# Patient Record
Sex: FEMALE | Race: WHITE | Employment: OTHER
[De-identification: names, ages, dates, MRNs, and addresses within clinical notes are randomized per-mention and may not be internally consistent; named-entity substitution may affect disease eponyms.]

---

## 2017-01-26 ENCOUNTER — OFFICE VISIT (OUTPATIENT)
Dept: PRIMARY CARE CLINIC | Facility: CLINIC | Age: 82
End: 2017-01-26

## 2017-01-26 VITALS
HEART RATE: 79 BPM | TEMPERATURE: 98.8 F | OXYGEN SATURATION: 96 % | WEIGHT: 123.7 LBS | RESPIRATION RATE: 22 BRPM | DIASTOLIC BLOOD PRESSURE: 70 MMHG | SYSTOLIC BLOOD PRESSURE: 134 MMHG | BODY MASS INDEX: 22.63 KG/M2

## 2017-01-26 DIAGNOSIS — N18.30 CKD (CHRONIC KIDNEY DISEASE) STAGE 3, GFR 30-59 ML/MIN (HCC): Chronic | ICD-10-CM

## 2017-01-26 DIAGNOSIS — J44.9 CHRONIC OBSTRUCTIVE PULMONARY DISEASE, UNSPECIFIED COPD TYPE (HCC): ICD-10-CM

## 2017-01-26 DIAGNOSIS — R53.83 FATIGUE, UNSPECIFIED TYPE: Primary | ICD-10-CM

## 2017-01-26 DIAGNOSIS — E78.5 HYPERLIPIDEMIA, UNSPECIFIED HYPERLIPIDEMIA TYPE: ICD-10-CM

## 2017-01-26 DIAGNOSIS — I10 ESSENTIAL HYPERTENSION: Chronic | ICD-10-CM

## 2017-01-26 DIAGNOSIS — R50.9 FEVER, UNKNOWN ORIGIN: ICD-10-CM

## 2017-01-26 LAB
INFLUENZA A ANTIBODY: NORMAL
INFLUENZA B ANTIBODY: NORMAL

## 2017-01-26 PROCEDURE — 87804 INFLUENZA ASSAY W/OPTIC: CPT | Performed by: NURSE PRACTITIONER

## 2017-01-26 PROCEDURE — G8484 FLU IMMUNIZE NO ADMIN: HCPCS | Performed by: NURSE PRACTITIONER

## 2017-01-26 PROCEDURE — G8926 SPIRO NO PERF OR DOC: HCPCS | Performed by: NURSE PRACTITIONER

## 2017-01-26 PROCEDURE — 4040F PNEUMOC VAC/ADMIN/RCVD: CPT | Performed by: NURSE PRACTITIONER

## 2017-01-26 PROCEDURE — 1090F PRES/ABSN URINE INCON ASSESS: CPT | Performed by: NURSE PRACTITIONER

## 2017-01-26 PROCEDURE — G8419 CALC BMI OUT NRM PARAM NOF/U: HCPCS | Performed by: NURSE PRACTITIONER

## 2017-01-26 PROCEDURE — 3023F SPIROM DOC REV: CPT | Performed by: NURSE PRACTITIONER

## 2017-01-26 PROCEDURE — 1036F TOBACCO NON-USER: CPT | Performed by: NURSE PRACTITIONER

## 2017-01-26 PROCEDURE — G8598 ASA/ANTIPLAT THER USED: HCPCS | Performed by: NURSE PRACTITIONER

## 2017-01-26 PROCEDURE — G8427 DOCREV CUR MEDS BY ELIG CLIN: HCPCS | Performed by: NURSE PRACTITIONER

## 2017-01-26 PROCEDURE — 1123F ACP DISCUSS/DSCN MKR DOCD: CPT | Performed by: NURSE PRACTITIONER

## 2017-01-26 PROCEDURE — 99213 OFFICE O/P EST LOW 20 MIN: CPT | Performed by: NURSE PRACTITIONER

## 2017-01-26 RX ORDER — AMLODIPINE BESYLATE 5 MG/1
5 TABLET ORAL DAILY
Qty: 30 TABLET | Refills: 3 | Status: ON HOLD | OUTPATIENT
Start: 2017-01-26 | End: 2017-02-21 | Stop reason: HOSPADM

## 2017-01-26 RX ORDER — AMLODIPINE BESYLATE 5 MG/1
5 TABLET ORAL DAILY
Qty: 30 TABLET | Refills: 3 | Status: CANCELLED | OUTPATIENT
Start: 2017-01-26

## 2017-01-26 RX ORDER — ALBUTEROL SULFATE 90 UG/1
2 AEROSOL, METERED RESPIRATORY (INHALATION) EVERY 6 HOURS PRN
Qty: 1 INHALER | Refills: 3 | Status: SHIPPED | OUTPATIENT
Start: 2017-01-26 | End: 2017-02-09 | Stop reason: ALTCHOICE

## 2017-01-26 RX ORDER — ALBUTEROL SULFATE 90 UG/1
2 AEROSOL, METERED RESPIRATORY (INHALATION) EVERY 6 HOURS PRN
Qty: 1 INHALER | Refills: 3 | Status: CANCELLED | OUTPATIENT
Start: 2017-01-26

## 2017-01-26 RX ORDER — FERROUS SULFATE 325(65) MG
325 TABLET ORAL
Qty: 30 TABLET | Refills: 3 | Status: CANCELLED | OUTPATIENT
Start: 2017-01-26

## 2017-01-26 RX ORDER — LABETALOL 100 MG/1
100 TABLET, FILM COATED ORAL 2 TIMES DAILY
Qty: 60 TABLET | Refills: 3 | Status: CANCELLED | OUTPATIENT
Start: 2017-01-26

## 2017-01-26 RX ORDER — LISINOPRIL 20 MG/1
TABLET ORAL
Qty: 30 TABLET | Refills: 3 | Status: ON HOLD | OUTPATIENT
Start: 2017-01-26 | End: 2017-02-21 | Stop reason: HOSPADM

## 2017-01-26 RX ORDER — ALBUTEROL SULFATE 2.5 MG/3ML
2.5 SOLUTION RESPIRATORY (INHALATION) EVERY 6 HOURS PRN
Qty: 120 EACH | Refills: 11 | Status: CANCELLED | OUTPATIENT
Start: 2017-01-26

## 2017-01-26 RX ORDER — SIMVASTATIN 20 MG
20 TABLET ORAL NIGHTLY
Qty: 30 TABLET | Refills: 3 | Status: CANCELLED | OUTPATIENT
Start: 2017-01-26

## 2017-01-26 RX ORDER — LISINOPRIL 20 MG/1
TABLET ORAL
Qty: 90 TABLET | Refills: 1 | Status: CANCELLED | OUTPATIENT
Start: 2017-01-26

## 2017-01-26 RX ORDER — LABETALOL 100 MG/1
100 TABLET, FILM COATED ORAL 2 TIMES DAILY
Qty: 60 TABLET | Refills: 3 | Status: ON HOLD | OUTPATIENT
Start: 2017-01-26 | End: 2017-02-14 | Stop reason: ALTCHOICE

## 2017-01-26 RX ORDER — SIMVASTATIN 20 MG
20 TABLET ORAL NIGHTLY
Qty: 30 TABLET | Refills: 3 | Status: SHIPPED | OUTPATIENT
Start: 2017-01-26 | End: 2017-11-07 | Stop reason: SDUPTHER

## 2017-01-26 RX ORDER — FERROUS SULFATE 325(65) MG
325 TABLET ORAL
Qty: 30 TABLET | Refills: 3 | Status: SHIPPED | OUTPATIENT
Start: 2017-01-26 | End: 2017-07-24 | Stop reason: SDUPTHER

## 2017-01-26 ASSESSMENT — ENCOUNTER SYMPTOMS
NAUSEA: 0
ANAL BLEEDING: 0
BLOOD IN STOOL: 0
COUGH: 1
BLURRED VISION: 0
SORE THROAT: 0
CONSTIPATION: 0
EYES NEGATIVE: 1
ABDOMINAL DISTENTION: 0
CHEST TIGHTNESS: 0
ABDOMINAL PAIN: 0
SHORTNESS OF BREATH: 0
WHEEZING: 0
VOMITING: 0
DIARRHEA: 1
TROUBLE SWALLOWING: 0

## 2017-01-26 ASSESSMENT — PATIENT HEALTH QUESTIONNAIRE - PHQ9
SUM OF ALL RESPONSES TO PHQ QUESTIONS 1-9: 0
SUM OF ALL RESPONSES TO PHQ9 QUESTIONS 1 & 2: 0
1. LITTLE INTEREST OR PLEASURE IN DOING THINGS: 0
2. FEELING DOWN, DEPRESSED OR HOPELESS: 0

## 2017-01-27 ENCOUNTER — TELEPHONE (OUTPATIENT)
Dept: PRIMARY CARE CLINIC | Facility: CLINIC | Age: 82
End: 2017-01-27

## 2017-01-30 ENCOUNTER — TELEPHONE (OUTPATIENT)
Dept: PRIMARY CARE CLINIC | Facility: CLINIC | Age: 82
End: 2017-01-30

## 2017-01-30 ENCOUNTER — OFFICE VISIT (OUTPATIENT)
Dept: PRIMARY CARE CLINIC | Facility: CLINIC | Age: 82
End: 2017-01-30

## 2017-01-30 VITALS
RESPIRATION RATE: 24 BRPM | BODY MASS INDEX: 23.34 KG/M2 | SYSTOLIC BLOOD PRESSURE: 180 MMHG | OXYGEN SATURATION: 97 % | DIASTOLIC BLOOD PRESSURE: 78 MMHG | WEIGHT: 127.6 LBS | HEART RATE: 68 BPM | TEMPERATURE: 97.3 F

## 2017-01-30 DIAGNOSIS — R79.89 ELEVATED BRAIN NATRIURETIC PEPTIDE (BNP) LEVEL: ICD-10-CM

## 2017-01-30 DIAGNOSIS — I10 ESSENTIAL HYPERTENSION: Chronic | ICD-10-CM

## 2017-01-30 DIAGNOSIS — I50.30 DIASTOLIC CONGESTIVE HEART FAILURE, UNSPECIFIED CONGESTIVE HEART FAILURE CHRONICITY: Chronic | ICD-10-CM

## 2017-01-30 DIAGNOSIS — R79.89 ELEVATED SERUM CREATININE: ICD-10-CM

## 2017-01-30 DIAGNOSIS — R94.31 ABNORMAL EKG: Primary | ICD-10-CM

## 2017-01-30 DIAGNOSIS — R73.09 ELEVATED GLUCOSE: ICD-10-CM

## 2017-01-30 DIAGNOSIS — N18.30 CKD (CHRONIC KIDNEY DISEASE) STAGE 3, GFR 30-59 ML/MIN (HCC): Chronic | ICD-10-CM

## 2017-01-30 PROCEDURE — 1036F TOBACCO NON-USER: CPT | Performed by: NURSE PRACTITIONER

## 2017-01-30 PROCEDURE — G8598 ASA/ANTIPLAT THER USED: HCPCS | Performed by: NURSE PRACTITIONER

## 2017-01-30 PROCEDURE — 1090F PRES/ABSN URINE INCON ASSESS: CPT | Performed by: NURSE PRACTITIONER

## 2017-01-30 PROCEDURE — G8484 FLU IMMUNIZE NO ADMIN: HCPCS | Performed by: NURSE PRACTITIONER

## 2017-01-30 PROCEDURE — G8420 CALC BMI NORM PARAMETERS: HCPCS | Performed by: NURSE PRACTITIONER

## 2017-01-30 PROCEDURE — 4040F PNEUMOC VAC/ADMIN/RCVD: CPT | Performed by: NURSE PRACTITIONER

## 2017-01-30 PROCEDURE — 99213 OFFICE O/P EST LOW 20 MIN: CPT | Performed by: NURSE PRACTITIONER

## 2017-01-30 PROCEDURE — 1123F ACP DISCUSS/DSCN MKR DOCD: CPT | Performed by: NURSE PRACTITIONER

## 2017-01-30 PROCEDURE — G8427 DOCREV CUR MEDS BY ELIG CLIN: HCPCS | Performed by: NURSE PRACTITIONER

## 2017-01-30 RX ORDER — ALBUTEROL SULFATE 2.5 MG/3ML
2.5 SOLUTION RESPIRATORY (INHALATION) EVERY 4 HOURS PRN
Refills: 1 | COMMUNITY
Start: 2017-01-27 | End: 2017-05-16 | Stop reason: SDUPTHER

## 2017-01-30 ASSESSMENT — ENCOUNTER SYMPTOMS
COUGH: 1
EYES NEGATIVE: 1
WHEEZING: 0
CHEST TIGHTNESS: 0
SHORTNESS OF BREATH: 1
GASTROINTESTINAL NEGATIVE: 1

## 2017-02-09 ENCOUNTER — OFFICE VISIT (OUTPATIENT)
Dept: PULMONOLOGY | Facility: CLINIC | Age: 82
End: 2017-02-09

## 2017-02-09 VITALS
RESPIRATION RATE: 18 BRPM | DIASTOLIC BLOOD PRESSURE: 70 MMHG | BODY MASS INDEX: 24.51 KG/M2 | WEIGHT: 133.2 LBS | OXYGEN SATURATION: 99 % | SYSTOLIC BLOOD PRESSURE: 181 MMHG | TEMPERATURE: 98.8 F | HEIGHT: 62 IN | HEART RATE: 84 BPM

## 2017-02-09 DIAGNOSIS — J44.9 CHRONIC OBSTRUCTIVE PULMONARY DISEASE, UNSPECIFIED COPD TYPE (HCC): ICD-10-CM

## 2017-02-09 DIAGNOSIS — R06.09 DYSPNEA ON EXERTION: ICD-10-CM

## 2017-02-09 DIAGNOSIS — J45.40 MODERATE PERSISTENT ASTHMA WITHOUT COMPLICATION: Primary | ICD-10-CM

## 2017-02-09 DIAGNOSIS — R91.8 MASS OF UPPER LOBE OF LUNG: ICD-10-CM

## 2017-02-09 PROCEDURE — 1123F ACP DISCUSS/DSCN MKR DOCD: CPT | Performed by: INTERNAL MEDICINE

## 2017-02-09 PROCEDURE — G8598 ASA/ANTIPLAT THER USED: HCPCS | Performed by: INTERNAL MEDICINE

## 2017-02-09 PROCEDURE — 1090F PRES/ABSN URINE INCON ASSESS: CPT | Performed by: INTERNAL MEDICINE

## 2017-02-09 PROCEDURE — 1036F TOBACCO NON-USER: CPT | Performed by: INTERNAL MEDICINE

## 2017-02-09 PROCEDURE — G8420 CALC BMI NORM PARAMETERS: HCPCS | Performed by: INTERNAL MEDICINE

## 2017-02-09 PROCEDURE — G8427 DOCREV CUR MEDS BY ELIG CLIN: HCPCS | Performed by: INTERNAL MEDICINE

## 2017-02-09 PROCEDURE — 4040F PNEUMOC VAC/ADMIN/RCVD: CPT | Performed by: INTERNAL MEDICINE

## 2017-02-09 PROCEDURE — G8484 FLU IMMUNIZE NO ADMIN: HCPCS | Performed by: INTERNAL MEDICINE

## 2017-02-09 PROCEDURE — 3023F SPIROM DOC REV: CPT | Performed by: INTERNAL MEDICINE

## 2017-02-09 PROCEDURE — G8926 SPIRO NO PERF OR DOC: HCPCS | Performed by: INTERNAL MEDICINE

## 2017-02-09 PROCEDURE — 99214 OFFICE O/P EST MOD 30 MIN: CPT | Performed by: INTERNAL MEDICINE

## 2017-02-09 RX ORDER — ALBUTEROL SULFATE 90 UG/1
2 AEROSOL, METERED RESPIRATORY (INHALATION) EVERY 6 HOURS PRN
Qty: 1 INHALER | Refills: 11 | Status: SHIPPED | OUTPATIENT
Start: 2017-02-09 | End: 2018-02-19 | Stop reason: SDUPTHER

## 2017-02-13 ENCOUNTER — TELEPHONE (OUTPATIENT)
Dept: PULMONOLOGY | Facility: CLINIC | Age: 82
End: 2017-02-13

## 2017-02-14 ENCOUNTER — HOSPITAL ENCOUNTER (INPATIENT)
Age: 82
LOS: 7 days | Discharge: HOME | DRG: 286 | End: 2017-02-21
Attending: INTERNAL MEDICINE | Admitting: INTERNAL MEDICINE
Payer: MEDICARE

## 2017-02-14 PROBLEM — Z87.891 EX-SMOKER: Status: ACTIVE | Noted: 2017-02-14

## 2017-02-14 LAB
HCT VFR BLD CALC: 37.8 % (ref 36–46)
HEMOGLOBIN: 12.6 G/DL (ref 12–16)
LV EF: 55 %
LVEF MODALITY: NORMAL
MCH RBC QN AUTO: 28.8 PG (ref 26–34)
MCHC RBC AUTO-ENTMCNC: 33.4 G/DL (ref 31–37)
MCV RBC AUTO: 86.2 FL (ref 80–100)
PARTIAL THROMBOPLASTIN TIME: 20.4 SEC (ref 21.3–31.3)
PARTIAL THROMBOPLASTIN TIME: 31.2 SEC (ref 21.3–31.3)
PARTIAL THROMBOPLASTIN TIME: 32.1 SEC (ref 21.3–31.3)
PARTIAL THROMBOPLASTIN TIME: 40.8 SEC (ref 21.3–31.3)
PDW BLD-RTO: 15.8 % (ref 12.5–15.4)
PLATELET # BLD: 134 K/UL (ref 140–450)
PMV BLD AUTO: 9.6 FL (ref 6–12)
RBC # BLD: 4.38 M/UL (ref 4–5.2)
WBC # BLD: 13.4 K/UL (ref 3.5–11)

## 2017-02-14 PROCEDURE — 85027 COMPLETE CBC AUTOMATED: CPT

## 2017-02-14 PROCEDURE — 76937 US GUIDE VASCULAR ACCESS: CPT

## 2017-02-14 PROCEDURE — 94640 AIRWAY INHALATION TREATMENT: CPT

## 2017-02-14 PROCEDURE — 2500000003 HC RX 250 WO HCPCS: Performed by: NURSE PRACTITIONER

## 2017-02-14 PROCEDURE — 6360000002 HC RX W HCPCS: Performed by: INTERNAL MEDICINE

## 2017-02-14 PROCEDURE — 99223 1ST HOSP IP/OBS HIGH 75: CPT | Performed by: INTERNAL MEDICINE

## 2017-02-14 PROCEDURE — 6360000002 HC RX W HCPCS: Performed by: NURSE PRACTITIONER

## 2017-02-14 PROCEDURE — 36415 COLL VENOUS BLD VENIPUNCTURE: CPT

## 2017-02-14 PROCEDURE — 2580000003 HC RX 258: Performed by: NURSE PRACTITIONER

## 2017-02-14 PROCEDURE — 2140000000 HC CCU INTERMEDIATE R&B

## 2017-02-14 PROCEDURE — 85730 THROMBOPLASTIN TIME PARTIAL: CPT

## 2017-02-14 PROCEDURE — 6370000000 HC RX 637 (ALT 250 FOR IP): Performed by: NURSE PRACTITIONER

## 2017-02-14 PROCEDURE — 6370000000 HC RX 637 (ALT 250 FOR IP): Performed by: INTERNAL MEDICINE

## 2017-02-14 PROCEDURE — 93306 TTE W/DOPPLER COMPLETE: CPT

## 2017-02-14 RX ORDER — POTASSIUM CHLORIDE 7.45 MG/ML
10 INJECTION INTRAVENOUS PRN
Status: DISCONTINUED | OUTPATIENT
Start: 2017-02-14 | End: 2017-02-14

## 2017-02-14 RX ORDER — LISINOPRIL 5 MG/1
5 TABLET ORAL DAILY
Status: DISCONTINUED | OUTPATIENT
Start: 2017-02-14 | End: 2017-02-14

## 2017-02-14 RX ORDER — SODIUM CHLORIDE 0.9 % (FLUSH) 0.9 %
10 SYRINGE (ML) INJECTION EVERY 12 HOURS SCHEDULED
Status: DISCONTINUED | OUTPATIENT
Start: 2017-02-14 | End: 2017-02-21 | Stop reason: HOSPADM

## 2017-02-14 RX ORDER — ACETAMINOPHEN 325 MG/1
650 TABLET ORAL EVERY 4 HOURS PRN
Status: DISCONTINUED | OUTPATIENT
Start: 2017-02-14 | End: 2017-02-21 | Stop reason: HOSPADM

## 2017-02-14 RX ORDER — SIMVASTATIN 20 MG
20 TABLET ORAL NIGHTLY
Status: DISCONTINUED | OUTPATIENT
Start: 2017-02-14 | End: 2017-02-21 | Stop reason: HOSPADM

## 2017-02-14 RX ORDER — ALBUTEROL SULFATE 2.5 MG/3ML
2.5 SOLUTION RESPIRATORY (INHALATION) EVERY 4 HOURS PRN
Status: DISCONTINUED | OUTPATIENT
Start: 2017-02-14 | End: 2017-02-21 | Stop reason: HOSPADM

## 2017-02-14 RX ORDER — HEPARIN SODIUM 1000 [USP'U]/ML
60 INJECTION, SOLUTION INTRAVENOUS; SUBCUTANEOUS ONCE
Status: DISCONTINUED | OUTPATIENT
Start: 2017-02-14 | End: 2017-02-21 | Stop reason: HOSPADM

## 2017-02-14 RX ORDER — LANOLIN ALCOHOL/MO/W.PET/CERES
325 CREAM (GRAM) TOPICAL
Status: DISCONTINUED | OUTPATIENT
Start: 2017-02-14 | End: 2017-02-21 | Stop reason: HOSPADM

## 2017-02-14 RX ORDER — LABETALOL 100 MG/1
100 TABLET, FILM COATED ORAL 2 TIMES DAILY
Status: DISCONTINUED | OUTPATIENT
Start: 2017-02-14 | End: 2017-02-15

## 2017-02-14 RX ORDER — LISINOPRIL 20 MG/1
20 TABLET ORAL DAILY
Status: DISCONTINUED | OUTPATIENT
Start: 2017-02-14 | End: 2017-02-16

## 2017-02-14 RX ORDER — ERGOCALCIFEROL 1.25 MG/1
50000 CAPSULE ORAL WEEKLY
Status: DISCONTINUED | OUTPATIENT
Start: 2017-02-14 | End: 2017-02-21 | Stop reason: HOSPADM

## 2017-02-14 RX ORDER — POTASSIUM CHLORIDE 20 MEQ/1
40 TABLET, EXTENDED RELEASE ORAL PRN
Status: DISCONTINUED | OUTPATIENT
Start: 2017-02-14 | End: 2017-02-14

## 2017-02-14 RX ORDER — NITROGLYCERIN 0.4 MG/1
0.4 TABLET SUBLINGUAL EVERY 5 MIN PRN
Status: DISCONTINUED | OUTPATIENT
Start: 2017-02-14 | End: 2017-02-21 | Stop reason: HOSPADM

## 2017-02-14 RX ORDER — FUROSEMIDE 10 MG/ML
40 INJECTION INTRAMUSCULAR; INTRAVENOUS 2 TIMES DAILY
Status: DISCONTINUED | OUTPATIENT
Start: 2017-02-14 | End: 2017-02-16

## 2017-02-14 RX ORDER — PREDNISONE 20 MG/1
20 TABLET ORAL DAILY
Status: DISCONTINUED | OUTPATIENT
Start: 2017-02-14 | End: 2017-02-21 | Stop reason: HOSPADM

## 2017-02-14 RX ORDER — SODIUM CHLORIDE 0.9 % (FLUSH) 0.9 %
10 SYRINGE (ML) INJECTION PRN
Status: DISCONTINUED | OUTPATIENT
Start: 2017-02-14 | End: 2017-02-21 | Stop reason: HOSPADM

## 2017-02-14 RX ORDER — HEPARIN SODIUM 10000 [USP'U]/100ML
12 INJECTION, SOLUTION INTRAVENOUS CONTINUOUS
Status: DISCONTINUED | OUTPATIENT
Start: 2017-02-14 | End: 2017-02-20

## 2017-02-14 RX ORDER — AMLODIPINE BESYLATE 5 MG/1
5 TABLET ORAL DAILY
Status: DISCONTINUED | OUTPATIENT
Start: 2017-02-14 | End: 2017-02-17

## 2017-02-14 RX ORDER — HEPARIN SODIUM 1000 [USP'U]/ML
30 INJECTION, SOLUTION INTRAVENOUS; SUBCUTANEOUS PRN
Status: DISCONTINUED | OUTPATIENT
Start: 2017-02-14 | End: 2017-02-20

## 2017-02-14 RX ORDER — POTASSIUM CHLORIDE 20MEQ/15ML
40 LIQUID (ML) ORAL PRN
Status: DISCONTINUED | OUTPATIENT
Start: 2017-02-14 | End: 2017-02-14

## 2017-02-14 RX ORDER — ONDANSETRON 2 MG/ML
4 INJECTION INTRAMUSCULAR; INTRAVENOUS EVERY 6 HOURS PRN
Status: DISCONTINUED | OUTPATIENT
Start: 2017-02-14 | End: 2017-02-21 | Stop reason: HOSPADM

## 2017-02-14 RX ORDER — HEPARIN SODIUM 1000 [USP'U]/ML
60 INJECTION, SOLUTION INTRAVENOUS; SUBCUTANEOUS PRN
Status: DISCONTINUED | OUTPATIENT
Start: 2017-02-14 | End: 2017-02-20

## 2017-02-14 RX ADMIN — ASPIRIN 325 MG: 325 TABLET, COATED ORAL at 08:50

## 2017-02-14 RX ADMIN — HEPARIN SODIUM AND DEXTROSE 698.4 UNITS/HR: 10000; 5 INJECTION INTRAVENOUS at 06:23

## 2017-02-14 RX ADMIN — HEPARIN SODIUM 3500 UNITS: 1000 INJECTION, SOLUTION INTRAVENOUS; SUBCUTANEOUS at 06:23

## 2017-02-14 RX ADMIN — PREDNISONE 20 MG: 20 TABLET ORAL at 11:22

## 2017-02-14 RX ADMIN — Medication 10 ML: at 08:50

## 2017-02-14 RX ADMIN — SIMVASTATIN 20 MG: 20 TABLET, FILM COATED ORAL at 21:52

## 2017-02-14 RX ADMIN — ERGOCALCIFEROL 50000 UNITS: 1.25 CAPSULE ORAL at 08:50

## 2017-02-14 RX ADMIN — LABETALOL HCL 100 MG: 100 TABLET, FILM COATED ORAL at 21:52

## 2017-02-14 RX ADMIN — FUROSEMIDE 40 MG: 10 INJECTION, SOLUTION INTRAMUSCULAR; INTRAVENOUS at 21:52

## 2017-02-14 RX ADMIN — HEPARIN SODIUM 1700 UNITS: 1000 INJECTION, SOLUTION INTRAVENOUS; SUBCUTANEOUS at 13:26

## 2017-02-14 RX ADMIN — MOMETASONE FUROATE AND FORMOTEROL FUMARATE DIHYDRATE 2 PUFF: 200; 5 AEROSOL RESPIRATORY (INHALATION) at 12:54

## 2017-02-14 RX ADMIN — LISINOPRIL 20 MG: 20 TABLET ORAL at 13:07

## 2017-02-14 RX ADMIN — DILTIAZEM HYDROCHLORIDE 7.5 MG/HR: 5 INJECTION INTRAVENOUS at 05:29

## 2017-02-14 RX ADMIN — FUROSEMIDE 40 MG: 10 INJECTION, SOLUTION INTRAMUSCULAR; INTRAVENOUS at 08:50

## 2017-02-14 RX ADMIN — AMLODIPINE BESYLATE 5 MG: 5 TABLET ORAL at 08:50

## 2017-02-15 LAB
ANION GAP SERPL CALCULATED.3IONS-SCNC: 16 MMOL/L (ref 9–17)
BUN BLDV-MCNC: 54 MG/DL (ref 8–23)
BUN/CREAT BLD: ABNORMAL (ref 9–20)
CALCIUM SERPL-MCNC: 8.4 MG/DL (ref 8.6–10.4)
CHLORIDE BLD-SCNC: 103 MMOL/L (ref 98–107)
CO2: 22 MMOL/L (ref 20–31)
CREAT SERPL-MCNC: 2.47 MG/DL (ref 0.5–0.9)
GFR AFRICAN AMERICAN: 22 ML/MIN
GFR NON-AFRICAN AMERICAN: 18 ML/MIN
GFR SERPL CREATININE-BSD FRML MDRD: ABNORMAL ML/MIN/{1.73_M2}
GFR SERPL CREATININE-BSD FRML MDRD: ABNORMAL ML/MIN/{1.73_M2}
GLUCOSE BLD-MCNC: 137 MG/DL (ref 70–99)
HCT VFR BLD CALC: 32.5 % (ref 36–46)
HEMOGLOBIN: 10.9 G/DL (ref 12–16)
MAGNESIUM: 1.7 MG/DL (ref 1.6–2.6)
MCH RBC QN AUTO: 28.6 PG (ref 26–34)
MCHC RBC AUTO-ENTMCNC: 33.5 G/DL (ref 31–37)
MCV RBC AUTO: 85.6 FL (ref 80–100)
PARTIAL THROMBOPLASTIN TIME: 61.7 SEC (ref 21.3–31.3)
PDW BLD-RTO: 15.2 % (ref 12.5–15.4)
PLATELET # BLD: 105 K/UL (ref 140–450)
PMV BLD AUTO: 9.6 FL (ref 6–12)
POTASSIUM SERPL-SCNC: 4.3 MMOL/L (ref 3.7–5.3)
RBC # BLD: 3.81 M/UL (ref 4–5.2)
SODIUM BLD-SCNC: 141 MMOL/L (ref 135–144)
WBC # BLD: 11.4 K/UL (ref 3.5–11)

## 2017-02-15 PROCEDURE — 99231 SBSQ HOSP IP/OBS SF/LOW 25: CPT | Performed by: INTERNAL MEDICINE

## 2017-02-15 PROCEDURE — 6370000000 HC RX 637 (ALT 250 FOR IP): Performed by: NURSE PRACTITIONER

## 2017-02-15 PROCEDURE — 2580000003 HC RX 258: Performed by: NURSE PRACTITIONER

## 2017-02-15 PROCEDURE — 83735 ASSAY OF MAGNESIUM: CPT

## 2017-02-15 PROCEDURE — 85730 THROMBOPLASTIN TIME PARTIAL: CPT

## 2017-02-15 PROCEDURE — 85027 COMPLETE CBC AUTOMATED: CPT

## 2017-02-15 PROCEDURE — 6360000002 HC RX W HCPCS: Performed by: INTERNAL MEDICINE

## 2017-02-15 PROCEDURE — 6370000000 HC RX 637 (ALT 250 FOR IP): Performed by: INTERNAL MEDICINE

## 2017-02-15 PROCEDURE — 2140000000 HC CCU INTERMEDIATE R&B

## 2017-02-15 PROCEDURE — 6360000002 HC RX W HCPCS: Performed by: NURSE PRACTITIONER

## 2017-02-15 PROCEDURE — 80048 BASIC METABOLIC PNL TOTAL CA: CPT

## 2017-02-15 PROCEDURE — 36415 COLL VENOUS BLD VENIPUNCTURE: CPT

## 2017-02-15 RX ADMIN — Medication 10 ML: at 20:26

## 2017-02-15 RX ADMIN — AMLODIPINE BESYLATE 5 MG: 5 TABLET ORAL at 10:34

## 2017-02-15 RX ADMIN — LISINOPRIL 20 MG: 20 TABLET ORAL at 10:33

## 2017-02-15 RX ADMIN — FUROSEMIDE 40 MG: 10 INJECTION, SOLUTION INTRAMUSCULAR; INTRAVENOUS at 10:33

## 2017-02-15 RX ADMIN — SIMVASTATIN 20 MG: 20 TABLET, FILM COATED ORAL at 20:25

## 2017-02-15 RX ADMIN — HEPARIN SODIUM 1700 UNITS: 1000 INJECTION, SOLUTION INTRAVENOUS; SUBCUTANEOUS at 00:47

## 2017-02-15 RX ADMIN — FUROSEMIDE 40 MG: 10 INJECTION, SOLUTION INTRAMUSCULAR; INTRAVENOUS at 20:25

## 2017-02-15 RX ADMIN — ASPIRIN 325 MG: 325 TABLET, COATED ORAL at 09:00

## 2017-02-15 RX ADMIN — Medication 10 ML: at 10:33

## 2017-02-15 RX ADMIN — FERROUS SULFATE TAB EC 325 MG (65 MG FE EQUIVALENT) 325 MG: 325 (65 FE) TABLET DELAYED RESPONSE at 10:33

## 2017-02-15 RX ADMIN — PREDNISONE 20 MG: 20 TABLET ORAL at 10:33

## 2017-02-15 ASSESSMENT — PAIN SCALES - GENERAL: PAINLEVEL_OUTOF10: 0

## 2017-02-16 ENCOUNTER — APPOINTMENT (OUTPATIENT)
Dept: ULTRASOUND IMAGING | Age: 82
DRG: 286 | End: 2017-02-16
Attending: INTERNAL MEDICINE
Payer: MEDICARE

## 2017-02-16 LAB
ADENOVIRUS PCR: NOT DETECTED
ANION GAP SERPL CALCULATED.3IONS-SCNC: 17 MMOL/L (ref 9–17)
BORDETELLA PERTUSSIS PCR: NOT DETECTED
BUN BLDV-MCNC: 63 MG/DL (ref 8–23)
BUN/CREAT BLD: ABNORMAL (ref 9–20)
CALCIUM SERPL-MCNC: 8.9 MG/DL (ref 8.6–10.4)
CHLAMYDIA PNEUMONIAE BY PCR: NOT DETECTED
CHLORIDE BLD-SCNC: 102 MMOL/L (ref 98–107)
CO2: 22 MMOL/L (ref 20–31)
CORONAVIRUS 229E PCR: NOT DETECTED
CORONAVIRUS HKU1 PCR: NOT DETECTED
CORONAVIRUS NL63 PCR: NOT DETECTED
CORONAVIRUS OC43 PCR: NOT DETECTED
CREAT SERPL-MCNC: 2.95 MG/DL (ref 0.5–0.9)
GFR AFRICAN AMERICAN: 18 ML/MIN
GFR NON-AFRICAN AMERICAN: 15 ML/MIN
GFR SERPL CREATININE-BSD FRML MDRD: ABNORMAL ML/MIN/{1.73_M2}
GFR SERPL CREATININE-BSD FRML MDRD: ABNORMAL ML/MIN/{1.73_M2}
GLUCOSE BLD-MCNC: 140 MG/DL (ref 70–99)
HUMAN METAPNEUMOVIRUS PCR: NOT DETECTED
INFLUENZA A BY PCR: NOT DETECTED
INFLUENZA A H1 (2009) PCR: NORMAL
INFLUENZA A H1 PCR: NORMAL
INFLUENZA A H3 PCR: NORMAL
INFLUENZA B BY PCR: NOT DETECTED
MAGNESIUM: 2 MG/DL (ref 1.6–2.6)
MYCOPLASMA PNEUMONIAE PCR: NOT DETECTED
PARAINFLUENZA 1 PCR: NOT DETECTED
PARAINFLUENZA 2 PCR: NOT DETECTED
PARAINFLUENZA 3 PCR: NOT DETECTED
PARAINFLUENZA 4 PCR: NOT DETECTED
PARTIAL THROMBOPLASTIN TIME: 46.6 SEC (ref 21.3–31.3)
PARTIAL THROMBOPLASTIN TIME: 54.1 SEC (ref 21.3–31.3)
PLATELET # BLD: 129 K/UL (ref 140–450)
POTASSIUM SERPL-SCNC: 4.1 MMOL/L (ref 3.7–5.3)
RESP SYNCYTIAL VIRUS PCR: NOT DETECTED
RHINO/ENTEROVIRUS PCR: NOT DETECTED
SODIUM BLD-SCNC: 141 MMOL/L (ref 135–144)
SOURCE: NORMAL

## 2017-02-16 PROCEDURE — 93005 ELECTROCARDIOGRAM TRACING: CPT

## 2017-02-16 PROCEDURE — 76770 US EXAM ABDO BACK WALL COMP: CPT

## 2017-02-16 PROCEDURE — 2580000003 HC RX 258: Performed by: NURSE PRACTITIONER

## 2017-02-16 PROCEDURE — 87633 RESP VIRUS 12-25 TARGETS: CPT

## 2017-02-16 PROCEDURE — 85730 THROMBOPLASTIN TIME PARTIAL: CPT

## 2017-02-16 PROCEDURE — 84156 ASSAY OF PROTEIN URINE: CPT

## 2017-02-16 PROCEDURE — 87205 SMEAR GRAM STAIN: CPT

## 2017-02-16 PROCEDURE — 6370000000 HC RX 637 (ALT 250 FOR IP): Performed by: NURSE PRACTITIONER

## 2017-02-16 PROCEDURE — 87486 CHLMYD PNEUM DNA AMP PROBE: CPT

## 2017-02-16 PROCEDURE — 85049 AUTOMATED PLATELET COUNT: CPT

## 2017-02-16 PROCEDURE — 6370000000 HC RX 637 (ALT 250 FOR IP): Performed by: INTERNAL MEDICINE

## 2017-02-16 PROCEDURE — 80048 BASIC METABOLIC PNL TOTAL CA: CPT

## 2017-02-16 PROCEDURE — 99232 SBSQ HOSP IP/OBS MODERATE 35: CPT | Performed by: INTERNAL MEDICINE

## 2017-02-16 PROCEDURE — 2580000003 HC RX 258: Performed by: INTERNAL MEDICINE

## 2017-02-16 PROCEDURE — 82570 ASSAY OF URINE CREATININE: CPT

## 2017-02-16 PROCEDURE — 2140000000 HC CCU INTERMEDIATE R&B

## 2017-02-16 PROCEDURE — 84166 PROTEIN E-PHORESIS/URINE/CSF: CPT

## 2017-02-16 PROCEDURE — 6360000002 HC RX W HCPCS: Performed by: NURSE PRACTITIONER

## 2017-02-16 PROCEDURE — 76770 US EXAM ABDO BACK WALL COMP: CPT | Performed by: RADIOLOGY

## 2017-02-16 PROCEDURE — 6360000002 HC RX W HCPCS: Performed by: INTERNAL MEDICINE

## 2017-02-16 PROCEDURE — 84165 PROTEIN E-PHORESIS SERUM: CPT

## 2017-02-16 PROCEDURE — 87581 M.PNEUMON DNA AMP PROBE: CPT

## 2017-02-16 PROCEDURE — 36415 COLL VENOUS BLD VENIPUNCTURE: CPT

## 2017-02-16 PROCEDURE — 87798 DETECT AGENT NOS DNA AMP: CPT

## 2017-02-16 PROCEDURE — 84300 ASSAY OF URINE SODIUM: CPT

## 2017-02-16 PROCEDURE — 84155 ASSAY OF PROTEIN SERUM: CPT

## 2017-02-16 PROCEDURE — 86334 IMMUNOFIX E-PHORESIS SERUM: CPT

## 2017-02-16 PROCEDURE — 81003 URINALYSIS AUTO W/O SCOPE: CPT

## 2017-02-16 PROCEDURE — 83735 ASSAY OF MAGNESIUM: CPT

## 2017-02-16 RX ORDER — SODIUM CHLORIDE 450 MG/100ML
INJECTION, SOLUTION INTRAVENOUS CONTINUOUS
Status: DISCONTINUED | OUTPATIENT
Start: 2017-02-16 | End: 2017-02-19

## 2017-02-16 RX ADMIN — SIMVASTATIN 20 MG: 20 TABLET, FILM COATED ORAL at 21:32

## 2017-02-16 RX ADMIN — PREDNISONE 20 MG: 20 TABLET ORAL at 10:16

## 2017-02-16 RX ADMIN — FERROUS SULFATE TAB EC 325 MG (65 MG FE EQUIVALENT) 325 MG: 325 (65 FE) TABLET DELAYED RESPONSE at 10:15

## 2017-02-16 RX ADMIN — HEPARIN SODIUM AND DEXTROSE 15.98 UNITS/KG/HR: 10000; 5 INJECTION INTRAVENOUS at 14:34

## 2017-02-16 RX ADMIN — AMLODIPINE BESYLATE 5 MG: 5 TABLET ORAL at 10:15

## 2017-02-16 RX ADMIN — ASPIRIN 325 MG: 325 TABLET, COATED ORAL at 10:15

## 2017-02-16 RX ADMIN — Medication 10 ML: at 10:44

## 2017-02-16 RX ADMIN — LISINOPRIL 20 MG: 20 TABLET ORAL at 10:16

## 2017-02-16 RX ADMIN — FUROSEMIDE 40 MG: 10 INJECTION, SOLUTION INTRAMUSCULAR; INTRAVENOUS at 10:16

## 2017-02-16 RX ADMIN — SODIUM CHLORIDE: 4.5 INJECTION, SOLUTION INTRAVENOUS at 14:30

## 2017-02-16 ASSESSMENT — ENCOUNTER SYMPTOMS
BACK PAIN: 0
COUGH: 1
DIARRHEA: 0
SHORTNESS OF BREATH: 0
ABDOMINAL PAIN: 0
NAUSEA: 0
SORE THROAT: 0
CONSTIPATION: 0

## 2017-02-16 ASSESSMENT — PAIN SCALES - GENERAL
PAINLEVEL_OUTOF10: 0
PAINLEVEL_OUTOF10: 0

## 2017-02-17 ENCOUNTER — APPOINTMENT (OUTPATIENT)
Dept: NUCLEAR MEDICINE | Age: 82
DRG: 286 | End: 2017-02-17
Attending: INTERNAL MEDICINE
Payer: MEDICARE

## 2017-02-17 PROBLEM — I48.91 ATRIAL FIBRILLATION WITH RAPID VENTRICULAR RESPONSE (HCC): Status: ACTIVE | Noted: 2017-02-17

## 2017-02-17 LAB
ALBUMIN (CALCULATED): 2.8 G/DL (ref 3.2–5.2)
ALBUMIN PERCENT: 51 % (ref 45–65)
ALPHA 1 PERCENT: 5 % (ref 3–6)
ALPHA 2 PERCENT: 20 % (ref 6–13)
ALPHA-1-GLOBULIN: 0.3 G/DL (ref 0.1–0.4)
ALPHA-2-GLOBULIN: 1.1 G/DL (ref 0.5–0.9)
ANION GAP SERPL CALCULATED.3IONS-SCNC: 19 MMOL/L (ref 9–17)
BETA GLOBULIN: 0.8 G/DL (ref 0.7–1.4)
BETA PERCENT: 15 % (ref 11–19)
BILIRUBIN URINE: NEGATIVE
BUN BLDV-MCNC: 66 MG/DL (ref 8–23)
BUN/CREAT BLD: ABNORMAL (ref 9–20)
CALCIUM SERPL-MCNC: 8.8 MG/DL (ref 8.6–10.4)
CHLORIDE BLD-SCNC: 99 MMOL/L (ref 98–107)
CO2: 23 MMOL/L (ref 20–31)
COLOR: YELLOW
COMMENT UA: NORMAL
CREAT SERPL-MCNC: 2.55 MG/DL (ref 0.5–0.9)
CREATININE URINE: 60.8 MG/DL (ref 28–217)
EKG ATRIAL RATE: 89 BPM
EKG Q-T INTERVAL: 348 MS
EKG QRS DURATION: 72 MS
EKG QTC CALCULATION (BAZETT): 423 MS
EKG R AXIS: -158 DEGREES
EKG T AXIS: 31 DEGREES
EKG VENTRICULAR RATE: 89 BPM
EOSINOPHIL,URINE: NORMAL
GAMMA GLOBULIN %: 9 % (ref 9–20)
GAMMA GLOBULIN: 0.5 G/DL (ref 0.5–1.5)
GFR AFRICAN AMERICAN: 22 ML/MIN
GFR NON-AFRICAN AMERICAN: 18 ML/MIN
GFR SERPL CREATININE-BSD FRML MDRD: ABNORMAL ML/MIN/{1.73_M2}
GFR SERPL CREATININE-BSD FRML MDRD: ABNORMAL ML/MIN/{1.73_M2}
GLUCOSE BLD-MCNC: 132 MG/DL (ref 70–99)
GLUCOSE URINE: NEGATIVE
KETONES, URINE: NEGATIVE
LEUKOCYTE ESTERASE, URINE: NEGATIVE
LV EF: 51 %
LVEF MODALITY: NORMAL
NITRITE, URINE: NEGATIVE
P E INTERPRETATION, U: NORMAL
PARTIAL THROMBOPLASTIN TIME: 62.9 SEC (ref 21.3–31.3)
PATHOLOGIST: ABNORMAL
PATHOLOGIST: NORMAL
PATHOLOGIST: NORMAL
PH UA: 5 (ref 5–8)
POTASSIUM SERPL-SCNC: 4.1 MMOL/L (ref 3.7–5.3)
PROTEIN ELECTROPHORESIS, SERUM: ABNORMAL
PROTEIN UA: NEGATIVE
SERUM IFX INTERP: NORMAL
SODIUM BLD-SCNC: 141 MMOL/L (ref 135–144)
SODIUM,UR: 72 MMOL/L
SPECIFIC GRAVITY UA: 1.01 (ref 1–1.03)
SPECIMEN TYPE: NORMAL
TOTAL PROT. SUM,%: 100 % (ref 98–102)
TOTAL PROT. SUM: 5.5 G/DL (ref 6.3–8.2)
TOTAL PROTEIN: 5.4 G/DL (ref 6.4–8.3)
TURBIDITY: CLEAR
URINE HGB: NEGATIVE
URINE TOTAL PROTEIN: 9 MG/DL
UROBILINOGEN, URINE: NORMAL

## 2017-02-17 PROCEDURE — 93017 CV STRESS TEST TRACING ONLY: CPT

## 2017-02-17 PROCEDURE — 6370000000 HC RX 637 (ALT 250 FOR IP): Performed by: INTERNAL MEDICINE

## 2017-02-17 PROCEDURE — 78452 HT MUSCLE IMAGE SPECT MULT: CPT

## 2017-02-17 PROCEDURE — A9500 TC99M SESTAMIBI: HCPCS | Performed by: INTERNAL MEDICINE

## 2017-02-17 PROCEDURE — 6360000002 HC RX W HCPCS: Performed by: INTERNAL MEDICINE

## 2017-02-17 PROCEDURE — 6370000000 HC RX 637 (ALT 250 FOR IP): Performed by: NURSE PRACTITIONER

## 2017-02-17 PROCEDURE — 78452 HT MUSCLE IMAGE SPECT MULT: CPT | Performed by: RADIOLOGY

## 2017-02-17 PROCEDURE — 2580000003 HC RX 258: Performed by: NURSE PRACTITIONER

## 2017-02-17 PROCEDURE — 80048 BASIC METABOLIC PNL TOTAL CA: CPT

## 2017-02-17 PROCEDURE — 2140000000 HC CCU INTERMEDIATE R&B

## 2017-02-17 PROCEDURE — 2580000003 HC RX 258: Performed by: INTERNAL MEDICINE

## 2017-02-17 PROCEDURE — 76937 US GUIDE VASCULAR ACCESS: CPT

## 2017-02-17 PROCEDURE — 85730 THROMBOPLASTIN TIME PARTIAL: CPT

## 2017-02-17 PROCEDURE — 3430000000 HC RX DIAGNOSTIC RADIOPHARMACEUTICAL: Performed by: INTERNAL MEDICINE

## 2017-02-17 PROCEDURE — 36415 COLL VENOUS BLD VENIPUNCTURE: CPT

## 2017-02-17 PROCEDURE — 99232 SBSQ HOSP IP/OBS MODERATE 35: CPT | Performed by: INTERNAL MEDICINE

## 2017-02-17 PROCEDURE — 94640 AIRWAY INHALATION TREATMENT: CPT

## 2017-02-17 RX ORDER — AMINOPHYLLINE DIHYDRATE 25 MG/ML
100 INJECTION, SOLUTION INTRAVENOUS
Status: COMPLETED | OUTPATIENT
Start: 2017-02-17 | End: 2017-02-17

## 2017-02-17 RX ORDER — SODIUM CHLORIDE 0.9 % (FLUSH) 0.9 %
10 SYRINGE (ML) INJECTION PRN
Status: DISCONTINUED | OUTPATIENT
Start: 2017-02-17 | End: 2017-02-17

## 2017-02-17 RX ORDER — SODIUM CHLORIDE 9 MG/ML
INJECTION, SOLUTION INTRAVENOUS ONCE
Status: COMPLETED | OUTPATIENT
Start: 2017-02-17 | End: 2017-02-17

## 2017-02-17 RX ADMIN — FERROUS SULFATE TAB EC 325 MG (65 MG FE EQUIVALENT) 325 MG: 325 (65 FE) TABLET DELAYED RESPONSE at 08:27

## 2017-02-17 RX ADMIN — SODIUM CHLORIDE: 4.5 INJECTION, SOLUTION INTRAVENOUS at 13:39

## 2017-02-17 RX ADMIN — DILTIAZEM HYDROCHLORIDE 30 MG: 30 TABLET, FILM COATED ORAL at 13:21

## 2017-02-17 RX ADMIN — ASPIRIN 325 MG: 325 TABLET, COATED ORAL at 08:27

## 2017-02-17 RX ADMIN — HEPARIN SODIUM AND DEXTROSE 18.04 UNITS/KG/HR: 10000; 5 INJECTION INTRAVENOUS at 13:39

## 2017-02-17 RX ADMIN — Medication 10 ML: at 11:33

## 2017-02-17 RX ADMIN — Medication 10 ML: at 11:31

## 2017-02-17 RX ADMIN — AMINOPHYLLINE 100 MG: 25 INJECTION, SOLUTION INTRAVENOUS at 11:37

## 2017-02-17 RX ADMIN — METOPROLOL TARTRATE 12.5 MG: 25 TABLET ORAL at 13:21

## 2017-02-17 RX ADMIN — TETRAKIS(2-METHOXYISOBUTYLISOCYANIDE)COPPER(I) TETRAFLUOROBORATE 14.7 MILLICURIE: 1 INJECTION, POWDER, LYOPHILIZED, FOR SOLUTION INTRAVENOUS at 07:35

## 2017-02-17 RX ADMIN — METOPROLOL TARTRATE 12.5 MG: 25 TABLET ORAL at 22:34

## 2017-02-17 RX ADMIN — REGADENOSON 0.4 MG: 0.08 INJECTION, SOLUTION INTRAVENOUS at 11:33

## 2017-02-17 RX ADMIN — TETRAKIS(2-METHOXYISOBUTYLISOCYANIDE)COPPER(I) TETRAFLUOROBORATE 35 MILLICURIE: 1 INJECTION, POWDER, LYOPHILIZED, FOR SOLUTION INTRAVENOUS at 11:38

## 2017-02-17 RX ADMIN — MOMETASONE FUROATE AND FORMOTEROL FUMARATE DIHYDRATE 2 PUFF: 200; 5 AEROSOL RESPIRATORY (INHALATION) at 18:13

## 2017-02-17 RX ADMIN — Medication 10 ML: at 22:35

## 2017-02-17 RX ADMIN — PREDNISONE 20 MG: 20 TABLET ORAL at 08:27

## 2017-02-17 RX ADMIN — DILTIAZEM HYDROCHLORIDE 30 MG: 30 TABLET, FILM COATED ORAL at 19:49

## 2017-02-17 RX ADMIN — SIMVASTATIN 20 MG: 20 TABLET, FILM COATED ORAL at 22:34

## 2017-02-17 RX ADMIN — AMLODIPINE BESYLATE 5 MG: 5 TABLET ORAL at 08:27

## 2017-02-17 RX ADMIN — MOMETASONE FUROATE AND FORMOTEROL FUMARATE DIHYDRATE 2 PUFF: 200; 5 AEROSOL RESPIRATORY (INHALATION) at 08:36

## 2017-02-17 RX ADMIN — SODIUM CHLORIDE: 9 INJECTION, SOLUTION INTRAVENOUS at 11:31

## 2017-02-17 ASSESSMENT — ENCOUNTER SYMPTOMS
DIARRHEA: 0
ABDOMINAL PAIN: 0
SORE THROAT: 0
NAUSEA: 0
CHEST TIGHTNESS: 0
APNEA: 0
SHORTNESS OF BREATH: 0
WHEEZING: 0
COUGH: 1
BACK PAIN: 0
CONSTIPATION: 0

## 2017-02-18 LAB
ABSOLUTE EOS #: 0 K/UL (ref 0–0.4)
ABSOLUTE LYMPH #: 1.6 K/UL (ref 1–4.8)
ABSOLUTE MONO #: 0.9 K/UL (ref 0.1–1.2)
ANION GAP SERPL CALCULATED.3IONS-SCNC: 16 MMOL/L (ref 9–17)
BASOPHILS # BLD: 0 % (ref 0–2)
BASOPHILS ABSOLUTE: 0 K/UL (ref 0–0.2)
BUN BLDV-MCNC: 61 MG/DL (ref 8–23)
BUN/CREAT BLD: ABNORMAL (ref 9–20)
CALCIUM SERPL-MCNC: 8.7 MG/DL (ref 8.6–10.4)
CHLORIDE BLD-SCNC: 104 MMOL/L (ref 98–107)
CO2: 21 MMOL/L (ref 20–31)
CREAT SERPL-MCNC: 2.33 MG/DL (ref 0.5–0.9)
DIFFERENTIAL TYPE: ABNORMAL
EOSINOPHILS RELATIVE PERCENT: 0 % (ref 1–4)
GFR AFRICAN AMERICAN: 24 ML/MIN
GFR NON-AFRICAN AMERICAN: 20 ML/MIN
GFR SERPL CREATININE-BSD FRML MDRD: ABNORMAL ML/MIN/{1.73_M2}
GFR SERPL CREATININE-BSD FRML MDRD: ABNORMAL ML/MIN/{1.73_M2}
GLUCOSE BLD-MCNC: 152 MG/DL (ref 70–99)
HCT VFR BLD CALC: 33.9 % (ref 36–46)
HEMOGLOBIN: 11.2 G/DL (ref 12–16)
LYMPHOCYTES # BLD: 12 % (ref 24–44)
MCH RBC QN AUTO: 29.2 PG (ref 26–34)
MCHC RBC AUTO-ENTMCNC: 33.1 G/DL (ref 31–37)
MCV RBC AUTO: 88.2 FL (ref 80–100)
MONOCYTES # BLD: 7 % (ref 2–11)
PARTIAL THROMBOPLASTIN TIME: 40.1 SEC (ref 21.3–31.3)
PARTIAL THROMBOPLASTIN TIME: 53.9 SEC (ref 21.3–31.3)
PARTIAL THROMBOPLASTIN TIME: >120 SEC (ref 21.3–31.3)
PDW BLD-RTO: 15.4 % (ref 12.5–15.4)
PLATELET # BLD: 128 K/UL (ref 140–450)
PLATELET # BLD: 147 K/UL (ref 140–450)
PLATELET ESTIMATE: ABNORMAL
PMV BLD AUTO: 9.6 FL (ref 6–12)
POTASSIUM SERPL-SCNC: 3.6 MMOL/L (ref 3.7–5.3)
RBC # BLD: 3.84 M/UL (ref 4–5.2)
RBC # BLD: ABNORMAL 10*6/UL
SEG NEUTROPHILS: 81 % (ref 36–66)
SEGMENTED NEUTROPHILS ABSOLUTE COUNT: 10 K/UL (ref 1.8–7.7)
SODIUM BLD-SCNC: 141 MMOL/L (ref 135–144)
WBC # BLD: 12.5 K/UL (ref 3.5–11)
WBC # BLD: ABNORMAL 10*3/UL

## 2017-02-18 PROCEDURE — 6370000000 HC RX 637 (ALT 250 FOR IP): Performed by: NURSE PRACTITIONER

## 2017-02-18 PROCEDURE — 6370000000 HC RX 637 (ALT 250 FOR IP): Performed by: INTERNAL MEDICINE

## 2017-02-18 PROCEDURE — 6360000002 HC RX W HCPCS: Performed by: INTERNAL MEDICINE

## 2017-02-18 PROCEDURE — 85049 AUTOMATED PLATELET COUNT: CPT

## 2017-02-18 PROCEDURE — 2140000000 HC CCU INTERMEDIATE R&B

## 2017-02-18 PROCEDURE — 99232 SBSQ HOSP IP/OBS MODERATE 35: CPT | Performed by: FAMILY MEDICINE

## 2017-02-18 PROCEDURE — 85025 COMPLETE CBC W/AUTO DIFF WBC: CPT

## 2017-02-18 PROCEDURE — 2580000003 HC RX 258: Performed by: INTERNAL MEDICINE

## 2017-02-18 PROCEDURE — 85730 THROMBOPLASTIN TIME PARTIAL: CPT

## 2017-02-18 PROCEDURE — 36415 COLL VENOUS BLD VENIPUNCTURE: CPT

## 2017-02-18 PROCEDURE — 94640 AIRWAY INHALATION TREATMENT: CPT

## 2017-02-18 PROCEDURE — 2580000003 HC RX 258: Performed by: NURSE PRACTITIONER

## 2017-02-18 PROCEDURE — 80048 BASIC METABOLIC PNL TOTAL CA: CPT

## 2017-02-18 RX ADMIN — ASPIRIN 325 MG: 325 TABLET, COATED ORAL at 09:31

## 2017-02-18 RX ADMIN — SIMVASTATIN 20 MG: 20 TABLET, FILM COATED ORAL at 20:22

## 2017-02-18 RX ADMIN — DILTIAZEM HYDROCHLORIDE 30 MG: 30 TABLET, FILM COATED ORAL at 09:31

## 2017-02-18 RX ADMIN — DILTIAZEM HYDROCHLORIDE 30 MG: 30 TABLET, FILM COATED ORAL at 02:50

## 2017-02-18 RX ADMIN — MOMETASONE FUROATE AND FORMOTEROL FUMARATE DIHYDRATE 2 PUFF: 200; 5 AEROSOL RESPIRATORY (INHALATION) at 08:15

## 2017-02-18 RX ADMIN — DILTIAZEM HYDROCHLORIDE 30 MG: 30 TABLET, FILM COATED ORAL at 14:36

## 2017-02-18 RX ADMIN — DILTIAZEM HYDROCHLORIDE 30 MG: 30 TABLET, FILM COATED ORAL at 20:22

## 2017-02-18 RX ADMIN — METOPROLOL TARTRATE 12.5 MG: 25 TABLET ORAL at 09:31

## 2017-02-18 RX ADMIN — FERROUS SULFATE TAB EC 325 MG (65 MG FE EQUIVALENT) 325 MG: 325 (65 FE) TABLET DELAYED RESPONSE at 09:31

## 2017-02-18 RX ADMIN — SODIUM CHLORIDE: 4.5 INJECTION, SOLUTION INTRAVENOUS at 09:21

## 2017-02-18 RX ADMIN — PREDNISONE 20 MG: 20 TABLET ORAL at 09:31

## 2017-02-18 RX ADMIN — Medication 10 ML: at 20:22

## 2017-02-18 RX ADMIN — HEPARIN SODIUM AND DEXTROSE 16.05 UNITS/KG/HR: 10000; 5 INJECTION INTRAVENOUS at 17:21

## 2017-02-18 RX ADMIN — METOPROLOL TARTRATE 12.5 MG: 25 TABLET ORAL at 20:22

## 2017-02-18 RX ADMIN — HEPARIN SODIUM 1700 UNITS: 1000 INJECTION, SOLUTION INTRAVENOUS; SUBCUTANEOUS at 17:13

## 2017-02-18 ASSESSMENT — ENCOUNTER SYMPTOMS
RHINORRHEA: 0
BLOOD IN STOOL: 0
VOMITING: 0
EYE PAIN: 0
COUGH: 1
NAUSEA: 0
CHEST TIGHTNESS: 0
CONSTIPATION: 0
ABDOMINAL PAIN: 0
DIARRHEA: 0
SHORTNESS OF BREATH: 0
BACK PAIN: 0

## 2017-02-18 ASSESSMENT — PAIN SCALES - GENERAL
PAINLEVEL_OUTOF10: 0
PAINLEVEL_OUTOF10: 0

## 2017-02-19 PROBLEM — I24.9 ACUTE CORONARY SYNDROME (HCC): Status: ACTIVE | Noted: 2017-02-19

## 2017-02-19 LAB
ABSOLUTE EOS #: 0 K/UL (ref 0–0.4)
ABSOLUTE LYMPH #: 1.2 K/UL (ref 1–4.8)
ABSOLUTE MONO #: 0.8 K/UL (ref 0.1–1.2)
ANION GAP SERPL CALCULATED.3IONS-SCNC: 15 MMOL/L (ref 9–17)
BASOPHILS # BLD: 2 % (ref 0–2)
BASOPHILS ABSOLUTE: 0.2 K/UL (ref 0–0.2)
BUN BLDV-MCNC: 58 MG/DL (ref 8–23)
BUN/CREAT BLD: ABNORMAL (ref 9–20)
CALCIUM SERPL-MCNC: 8.6 MG/DL (ref 8.6–10.4)
CHLORIDE BLD-SCNC: 106 MMOL/L (ref 98–107)
CO2: 20 MMOL/L (ref 20–31)
CREAT SERPL-MCNC: 2.22 MG/DL (ref 0.5–0.9)
DIFFERENTIAL TYPE: ABNORMAL
EOSINOPHILS RELATIVE PERCENT: 0 % (ref 1–4)
GFR AFRICAN AMERICAN: 25 ML/MIN
GFR NON-AFRICAN AMERICAN: 21 ML/MIN
GFR SERPL CREATININE-BSD FRML MDRD: ABNORMAL ML/MIN/{1.73_M2}
GFR SERPL CREATININE-BSD FRML MDRD: ABNORMAL ML/MIN/{1.73_M2}
GLUCOSE BLD-MCNC: 138 MG/DL (ref 70–99)
HCT VFR BLD CALC: 31.8 % (ref 36–46)
HEMOGLOBIN: 10.5 G/DL (ref 12–16)
LYMPHOCYTES # BLD: 8 % (ref 24–44)
MCH RBC QN AUTO: 28.8 PG (ref 26–34)
MCHC RBC AUTO-ENTMCNC: 33.2 G/DL (ref 31–37)
MCV RBC AUTO: 86.9 FL (ref 80–100)
MONOCYTES # BLD: 6 % (ref 2–11)
PARTIAL THROMBOPLASTIN TIME: 53.5 SEC (ref 21.3–31.3)
PDW BLD-RTO: 15.5 % (ref 12.5–15.4)
PLATELET # BLD: 143 K/UL (ref 140–450)
PLATELET ESTIMATE: ABNORMAL
PMV BLD AUTO: 9.9 FL (ref 6–12)
POTASSIUM SERPL-SCNC: 3.6 MMOL/L (ref 3.7–5.3)
RBC # BLD: 3.65 M/UL (ref 4–5.2)
RBC # BLD: ABNORMAL 10*6/UL
SEG NEUTROPHILS: 84 % (ref 36–66)
SEGMENTED NEUTROPHILS ABSOLUTE COUNT: 12.2 K/UL (ref 1.8–7.7)
SODIUM BLD-SCNC: 141 MMOL/L (ref 135–144)
WBC # BLD: 14.5 K/UL (ref 3.5–11)
WBC # BLD: ABNORMAL 10*3/UL

## 2017-02-19 PROCEDURE — 6370000000 HC RX 637 (ALT 250 FOR IP): Performed by: NURSE PRACTITIONER

## 2017-02-19 PROCEDURE — G8987 SELF CARE CURRENT STATUS: HCPCS

## 2017-02-19 PROCEDURE — 6360000002 HC RX W HCPCS: Performed by: NURSE PRACTITIONER

## 2017-02-19 PROCEDURE — 2140000000 HC CCU INTERMEDIATE R&B

## 2017-02-19 PROCEDURE — 2580000003 HC RX 258: Performed by: NURSE PRACTITIONER

## 2017-02-19 PROCEDURE — 85025 COMPLETE CBC W/AUTO DIFF WBC: CPT

## 2017-02-19 PROCEDURE — G8988 SELF CARE GOAL STATUS: HCPCS

## 2017-02-19 PROCEDURE — 6360000002 HC RX W HCPCS: Performed by: INTERNAL MEDICINE

## 2017-02-19 PROCEDURE — 97166 OT EVAL MOD COMPLEX 45 MIN: CPT

## 2017-02-19 PROCEDURE — 99233 SBSQ HOSP IP/OBS HIGH 50: CPT | Performed by: FAMILY MEDICINE

## 2017-02-19 PROCEDURE — 2580000003 HC RX 258: Performed by: INTERNAL MEDICINE

## 2017-02-19 PROCEDURE — 6370000000 HC RX 637 (ALT 250 FOR IP): Performed by: INTERNAL MEDICINE

## 2017-02-19 PROCEDURE — 97110 THERAPEUTIC EXERCISES: CPT

## 2017-02-19 PROCEDURE — 85730 THROMBOPLASTIN TIME PARTIAL: CPT

## 2017-02-19 PROCEDURE — 80048 BASIC METABOLIC PNL TOTAL CA: CPT

## 2017-02-19 PROCEDURE — 97535 SELF CARE MNGMENT TRAINING: CPT

## 2017-02-19 PROCEDURE — 36415 COLL VENOUS BLD VENIPUNCTURE: CPT

## 2017-02-19 PROCEDURE — 94640 AIRWAY INHALATION TREATMENT: CPT

## 2017-02-19 RX ORDER — ACETYLCYSTEINE 200 MG/ML
1200 SOLUTION ORAL; RESPIRATORY (INHALATION) 2 TIMES DAILY
Status: DISCONTINUED | OUTPATIENT
Start: 2017-02-19 | End: 2017-02-21 | Stop reason: HOSPADM

## 2017-02-19 RX ORDER — SODIUM CHLORIDE 9 MG/ML
INJECTION, SOLUTION INTRAVENOUS CONTINUOUS
Status: DISCONTINUED | OUTPATIENT
Start: 2017-02-19 | End: 2017-02-21 | Stop reason: HOSPADM

## 2017-02-19 RX ORDER — ACETYLCYSTEINE 200 MG/ML
600 SOLUTION ORAL; RESPIRATORY (INHALATION) 2 TIMES DAILY
Status: DISCONTINUED | OUTPATIENT
Start: 2017-02-19 | End: 2017-02-19

## 2017-02-19 RX ADMIN — SIMVASTATIN 20 MG: 20 TABLET, FILM COATED ORAL at 20:42

## 2017-02-19 RX ADMIN — ALBUTEROL SULFATE 2.5 MG: 2.5 SOLUTION RESPIRATORY (INHALATION) at 21:06

## 2017-02-19 RX ADMIN — SODIUM CHLORIDE: 4.5 INJECTION, SOLUTION INTRAVENOUS at 06:02

## 2017-02-19 RX ADMIN — DILTIAZEM HYDROCHLORIDE 30 MG: 30 TABLET, FILM COATED ORAL at 13:44

## 2017-02-19 RX ADMIN — DILTIAZEM HYDROCHLORIDE 30 MG: 30 TABLET, FILM COATED ORAL at 20:42

## 2017-02-19 RX ADMIN — DILTIAZEM HYDROCHLORIDE 30 MG: 30 TABLET, FILM COATED ORAL at 03:54

## 2017-02-19 RX ADMIN — Medication 1200 MG: at 20:42

## 2017-02-19 RX ADMIN — MOMETASONE FUROATE AND FORMOTEROL FUMARATE DIHYDRATE 2 PUFF: 200; 5 AEROSOL RESPIRATORY (INHALATION) at 10:41

## 2017-02-19 RX ADMIN — MOMETASONE FUROATE AND FORMOTEROL FUMARATE DIHYDRATE 2 PUFF: 200; 5 AEROSOL RESPIRATORY (INHALATION) at 21:03

## 2017-02-19 RX ADMIN — Medication 10 ML: at 20:43

## 2017-02-19 RX ADMIN — HEPARIN SODIUM AND DEXTROSE 15.98 UNITS/KG/HR: 10000; 5 INJECTION INTRAVENOUS at 20:05

## 2017-02-19 RX ADMIN — METOPROLOL TARTRATE 12.5 MG: 25 TABLET ORAL at 08:14

## 2017-02-19 RX ADMIN — PREDNISONE 20 MG: 20 TABLET ORAL at 08:13

## 2017-02-19 RX ADMIN — ASPIRIN 325 MG: 325 TABLET, COATED ORAL at 08:13

## 2017-02-19 RX ADMIN — FERROUS SULFATE TAB EC 325 MG (65 MG FE EQUIVALENT) 325 MG: 325 (65 FE) TABLET DELAYED RESPONSE at 08:14

## 2017-02-19 RX ADMIN — SODIUM CHLORIDE: 9 INJECTION, SOLUTION INTRAVENOUS at 17:35

## 2017-02-19 RX ADMIN — Medication 600 MG: at 13:42

## 2017-02-19 RX ADMIN — METOPROLOL TARTRATE 12.5 MG: 25 TABLET ORAL at 20:42

## 2017-02-19 RX ADMIN — DILTIAZEM HYDROCHLORIDE 30 MG: 30 TABLET, FILM COATED ORAL at 17:35

## 2017-02-19 RX ADMIN — DILTIAZEM HYDROCHLORIDE 30 MG: 30 TABLET, FILM COATED ORAL at 08:13

## 2017-02-19 ASSESSMENT — PAIN SCALES - GENERAL: PAINLEVEL_OUTOF10: 0

## 2017-02-19 ASSESSMENT — ENCOUNTER SYMPTOMS
BLOOD IN STOOL: 0
NAUSEA: 0
EYE PAIN: 0
CHEST TIGHTNESS: 0
ABDOMINAL PAIN: 0
BACK PAIN: 0
SHORTNESS OF BREATH: 0
CONSTIPATION: 0
COUGH: 1
RHINORRHEA: 0
VOMITING: 0
DIARRHEA: 0

## 2017-02-20 ENCOUNTER — APPOINTMENT (OUTPATIENT)
Dept: CARDIAC CATH/INVASIVE PROCEDURES | Age: 82
DRG: 286 | End: 2017-02-20
Attending: INTERNAL MEDICINE
Payer: MEDICARE

## 2017-02-20 LAB
ABSOLUTE EOS #: 0 K/UL (ref 0–0.4)
ABSOLUTE LYMPH #: 1.4 K/UL (ref 1–4.8)
ABSOLUTE MONO #: 0.8 K/UL (ref 0.1–1.2)
ANION GAP SERPL CALCULATED.3IONS-SCNC: 15 MMOL/L (ref 9–17)
BASOPHILS # BLD: 1 % (ref 0–2)
BASOPHILS ABSOLUTE: 0.1 K/UL (ref 0–0.2)
BUN BLDV-MCNC: 48 MG/DL (ref 8–23)
BUN/CREAT BLD: ABNORMAL (ref 9–20)
CALCIUM SERPL-MCNC: 8.9 MG/DL (ref 8.6–10.4)
CHLORIDE BLD-SCNC: 106 MMOL/L (ref 98–107)
CO2: 20 MMOL/L (ref 20–31)
CREAT SERPL-MCNC: 1.87 MG/DL (ref 0.5–0.9)
DIFFERENTIAL TYPE: ABNORMAL
EOSINOPHILS RELATIVE PERCENT: 0 % (ref 1–4)
GFR AFRICAN AMERICAN: 31 ML/MIN
GFR NON-AFRICAN AMERICAN: 25 ML/MIN
GFR SERPL CREATININE-BSD FRML MDRD: ABNORMAL ML/MIN/{1.73_M2}
GFR SERPL CREATININE-BSD FRML MDRD: ABNORMAL ML/MIN/{1.73_M2}
GLUCOSE BLD-MCNC: 117 MG/DL (ref 70–99)
HCT VFR BLD CALC: 31.5 % (ref 36–46)
HEMOGLOBIN: 10.6 G/DL (ref 12–16)
LV EF: 55 %
LVEF MODALITY: NORMAL
LYMPHOCYTES # BLD: 10 % (ref 24–44)
MCH RBC QN AUTO: 29.3 PG (ref 26–34)
MCHC RBC AUTO-ENTMCNC: 33.7 G/DL (ref 31–37)
MCV RBC AUTO: 86.9 FL (ref 80–100)
MONOCYTES # BLD: 5 % (ref 2–11)
PARTIAL THROMBOPLASTIN TIME: 58.9 SEC (ref 21.3–31.3)
PDW BLD-RTO: 15.5 % (ref 12.5–15.4)
PLATELET # BLD: 170 K/UL (ref 140–450)
PLATELET ESTIMATE: ABNORMAL
PMV BLD AUTO: 10.7 FL (ref 6–12)
POTASSIUM SERPL-SCNC: 4.1 MMOL/L (ref 3.7–5.3)
RBC # BLD: 3.63 M/UL (ref 4–5.2)
RBC # BLD: ABNORMAL 10*6/UL
SEG NEUTROPHILS: 84 % (ref 36–66)
SEGMENTED NEUTROPHILS ABSOLUTE COUNT: 12.7 K/UL (ref 1.8–7.7)
SODIUM BLD-SCNC: 141 MMOL/L (ref 135–144)
WBC # BLD: 15.1 K/UL (ref 3.5–11)
WBC # BLD: ABNORMAL 10*3/UL

## 2017-02-20 PROCEDURE — 6370000000 HC RX 637 (ALT 250 FOR IP): Performed by: NURSE PRACTITIONER

## 2017-02-20 PROCEDURE — 2580000003 HC RX 258: Performed by: NURSE PRACTITIONER

## 2017-02-20 PROCEDURE — 99232 SBSQ HOSP IP/OBS MODERATE 35: CPT | Performed by: INTERNAL MEDICINE

## 2017-02-20 PROCEDURE — 93325 DOPPLER ECHO COLOR FLOW MAPG: CPT

## 2017-02-20 PROCEDURE — 85730 THROMBOPLASTIN TIME PARTIAL: CPT

## 2017-02-20 PROCEDURE — 85025 COMPLETE CBC W/AUTO DIFF WBC: CPT

## 2017-02-20 PROCEDURE — 92960 CARDIOVERSION ELECTRIC EXT: CPT | Performed by: INTERNAL MEDICINE

## 2017-02-20 PROCEDURE — 6360000002 HC RX W HCPCS: Performed by: INTERNAL MEDICINE

## 2017-02-20 PROCEDURE — 2060000000 HC ICU INTERMEDIATE R&B

## 2017-02-20 PROCEDURE — 97530 THERAPEUTIC ACTIVITIES: CPT

## 2017-02-20 PROCEDURE — 2580000003 HC RX 258: Performed by: INTERNAL MEDICINE

## 2017-02-20 PROCEDURE — 6370000000 HC RX 637 (ALT 250 FOR IP): Performed by: INTERNAL MEDICINE

## 2017-02-20 PROCEDURE — 97162 PT EVAL MOD COMPLEX 30 MIN: CPT

## 2017-02-20 PROCEDURE — C1725 CATH, TRANSLUMIN NON-LASER: HCPCS

## 2017-02-20 PROCEDURE — 94640 AIRWAY INHALATION TREATMENT: CPT

## 2017-02-20 PROCEDURE — 36415 COLL VENOUS BLD VENIPUNCTURE: CPT

## 2017-02-20 PROCEDURE — 6360000002 HC RX W HCPCS

## 2017-02-20 PROCEDURE — 5A2204Z RESTORATION OF CARDIAC RHYTHM, SINGLE: ICD-10-PCS | Performed by: INTERNAL MEDICINE

## 2017-02-20 PROCEDURE — 97535 SELF CARE MNGMENT TRAINING: CPT

## 2017-02-20 PROCEDURE — 76937 US GUIDE VASCULAR ACCESS: CPT

## 2017-02-20 PROCEDURE — C1894 INTRO/SHEATH, NON-LASER: HCPCS

## 2017-02-20 PROCEDURE — B2111ZZ FLUOROSCOPY OF MULTIPLE CORONARY ARTERIES USING LOW OSMOLAR CONTRAST: ICD-10-PCS | Performed by: INTERNAL MEDICINE

## 2017-02-20 PROCEDURE — G8978 MOBILITY CURRENT STATUS: HCPCS

## 2017-02-20 PROCEDURE — G8979 MOBILITY GOAL STATUS: HCPCS

## 2017-02-20 PROCEDURE — 93312 ECHO TRANSESOPHAGEAL: CPT

## 2017-02-20 PROCEDURE — C1760 CLOSURE DEV, VASC: HCPCS

## 2017-02-20 PROCEDURE — 4A023N7 MEASUREMENT OF CARDIAC SAMPLING AND PRESSURE, LEFT HEART, PERCUTANEOUS APPROACH: ICD-10-PCS | Performed by: INTERNAL MEDICINE

## 2017-02-20 PROCEDURE — 80048 BASIC METABOLIC PNL TOTAL CA: CPT

## 2017-02-20 PROCEDURE — 93005 ELECTROCARDIOGRAM TRACING: CPT

## 2017-02-20 PROCEDURE — 93458 L HRT ARTERY/VENTRICLE ANGIO: CPT | Performed by: INTERNAL MEDICINE

## 2017-02-20 PROCEDURE — C1769 GUIDE WIRE: HCPCS

## 2017-02-20 RX ORDER — CLOPIDOGREL BISULFATE 75 MG/1
75 TABLET ORAL DAILY
Status: DISCONTINUED | OUTPATIENT
Start: 2017-02-21 | End: 2017-02-21 | Stop reason: HOSPADM

## 2017-02-20 RX ADMIN — DILTIAZEM HYDROCHLORIDE 30 MG: 30 TABLET, FILM COATED ORAL at 18:37

## 2017-02-20 RX ADMIN — ASPIRIN 325 MG: 325 TABLET, COATED ORAL at 09:24

## 2017-02-20 RX ADMIN — FERROUS SULFATE TAB EC 325 MG (65 MG FE EQUIVALENT) 325 MG: 325 (65 FE) TABLET DELAYED RESPONSE at 09:26

## 2017-02-20 RX ADMIN — SIMVASTATIN 20 MG: 20 TABLET, FILM COATED ORAL at 20:11

## 2017-02-20 RX ADMIN — MOMETASONE FUROATE AND FORMOTEROL FUMARATE DIHYDRATE 2 PUFF: 200; 5 AEROSOL RESPIRATORY (INHALATION) at 09:15

## 2017-02-20 RX ADMIN — Medication 1200 MG: at 09:24

## 2017-02-20 RX ADMIN — DILTIAZEM HYDROCHLORIDE 30 MG: 30 TABLET, FILM COATED ORAL at 09:26

## 2017-02-20 RX ADMIN — APIXABAN 2.5 MG: 2.5 TABLET, FILM COATED ORAL at 20:30

## 2017-02-20 RX ADMIN — Medication 10 ML: at 09:27

## 2017-02-20 RX ADMIN — METOPROLOL TARTRATE 12.5 MG: 25 TABLET ORAL at 09:27

## 2017-02-20 RX ADMIN — Medication 1200 MG: at 20:11

## 2017-02-20 RX ADMIN — PREDNISONE 20 MG: 20 TABLET ORAL at 09:24

## 2017-02-20 RX ADMIN — METOPROLOL TARTRATE 12.5 MG: 25 TABLET ORAL at 20:11

## 2017-02-20 RX ADMIN — SODIUM CHLORIDE: 9 INJECTION, SOLUTION INTRAVENOUS at 14:42

## 2017-02-20 RX ADMIN — Medication 10 ML: at 20:11

## 2017-02-20 RX ADMIN — MOMETASONE FUROATE AND FORMOTEROL FUMARATE DIHYDRATE 2 PUFF: 200; 5 AEROSOL RESPIRATORY (INHALATION) at 18:29

## 2017-02-20 RX ADMIN — DILTIAZEM HYDROCHLORIDE 30 MG: 30 TABLET, FILM COATED ORAL at 20:11

## 2017-02-21 VITALS
HEART RATE: 56 BPM | TEMPERATURE: 97.6 F | SYSTOLIC BLOOD PRESSURE: 165 MMHG | RESPIRATION RATE: 14 BRPM | HEIGHT: 62 IN | OXYGEN SATURATION: 96 % | DIASTOLIC BLOOD PRESSURE: 52 MMHG | BODY MASS INDEX: 23.61 KG/M2 | WEIGHT: 128.31 LBS

## 2017-02-21 LAB
ABSOLUTE EOS #: 0 K/UL (ref 0–0.4)
ABSOLUTE LYMPH #: 1.1 K/UL (ref 1–4.8)
ABSOLUTE MONO #: 0.9 K/UL (ref 0.1–1.2)
ANION GAP SERPL CALCULATED.3IONS-SCNC: 14 MMOL/L (ref 9–17)
BASOPHILS # BLD: 0 % (ref 0–2)
BASOPHILS ABSOLUTE: 0 K/UL (ref 0–0.2)
BUN BLDV-MCNC: 43 MG/DL (ref 8–23)
BUN/CREAT BLD: ABNORMAL (ref 9–20)
CALCIUM SERPL-MCNC: 8.9 MG/DL (ref 8.6–10.4)
CHLORIDE BLD-SCNC: 109 MMOL/L (ref 98–107)
CO2: 20 MMOL/L (ref 20–31)
CREAT SERPL-MCNC: 1.67 MG/DL (ref 0.5–0.9)
DIFFERENTIAL TYPE: ABNORMAL
EOSINOPHILS RELATIVE PERCENT: 0 % (ref 1–4)
GFR AFRICAN AMERICAN: 35 ML/MIN
GFR NON-AFRICAN AMERICAN: 29 ML/MIN
GFR SERPL CREATININE-BSD FRML MDRD: ABNORMAL ML/MIN/{1.73_M2}
GFR SERPL CREATININE-BSD FRML MDRD: ABNORMAL ML/MIN/{1.73_M2}
GLUCOSE BLD-MCNC: 104 MG/DL (ref 70–99)
HCT VFR BLD CALC: 31.4 % (ref 36–46)
HEMOGLOBIN: 10.1 G/DL (ref 12–16)
LYMPHOCYTES # BLD: 9 % (ref 24–44)
MCH RBC QN AUTO: 28.8 PG (ref 26–34)
MCHC RBC AUTO-ENTMCNC: 32.3 G/DL (ref 31–37)
MCV RBC AUTO: 89.1 FL (ref 80–100)
MONOCYTES # BLD: 8 % (ref 2–11)
PDW BLD-RTO: 16.5 % (ref 12.5–15.4)
PLATELET # BLD: 213 K/UL (ref 140–450)
PLATELET ESTIMATE: ABNORMAL
PMV BLD AUTO: 9 FL (ref 6–12)
POTASSIUM SERPL-SCNC: 4.3 MMOL/L (ref 3.7–5.3)
RBC # BLD: 3.52 M/UL (ref 4–5.2)
RBC # BLD: ABNORMAL 10*6/UL
SEG NEUTROPHILS: 83 % (ref 36–66)
SEGMENTED NEUTROPHILS ABSOLUTE COUNT: 9.9 K/UL (ref 1.8–7.7)
SODIUM BLD-SCNC: 143 MMOL/L (ref 135–144)
WBC # BLD: 11.9 K/UL (ref 3.5–11)
WBC # BLD: ABNORMAL 10*3/UL

## 2017-02-21 PROCEDURE — 94640 AIRWAY INHALATION TREATMENT: CPT

## 2017-02-21 PROCEDURE — 6360000002 HC RX W HCPCS: Performed by: INTERNAL MEDICINE

## 2017-02-21 PROCEDURE — 36415 COLL VENOUS BLD VENIPUNCTURE: CPT

## 2017-02-21 PROCEDURE — 97535 SELF CARE MNGMENT TRAINING: CPT

## 2017-02-21 PROCEDURE — 97116 GAIT TRAINING THERAPY: CPT

## 2017-02-21 PROCEDURE — 6370000000 HC RX 637 (ALT 250 FOR IP): Performed by: NURSE PRACTITIONER

## 2017-02-21 PROCEDURE — 97530 THERAPEUTIC ACTIVITIES: CPT

## 2017-02-21 PROCEDURE — 6370000000 HC RX 637 (ALT 250 FOR IP): Performed by: INTERNAL MEDICINE

## 2017-02-21 PROCEDURE — 2580000003 HC RX 258: Performed by: NURSE PRACTITIONER

## 2017-02-21 PROCEDURE — 80048 BASIC METABOLIC PNL TOTAL CA: CPT

## 2017-02-21 PROCEDURE — 85025 COMPLETE CBC W/AUTO DIFF WBC: CPT

## 2017-02-21 PROCEDURE — 99232 SBSQ HOSP IP/OBS MODERATE 35: CPT | Performed by: FAMILY MEDICINE

## 2017-02-21 RX ORDER — LISINOPRIL 10 MG/1
10 TABLET ORAL DAILY
Qty: 30 TABLET | Refills: 3 | Status: ON HOLD | OUTPATIENT
Start: 2017-02-21 | End: 2018-02-04 | Stop reason: HOSPADM

## 2017-02-21 RX ORDER — LISINOPRIL 10 MG/1
10 TABLET ORAL DAILY
Status: DISCONTINUED | OUTPATIENT
Start: 2017-02-21 | End: 2017-02-21 | Stop reason: HOSPADM

## 2017-02-21 RX ORDER — NITROGLYCERIN 0.4 MG/1
TABLET SUBLINGUAL
Qty: 25 TABLET | Refills: 3 | Status: ON HOLD | OUTPATIENT
Start: 2017-02-21 | End: 2018-02-06

## 2017-02-21 RX ORDER — CLOPIDOGREL BISULFATE 75 MG/1
75 TABLET ORAL DAILY
Qty: 30 TABLET | Refills: 11 | Status: ON HOLD | OUTPATIENT
Start: 2017-02-21 | End: 2018-07-30 | Stop reason: HOSPADM

## 2017-02-21 RX ADMIN — MOMETASONE FUROATE AND FORMOTEROL FUMARATE DIHYDRATE 2 PUFF: 200; 5 AEROSOL RESPIRATORY (INHALATION) at 18:31

## 2017-02-21 RX ADMIN — Medication 1200 MG: at 09:31

## 2017-02-21 RX ADMIN — CLOPIDOGREL 75 MG: 75 TABLET, FILM COATED ORAL at 09:32

## 2017-02-21 RX ADMIN — Medication 10 ML: at 09:33

## 2017-02-21 RX ADMIN — FERROUS SULFATE TAB EC 325 MG (65 MG FE EQUIVALENT) 325 MG: 325 (65 FE) TABLET DELAYED RESPONSE at 09:32

## 2017-02-21 RX ADMIN — APIXABAN 2.5 MG: 2.5 TABLET, FILM COATED ORAL at 09:31

## 2017-02-21 RX ADMIN — DILTIAZEM HYDROCHLORIDE 30 MG: 30 TABLET, FILM COATED ORAL at 13:20

## 2017-02-21 RX ADMIN — DILTIAZEM HYDROCHLORIDE 30 MG: 30 TABLET, FILM COATED ORAL at 18:24

## 2017-02-21 RX ADMIN — PREDNISONE 20 MG: 20 TABLET ORAL at 09:33

## 2017-02-21 RX ADMIN — LISINOPRIL 10 MG: 10 TABLET ORAL at 13:18

## 2017-02-21 RX ADMIN — DILTIAZEM HYDROCHLORIDE 30 MG: 30 TABLET, FILM COATED ORAL at 09:00

## 2017-02-21 RX ADMIN — MOMETASONE FUROATE AND FORMOTEROL FUMARATE DIHYDRATE 2 PUFF: 200; 5 AEROSOL RESPIRATORY (INHALATION) at 09:09

## 2017-02-21 RX ADMIN — METOPROLOL TARTRATE 12.5 MG: 25 TABLET ORAL at 09:32

## 2017-02-21 RX ADMIN — ERGOCALCIFEROL 50000 UNITS: 1.25 CAPSULE ORAL at 09:34

## 2017-02-21 ASSESSMENT — ENCOUNTER SYMPTOMS
VOICE CHANGE: 0
COUGH: 0
SHORTNESS OF BREATH: 0
SORE THROAT: 0
BLOOD IN STOOL: 0
CONSTIPATION: 0
WHEEZING: 0
VOMITING: 0
NAUSEA: 0
ABDOMINAL PAIN: 0
SINUS PRESSURE: 0
DIARRHEA: 0

## 2017-02-21 ASSESSMENT — PAIN SCALES - GENERAL: PAINLEVEL_OUTOF10: 0

## 2017-02-22 ENCOUNTER — TELEPHONE (OUTPATIENT)
Dept: PRIMARY CARE CLINIC | Facility: CLINIC | Age: 82
End: 2017-02-22

## 2017-02-22 ENCOUNTER — TELEPHONE (OUTPATIENT)
Dept: PULMONOLOGY | Facility: CLINIC | Age: 82
End: 2017-02-22

## 2017-02-22 PROBLEM — Z87.891 EX-SMOKER: Chronic | Status: ACTIVE | Noted: 2017-02-14

## 2017-02-22 PROBLEM — I24.9 ACUTE CORONARY SYNDROME (HCC): Status: RESOLVED | Noted: 2017-02-19 | Resolved: 2017-02-22

## 2017-02-22 PROBLEM — I48.91 ATRIAL FIBRILLATION WITH RAPID VENTRICULAR RESPONSE (HCC): Chronic | Status: ACTIVE | Noted: 2017-02-17

## 2017-03-22 ENCOUNTER — HOSPITAL ENCOUNTER (OUTPATIENT)
Dept: CARDIAC REHAB | Age: 82
Setting detail: THERAPIES SERIES
Discharge: HOME | End: 2017-03-22
Payer: MEDICARE

## 2017-03-22 VITALS
OXYGEN SATURATION: 94 % | HEIGHT: 62 IN | WEIGHT: 122 LBS | RESPIRATION RATE: 16 BRPM | HEART RATE: 95 BPM | BODY MASS INDEX: 22.45 KG/M2 | DIASTOLIC BLOOD PRESSURE: 72 MMHG | SYSTOLIC BLOOD PRESSURE: 138 MMHG

## 2017-03-24 ENCOUNTER — HOSPITAL ENCOUNTER (INPATIENT)
Age: 82
LOS: 1 days | Discharge: HOME | DRG: 190 | End: 2017-03-25
Attending: INTERNAL MEDICINE | Admitting: INTERNAL MEDICINE
Payer: MEDICARE

## 2017-03-24 ENCOUNTER — APPOINTMENT (OUTPATIENT)
Dept: GENERAL RADIOLOGY | Age: 82
DRG: 190 | End: 2017-03-24
Payer: MEDICARE

## 2017-03-24 DIAGNOSIS — J45.901 ACUTE EXACERBATION OF COPD WITH ASTHMA (HCC): Primary | ICD-10-CM

## 2017-03-24 DIAGNOSIS — J44.1 ACUTE EXACERBATION OF COPD WITH ASTHMA (HCC): Primary | ICD-10-CM

## 2017-03-24 PROBLEM — J96.01 ACUTE RESPIRATORY FAILURE WITH HYPOXEMIA (HCC): Status: ACTIVE | Noted: 2017-03-24

## 2017-03-24 LAB
ABSOLUTE EOS #: 0.22 K/UL (ref 0–0.4)
ABSOLUTE EOS #: 2.72 K/UL (ref 0–0.4)
ABSOLUTE LYMPH #: 0.9 K/UL (ref 1–4.8)
ABSOLUTE LYMPH #: 1.43 K/UL (ref 1–4.8)
ABSOLUTE MONO #: 0.43 K/UL (ref 0–1)
ABSOLUTE MONO #: 0.67 K/UL (ref 0–1)
ALBUMIN SERPL-MCNC: 3.6 G/DL (ref 3.5–5.2)
ALBUMIN SERPL-MCNC: 3.7 G/DL (ref 3.5–5.2)
ALBUMIN/GLOBULIN RATIO: 1.1 (ref 1–2.5)
ALBUMIN/GLOBULIN RATIO: 1.2 (ref 1–2.5)
ALP BLD-CCNC: 82 U/L (ref 35–104)
ALP BLD-CCNC: 85 U/L (ref 35–104)
ALT SERPL-CCNC: 10 U/L (ref 5–33)
ALT SERPL-CCNC: 9 U/L (ref 5–33)
ANION GAP SERPL CALCULATED.3IONS-SCNC: 15 MMOL/L (ref 9–17)
ANION GAP SERPL CALCULATED.3IONS-SCNC: 17 MMOL/L (ref 9–17)
AST SERPL-CCNC: 10 U/L
AST SERPL-CCNC: 9 U/L
BASOPHILS # BLD: 0 % (ref 0–2)
BASOPHILS # BLD: 0 % (ref 0–2)
BASOPHILS ABSOLUTE: 0 K/UL (ref 0–0.2)
BASOPHILS ABSOLUTE: 0 K/UL (ref 0–0.2)
BILIRUB SERPL-MCNC: 0.24 MG/DL (ref 0.3–1.2)
BILIRUB SERPL-MCNC: 0.24 MG/DL (ref 0.3–1.2)
BNP INTERPRETATION: ABNORMAL
BUN BLDV-MCNC: 27 MG/DL (ref 8–23)
BUN BLDV-MCNC: 27 MG/DL (ref 8–23)
BUN/CREAT BLD: 14 (ref 9–20)
BUN/CREAT BLD: 14 (ref 9–20)
CALCIUM SERPL-MCNC: 10.1 MG/DL (ref 8.6–10.4)
CALCIUM SERPL-MCNC: 10.3 MG/DL (ref 8.6–10.4)
CHLORIDE BLD-SCNC: 101 MMOL/L (ref 98–107)
CHLORIDE BLD-SCNC: 103 MMOL/L (ref 98–107)
CK MB: 3.6 NG/ML
CO2: 23 MMOL/L (ref 20–31)
CO2: 25 MMOL/L (ref 20–31)
CREAT SERPL-MCNC: 1.87 MG/DL (ref 0.5–0.9)
CREAT SERPL-MCNC: 1.91 MG/DL (ref 0.5–0.9)
D-DIMER QUANTITATIVE: 0.44 MG/L FEU (ref 0.19–0.5)
DIFFERENTIAL TYPE: ABNORMAL
DIFFERENTIAL TYPE: ABNORMAL
EKG ATRIAL RATE: 110 BPM
EKG ATRIAL RATE: 93 BPM
EKG P AXIS: 83 DEGREES
EKG P AXIS: 94 DEGREES
EKG P-R INTERVAL: 168 MS
EKG P-R INTERVAL: 170 MS
EKG Q-T INTERVAL: 332 MS
EKG Q-T INTERVAL: 364 MS
EKG QRS DURATION: 74 MS
EKG QRS DURATION: 78 MS
EKG QTC CALCULATION (BAZETT): 449 MS
EKG QTC CALCULATION (BAZETT): 452 MS
EKG R AXIS: -15 DEGREES
EKG R AXIS: -32 DEGREES
EKG T AXIS: 69 DEGREES
EKG T AXIS: 74 DEGREES
EKG VENTRICULAR RATE: 110 BPM
EKG VENTRICULAR RATE: 93 BPM
EOSINOPHILS RELATIVE PERCENT: 19 % (ref 0–8)
EOSINOPHILS RELATIVE PERCENT: 2 % (ref 0–8)
GFR AFRICAN AMERICAN: 30 ML/MIN
GFR AFRICAN AMERICAN: 31 ML/MIN
GFR NON-AFRICAN AMERICAN: 25 ML/MIN
GFR NON-AFRICAN AMERICAN: 25 ML/MIN
GFR SERPL CREATININE-BSD FRML MDRD: ABNORMAL ML/MIN/{1.73_M2}
GLUCOSE BLD-MCNC: 116 MG/DL (ref 70–99)
GLUCOSE BLD-MCNC: 172 MG/DL (ref 70–99)
HCT VFR BLD CALC: 35.8 % (ref 36–46)
HCT VFR BLD CALC: 37.3 % (ref 36–46)
HEMOGLOBIN: 11.3 G/DL (ref 12–16)
HEMOGLOBIN: 11.8 G/DL (ref 12–16)
LYMPHOCYTES # BLD: 10 % (ref 24–44)
LYMPHOCYTES # BLD: 8 % (ref 24–44)
MCH RBC QN AUTO: 28 PG (ref 26–34)
MCH RBC QN AUTO: 28 PG (ref 26–34)
MCHC RBC AUTO-ENTMCNC: 31.6 G/DL (ref 31–37)
MCHC RBC AUTO-ENTMCNC: 31.7 G/DL (ref 31–37)
MCV RBC AUTO: 88.3 FL (ref 80–100)
MCV RBC AUTO: 88.5 FL (ref 80–100)
MONOCYTES # BLD: 3 % (ref 0–12)
MONOCYTES # BLD: 6 % (ref 0–12)
MORPHOLOGY: ABNORMAL
MORPHOLOGY: NORMAL
PDW BLD-RTO: 15.6 % (ref 12.1–15.2)
PDW BLD-RTO: 15.7 % (ref 12.1–15.2)
PLATELET # BLD: 368 K/UL (ref 140–450)
PLATELET # BLD: 369 K/UL (ref 140–450)
PLATELET ESTIMATE: ABNORMAL
PLATELET ESTIMATE: ABNORMAL
PMV BLD AUTO: 8.6 FL (ref 6–12)
PMV BLD AUTO: 8.9 FL (ref 6–12)
POTASSIUM SERPL-SCNC: 4.4 MMOL/L (ref 3.7–5.3)
POTASSIUM SERPL-SCNC: 4.5 MMOL/L (ref 3.7–5.3)
PRO-BNP: 901 PG/ML
RBC # BLD: 4.05 M/UL (ref 4–5.2)
RBC # BLD: 4.22 M/UL (ref 4–5.2)
RBC # BLD: ABNORMAL 10*6/UL
RBC # BLD: ABNORMAL 10*6/UL
SEG NEUTROPHILS: 68 % (ref 36–66)
SEG NEUTROPHILS: 84 % (ref 36–66)
SEGMENTED NEUTROPHILS ABSOLUTE COUNT: 9.41 K/UL (ref 1.8–7.7)
SEGMENTED NEUTROPHILS ABSOLUTE COUNT: 9.72 K/UL (ref 1.8–7.7)
SODIUM BLD-SCNC: 141 MMOL/L (ref 135–144)
SODIUM BLD-SCNC: 143 MMOL/L (ref 135–144)
TOTAL CK: 55 U/L (ref 26–192)
TOTAL PROTEIN: 6.9 G/DL (ref 6.4–8.3)
TOTAL PROTEIN: 6.9 G/DL (ref 6.4–8.3)
TROPONIN INTERP: NORMAL
TROPONIN T: <0.03 NG/ML
WBC # BLD: 11.2 K/UL (ref 3.5–11)
WBC # BLD: 14.3 K/UL (ref 3.5–11)
WBC # BLD: ABNORMAL 10*3/UL
WBC # BLD: ABNORMAL 10*3/UL

## 2017-03-24 PROCEDURE — 2580000003 HC RX 258: Performed by: INTERNAL MEDICINE

## 2017-03-24 PROCEDURE — 94640 AIRWAY INHALATION TREATMENT: CPT

## 2017-03-24 PROCEDURE — 36415 COLL VENOUS BLD VENIPUNCTURE: CPT

## 2017-03-24 PROCEDURE — 6370000000 HC RX 637 (ALT 250 FOR IP): Performed by: INTERNAL MEDICINE

## 2017-03-24 PROCEDURE — 85025 COMPLETE CBC W/AUTO DIFF WBC: CPT

## 2017-03-24 PROCEDURE — 6370000000 HC RX 637 (ALT 250 FOR IP)

## 2017-03-24 PROCEDURE — 85379 FIBRIN DEGRADATION QUANT: CPT

## 2017-03-24 PROCEDURE — 6360000002 HC RX W HCPCS: Performed by: INTERNAL MEDICINE

## 2017-03-24 PROCEDURE — 87070 CULTURE OTHR SPECIMN AEROBIC: CPT

## 2017-03-24 PROCEDURE — 93005 ELECTROCARDIOGRAM TRACING: CPT

## 2017-03-24 PROCEDURE — 80053 COMPREHEN METABOLIC PANEL: CPT

## 2017-03-24 PROCEDURE — 94760 N-INVAS EAR/PLS OXIMETRY 1: CPT

## 2017-03-24 PROCEDURE — 94664 DEMO&/EVAL PT USE INHALER: CPT

## 2017-03-24 PROCEDURE — 99285 EMERGENCY DEPT VISIT HI MDM: CPT

## 2017-03-24 PROCEDURE — 96374 THER/PROPH/DIAG INJ IV PUSH: CPT

## 2017-03-24 PROCEDURE — 1200000000 HC SEMI PRIVATE

## 2017-03-24 PROCEDURE — 71010 XR CHEST LIMITED ONE VIEW: CPT

## 2017-03-24 PROCEDURE — 82550 ASSAY OF CK (CPK): CPT

## 2017-03-24 PROCEDURE — 87205 SMEAR GRAM STAIN: CPT

## 2017-03-24 PROCEDURE — 84484 ASSAY OF TROPONIN QUANT: CPT

## 2017-03-24 PROCEDURE — 83880 ASSAY OF NATRIURETIC PEPTIDE: CPT

## 2017-03-24 PROCEDURE — 82553 CREATINE MB FRACTION: CPT

## 2017-03-24 PROCEDURE — 87040 BLOOD CULTURE FOR BACTERIA: CPT

## 2017-03-24 RX ORDER — METHYLPREDNISOLONE SODIUM SUCCINATE 40 MG/ML
40 INJECTION, POWDER, LYOPHILIZED, FOR SOLUTION INTRAMUSCULAR; INTRAVENOUS EVERY 12 HOURS
Status: DISCONTINUED | OUTPATIENT
Start: 2017-03-24 | End: 2017-03-25 | Stop reason: HOSPADM

## 2017-03-24 RX ORDER — LISINOPRIL 10 MG/1
10 TABLET ORAL DAILY
Status: DISCONTINUED | OUTPATIENT
Start: 2017-03-24 | End: 2017-03-25 | Stop reason: HOSPADM

## 2017-03-24 RX ORDER — NITROGLYCERIN 0.4 MG/1
0.4 TABLET SUBLINGUAL EVERY 5 MIN PRN
Status: DISCONTINUED | OUTPATIENT
Start: 2017-03-24 | End: 2017-03-25 | Stop reason: HOSPADM

## 2017-03-24 RX ORDER — ACETAMINOPHEN 325 MG/1
650 TABLET ORAL EVERY 4 HOURS PRN
Status: DISCONTINUED | OUTPATIENT
Start: 2017-03-24 | End: 2017-03-25 | Stop reason: HOSPADM

## 2017-03-24 RX ORDER — SODIUM CHLORIDE 0.9 % (FLUSH) 0.9 %
10 SYRINGE (ML) INJECTION PRN
Status: DISCONTINUED | OUTPATIENT
Start: 2017-03-24 | End: 2017-03-25 | Stop reason: HOSPADM

## 2017-03-24 RX ORDER — ONDANSETRON 2 MG/ML
4 INJECTION INTRAMUSCULAR; INTRAVENOUS EVERY 6 HOURS PRN
Status: DISCONTINUED | OUTPATIENT
Start: 2017-03-24 | End: 2017-03-25 | Stop reason: HOSPADM

## 2017-03-24 RX ORDER — ALBUTEROL SULFATE 2.5 MG/3ML
2.5 SOLUTION RESPIRATORY (INHALATION) EVERY 4 HOURS PRN
Status: DISCONTINUED | OUTPATIENT
Start: 2017-03-24 | End: 2017-03-25 | Stop reason: HOSPADM

## 2017-03-24 RX ORDER — CLOPIDOGREL BISULFATE 75 MG/1
75 TABLET ORAL DAILY
Status: DISCONTINUED | OUTPATIENT
Start: 2017-03-24 | End: 2017-03-25 | Stop reason: HOSPADM

## 2017-03-24 RX ORDER — SIMVASTATIN 20 MG
20 TABLET ORAL NIGHTLY
Status: DISCONTINUED | OUTPATIENT
Start: 2017-03-24 | End: 2017-03-25 | Stop reason: HOSPADM

## 2017-03-24 RX ORDER — SODIUM CHLORIDE 0.9 % (FLUSH) 0.9 %
10 SYRINGE (ML) INJECTION EVERY 12 HOURS SCHEDULED
Status: DISCONTINUED | OUTPATIENT
Start: 2017-03-24 | End: 2017-03-25 | Stop reason: HOSPADM

## 2017-03-24 RX ORDER — IPRATROPIUM BROMIDE AND ALBUTEROL SULFATE 2.5; .5 MG/3ML; MG/3ML
1 SOLUTION RESPIRATORY (INHALATION)
Status: DISCONTINUED | OUTPATIENT
Start: 2017-03-24 | End: 2017-03-24

## 2017-03-24 RX ORDER — IPRATROPIUM BROMIDE AND ALBUTEROL SULFATE 2.5; .5 MG/3ML; MG/3ML
1 SOLUTION RESPIRATORY (INHALATION) 4 TIMES DAILY
Status: DISCONTINUED | OUTPATIENT
Start: 2017-03-24 | End: 2017-03-25

## 2017-03-24 RX ORDER — FAMOTIDINE 20 MG/1
20 TABLET, FILM COATED ORAL DAILY
Status: DISCONTINUED | OUTPATIENT
Start: 2017-03-24 | End: 2017-03-25 | Stop reason: HOSPADM

## 2017-03-24 RX ORDER — FERROUS SULFATE 325(65) MG
325 TABLET ORAL
Status: DISCONTINUED | OUTPATIENT
Start: 2017-03-24 | End: 2017-03-25 | Stop reason: HOSPADM

## 2017-03-24 RX ORDER — SODIUM CHLORIDE 9 MG/ML
INJECTION, SOLUTION INTRAVENOUS CONTINUOUS
Status: DISCONTINUED | OUTPATIENT
Start: 2017-03-24 | End: 2017-03-25 | Stop reason: HOSPADM

## 2017-03-24 RX ORDER — IPRATROPIUM BROMIDE AND ALBUTEROL SULFATE 2.5; .5 MG/3ML; MG/3ML
SOLUTION RESPIRATORY (INHALATION)
Status: COMPLETED
Start: 2017-03-24 | End: 2017-03-24

## 2017-03-24 RX ORDER — METHYLPREDNISOLONE SODIUM SUCCINATE 125 MG/2ML
125 INJECTION, POWDER, LYOPHILIZED, FOR SOLUTION INTRAMUSCULAR; INTRAVENOUS ONCE
Status: COMPLETED | OUTPATIENT
Start: 2017-03-24 | End: 2017-03-24

## 2017-03-24 RX ORDER — IPRATROPIUM BROMIDE AND ALBUTEROL SULFATE 2.5; .5 MG/3ML; MG/3ML
1 SOLUTION RESPIRATORY (INHALATION)
Status: COMPLETED | OUTPATIENT
Start: 2017-03-24 | End: 2017-03-24

## 2017-03-24 RX ADMIN — METOPROLOL TARTRATE 12.5 MG: 25 TABLET ORAL at 20:28

## 2017-03-24 RX ADMIN — IPRATROPIUM BROMIDE AND ALBUTEROL SULFATE 1 AMPULE: 2.5; .5 SOLUTION RESPIRATORY (INHALATION) at 00:36

## 2017-03-24 RX ADMIN — IPRATROPIUM BROMIDE AND ALBUTEROL SULFATE 1 AMPULE: .5; 3 SOLUTION RESPIRATORY (INHALATION) at 00:55

## 2017-03-24 RX ADMIN — IPRATROPIUM BROMIDE AND ALBUTEROL SULFATE 1 AMPULE: .5; 3 SOLUTION RESPIRATORY (INHALATION) at 01:23

## 2017-03-24 RX ADMIN — ENOXAPARIN SODIUM 30 MG: 30 INJECTION SUBCUTANEOUS at 08:18

## 2017-03-24 RX ADMIN — DILTIAZEM HYDROCHLORIDE 30 MG: 30 TABLET, FILM COATED ORAL at 13:43

## 2017-03-24 RX ADMIN — MOMETASONE FUROATE AND FORMOTEROL FUMARATE DIHYDRATE 2 PUFF: 200; 5 AEROSOL RESPIRATORY (INHALATION) at 20:45

## 2017-03-24 RX ADMIN — IPRATROPIUM BROMIDE AND ALBUTEROL SULFATE 1 AMPULE: .5; 3 SOLUTION RESPIRATORY (INHALATION) at 20:45

## 2017-03-24 RX ADMIN — IPRATROPIUM BROMIDE AND ALBUTEROL SULFATE 1 AMPULE: .5; 3 SOLUTION RESPIRATORY (INHALATION) at 06:17

## 2017-03-24 RX ADMIN — SIMVASTATIN 20 MG: 20 TABLET, FILM COATED ORAL at 20:28

## 2017-03-24 RX ADMIN — IPRATROPIUM BROMIDE AND ALBUTEROL SULFATE 1 AMPULE: .5; 3 SOLUTION RESPIRATORY (INHALATION) at 16:36

## 2017-03-24 RX ADMIN — DILTIAZEM HYDROCHLORIDE 30 MG: 30 TABLET, FILM COATED ORAL at 09:07

## 2017-03-24 RX ADMIN — LISINOPRIL 10 MG: 10 TABLET ORAL at 09:07

## 2017-03-24 RX ADMIN — SODIUM CHLORIDE: 9 INJECTION, SOLUTION INTRAVENOUS at 04:36

## 2017-03-24 RX ADMIN — CLOPIDOGREL BISULFATE 75 MG: 75 TABLET ORAL at 09:07

## 2017-03-24 RX ADMIN — ONDANSETRON 4 MG: 2 INJECTION INTRAMUSCULAR; INTRAVENOUS at 08:18

## 2017-03-24 RX ADMIN — CEFTRIAXONE 1 G: 1 INJECTION, POWDER, FOR SOLUTION INTRAMUSCULAR; INTRAVENOUS at 04:39

## 2017-03-24 RX ADMIN — IPRATROPIUM BROMIDE AND ALBUTEROL SULFATE 1 AMPULE: .5; 3 SOLUTION RESPIRATORY (INHALATION) at 00:36

## 2017-03-24 RX ADMIN — DILTIAZEM HYDROCHLORIDE 30 MG: 30 TABLET, FILM COATED ORAL at 20:28

## 2017-03-24 RX ADMIN — SODIUM CHLORIDE: 9 INJECTION, SOLUTION INTRAVENOUS at 20:30

## 2017-03-24 RX ADMIN — IPRATROPIUM BROMIDE AND ALBUTEROL SULFATE 1 AMPULE: .5; 3 SOLUTION RESPIRATORY (INHALATION) at 02:00

## 2017-03-24 RX ADMIN — MOMETASONE FUROATE AND FORMOTEROL FUMARATE DIHYDRATE 2 PUFF: 200; 5 AEROSOL RESPIRATORY (INHALATION) at 10:48

## 2017-03-24 RX ADMIN — METOPROLOL TARTRATE 12.5 MG: 25 TABLET ORAL at 09:07

## 2017-03-24 RX ADMIN — FAMOTIDINE 20 MG: 20 TABLET ORAL at 09:07

## 2017-03-24 RX ADMIN — METHYLPREDNISOLONE SODIUM SUCCINATE 125 MG: 125 INJECTION, POWDER, FOR SOLUTION INTRAMUSCULAR; INTRAVENOUS at 00:49

## 2017-03-24 RX ADMIN — METHYLPREDNISOLONE SODIUM SUCCINATE 40 MG: 40 INJECTION, POWDER, FOR SOLUTION INTRAMUSCULAR; INTRAVENOUS at 13:43

## 2017-03-24 RX ADMIN — IRON SUPPLEMENT 325 MG: 325 TABLET ORAL at 09:08

## 2017-03-24 RX ADMIN — IPRATROPIUM BROMIDE AND ALBUTEROL SULFATE 1 AMPULE: .5; 3 SOLUTION RESPIRATORY (INHALATION) at 10:48

## 2017-03-24 ASSESSMENT — ENCOUNTER SYMPTOMS
WHEEZING: 1
ABDOMINAL PAIN: 0
EYE PAIN: 0
SORE THROAT: 0
VOMITING: 0
DIARRHEA: 0
BACK PAIN: 0
COUGH: 1
NAUSEA: 0
CHEST TIGHTNESS: 0
EYE DISCHARGE: 0
SHORTNESS OF BREATH: 1

## 2017-03-25 VITALS
RESPIRATION RATE: 18 BRPM | BODY MASS INDEX: 22.1 KG/M2 | HEART RATE: 91 BPM | SYSTOLIC BLOOD PRESSURE: 159 MMHG | WEIGHT: 120.1 LBS | HEIGHT: 62 IN | TEMPERATURE: 98.1 F | OXYGEN SATURATION: 95 % | DIASTOLIC BLOOD PRESSURE: 97 MMHG

## 2017-03-25 LAB
ABSOLUTE EOS #: 0 K/UL (ref 0–0.4)
ABSOLUTE LYMPH #: 0.54 K/UL (ref 1–4.8)
ABSOLUTE MONO #: 0.54 K/UL (ref 0–1)
ALBUMIN SERPL-MCNC: 3.3 G/DL (ref 3.5–5.2)
ALBUMIN/GLOBULIN RATIO: 1.2 (ref 1–2.5)
ALP BLD-CCNC: 71 U/L (ref 35–104)
ALT SERPL-CCNC: 10 U/L (ref 5–33)
ANION GAP SERPL CALCULATED.3IONS-SCNC: 13 MMOL/L (ref 9–17)
AST SERPL-CCNC: 11 U/L
BASOPHILS # BLD: 0 % (ref 0–2)
BASOPHILS ABSOLUTE: 0 K/UL (ref 0–0.2)
BILIRUB SERPL-MCNC: <0.1 MG/DL (ref 0.3–1.2)
BUN BLDV-MCNC: 35 MG/DL (ref 8–23)
BUN/CREAT BLD: 20 (ref 9–20)
CALCIUM SERPL-MCNC: 9.3 MG/DL (ref 8.6–10.4)
CHLORIDE BLD-SCNC: 105 MMOL/L (ref 98–107)
CO2: 24 MMOL/L (ref 20–31)
CREAT SERPL-MCNC: 1.71 MG/DL (ref 0.5–0.9)
DIFFERENTIAL TYPE: ABNORMAL
EOSINOPHILS RELATIVE PERCENT: 0 % (ref 0–8)
GFR AFRICAN AMERICAN: 34 ML/MIN
GFR NON-AFRICAN AMERICAN: 28 ML/MIN
GFR SERPL CREATININE-BSD FRML MDRD: ABNORMAL ML/MIN/{1.73_M2}
GFR SERPL CREATININE-BSD FRML MDRD: ABNORMAL ML/MIN/{1.73_M2}
GLUCOSE BLD-MCNC: 197 MG/DL (ref 70–99)
HCT VFR BLD CALC: 33.2 % (ref 36–46)
HEMOGLOBIN: 10.6 G/DL (ref 12–16)
LYMPHOCYTES # BLD: 3 % (ref 24–44)
MCH RBC QN AUTO: 28.3 PG (ref 26–34)
MCHC RBC AUTO-ENTMCNC: 32 G/DL (ref 31–37)
MCV RBC AUTO: 88.3 FL (ref 80–100)
MONOCYTES # BLD: 3 % (ref 0–12)
MORPHOLOGY: NORMAL
PDW BLD-RTO: 15.7 % (ref 12.1–15.2)
PLATELET # BLD: 297 K/UL (ref 140–450)
PLATELET ESTIMATE: ABNORMAL
PMV BLD AUTO: 9.4 FL (ref 6–12)
POTASSIUM SERPL-SCNC: 4.5 MMOL/L (ref 3.7–5.3)
RBC # BLD: 3.76 M/UL (ref 4–5.2)
RBC # BLD: ABNORMAL 10*6/UL
SEG NEUTROPHILS: 94 % (ref 36–66)
SEGMENTED NEUTROPHILS ABSOLUTE COUNT: 16.82 K/UL (ref 1.8–7.7)
SODIUM BLD-SCNC: 142 MMOL/L (ref 135–144)
TOTAL PROTEIN: 6.1 G/DL (ref 6.4–8.3)
WBC # BLD: 17.9 K/UL (ref 3.5–11)
WBC # BLD: ABNORMAL 10*3/UL

## 2017-03-25 PROCEDURE — 6370000000 HC RX 637 (ALT 250 FOR IP): Performed by: INTERNAL MEDICINE

## 2017-03-25 PROCEDURE — 6360000002 HC RX W HCPCS: Performed by: INTERNAL MEDICINE

## 2017-03-25 PROCEDURE — 2580000003 HC RX 258: Performed by: INTERNAL MEDICINE

## 2017-03-25 PROCEDURE — 85025 COMPLETE CBC W/AUTO DIFF WBC: CPT

## 2017-03-25 PROCEDURE — 80053 COMPREHEN METABOLIC PANEL: CPT

## 2017-03-25 PROCEDURE — 36415 COLL VENOUS BLD VENIPUNCTURE: CPT

## 2017-03-25 PROCEDURE — 94664 DEMO&/EVAL PT USE INHALER: CPT

## 2017-03-25 PROCEDURE — 94760 N-INVAS EAR/PLS OXIMETRY 1: CPT

## 2017-03-25 RX ORDER — IPRATROPIUM BROMIDE AND ALBUTEROL SULFATE 2.5; .5 MG/3ML; MG/3ML
1 SOLUTION RESPIRATORY (INHALATION) 3 TIMES DAILY
Status: DISCONTINUED | OUTPATIENT
Start: 2017-03-25 | End: 2017-03-25 | Stop reason: HOSPADM

## 2017-03-25 RX ORDER — PREDNISONE 20 MG/1
20 TABLET ORAL 2 TIMES DAILY
Qty: 10 TABLET | Refills: 0 | Status: SHIPPED | OUTPATIENT
Start: 2017-03-25 | End: 2017-03-29 | Stop reason: ALTCHOICE

## 2017-03-25 RX ORDER — CEFUROXIME AXETIL 250 MG/1
250 TABLET ORAL 2 TIMES DAILY
Qty: 20 TABLET | Refills: 0 | Status: SHIPPED | OUTPATIENT
Start: 2017-03-25 | End: 2017-03-29 | Stop reason: ALTCHOICE

## 2017-03-25 RX ADMIN — METHYLPREDNISOLONE SODIUM SUCCINATE 40 MG: 40 INJECTION, POWDER, FOR SOLUTION INTRAMUSCULAR; INTRAVENOUS at 12:39

## 2017-03-25 RX ADMIN — IRON SUPPLEMENT 325 MG: 325 TABLET ORAL at 08:57

## 2017-03-25 RX ADMIN — DILTIAZEM HYDROCHLORIDE 30 MG: 30 TABLET, FILM COATED ORAL at 08:56

## 2017-03-25 RX ADMIN — METOPROLOL TARTRATE 12.5 MG: 25 TABLET ORAL at 08:56

## 2017-03-25 RX ADMIN — FAMOTIDINE 20 MG: 20 TABLET ORAL at 08:56

## 2017-03-25 RX ADMIN — ENOXAPARIN SODIUM 30 MG: 30 INJECTION SUBCUTANEOUS at 08:57

## 2017-03-25 RX ADMIN — CEFTRIAXONE 1 G: 1 INJECTION, POWDER, FOR SOLUTION INTRAMUSCULAR; INTRAVENOUS at 04:28

## 2017-03-25 RX ADMIN — IPRATROPIUM BROMIDE AND ALBUTEROL SULFATE 1 AMPULE: .5; 3 SOLUTION RESPIRATORY (INHALATION) at 15:01

## 2017-03-25 RX ADMIN — METHYLPREDNISOLONE SODIUM SUCCINATE 40 MG: 40 INJECTION, POWDER, FOR SOLUTION INTRAMUSCULAR; INTRAVENOUS at 00:23

## 2017-03-25 RX ADMIN — DILTIAZEM HYDROCHLORIDE 30 MG: 30 TABLET, FILM COATED ORAL at 15:02

## 2017-03-25 RX ADMIN — LISINOPRIL 10 MG: 10 TABLET ORAL at 08:56

## 2017-03-25 RX ADMIN — CLOPIDOGREL BISULFATE 75 MG: 75 TABLET ORAL at 08:56

## 2017-03-26 LAB
CULTURE: ABNORMAL
CULTURE: ABNORMAL
DIRECT EXAM: ABNORMAL
Lab: ABNORMAL
SPECIMEN DESCRIPTION: ABNORMAL
SPECIMEN DESCRIPTION: ABNORMAL
STATUS: ABNORMAL

## 2017-03-27 ENCOUNTER — TELEPHONE (OUTPATIENT)
Dept: PRIMARY CARE CLINIC | Age: 82
End: 2017-03-27

## 2017-03-29 ENCOUNTER — TELEPHONE (OUTPATIENT)
Dept: PRIMARY CARE CLINIC | Age: 82
End: 2017-03-29

## 2017-03-29 ENCOUNTER — OFFICE VISIT (OUTPATIENT)
Dept: PRIMARY CARE CLINIC | Age: 82
End: 2017-03-29
Payer: MEDICARE

## 2017-03-29 ENCOUNTER — HOSPITAL ENCOUNTER (OUTPATIENT)
Dept: CARDIAC REHAB | Age: 82
Setting detail: THERAPIES SERIES
Discharge: HOME | End: 2017-03-29
Payer: MEDICARE

## 2017-03-29 VITALS
TEMPERATURE: 97.9 F | RESPIRATION RATE: 20 BRPM | DIASTOLIC BLOOD PRESSURE: 88 MMHG | HEART RATE: 94 BPM | OXYGEN SATURATION: 97 % | BODY MASS INDEX: 22.94 KG/M2 | WEIGHT: 125.4 LBS | SYSTOLIC BLOOD PRESSURE: 152 MMHG

## 2017-03-29 DIAGNOSIS — R73.09 ELEVATED GLUCOSE: ICD-10-CM

## 2017-03-29 DIAGNOSIS — I10 ESSENTIAL HYPERTENSION: Chronic | ICD-10-CM

## 2017-03-29 DIAGNOSIS — D72.828 OTHER ELEVATED WHITE BLOOD CELL (WBC) COUNT: ICD-10-CM

## 2017-03-29 DIAGNOSIS — J44.1 COPD EXACERBATION (HCC): Primary | ICD-10-CM

## 2017-03-29 LAB
CULTURE: NORMAL
Lab: NORMAL
Lab: NORMAL
SPECIMEN DESCRIPTION: NORMAL
SPECIMEN DESCRIPTION: NORMAL
STATUS: NORMAL
STATUS: NORMAL

## 2017-03-29 PROCEDURE — 1123F ACP DISCUSS/DSCN MKR DOCD: CPT | Performed by: NURSE PRACTITIONER

## 2017-03-29 PROCEDURE — 1036F TOBACCO NON-USER: CPT | Performed by: NURSE PRACTITIONER

## 2017-03-29 PROCEDURE — G8926 SPIRO NO PERF OR DOC: HCPCS | Performed by: NURSE PRACTITIONER

## 2017-03-29 PROCEDURE — 99213 OFFICE O/P EST LOW 20 MIN: CPT | Performed by: NURSE PRACTITIONER

## 2017-03-29 PROCEDURE — 3023F SPIROM DOC REV: CPT | Performed by: NURSE PRACTITIONER

## 2017-03-29 PROCEDURE — G8419 CALC BMI OUT NRM PARAM NOF/U: HCPCS | Performed by: NURSE PRACTITIONER

## 2017-03-29 PROCEDURE — G8484 FLU IMMUNIZE NO ADMIN: HCPCS | Performed by: NURSE PRACTITIONER

## 2017-03-29 PROCEDURE — G8427 DOCREV CUR MEDS BY ELIG CLIN: HCPCS | Performed by: NURSE PRACTITIONER

## 2017-03-29 PROCEDURE — G8598 ASA/ANTIPLAT THER USED: HCPCS | Performed by: NURSE PRACTITIONER

## 2017-03-29 PROCEDURE — 1111F DSCHRG MED/CURRENT MED MERGE: CPT | Performed by: NURSE PRACTITIONER

## 2017-03-29 PROCEDURE — 1090F PRES/ABSN URINE INCON ASSESS: CPT | Performed by: NURSE PRACTITIONER

## 2017-03-29 PROCEDURE — 4040F PNEUMOC VAC/ADMIN/RCVD: CPT | Performed by: NURSE PRACTITIONER

## 2017-03-29 ASSESSMENT — ENCOUNTER SYMPTOMS
GASTROINTESTINAL NEGATIVE: 1
ASSOCIATED PULMONARY SYMPTOMS: COUGH
EYES NEGATIVE: 1
CHEST TIGHTNESS: 0
DIFFICULTY BREATHING: 1
SHORTNESS OF BREATH: 1
TROUBLE SWALLOWING: 0
WHEEZING: 1

## 2017-03-29 ASSESSMENT — COPD QUESTIONNAIRES: COPD: 1

## 2017-03-30 ENCOUNTER — HOSPITAL ENCOUNTER (OUTPATIENT)
Dept: CARDIAC REHAB | Age: 82
Setting detail: THERAPIES SERIES
Discharge: HOME | End: 2017-03-30
Payer: MEDICARE

## 2017-03-30 ENCOUNTER — HOSPITAL ENCOUNTER (OUTPATIENT)
Age: 82
Discharge: HOME | End: 2017-03-30
Payer: MEDICARE

## 2017-03-30 DIAGNOSIS — D72.828 OTHER ELEVATED WHITE BLOOD CELL (WBC) COUNT: ICD-10-CM

## 2017-03-30 LAB
ABSOLUTE EOS #: 0 K/UL (ref 0–0.4)
ABSOLUTE LYMPH #: 1.21 K/UL (ref 1–4.8)
ABSOLUTE MONO #: 1.45 K/UL (ref 0–1)
BASOPHILS # BLD: 0 % (ref 0–2)
BASOPHILS ABSOLUTE: 0 K/UL (ref 0–0.2)
DIFFERENTIAL TYPE: ABNORMAL
EOSINOPHILS RELATIVE PERCENT: 0 % (ref 0–8)
HCT VFR BLD CALC: 38.6 % (ref 36–46)
HEMOGLOBIN: 12 G/DL (ref 12–16)
LYMPHOCYTES # BLD: 5 % (ref 24–44)
MCH RBC QN AUTO: 27.5 PG (ref 26–34)
MCHC RBC AUTO-ENTMCNC: 31.2 G/DL (ref 31–37)
MCV RBC AUTO: 88.2 FL (ref 80–100)
MONOCYTES # BLD: 6 % (ref 0–12)
MORPHOLOGY: NORMAL
PDW BLD-RTO: 14.7 % (ref 12.1–15.2)
PLATELET # BLD: 329 K/UL (ref 140–450)
PLATELET ESTIMATE: ABNORMAL
PMV BLD AUTO: ABNORMAL FL (ref 6–12)
RBC # BLD: 4.37 M/UL (ref 4–5.2)
RBC # BLD: ABNORMAL 10*6/UL
SEG NEUTROPHILS: 89 % (ref 36–66)
SEGMENTED NEUTROPHILS ABSOLUTE COUNT: 21.54 K/UL (ref 1.8–7.7)
WBC # BLD: 24.2 K/UL (ref 3.5–11)
WBC # BLD: ABNORMAL 10*3/UL

## 2017-03-30 PROCEDURE — 85025 COMPLETE CBC W/AUTO DIFF WBC: CPT

## 2017-03-30 PROCEDURE — 36415 COLL VENOUS BLD VENIPUNCTURE: CPT

## 2017-03-31 ENCOUNTER — TELEPHONE (OUTPATIENT)
Dept: PRIMARY CARE CLINIC | Age: 82
End: 2017-03-31

## 2017-03-31 DIAGNOSIS — D72.828 OTHER ELEVATED WHITE BLOOD CELL (WBC) COUNT: Primary | ICD-10-CM

## 2017-04-01 ENCOUNTER — HOSPITAL ENCOUNTER (OUTPATIENT)
Age: 82
Discharge: HOME | End: 2017-04-01
Payer: MEDICARE

## 2017-04-01 DIAGNOSIS — R73.09 ELEVATED GLUCOSE: ICD-10-CM

## 2017-04-01 DIAGNOSIS — D72.828 OTHER ELEVATED WHITE BLOOD CELL (WBC) COUNT: ICD-10-CM

## 2017-04-01 LAB
ABSOLUTE BANDS #: 0.7 K/UL (ref 0–1)
ABSOLUTE EOS #: 0 K/UL (ref 0–0.4)
ABSOLUTE LYMPH #: 2.28 K/UL (ref 1–4.8)
ABSOLUTE MONO #: 1.23 K/UL (ref 0–1)
BANDS: 4 % (ref 0–10)
BASOPHILS # BLD: 0 % (ref 0–2)
BASOPHILS ABSOLUTE: 0 K/UL (ref 0–0.2)
DIFFERENTIAL TYPE: ABNORMAL
EOSINOPHILS RELATIVE PERCENT: 0 % (ref 0–8)
ESTIMATED AVERAGE GLUCOSE: 166 MG/DL
HBA1C MFR BLD: 7.4 % (ref 4.8–5.9)
HCT VFR BLD CALC: 40.3 % (ref 36–46)
HEMOGLOBIN: 12.7 G/DL (ref 12–16)
LYMPHOCYTES # BLD: 13 % (ref 24–44)
MCH RBC QN AUTO: 28.3 PG (ref 26–34)
MCHC RBC AUTO-ENTMCNC: 31.6 G/DL (ref 31–37)
MCV RBC AUTO: 89.5 FL (ref 80–100)
MONOCYTES # BLD: 7 % (ref 0–12)
MORPHOLOGY: ABNORMAL
PDW BLD-RTO: 16.4 % (ref 12.1–15.2)
PLATELET # BLD: 288 K/UL (ref 140–450)
PLATELET ESTIMATE: ABNORMAL
PMV BLD AUTO: ABNORMAL FL (ref 6–12)
RBC # BLD: 4.5 M/UL (ref 4–5.2)
RBC # BLD: ABNORMAL 10*6/UL
SEG NEUTROPHILS: 76 % (ref 36–66)
SEGMENTED NEUTROPHILS ABSOLUTE COUNT: 13.29 K/UL (ref 1.8–7.7)
WBC # BLD: 17.5 K/UL (ref 3.5–11)
WBC # BLD: ABNORMAL 10*3/UL

## 2017-04-01 PROCEDURE — 36415 COLL VENOUS BLD VENIPUNCTURE: CPT

## 2017-04-01 PROCEDURE — 83036 HEMOGLOBIN GLYCOSYLATED A1C: CPT

## 2017-04-01 PROCEDURE — 85025 COMPLETE CBC W/AUTO DIFF WBC: CPT

## 2017-04-03 ENCOUNTER — TELEPHONE (OUTPATIENT)
Dept: PRIMARY CARE CLINIC | Age: 82
End: 2017-04-03

## 2017-04-03 DIAGNOSIS — D72.828 OTHER ELEVATED WHITE BLOOD CELL (WBC) COUNT: Primary | ICD-10-CM

## 2017-04-04 ENCOUNTER — HOSPITAL ENCOUNTER (EMERGENCY)
Age: 82
Discharge: HOME | End: 2017-04-04
Attending: EMERGENCY MEDICINE
Payer: MEDICARE

## 2017-04-04 VITALS
DIASTOLIC BLOOD PRESSURE: 66 MMHG | RESPIRATION RATE: 20 BRPM | SYSTOLIC BLOOD PRESSURE: 151 MMHG | TEMPERATURE: 98.8 F | OXYGEN SATURATION: 99 % | HEART RATE: 77 BPM

## 2017-04-04 DIAGNOSIS — R19.7 DIARRHEA OF PRESUMED INFECTIOUS ORIGIN: Primary | ICD-10-CM

## 2017-04-04 LAB
ABSOLUTE EOS #: 0.52 K/UL (ref 0–0.4)
ABSOLUTE LYMPH #: 1.89 K/UL (ref 1–4.8)
ABSOLUTE MONO #: 0.34 K/UL (ref 0–1)
ALBUMIN SERPL-MCNC: 2.7 G/DL (ref 3.5–5.2)
ALBUMIN/GLOBULIN RATIO: 0.9 (ref 1–2.5)
ALP BLD-CCNC: 65 U/L (ref 35–104)
ALT SERPL-CCNC: 9 U/L (ref 5–33)
ANION GAP SERPL CALCULATED.3IONS-SCNC: 14 MMOL/L (ref 9–17)
AST SERPL-CCNC: 9 U/L
BASOPHILS # BLD: 0 % (ref 0–2)
BASOPHILS ABSOLUTE: 0 K/UL (ref 0–0.2)
BILIRUB SERPL-MCNC: 0.34 MG/DL (ref 0.3–1.2)
BUN BLDV-MCNC: 32 MG/DL (ref 8–23)
BUN/CREAT BLD: 15 (ref 9–20)
CALCIUM SERPL-MCNC: 8.5 MG/DL (ref 8.6–10.4)
CHLORIDE BLD-SCNC: 101 MMOL/L (ref 98–107)
CO2: 23 MMOL/L (ref 20–31)
CREAT SERPL-MCNC: 2.2 MG/DL (ref 0.5–0.9)
DIFFERENTIAL TYPE: ABNORMAL
EOSINOPHILS RELATIVE PERCENT: 3 % (ref 0–8)
GFR AFRICAN AMERICAN: 26 ML/MIN
GFR NON-AFRICAN AMERICAN: 21 ML/MIN
GFR SERPL CREATININE-BSD FRML MDRD: ABNORMAL ML/MIN/{1.73_M2}
GFR SERPL CREATININE-BSD FRML MDRD: ABNORMAL ML/MIN/{1.73_M2}
GLUCOSE BLD-MCNC: 127 MG/DL (ref 70–99)
HCT VFR BLD CALC: 36.3 % (ref 36–46)
HEMOGLOBIN: 11.7 G/DL (ref 12–16)
LACTIC ACID, WHOLE BLOOD: NORMAL MMOL/L (ref 0.7–2.1)
LACTIC ACID: 0.9 MMOL/L (ref 0.5–2.2)
LIPASE: 22 U/L (ref 13–60)
LYMPHOCYTES # BLD: 11 % (ref 24–44)
MCH RBC QN AUTO: 28.5 PG (ref 26–34)
MCHC RBC AUTO-ENTMCNC: 32.2 G/DL (ref 31–37)
MCV RBC AUTO: 88.4 FL (ref 80–100)
MONOCYTES # BLD: 2 % (ref 0–12)
MORPHOLOGY: NORMAL
PDW BLD-RTO: 16.3 % (ref 12.1–15.2)
PLATELET # BLD: 229 K/UL (ref 140–450)
PLATELET ESTIMATE: ABNORMAL
PMV BLD AUTO: ABNORMAL FL (ref 6–12)
POTASSIUM SERPL-SCNC: 3.5 MMOL/L (ref 3.7–5.3)
RBC # BLD: 4.11 M/UL (ref 4–5.2)
RBC # BLD: ABNORMAL 10*6/UL
SEG NEUTROPHILS: 84 % (ref 36–66)
SEGMENTED NEUTROPHILS ABSOLUTE COUNT: 14.45 K/UL (ref 1.8–7.7)
SODIUM BLD-SCNC: 138 MMOL/L (ref 135–144)
TOTAL PROTEIN: 5.6 G/DL (ref 6.4–8.3)
WBC # BLD: 17.2 K/UL (ref 3.5–11)
WBC # BLD: ABNORMAL 10*3/UL

## 2017-04-04 PROCEDURE — 83605 ASSAY OF LACTIC ACID: CPT

## 2017-04-04 PROCEDURE — 2580000003 HC RX 258: Performed by: EMERGENCY MEDICINE

## 2017-04-04 PROCEDURE — 80053 COMPREHEN METABOLIC PANEL: CPT

## 2017-04-04 PROCEDURE — 85025 COMPLETE CBC W/AUTO DIFF WBC: CPT

## 2017-04-04 PROCEDURE — 83690 ASSAY OF LIPASE: CPT

## 2017-04-04 PROCEDURE — 6370000000 HC RX 637 (ALT 250 FOR IP): Performed by: EMERGENCY MEDICINE

## 2017-04-04 PROCEDURE — 99284 EMERGENCY DEPT VISIT MOD MDM: CPT

## 2017-04-04 PROCEDURE — 87493 C DIFF AMPLIFIED PROBE: CPT

## 2017-04-04 RX ORDER — METRONIDAZOLE 500 MG/1
500 TABLET ORAL 4 TIMES DAILY
Qty: 40 TABLET | Refills: 0 | Status: SHIPPED | OUTPATIENT
Start: 2017-04-04 | End: 2017-04-14

## 2017-04-04 RX ORDER — SODIUM CHLORIDE, SODIUM LACTATE, POTASSIUM CHLORIDE, CALCIUM CHLORIDE 600; 310; 30; 20 MG/100ML; MG/100ML; MG/100ML; MG/100ML
INJECTION, SOLUTION INTRAVENOUS ONCE
Status: COMPLETED | OUTPATIENT
Start: 2017-04-04 | End: 2017-04-04

## 2017-04-04 RX ORDER — METRONIDAZOLE 250 MG/1
500 TABLET ORAL ONCE
Status: COMPLETED | OUTPATIENT
Start: 2017-04-04 | End: 2017-04-04

## 2017-04-04 RX ADMIN — METRONIDAZOLE 500 MG: 250 TABLET ORAL at 23:32

## 2017-04-04 RX ADMIN — SODIUM CHLORIDE, POTASSIUM CHLORIDE, SODIUM LACTATE AND CALCIUM CHLORIDE: 600; 310; 30; 20 INJECTION, SOLUTION INTRAVENOUS at 21:34

## 2017-04-04 ASSESSMENT — ENCOUNTER SYMPTOMS
DIARRHEA: 1
ABDOMINAL DISTENTION: 0
ALLERGIC/IMMUNOLOGIC NEGATIVE: 1
EYES NEGATIVE: 1
ABDOMINAL PAIN: 0
VOMITING: 0
RESPIRATORY NEGATIVE: 1
RECTAL PAIN: 0
NAUSEA: 0
BLOOD IN STOOL: 0

## 2017-04-12 ENCOUNTER — HOSPITAL ENCOUNTER (OUTPATIENT)
Dept: CARDIAC REHAB | Age: 82
Setting detail: THERAPIES SERIES
Discharge: HOME | End: 2017-04-12
Payer: MEDICARE

## 2017-04-19 ENCOUNTER — HOSPITAL ENCOUNTER (OUTPATIENT)
Dept: CARDIAC REHAB | Age: 82
Setting detail: THERAPIES SERIES
Discharge: HOME | End: 2017-04-19
Payer: MEDICARE

## 2017-04-20 ENCOUNTER — HOSPITAL ENCOUNTER (EMERGENCY)
Age: 82
Discharge: HOME | End: 2017-04-20
Attending: INTERNAL MEDICINE
Payer: MEDICARE

## 2017-04-20 ENCOUNTER — APPOINTMENT (OUTPATIENT)
Dept: GENERAL RADIOLOGY | Age: 82
End: 2017-04-20
Payer: MEDICARE

## 2017-04-20 VITALS
DIASTOLIC BLOOD PRESSURE: 76 MMHG | HEART RATE: 87 BPM | SYSTOLIC BLOOD PRESSURE: 164 MMHG | OXYGEN SATURATION: 96 % | TEMPERATURE: 100 F | RESPIRATION RATE: 20 BRPM

## 2017-04-20 DIAGNOSIS — L03.115 CELLULITIS OF RIGHT LOWER EXTREMITY: ICD-10-CM

## 2017-04-20 DIAGNOSIS — I87.303 STASIS EDEMA OF BOTH LOWER EXTREMITIES: Primary | ICD-10-CM

## 2017-04-20 LAB
ABSOLUTE EOS #: 0.5 K/UL (ref 0–0.4)
ABSOLUTE LYMPH #: 0.9 K/UL (ref 1–4.8)
ABSOLUTE MONO #: 1.4 K/UL (ref 0–1)
ANION GAP SERPL CALCULATED.3IONS-SCNC: 11 MMOL/L (ref 9–17)
BASOPHILS # BLD: 0 % (ref 0–2)
BASOPHILS ABSOLUTE: 0 K/UL (ref 0–0.2)
BNP INTERPRETATION: ABNORMAL
BUN BLDV-MCNC: 16 MG/DL (ref 8–23)
BUN/CREAT BLD: 10 (ref 9–20)
CALCIUM SERPL-MCNC: 8.4 MG/DL (ref 8.6–10.4)
CHLORIDE BLD-SCNC: 104 MMOL/L (ref 98–107)
CO2: 29 MMOL/L (ref 20–31)
CREAT SERPL-MCNC: 1.55 MG/DL (ref 0.5–0.9)
DIFFERENTIAL TYPE: ABNORMAL
EOSINOPHILS RELATIVE PERCENT: 4 % (ref 0–8)
GFR AFRICAN AMERICAN: 38 ML/MIN
GFR NON-AFRICAN AMERICAN: 32 ML/MIN
GFR SERPL CREATININE-BSD FRML MDRD: ABNORMAL ML/MIN/{1.73_M2}
GFR SERPL CREATININE-BSD FRML MDRD: ABNORMAL ML/MIN/{1.73_M2}
GLUCOSE BLD-MCNC: 131 MG/DL (ref 70–99)
HCT VFR BLD CALC: 35.5 % (ref 36–46)
HEMOGLOBIN: 11.4 G/DL (ref 12–16)
LYMPHOCYTES # BLD: 8 % (ref 24–44)
MCH RBC QN AUTO: 28.9 PG (ref 26–34)
MCHC RBC AUTO-ENTMCNC: 32.2 G/DL (ref 31–37)
MCV RBC AUTO: 89.7 FL (ref 80–100)
MONOCYTES # BLD: 12 % (ref 0–12)
PDW BLD-RTO: 18.4 % (ref 12.1–15.2)
PLATELET # BLD: 365 K/UL (ref 140–450)
PLATELET ESTIMATE: ABNORMAL
PMV BLD AUTO: ABNORMAL FL (ref 6–12)
POTASSIUM SERPL-SCNC: 3.8 MMOL/L (ref 3.7–5.3)
PRO-BNP: 2840 PG/ML
RBC # BLD: 3.96 M/UL (ref 4–5.2)
RBC # BLD: ABNORMAL 10*6/UL
SEG NEUTROPHILS: 76 % (ref 36–66)
SEGMENTED NEUTROPHILS ABSOLUTE COUNT: 9.3 K/UL (ref 1.8–7.7)
SODIUM BLD-SCNC: 144 MMOL/L (ref 135–144)
TROPONIN INTERP: NORMAL
TROPONIN T: <0.03 NG/ML
WBC # BLD: 12.2 K/UL (ref 3.5–11)
WBC # BLD: ABNORMAL 10*3/UL

## 2017-04-20 PROCEDURE — 80048 BASIC METABOLIC PNL TOTAL CA: CPT

## 2017-04-20 PROCEDURE — 6370000000 HC RX 637 (ALT 250 FOR IP): Performed by: INTERNAL MEDICINE

## 2017-04-20 PROCEDURE — 94664 DEMO&/EVAL PT USE INHALER: CPT

## 2017-04-20 PROCEDURE — 84484 ASSAY OF TROPONIN QUANT: CPT

## 2017-04-20 PROCEDURE — 36415 COLL VENOUS BLD VENIPUNCTURE: CPT

## 2017-04-20 PROCEDURE — 71020 XR CHEST STANDARD TWO VW: CPT

## 2017-04-20 PROCEDURE — 6360000002 HC RX W HCPCS: Performed by: INTERNAL MEDICINE

## 2017-04-20 PROCEDURE — 93005 ELECTROCARDIOGRAM TRACING: CPT

## 2017-04-20 PROCEDURE — 96374 THER/PROPH/DIAG INJ IV PUSH: CPT

## 2017-04-20 PROCEDURE — 83880 ASSAY OF NATRIURETIC PEPTIDE: CPT

## 2017-04-20 PROCEDURE — 87040 BLOOD CULTURE FOR BACTERIA: CPT

## 2017-04-20 PROCEDURE — 85025 COMPLETE CBC W/AUTO DIFF WBC: CPT

## 2017-04-20 PROCEDURE — 99285 EMERGENCY DEPT VISIT HI MDM: CPT

## 2017-04-20 PROCEDURE — 94761 N-INVAS EAR/PLS OXIMETRY MLT: CPT

## 2017-04-20 RX ORDER — CEPHALEXIN 500 MG/1
500 CAPSULE ORAL ONCE
Status: COMPLETED | OUTPATIENT
Start: 2017-04-20 | End: 2017-04-20

## 2017-04-20 RX ORDER — IPRATROPIUM BROMIDE AND ALBUTEROL SULFATE 2.5; .5 MG/3ML; MG/3ML
1 SOLUTION RESPIRATORY (INHALATION) ONCE
Status: COMPLETED | OUTPATIENT
Start: 2017-04-20 | End: 2017-04-20

## 2017-04-20 RX ORDER — CEPHALEXIN 500 MG/1
500 CAPSULE ORAL 3 TIMES DAILY
Qty: 30 CAPSULE | Refills: 0 | Status: SHIPPED | OUTPATIENT
Start: 2017-04-20 | End: 2017-06-21 | Stop reason: ALTCHOICE

## 2017-04-20 RX ORDER — FUROSEMIDE 20 MG/1
20 TABLET ORAL DAILY
Qty: 30 TABLET | Refills: 0 | Status: SHIPPED | OUTPATIENT
Start: 2017-04-20 | End: 2017-06-28 | Stop reason: DRUGHIGH

## 2017-04-20 RX ORDER — FUROSEMIDE 10 MG/ML
40 INJECTION INTRAMUSCULAR; INTRAVENOUS ONCE
Status: COMPLETED | OUTPATIENT
Start: 2017-04-20 | End: 2017-04-20

## 2017-04-20 RX ADMIN — CEPHALEXIN 500 MG: 500 CAPSULE ORAL at 20:58

## 2017-04-20 RX ADMIN — IPRATROPIUM BROMIDE AND ALBUTEROL SULFATE 1 AMPULE: .5; 3 SOLUTION RESPIRATORY (INHALATION) at 19:34

## 2017-04-20 RX ADMIN — FUROSEMIDE 40 MG: 10 INJECTION, SOLUTION INTRAMUSCULAR; INTRAVENOUS at 19:30

## 2017-04-20 ASSESSMENT — ENCOUNTER SYMPTOMS
VOMITING: 0
SHORTNESS OF BREATH: 1
NAUSEA: 0
EYE PAIN: 0
ABDOMINAL PAIN: 0
CHEST TIGHTNESS: 0
EYE DISCHARGE: 0
DIARRHEA: 0
BACK PAIN: 0
SORE THROAT: 0
COUGH: 1

## 2017-04-24 ENCOUNTER — HOSPITAL ENCOUNTER (OUTPATIENT)
Dept: CARDIAC REHAB | Age: 82
Setting detail: THERAPIES SERIES
Discharge: HOME | End: 2017-04-24
Payer: MEDICARE

## 2017-04-26 ENCOUNTER — HOSPITAL ENCOUNTER (OUTPATIENT)
Dept: CARDIAC REHAB | Age: 82
Setting detail: THERAPIES SERIES
Discharge: HOME | End: 2017-04-26
Payer: MEDICARE

## 2017-04-26 ENCOUNTER — OFFICE VISIT (OUTPATIENT)
Dept: PRIMARY CARE CLINIC | Age: 82
End: 2017-04-26
Payer: MEDICARE

## 2017-04-26 VITALS
BODY MASS INDEX: 22.68 KG/M2 | DIASTOLIC BLOOD PRESSURE: 65 MMHG | OXYGEN SATURATION: 96 % | TEMPERATURE: 98.3 F | RESPIRATION RATE: 20 BRPM | WEIGHT: 124 LBS | SYSTOLIC BLOOD PRESSURE: 137 MMHG | HEART RATE: 85 BPM

## 2017-04-26 DIAGNOSIS — N18.30 CKD (CHRONIC KIDNEY DISEASE) STAGE 3, GFR 30-59 ML/MIN (HCC): Chronic | ICD-10-CM

## 2017-04-26 DIAGNOSIS — I87.303 STASIS EDEMA OF BOTH LOWER EXTREMITIES: ICD-10-CM

## 2017-04-26 DIAGNOSIS — I50.32 CHRONIC DIASTOLIC CONGESTIVE HEART FAILURE (HCC): Chronic | ICD-10-CM

## 2017-04-26 DIAGNOSIS — L03.119 CELLULITIS OF LOWER EXTREMITY, UNSPECIFIED LATERALITY: ICD-10-CM

## 2017-04-26 DIAGNOSIS — J44.9 CHRONIC OBSTRUCTIVE PULMONARY DISEASE, UNSPECIFIED COPD TYPE (HCC): Primary | Chronic | ICD-10-CM

## 2017-04-26 LAB
EKG ATRIAL RATE: 77 BPM
EKG P AXIS: -29 DEGREES
EKG P-R INTERVAL: 160 MS
EKG Q-T INTERVAL: 366 MS
EKG QRS DURATION: 70 MS
EKG QTC CALCULATION (BAZETT): 414 MS
EKG R AXIS: -23 DEGREES
EKG T AXIS: 71 DEGREES
EKG VENTRICULAR RATE: 77 BPM

## 2017-04-26 PROCEDURE — 1036F TOBACCO NON-USER: CPT | Performed by: NURSE PRACTITIONER

## 2017-04-26 PROCEDURE — 4040F PNEUMOC VAC/ADMIN/RCVD: CPT | Performed by: NURSE PRACTITIONER

## 2017-04-26 PROCEDURE — 3023F SPIROM DOC REV: CPT | Performed by: NURSE PRACTITIONER

## 2017-04-26 PROCEDURE — G8598 ASA/ANTIPLAT THER USED: HCPCS | Performed by: NURSE PRACTITIONER

## 2017-04-26 PROCEDURE — G8926 SPIRO NO PERF OR DOC: HCPCS | Performed by: NURSE PRACTITIONER

## 2017-04-26 PROCEDURE — 1090F PRES/ABSN URINE INCON ASSESS: CPT | Performed by: NURSE PRACTITIONER

## 2017-04-26 PROCEDURE — 1123F ACP DISCUSS/DSCN MKR DOCD: CPT | Performed by: NURSE PRACTITIONER

## 2017-04-26 PROCEDURE — G8419 CALC BMI OUT NRM PARAM NOF/U: HCPCS | Performed by: NURSE PRACTITIONER

## 2017-04-26 PROCEDURE — 99213 OFFICE O/P EST LOW 20 MIN: CPT | Performed by: NURSE PRACTITIONER

## 2017-04-26 PROCEDURE — G8427 DOCREV CUR MEDS BY ELIG CLIN: HCPCS | Performed by: NURSE PRACTITIONER

## 2017-04-26 ASSESSMENT — ENCOUNTER SYMPTOMS
TROUBLE SWALLOWING: 0
SHORTNESS OF BREATH: 1
CHOKING: 0
CHEST TIGHTNESS: 0
EYES NEGATIVE: 1
GASTROINTESTINAL NEGATIVE: 1
COUGH: 1

## 2017-05-01 ENCOUNTER — HOSPITAL ENCOUNTER (OUTPATIENT)
Dept: CARDIAC REHAB | Age: 82
Setting detail: THERAPIES SERIES
Discharge: HOME | End: 2017-05-01
Payer: MEDICARE

## 2017-05-03 ENCOUNTER — HOSPITAL ENCOUNTER (OUTPATIENT)
Dept: CARDIAC REHAB | Age: 82
Setting detail: THERAPIES SERIES
Discharge: HOME | End: 2017-05-03
Payer: MEDICARE

## 2017-05-08 ENCOUNTER — HOSPITAL ENCOUNTER (OUTPATIENT)
Dept: CARDIAC REHAB | Age: 82
Setting detail: THERAPIES SERIES
Discharge: HOME | End: 2017-05-08
Payer: MEDICARE

## 2017-05-17 RX ORDER — ALBUTEROL SULFATE 2.5 MG/3ML
SOLUTION RESPIRATORY (INHALATION)
Qty: 150 VIAL | Refills: 0 | Status: ON HOLD | OUTPATIENT
Start: 2017-05-17 | End: 2018-02-06

## 2017-06-16 ENCOUNTER — HOSPITAL ENCOUNTER (OUTPATIENT)
Age: 82
Discharge: HOME | End: 2017-06-16
Payer: MEDICARE

## 2017-06-16 DIAGNOSIS — I10 ESSENTIAL HYPERTENSION: Chronic | ICD-10-CM

## 2017-06-16 DIAGNOSIS — R73.09 ELEVATED GLUCOSE: ICD-10-CM

## 2017-06-16 LAB
-: ABNORMAL
ABSOLUTE EOS #: 0.4 K/UL (ref 0–0.4)
ABSOLUTE LYMPH #: 2 K/UL (ref 1–4.8)
ABSOLUTE MONO #: 1.2 K/UL (ref 0–1)
ALBUMIN SERPL-MCNC: 3.8 G/DL (ref 3.5–5.2)
ALBUMIN/GLOBULIN RATIO: 1.7 (ref 1–2.5)
ALP BLD-CCNC: 76 U/L (ref 35–104)
ALT SERPL-CCNC: 12 U/L (ref 5–33)
AMORPHOUS: ABNORMAL
ANION GAP SERPL CALCULATED.3IONS-SCNC: 14 MMOL/L (ref 9–17)
AST SERPL-CCNC: 13 U/L
BACTERIA: ABNORMAL
BASOPHILS # BLD: 0 %
BASOPHILS ABSOLUTE: 0 K/UL (ref 0–0.2)
BILIRUB SERPL-MCNC: 0.18 MG/DL (ref 0.3–1.2)
BILIRUBIN URINE: NEGATIVE
BUN BLDV-MCNC: 26 MG/DL (ref 8–23)
BUN/CREAT BLD: 15 (ref 9–20)
CALCIUM SERPL-MCNC: 9.8 MG/DL (ref 8.6–10.4)
CASTS UA: ABNORMAL /LPF
CHLORIDE BLD-SCNC: 100 MMOL/L (ref 98–107)
CO2: 24 MMOL/L (ref 20–31)
COLOR: YELLOW
COMMENT UA: ABNORMAL
CREAT SERPL-MCNC: 1.75 MG/DL (ref 0.5–0.9)
CREATININE URINE: 165.5 MG/DL (ref 28–217)
CRYSTALS, UA: ABNORMAL /HPF
DIFFERENTIAL TYPE: ABNORMAL
EOSINOPHILS RELATIVE PERCENT: 3 %
EPITHELIAL CELLS UA: ABNORMAL /HPF (ref 0–25)
ESTIMATED AVERAGE GLUCOSE: 126 MG/DL
GFR AFRICAN AMERICAN: 33 ML/MIN
GFR NON-AFRICAN AMERICAN: 27 ML/MIN
GFR SERPL CREATININE-BSD FRML MDRD: ABNORMAL ML/MIN/{1.73_M2}
GFR SERPL CREATININE-BSD FRML MDRD: ABNORMAL ML/MIN/{1.73_M2}
GLUCOSE BLD-MCNC: 118 MG/DL (ref 70–99)
GLUCOSE URINE: NEGATIVE
HBA1C MFR BLD: 6 % (ref 4.8–5.9)
HCT VFR BLD CALC: 39 % (ref 36–46)
HEMOGLOBIN: 12.8 G/DL (ref 12–16)
KETONES, URINE: ABNORMAL
LEUKOCYTE ESTERASE, URINE: ABNORMAL
LYMPHOCYTES # BLD: 17 %
MCH RBC QN AUTO: 29.9 PG (ref 26–34)
MCHC RBC AUTO-ENTMCNC: 32.9 G/DL (ref 31–37)
MCV RBC AUTO: 90.8 FL (ref 80–100)
MICROALBUMIN/CREAT 24H UR: 91 MG/L
MICROALBUMIN/CREAT UR-RTO: 55 MCG/MG CREAT
MONOCYTES # BLD: 10 %
MUCUS: ABNORMAL
NITRITE, URINE: NEGATIVE
OTHER OBSERVATIONS UA: ABNORMAL
PDW BLD-RTO: 14.9 % (ref 12.1–15.2)
PH UA: 5.5 (ref 5–9)
PLATELET # BLD: 288 K/UL (ref 140–450)
PLATELET ESTIMATE: ABNORMAL
PMV BLD AUTO: ABNORMAL FL (ref 6–12)
POTASSIUM SERPL-SCNC: 4.3 MMOL/L (ref 3.7–5.3)
PROTEIN UA: NEGATIVE
RBC # BLD: 4.29 M/UL (ref 4–5.2)
RBC # BLD: ABNORMAL 10*6/UL
RBC UA: ABNORMAL /HPF (ref 0–2)
RENAL EPITHELIAL, UA: ABNORMAL /HPF
SEG NEUTROPHILS: 70 %
SEGMENTED NEUTROPHILS ABSOLUTE COUNT: 8.1 K/UL (ref 1.8–7.7)
SODIUM BLD-SCNC: 138 MMOL/L (ref 135–144)
SPECIFIC GRAVITY UA: 1.02 (ref 1.01–1.02)
TOTAL PROTEIN: 6 G/DL (ref 6.4–8.3)
TRICHOMONAS: ABNORMAL
TURBIDITY: ABNORMAL
URINE HGB: NEGATIVE
UROBILINOGEN, URINE: NORMAL
WBC # BLD: 11.6 K/UL (ref 3.5–11)
WBC # BLD: ABNORMAL 10*3/UL
WBC UA: ABNORMAL /HPF (ref 0–5)
YEAST: ABNORMAL

## 2017-06-16 PROCEDURE — 82570 ASSAY OF URINE CREATININE: CPT

## 2017-06-16 PROCEDURE — 80053 COMPREHEN METABOLIC PANEL: CPT

## 2017-06-16 PROCEDURE — 85025 COMPLETE CBC W/AUTO DIFF WBC: CPT

## 2017-06-16 PROCEDURE — 83036 HEMOGLOBIN GLYCOSYLATED A1C: CPT

## 2017-06-16 PROCEDURE — 81001 URINALYSIS AUTO W/SCOPE: CPT

## 2017-06-16 PROCEDURE — 36415 COLL VENOUS BLD VENIPUNCTURE: CPT

## 2017-06-16 PROCEDURE — 82043 UR ALBUMIN QUANTITATIVE: CPT

## 2017-06-20 ENCOUNTER — TELEPHONE (OUTPATIENT)
Dept: PRIMARY CARE CLINIC | Age: 82
End: 2017-06-20

## 2017-06-21 ENCOUNTER — TELEPHONE (OUTPATIENT)
Dept: PRIMARY CARE CLINIC | Age: 82
End: 2017-06-21

## 2017-06-21 ENCOUNTER — OFFICE VISIT (OUTPATIENT)
Dept: PRIMARY CARE CLINIC | Age: 82
End: 2017-06-21
Payer: MEDICARE

## 2017-06-21 VITALS
HEART RATE: 63 BPM | BODY MASS INDEX: 23.06 KG/M2 | RESPIRATION RATE: 18 BRPM | WEIGHT: 126.1 LBS | TEMPERATURE: 98 F | SYSTOLIC BLOOD PRESSURE: 139 MMHG | DIASTOLIC BLOOD PRESSURE: 74 MMHG

## 2017-06-21 DIAGNOSIS — N18.30 CKD (CHRONIC KIDNEY DISEASE) STAGE 3, GFR 30-59 ML/MIN (HCC): Chronic | ICD-10-CM

## 2017-06-21 DIAGNOSIS — E78.5 HYPERLIPIDEMIA, UNSPECIFIED HYPERLIPIDEMIA TYPE: ICD-10-CM

## 2017-06-21 DIAGNOSIS — I10 ESSENTIAL HYPERTENSION: Primary | Chronic | ICD-10-CM

## 2017-06-21 PROCEDURE — G8427 DOCREV CUR MEDS BY ELIG CLIN: HCPCS | Performed by: NURSE PRACTITIONER

## 2017-06-21 PROCEDURE — 1090F PRES/ABSN URINE INCON ASSESS: CPT | Performed by: NURSE PRACTITIONER

## 2017-06-21 PROCEDURE — G8598 ASA/ANTIPLAT THER USED: HCPCS | Performed by: NURSE PRACTITIONER

## 2017-06-21 PROCEDURE — 4040F PNEUMOC VAC/ADMIN/RCVD: CPT | Performed by: NURSE PRACTITIONER

## 2017-06-21 PROCEDURE — 1036F TOBACCO NON-USER: CPT | Performed by: NURSE PRACTITIONER

## 2017-06-21 PROCEDURE — G8420 CALC BMI NORM PARAMETERS: HCPCS | Performed by: NURSE PRACTITIONER

## 2017-06-21 PROCEDURE — 99213 OFFICE O/P EST LOW 20 MIN: CPT | Performed by: NURSE PRACTITIONER

## 2017-06-21 PROCEDURE — 1123F ACP DISCUSS/DSCN MKR DOCD: CPT | Performed by: NURSE PRACTITIONER

## 2017-06-21 RX ORDER — SIMVASTATIN 20 MG
20 TABLET ORAL NIGHTLY
Qty: 30 TABLET | Refills: 3 | Status: CANCELLED | OUTPATIENT
Start: 2017-06-21

## 2017-06-21 RX ORDER — FERROUS SULFATE 325(65) MG
325 TABLET ORAL
Qty: 30 TABLET | Refills: 3 | Status: CANCELLED | OUTPATIENT
Start: 2017-06-21

## 2017-06-21 RX ORDER — LISINOPRIL 10 MG/1
10 TABLET ORAL DAILY
Qty: 30 TABLET | Refills: 3 | Status: CANCELLED | OUTPATIENT
Start: 2017-06-21

## 2017-06-21 RX ORDER — FUROSEMIDE 20 MG/1
20 TABLET ORAL DAILY
Qty: 30 TABLET | Refills: 0 | Status: CANCELLED | OUTPATIENT
Start: 2017-06-21

## 2017-06-21 ASSESSMENT — ENCOUNTER SYMPTOMS
SHORTNESS OF BREATH: 0
TROUBLE SWALLOWING: 0
RESPIRATORY NEGATIVE: 1
BLURRED VISION: 0
CHEST TIGHTNESS: 0
GASTROINTESTINAL NEGATIVE: 1
EYES NEGATIVE: 1
WHEEZING: 0
COUGH: 0

## 2017-06-22 ENCOUNTER — TELEPHONE (OUTPATIENT)
Dept: PRIMARY CARE CLINIC | Age: 82
End: 2017-06-22

## 2017-06-23 ENCOUNTER — HOSPITAL ENCOUNTER (OUTPATIENT)
Age: 82
Discharge: HOME | End: 2017-06-23
Payer: MEDICARE

## 2017-06-23 DIAGNOSIS — N18.30 CKD (CHRONIC KIDNEY DISEASE) STAGE 3, GFR 30-59 ML/MIN (HCC): Chronic | ICD-10-CM

## 2017-06-23 LAB
-: ABNORMAL
ALBUMIN SERPL-MCNC: 3.8 G/DL (ref 3.5–5.2)
ALBUMIN/GLOBULIN RATIO: 1.6 (ref 1–2.5)
ALP BLD-CCNC: 69 U/L (ref 35–104)
ALT SERPL-CCNC: 11 U/L (ref 5–33)
AMORPHOUS: ABNORMAL
ANION GAP SERPL CALCULATED.3IONS-SCNC: 14 MMOL/L (ref 9–17)
AST SERPL-CCNC: 13 U/L
BACTERIA: ABNORMAL
BILIRUB SERPL-MCNC: 0.17 MG/DL (ref 0.3–1.2)
BILIRUBIN URINE: NEGATIVE
BUN BLDV-MCNC: 31 MG/DL (ref 8–23)
BUN/CREAT BLD: 18 (ref 9–20)
CALCIUM SERPL-MCNC: 9.8 MG/DL (ref 8.6–10.4)
CASTS UA: ABNORMAL /LPF
CHLORIDE BLD-SCNC: 102 MMOL/L (ref 98–107)
CO2: 24 MMOL/L (ref 20–31)
COLOR: YELLOW
COMMENT UA: ABNORMAL
CREAT SERPL-MCNC: 1.68 MG/DL (ref 0.5–0.9)
CRYSTALS, UA: ABNORMAL /HPF
EPITHELIAL CELLS UA: ABNORMAL /HPF (ref 0–25)
GFR AFRICAN AMERICAN: 35 ML/MIN
GFR NON-AFRICAN AMERICAN: 29 ML/MIN
GFR SERPL CREATININE-BSD FRML MDRD: ABNORMAL ML/MIN/{1.73_M2}
GFR SERPL CREATININE-BSD FRML MDRD: ABNORMAL ML/MIN/{1.73_M2}
GLUCOSE BLD-MCNC: 93 MG/DL (ref 70–99)
GLUCOSE URINE: NEGATIVE
KETONES, URINE: NEGATIVE
LEUKOCYTE ESTERASE, URINE: ABNORMAL
MUCUS: ABNORMAL
NITRITE, URINE: NEGATIVE
OTHER OBSERVATIONS UA: ABNORMAL
PH UA: 6 (ref 5–9)
POTASSIUM SERPL-SCNC: 5.3 MMOL/L (ref 3.7–5.3)
PROTEIN UA: NEGATIVE
RBC UA: ABNORMAL /HPF (ref 0–2)
RENAL EPITHELIAL, UA: ABNORMAL /HPF
SODIUM BLD-SCNC: 140 MMOL/L (ref 135–144)
SPECIFIC GRAVITY UA: 1.02 (ref 1.01–1.02)
TOTAL PROTEIN: 6.2 G/DL (ref 6.4–8.3)
TRICHOMONAS: ABNORMAL
TURBIDITY: CLEAR
URINE HGB: NEGATIVE
UROBILINOGEN, URINE: NORMAL
WBC UA: ABNORMAL /HPF (ref 0–5)
YEAST: ABNORMAL

## 2017-06-23 PROCEDURE — 36415 COLL VENOUS BLD VENIPUNCTURE: CPT

## 2017-06-23 PROCEDURE — 81001 URINALYSIS AUTO W/SCOPE: CPT

## 2017-06-23 PROCEDURE — 80053 COMPREHEN METABOLIC PANEL: CPT

## 2017-06-26 ENCOUNTER — TELEPHONE (OUTPATIENT)
Dept: PRIMARY CARE CLINIC | Age: 82
End: 2017-06-26

## 2017-07-05 ENCOUNTER — HOSPITAL ENCOUNTER (OUTPATIENT)
Age: 82
Discharge: HOME | End: 2017-07-05
Payer: MEDICARE

## 2017-07-05 DIAGNOSIS — N18.30 CKD (CHRONIC KIDNEY DISEASE) STAGE 3, GFR 30-59 ML/MIN (HCC): Chronic | ICD-10-CM

## 2017-07-05 LAB
ANION GAP SERPL CALCULATED.3IONS-SCNC: 14 MMOL/L (ref 9–17)
BUN BLDV-MCNC: 36 MG/DL (ref 8–23)
BUN/CREAT BLD: 17 (ref 9–20)
CALCIUM SERPL-MCNC: 9.4 MG/DL (ref 8.6–10.4)
CHLORIDE BLD-SCNC: 105 MMOL/L (ref 98–107)
CO2: 24 MMOL/L (ref 20–31)
CREAT SERPL-MCNC: 2.13 MG/DL (ref 0.5–0.9)
GFR AFRICAN AMERICAN: 26 ML/MIN
GFR NON-AFRICAN AMERICAN: 22 ML/MIN
GFR SERPL CREATININE-BSD FRML MDRD: ABNORMAL ML/MIN/{1.73_M2}
GFR SERPL CREATININE-BSD FRML MDRD: ABNORMAL ML/MIN/{1.73_M2}
GLUCOSE BLD-MCNC: 98 MG/DL (ref 70–99)
POTASSIUM SERPL-SCNC: 4.4 MMOL/L (ref 3.7–5.3)
SODIUM BLD-SCNC: 143 MMOL/L (ref 135–144)

## 2017-07-05 PROCEDURE — 36415 COLL VENOUS BLD VENIPUNCTURE: CPT

## 2017-07-05 PROCEDURE — 80048 BASIC METABOLIC PNL TOTAL CA: CPT

## 2017-07-07 ENCOUNTER — HOSPITAL ENCOUNTER (OUTPATIENT)
Age: 82
Discharge: HOME | End: 2017-07-07
Payer: MEDICARE

## 2017-07-07 DIAGNOSIS — N18.30 CKD (CHRONIC KIDNEY DISEASE) STAGE 3, GFR 30-59 ML/MIN (HCC): Chronic | ICD-10-CM

## 2017-07-07 LAB
ANION GAP SERPL CALCULATED.3IONS-SCNC: 15 MMOL/L (ref 9–17)
BUN BLDV-MCNC: 42 MG/DL (ref 8–23)
BUN/CREAT BLD: 22 (ref 9–20)
CALCIUM SERPL-MCNC: 10.2 MG/DL (ref 8.6–10.4)
CHLORIDE BLD-SCNC: 102 MMOL/L (ref 98–107)
CO2: 26 MMOL/L (ref 20–31)
CREAT SERPL-MCNC: 1.91 MG/DL (ref 0.5–0.9)
GFR AFRICAN AMERICAN: 30 ML/MIN
GFR NON-AFRICAN AMERICAN: 25 ML/MIN
GFR SERPL CREATININE-BSD FRML MDRD: ABNORMAL ML/MIN/{1.73_M2}
GFR SERPL CREATININE-BSD FRML MDRD: ABNORMAL ML/MIN/{1.73_M2}
GLUCOSE BLD-MCNC: 136 MG/DL (ref 70–99)
POTASSIUM SERPL-SCNC: 4.4 MMOL/L (ref 3.7–5.3)
SODIUM BLD-SCNC: 143 MMOL/L (ref 135–144)

## 2017-07-07 PROCEDURE — 80048 BASIC METABOLIC PNL TOTAL CA: CPT

## 2017-07-07 PROCEDURE — 36415 COLL VENOUS BLD VENIPUNCTURE: CPT

## 2017-07-24 DIAGNOSIS — N18.30 CKD (CHRONIC KIDNEY DISEASE) STAGE 3, GFR 30-59 ML/MIN (HCC): Chronic | ICD-10-CM

## 2017-07-24 RX ORDER — FERROUS SULFATE 325(65) MG
325 TABLET ORAL
Qty: 30 TABLET | Refills: 3 | Status: SHIPPED | OUTPATIENT
Start: 2017-07-24 | End: 2018-01-10 | Stop reason: SDUPTHER

## 2017-08-10 ENCOUNTER — OFFICE VISIT (OUTPATIENT)
Dept: PULMONOLOGY | Age: 82
End: 2017-08-10
Payer: MEDICARE

## 2017-08-10 VITALS
BODY MASS INDEX: 23 KG/M2 | OXYGEN SATURATION: 96 % | SYSTOLIC BLOOD PRESSURE: 146 MMHG | RESPIRATION RATE: 16 BRPM | HEART RATE: 70 BPM | DIASTOLIC BLOOD PRESSURE: 70 MMHG | WEIGHT: 125 LBS | TEMPERATURE: 98.2 F | HEIGHT: 62 IN

## 2017-08-10 DIAGNOSIS — J44.9 CHRONIC OBSTRUCTIVE PULMONARY DISEASE, UNSPECIFIED COPD TYPE (HCC): ICD-10-CM

## 2017-08-10 DIAGNOSIS — J45.30 MILD PERSISTENT ASTHMA WITHOUT COMPLICATION: Primary | ICD-10-CM

## 2017-08-10 DIAGNOSIS — R91.8 MASS OF UPPER LOBE OF LUNG: ICD-10-CM

## 2017-08-10 DIAGNOSIS — R05.3 CHRONIC COUGH: ICD-10-CM

## 2017-08-10 PROCEDURE — 1090F PRES/ABSN URINE INCON ASSESS: CPT | Performed by: INTERNAL MEDICINE

## 2017-08-10 PROCEDURE — G8926 SPIRO NO PERF OR DOC: HCPCS | Performed by: INTERNAL MEDICINE

## 2017-08-10 PROCEDURE — G8420 CALC BMI NORM PARAMETERS: HCPCS | Performed by: INTERNAL MEDICINE

## 2017-08-10 PROCEDURE — G8427 DOCREV CUR MEDS BY ELIG CLIN: HCPCS | Performed by: INTERNAL MEDICINE

## 2017-08-10 PROCEDURE — 1123F ACP DISCUSS/DSCN MKR DOCD: CPT | Performed by: INTERNAL MEDICINE

## 2017-08-10 PROCEDURE — 3023F SPIROM DOC REV: CPT | Performed by: INTERNAL MEDICINE

## 2017-08-10 PROCEDURE — 1036F TOBACCO NON-USER: CPT | Performed by: INTERNAL MEDICINE

## 2017-08-10 PROCEDURE — 99214 OFFICE O/P EST MOD 30 MIN: CPT | Performed by: INTERNAL MEDICINE

## 2017-08-10 PROCEDURE — G8598 ASA/ANTIPLAT THER USED: HCPCS | Performed by: INTERNAL MEDICINE

## 2017-08-10 PROCEDURE — 4040F PNEUMOC VAC/ADMIN/RCVD: CPT | Performed by: INTERNAL MEDICINE

## 2017-09-25 ENCOUNTER — HOSPITAL ENCOUNTER (OUTPATIENT)
Age: 82
Discharge: HOME | End: 2017-09-25
Payer: MEDICARE

## 2017-09-25 LAB
ABSOLUTE EOS #: 0.3 K/UL (ref 0–0.4)
ABSOLUTE LYMPH #: 2.1 K/UL (ref 1–4.8)
ABSOLUTE MONO #: 1.2 K/UL (ref 0.2–0.8)
ANION GAP SERPL CALCULATED.3IONS-SCNC: 12 MMOL/L (ref 9–17)
BASOPHILS # BLD: 0 %
BASOPHILS ABSOLUTE: 0 K/UL (ref 0–0.2)
BUN BLDV-MCNC: 32 MG/DL (ref 8–23)
BUN/CREAT BLD: 18 (ref 9–20)
CALCIUM SERPL-MCNC: 9.8 MG/DL (ref 8.6–10.4)
CHLORIDE BLD-SCNC: 105 MMOL/L (ref 98–107)
CO2: 23 MMOL/L (ref 20–31)
CREAT SERPL-MCNC: 1.81 MG/DL (ref 0.5–0.9)
CREATININE URINE: 18.8 MG/DL (ref 28–217)
DIFFERENTIAL TYPE: ABNORMAL
EOSINOPHILS RELATIVE PERCENT: 3 %
GFR AFRICAN AMERICAN: 32 ML/MIN
GFR NON-AFRICAN AMERICAN: 26 ML/MIN
GFR SERPL CREATININE-BSD FRML MDRD: ABNORMAL ML/MIN/{1.73_M2}
GFR SERPL CREATININE-BSD FRML MDRD: ABNORMAL ML/MIN/{1.73_M2}
GLUCOSE BLD-MCNC: 90 MG/DL (ref 70–99)
HCT VFR BLD CALC: 39.2 % (ref 36–46)
HEMOGLOBIN: 13.2 G/DL (ref 12–16)
LYMPHOCYTES # BLD: 19 %
MCH RBC QN AUTO: 30.1 PG (ref 26–34)
MCHC RBC AUTO-ENTMCNC: 33.6 G/DL (ref 31–37)
MCV RBC AUTO: 89.7 FL (ref 80–100)
MONOCYTES # BLD: 11 %
PDW BLD-RTO: 13.5 % (ref 12.1–15.2)
PHOSPHORUS: 3.5 MG/DL (ref 2.6–4.5)
PLATELET # BLD: 262 K/UL (ref 140–450)
PLATELET ESTIMATE: ABNORMAL
PMV BLD AUTO: 9.2 FL (ref 6–12)
POTASSIUM SERPL-SCNC: 5.6 MMOL/L (ref 3.7–5.3)
RBC # BLD: 4.37 M/UL (ref 4–5.2)
RBC # BLD: ABNORMAL 10*6/UL
SEG NEUTROPHILS: 67 %
SEGMENTED NEUTROPHILS ABSOLUTE COUNT: 7.2 K/UL (ref 1.8–7.7)
SODIUM BLD-SCNC: 140 MMOL/L (ref 135–144)
TOTAL PROTEIN, URINE: 4 MG/DL
WBC # BLD: 10.8 K/UL (ref 3.5–11)
WBC # BLD: ABNORMAL 10*3/UL

## 2017-09-25 PROCEDURE — 84156 ASSAY OF PROTEIN URINE: CPT

## 2017-09-25 PROCEDURE — 82306 VITAMIN D 25 HYDROXY: CPT

## 2017-09-25 PROCEDURE — 85025 COMPLETE CBC W/AUTO DIFF WBC: CPT

## 2017-09-25 PROCEDURE — 83970 ASSAY OF PARATHORMONE: CPT

## 2017-09-25 PROCEDURE — 82570 ASSAY OF URINE CREATININE: CPT

## 2017-09-25 PROCEDURE — 80048 BASIC METABOLIC PNL TOTAL CA: CPT

## 2017-09-25 PROCEDURE — 36415 COLL VENOUS BLD VENIPUNCTURE: CPT

## 2017-09-25 PROCEDURE — 84100 ASSAY OF PHOSPHORUS: CPT

## 2017-09-26 LAB
PTH INTACT: 68.7 PG/ML (ref 15–65)
VITAMIN D 25-HYDROXY: 37.1 NG/ML (ref 30–100)

## 2017-09-28 ENCOUNTER — HOSPITAL ENCOUNTER (OUTPATIENT)
Age: 82
Discharge: HOME | End: 2017-09-28
Payer: MEDICARE

## 2017-09-28 DIAGNOSIS — N18.4 CKD (CHRONIC KIDNEY DISEASE), STAGE 4 (SEVERE): ICD-10-CM

## 2017-09-28 LAB
ANION GAP SERPL CALCULATED.3IONS-SCNC: 15 MMOL/L (ref 9–17)
BUN BLDV-MCNC: 34 MG/DL (ref 8–23)
BUN/CREAT BLD: 15 (ref 9–20)
CALCIUM SERPL-MCNC: 9.5 MG/DL (ref 8.6–10.4)
CHLORIDE BLD-SCNC: 102 MMOL/L (ref 98–107)
CO2: 27 MMOL/L (ref 20–31)
CREAT SERPL-MCNC: 2.26 MG/DL (ref 0.5–0.9)
GFR AFRICAN AMERICAN: 25 ML/MIN
GFR NON-AFRICAN AMERICAN: 20 ML/MIN
GFR SERPL CREATININE-BSD FRML MDRD: ABNORMAL ML/MIN/{1.73_M2}
GFR SERPL CREATININE-BSD FRML MDRD: ABNORMAL ML/MIN/{1.73_M2}
GLUCOSE BLD-MCNC: 94 MG/DL (ref 70–99)
POTASSIUM SERPL-SCNC: 4.1 MMOL/L (ref 3.7–5.3)
SODIUM BLD-SCNC: 144 MMOL/L (ref 135–144)

## 2017-09-28 PROCEDURE — 80048 BASIC METABOLIC PNL TOTAL CA: CPT

## 2017-09-28 PROCEDURE — 36415 COLL VENOUS BLD VENIPUNCTURE: CPT

## 2017-10-02 ENCOUNTER — HOSPITAL ENCOUNTER (OUTPATIENT)
Age: 82
Discharge: HOME | End: 2017-10-02
Payer: MEDICARE

## 2017-10-02 DIAGNOSIS — N18.30 CKD (CHRONIC KIDNEY DISEASE) STAGE 3, GFR 30-59 ML/MIN (HCC): Chronic | ICD-10-CM

## 2017-10-02 DIAGNOSIS — E87.5 HYPERKALEMIA: ICD-10-CM

## 2017-10-02 LAB
ANION GAP SERPL CALCULATED.3IONS-SCNC: 12 MMOL/L (ref 9–17)
BUN BLDV-MCNC: 30 MG/DL (ref 8–23)
BUN/CREAT BLD: 16 (ref 9–20)
CALCIUM SERPL-MCNC: 9.6 MG/DL (ref 8.6–10.4)
CHLORIDE BLD-SCNC: 101 MMOL/L (ref 98–107)
CO2: 27 MMOL/L (ref 20–31)
CREAT SERPL-MCNC: 1.88 MG/DL (ref 0.5–0.9)
GFR AFRICAN AMERICAN: 31 ML/MIN
GFR NON-AFRICAN AMERICAN: 25 ML/MIN
GFR SERPL CREATININE-BSD FRML MDRD: ABNORMAL ML/MIN/{1.73_M2}
GFR SERPL CREATININE-BSD FRML MDRD: ABNORMAL ML/MIN/{1.73_M2}
GLUCOSE BLD-MCNC: 90 MG/DL (ref 70–99)
POTASSIUM SERPL-SCNC: 4.8 MMOL/L (ref 3.7–5.3)
SODIUM BLD-SCNC: 140 MMOL/L (ref 135–144)

## 2017-10-02 PROCEDURE — 36415 COLL VENOUS BLD VENIPUNCTURE: CPT

## 2017-10-02 PROCEDURE — 80048 BASIC METABOLIC PNL TOTAL CA: CPT

## 2017-11-07 ENCOUNTER — HOSPITAL ENCOUNTER (EMERGENCY)
Age: 82
Discharge: HOME | End: 2017-11-07
Attending: EMERGENCY MEDICINE
Payer: MEDICARE

## 2017-11-07 VITALS
OXYGEN SATURATION: 97 % | SYSTOLIC BLOOD PRESSURE: 157 MMHG | RESPIRATION RATE: 18 BRPM | HEART RATE: 64 BPM | DIASTOLIC BLOOD PRESSURE: 86 MMHG | TEMPERATURE: 98.9 F

## 2017-11-07 DIAGNOSIS — M10.9 ACUTE GOUT INVOLVING TOE OF LEFT FOOT, UNSPECIFIED CAUSE: Primary | ICD-10-CM

## 2017-11-07 DIAGNOSIS — E78.5 HYPERLIPIDEMIA, UNSPECIFIED HYPERLIPIDEMIA TYPE: ICD-10-CM

## 2017-11-07 PROCEDURE — 99283 EMERGENCY DEPT VISIT LOW MDM: CPT

## 2017-11-07 RX ORDER — PREDNISONE 10 MG/1
TABLET ORAL
Qty: 20 TABLET | Refills: 0 | Status: SHIPPED | OUTPATIENT
Start: 2017-11-07 | End: 2018-01-24 | Stop reason: ALTCHOICE

## 2017-11-07 ASSESSMENT — ENCOUNTER SYMPTOMS
NAUSEA: 0
BLOOD IN STOOL: 0
SORE THROAT: 0
SHORTNESS OF BREATH: 0
WHEEZING: 0
CONSTIPATION: 0
VOMITING: 0
RHINORRHEA: 0
DIARRHEA: 0
CHEST TIGHTNESS: 0
ABDOMINAL PAIN: 0
EYE REDNESS: 0
COUGH: 0
BACK PAIN: 0
EYE DISCHARGE: 0

## 2017-11-07 ASSESSMENT — PAIN SCALES - GENERAL: PAINLEVEL_OUTOF10: 8

## 2017-11-07 ASSESSMENT — PAIN DESCRIPTION - LOCATION: LOCATION: FOOT

## 2017-11-07 ASSESSMENT — PAIN DESCRIPTION - PAIN TYPE: TYPE: ACUTE PAIN

## 2017-11-07 ASSESSMENT — PAIN DESCRIPTION - ORIENTATION: ORIENTATION: LEFT

## 2017-11-08 RX ORDER — SIMVASTATIN 20 MG
TABLET ORAL
Qty: 30 TABLET | Refills: 3 | Status: SHIPPED | OUTPATIENT
Start: 2017-11-08 | End: 2018-02-21 | Stop reason: SDUPTHER

## 2017-11-08 NOTE — ED PROVIDER NOTES
[facility]  eMERGENCY dEPARTMENT eNCOUnter      Pt Name: Earl Diaz  MRN: [id number]  Armsmarybelgfurt 5/16/1928  Date of evaluation: 11/7/2017  Provider: Levon Retana, [address] [place] [address]       Chief Complaint   Patient presents with    Foot Pain     left foot red and painful for 2 weeks         HISTORY OF PRESENT ILLNESS   (Location/Symptom, Timing/Onset, Context/Setting, Quality, Duration, Modifying Factors, Severity)  Note limiting factors. Earl Diaz is a 80 y.o. female who presents to the emergency department  With complaints of left great toe pain onset over 10 days ago. Patient reports the redness just continues and she is having pain. Denies any fever or chills. Denies any injury. She has not taken anything for this pain. She states she's not been seen anyone regarding this issue. She states she just came to see if she get some medication to make it better. Reports that she does have a history of COPD and does use her inhaler and nebulizer regularly. She states she is not here for any of her wheezing just wants someone to give her medication for her toe. She otherwise has no other complaints at this time denies any acute shortness of breath or chest pain. Denies any fever or chills. HPI    Nursing Notes were reviewed. REVIEW OF SYSTEMS    (2-9 systems for level 4, 10 or more for level 5)     Review of Systems   Constitutional: Negative for chills, diaphoresis and fever. HENT: Negative for congestion, ear pain, rhinorrhea and sore throat. Eyes: Negative for discharge, redness and visual disturbance. Respiratory: Negative for cough, chest tightness, shortness of breath and wheezing. Cardiovascular: Negative for chest pain and palpitations. Gastrointestinal: Negative for abdominal pain, blood in stool, constipation, diarrhea, nausea and vomiting. Endocrine: Negative for polydipsia, polyphagia and polyuria.    Genitourinary: Negative for decreased urine volume, difficulty urinating, dysuria, frequency and hematuria. Musculoskeletal: Negative for arthralgias, back pain and myalgias. Skin: Positive for wound. Negative for pallor and rash. Allergic/Immunologic: Negative for food allergies and immunocompromised state. Neurological: Negative for dizziness, syncope, weakness and light-headedness. Hematological: Negative for adenopathy. Does not bruise/bleed easily. Psychiatric/Behavioral: Negative for behavioral problems and suicidal ideas. The patient is not nervous/anxious. Except as noted above the remainder of the review of systems was reviewed and negative. PAST MEDICAL HISTORY     Past Medical History:   Diagnosis Date    Arthritis     Asthma     Atrial fibrillation with rapid ventricular response ([facility] 2/17/2017    CAD (coronary artery disease)     [facility]     Chronic kidney disease     COPD (chronic obstructive pulmonary disease) ([facility]     Examination of participant in clinical trial 5/20/13    6 year follow up, estimated completion JUN 2019    H/O cardiovascular stress test 02/14/2017    H/O echocardiogram 02/14/2017    EF 55%. Mildly increased wall thickness of the LV. Evidence of diastolic dysfunction. Normal right ventricular size and function. Normal tricuspid valve structure and function. Mild tricuspid regurg    Hx of transesophageal echocardiography (KRISTOPHER) for monitoring 02/20/2017    No clots  were visulaized in the left atrial appendage EF 55%.  Hyperlipidemia     Hypertension     MI (myocardial infarction)     2001    Shingles     15 years ago         SURGICAL HISTORY       Past Surgical History:   Procedure Laterality Date    CARDIAC CATHETERIZATION  02/2017    LMCA: Normal 0% stenosis. LAD: Mid area 50-60% stenosis. LCx: Patent proximal stent 30% stenosis distal to the stent in OM proximal area 50% stenosis. RCA: Minimal 30% mid area PDA stent  is patent <20% stenosis.  PL person, place, and time. She has normal reflexes. No cranial nerve deficit. Skin: Skin is warm and dry. No rash noted. She is not diaphoretic. No erythema. Psychiatric: She has a normal mood and affect. Her behavior is normal.   Nursing note and vitals reviewed. DIAGNOSTIC RESULTS     EKG: All EKG's are interpreted by the Emergency Department Physician who either signs or Co-signs this chart in the absence of a cardiologist.      RADIOLOGY:   Non-plain film images such as CT, Ultrasound and MRI are read by the radiologist. Plain radiographic images are visualized and preliminarily interpreted by the emergency physician with the below findings:      Interpretation per the Radiologist below, if available at the time of this note:    No orders to display         ED BEDSIDE ULTRASOUND:   Performed by ED Physician - none    LABS:  Labs Reviewed - No data to display    All other labs were within normal range or not returned as of this dictation. EMERGENCY DEPARTMENT COURSE and DIFFERENTIAL DIAGNOSIS/MDM:   Vitals:    Vitals:    11/07/17 1926   BP: (!) 157/86   Pulse: 64   Resp: 18   Temp: 98.9 °F (37.2 °C)   TempSrc: Tympanic   SpO2: 97%         MDM  80-year-old female who is on diuretics who presents with redness of her left great toe. On exam her toe is tender to palpation and slightly warm. There is mild swelling. She is taking Lasix which is likely the cause of this acute gout. She also has a slight wheeze on exam.  She has a history of COPD and other chronic lung issues I offered her further workup including lab work chest x-rays but she states she literally is just here for her toe and only wants no she can take something tablet make it better. She does not want any blood work or x-rays. Discussed with her to have this rechecked by her primary care provider within 48 hours. We will prescribe prednisone as this will also help with her lungs.   Strict and specific return lines 7 given she will

## 2018-01-10 DIAGNOSIS — N18.30 CKD (CHRONIC KIDNEY DISEASE) STAGE 3, GFR 30-59 ML/MIN (HCC): Chronic | ICD-10-CM

## 2018-01-11 RX ORDER — FERROUS SULFATE 325(65) MG
TABLET ORAL
Qty: 30 TABLET | Refills: 3 | Status: SHIPPED | OUTPATIENT
Start: 2018-01-11 | End: 2018-02-21 | Stop reason: SDUPTHER

## 2018-01-23 ENCOUNTER — HOSPITAL ENCOUNTER (OUTPATIENT)
Age: 83
Discharge: HOME | End: 2018-01-23
Payer: MEDICARE

## 2018-01-23 DIAGNOSIS — N18.30 CKD (CHRONIC KIDNEY DISEASE) STAGE 3, GFR 30-59 ML/MIN (HCC): Chronic | ICD-10-CM

## 2018-01-23 DIAGNOSIS — E55.9 AVITAMINOSIS D: ICD-10-CM

## 2018-01-23 DIAGNOSIS — D63.1 ANEMIA OF CHRONIC RENAL FAILURE, STAGE 3 (MODERATE) (HCC): ICD-10-CM

## 2018-01-23 DIAGNOSIS — N18.30 ANEMIA OF CHRONIC RENAL FAILURE, STAGE 3 (MODERATE) (HCC): ICD-10-CM

## 2018-01-23 LAB
ABSOLUTE EOS #: 0.4 K/UL (ref 0–0.4)
ABSOLUTE IMMATURE GRANULOCYTE: ABNORMAL K/UL (ref 0–0.3)
ABSOLUTE LYMPH #: 2.3 K/UL (ref 1–4.8)
ABSOLUTE MONO #: 1 K/UL (ref 0–1)
ALBUMIN SERPL-MCNC: 3.9 G/DL (ref 3.5–5.2)
ANION GAP SERPL CALCULATED.3IONS-SCNC: 11 MMOL/L (ref 9–17)
BASOPHILS # BLD: 0 % (ref 0–2)
BASOPHILS ABSOLUTE: 0 K/UL (ref 0–0.2)
BUN BLDV-MCNC: 35 MG/DL (ref 8–23)
BUN/CREAT BLD: 18 (ref 9–20)
CALCIUM SERPL-MCNC: 9.9 MG/DL (ref 8.6–10.4)
CHLORIDE BLD-SCNC: 104 MMOL/L (ref 98–107)
CO2: 26 MMOL/L (ref 20–31)
CREAT SERPL-MCNC: 1.95 MG/DL (ref 0.5–0.9)
CREATININE URINE: 93.3 MG/DL (ref 28–217)
CREATININE URINE: 93.6 MG/DL (ref 28–217)
DIFFERENTIAL TYPE: ABNORMAL
EOSINOPHILS RELATIVE PERCENT: 4 % (ref 0–8)
GFR AFRICAN AMERICAN: 29 ML/MIN
GFR NON-AFRICAN AMERICAN: 24 ML/MIN
GFR SERPL CREATININE-BSD FRML MDRD: ABNORMAL ML/MIN/{1.73_M2}
GFR SERPL CREATININE-BSD FRML MDRD: ABNORMAL ML/MIN/{1.73_M2}
GLUCOSE BLD-MCNC: 105 MG/DL (ref 70–99)
HCT VFR BLD CALC: 41.2 % (ref 36–46)
HEMOGLOBIN: 13.2 G/DL (ref 12–16)
IMMATURE GRANULOCYTES: ABNORMAL %
LYMPHOCYTES # BLD: 23 % (ref 24–44)
MCH RBC QN AUTO: 29.4 PG (ref 26–34)
MCHC RBC AUTO-ENTMCNC: 32.1 G/DL (ref 31–37)
MCV RBC AUTO: 91.5 FL (ref 80–100)
MICROALBUMIN/CREAT 24H UR: 50 MG/L
MICROALBUMIN/CREAT UR-RTO: 53 MCG/MG CREAT
MONOCYTES # BLD: 10 % (ref 0–12)
NRBC AUTOMATED: ABNORMAL PER 100 WBC
PDW BLD-RTO: 14.1 % (ref 12.1–15.2)
PHOSPHORUS: 3.7 MG/DL (ref 2.6–4.5)
PLATELET # BLD: 300 K/UL (ref 140–450)
PLATELET ESTIMATE: ABNORMAL
PMV BLD AUTO: 9 FL (ref 6–12)
POTASSIUM SERPL-SCNC: 5 MMOL/L (ref 3.7–5.3)
PTH INTACT: 61.44 PG/ML (ref 15–65)
RBC # BLD: 4.51 M/UL (ref 4–5.2)
RBC # BLD: ABNORMAL 10*6/UL
SEG NEUTROPHILS: 63 % (ref 36–66)
SEGMENTED NEUTROPHILS ABSOLUTE COUNT: 6.3 K/UL (ref 1.8–7.7)
SODIUM BLD-SCNC: 141 MMOL/L (ref 135–144)
TOTAL PROTEIN, URINE: 18 MG/DL
VITAMIN D 25-HYDROXY: 36.7 NG/ML (ref 30–100)
WBC # BLD: 9.9 K/UL (ref 3.5–11)
WBC # BLD: ABNORMAL 10*3/UL

## 2018-01-23 PROCEDURE — 80048 BASIC METABOLIC PNL TOTAL CA: CPT

## 2018-01-23 PROCEDURE — 83970 ASSAY OF PARATHORMONE: CPT

## 2018-01-23 PROCEDURE — 85025 COMPLETE CBC W/AUTO DIFF WBC: CPT

## 2018-01-23 PROCEDURE — 82306 VITAMIN D 25 HYDROXY: CPT

## 2018-01-23 PROCEDURE — 82570 ASSAY OF URINE CREATININE: CPT

## 2018-01-23 PROCEDURE — 84100 ASSAY OF PHOSPHORUS: CPT

## 2018-01-23 PROCEDURE — 82040 ASSAY OF SERUM ALBUMIN: CPT

## 2018-01-23 PROCEDURE — 36415 COLL VENOUS BLD VENIPUNCTURE: CPT

## 2018-01-23 PROCEDURE — 84156 ASSAY OF PROTEIN URINE: CPT

## 2018-01-23 PROCEDURE — 82043 UR ALBUMIN QUANTITATIVE: CPT

## 2018-02-02 ENCOUNTER — HOSPITAL ENCOUNTER (EMERGENCY)
Age: 83
Discharge: ANOTHER HOSPITAL | End: 2018-02-02
Attending: INTERNAL MEDICINE | Admitting: INTERNAL MEDICINE
Payer: MEDICARE

## 2018-02-02 ENCOUNTER — HOSPITAL ENCOUNTER (INPATIENT)
Age: 83
LOS: 4 days | Discharge: HOME | DRG: 190 | End: 2018-02-06
Attending: INTERNAL MEDICINE | Admitting: INTERNAL MEDICINE
Payer: MEDICARE

## 2018-02-02 ENCOUNTER — APPOINTMENT (OUTPATIENT)
Dept: CT IMAGING | Age: 83
End: 2018-02-02
Payer: MEDICARE

## 2018-02-02 ENCOUNTER — OFFICE VISIT (OUTPATIENT)
Dept: PRIMARY CARE CLINIC | Age: 83
End: 2018-02-02
Payer: MEDICARE

## 2018-02-02 ENCOUNTER — APPOINTMENT (OUTPATIENT)
Dept: GENERAL RADIOLOGY | Age: 83
End: 2018-02-02
Payer: MEDICARE

## 2018-02-02 VITALS
HEIGHT: 62 IN | OXYGEN SATURATION: 96 % | DIASTOLIC BLOOD PRESSURE: 70 MMHG | BODY MASS INDEX: 23 KG/M2 | RESPIRATION RATE: 18 BRPM | SYSTOLIC BLOOD PRESSURE: 174 MMHG | TEMPERATURE: 96.4 F | HEART RATE: 60 BPM | WEIGHT: 125 LBS

## 2018-02-02 VITALS
TEMPERATURE: 97.6 F | DIASTOLIC BLOOD PRESSURE: 60 MMHG | SYSTOLIC BLOOD PRESSURE: 111 MMHG | HEART RATE: 69 BPM | RESPIRATION RATE: 24 BRPM | WEIGHT: 125.8 LBS | BODY MASS INDEX: 23.01 KG/M2 | OXYGEN SATURATION: 97 %

## 2018-02-02 DIAGNOSIS — R77.8 ELEVATED TROPONIN: ICD-10-CM

## 2018-02-02 DIAGNOSIS — N39.0 URINARY TRACT INFECTION WITHOUT HEMATURIA, SITE UNSPECIFIED: ICD-10-CM

## 2018-02-02 DIAGNOSIS — J44.1 COPD EXACERBATION (HCC): Primary | ICD-10-CM

## 2018-02-02 DIAGNOSIS — R06.02 SOB (SHORTNESS OF BREATH) ON EXERTION: Primary | ICD-10-CM

## 2018-02-02 DIAGNOSIS — N17.9 ACUTE RENAL FAILURE, UNSPECIFIED ACUTE RENAL FAILURE TYPE (HCC): ICD-10-CM

## 2018-02-02 LAB
-: ABNORMAL
ABSOLUTE EOS #: 0.3 K/UL (ref 0–0.4)
ABSOLUTE IMMATURE GRANULOCYTE: ABNORMAL K/UL (ref 0–0.3)
ABSOLUTE LYMPH #: 1.9 K/UL (ref 1–4.8)
ABSOLUTE MONO #: 0.9 K/UL (ref 0–1)
ALBUMIN SERPL-MCNC: 3.9 G/DL (ref 3.5–5.2)
ALBUMIN/GLOBULIN RATIO: 1.2 (ref 1–2.5)
ALP BLD-CCNC: 84 U/L (ref 35–104)
ALT SERPL-CCNC: 12 U/L (ref 5–33)
AMORPHOUS: ABNORMAL
ANION GAP SERPL CALCULATED.3IONS-SCNC: 19 MMOL/L (ref 9–17)
AST SERPL-CCNC: 22 U/L
BACTERIA: ABNORMAL
BASOPHILS # BLD: 1 % (ref 0–2)
BASOPHILS ABSOLUTE: 0 K/UL (ref 0–0.2)
BILIRUB SERPL-MCNC: 0.2 MG/DL (ref 0.3–1.2)
BILIRUBIN URINE: NEGATIVE
BNP INTERPRETATION: ABNORMAL
BUN BLDV-MCNC: 60 MG/DL (ref 8–23)
BUN/CREAT BLD: 25 (ref 9–20)
CALCIUM SERPL-MCNC: 9.6 MG/DL (ref 8.6–10.4)
CASTS UA: ABNORMAL /LPF
CHLORIDE BLD-SCNC: 98 MMOL/L (ref 98–107)
CO2: 21 MMOL/L (ref 20–31)
COLOR: YELLOW
COMMENT UA: ABNORMAL
CREAT SERPL-MCNC: 2.38 MG/DL (ref 0.5–0.9)
CRYSTALS, UA: ABNORMAL /HPF
DIFFERENTIAL TYPE: ABNORMAL
EKG ATRIAL RATE: 59 BPM
EKG ATRIAL RATE: 60 BPM
EKG P AXIS: 51 DEGREES
EKG P AXIS: 71 DEGREES
EKG P-R INTERVAL: 190 MS
EKG P-R INTERVAL: 200 MS
EKG Q-T INTERVAL: 426 MS
EKG Q-T INTERVAL: 466 MS
EKG QRS DURATION: 78 MS
EKG QRS DURATION: 80 MS
EKG QTC CALCULATION (BAZETT): 426 MS
EKG QTC CALCULATION (BAZETT): 461 MS
EKG R AXIS: -22 DEGREES
EKG R AXIS: -29 DEGREES
EKG T AXIS: 67 DEGREES
EKG T AXIS: 78 DEGREES
EKG VENTRICULAR RATE: 59 BPM
EKG VENTRICULAR RATE: 60 BPM
EOSINOPHILS RELATIVE PERCENT: 3 % (ref 0–8)
EPITHELIAL CELLS UA: ABNORMAL /HPF (ref 0–25)
GFR AFRICAN AMERICAN: 23 ML/MIN
GFR NON-AFRICAN AMERICAN: 19 ML/MIN
GFR SERPL CREATININE-BSD FRML MDRD: ABNORMAL ML/MIN/{1.73_M2}
GFR SERPL CREATININE-BSD FRML MDRD: ABNORMAL ML/MIN/{1.73_M2}
GLUCOSE BLD-MCNC: 112 MG/DL (ref 70–99)
GLUCOSE URINE: NEGATIVE
HCT VFR BLD CALC: 42.2 % (ref 36–46)
HEMOGLOBIN: 13.7 G/DL (ref 12–16)
IMMATURE GRANULOCYTES: ABNORMAL %
KETONES, URINE: NEGATIVE
LEUKOCYTE ESTERASE, URINE: ABNORMAL
LYMPHOCYTES # BLD: 22 % (ref 24–44)
MCH RBC QN AUTO: 29.5 PG (ref 26–34)
MCHC RBC AUTO-ENTMCNC: 32.5 G/DL (ref 31–37)
MCV RBC AUTO: 90.6 FL (ref 80–100)
MONOCYTES # BLD: 10 % (ref 0–12)
MUCUS: ABNORMAL
NITRITE, URINE: POSITIVE
NRBC AUTOMATED: ABNORMAL PER 100 WBC
OTHER OBSERVATIONS UA: ABNORMAL
PDW BLD-RTO: 14 % (ref 12.1–15.2)
PH UA: 5.5 (ref 5–9)
PLATELET # BLD: 220 K/UL (ref 140–450)
PLATELET ESTIMATE: ABNORMAL
PMV BLD AUTO: 10.2 FL (ref 6–12)
POTASSIUM SERPL-SCNC: 3.7 MMOL/L (ref 3.7–5.3)
PRO-BNP: 879 PG/ML
PROTEIN UA: ABNORMAL
RBC # BLD: 4.66 M/UL (ref 4–5.2)
RBC # BLD: ABNORMAL 10*6/UL
RBC UA: ABNORMAL /HPF (ref 0–2)
RENAL EPITHELIAL, UA: ABNORMAL /HPF
SEG NEUTROPHILS: 64 % (ref 36–66)
SEGMENTED NEUTROPHILS ABSOLUTE COUNT: 5.5 K/UL (ref 1.8–7.7)
SODIUM BLD-SCNC: 138 MMOL/L (ref 135–144)
SPECIFIC GRAVITY UA: 1.02 (ref 1.01–1.02)
TOTAL PROTEIN: 7.2 G/DL (ref 6.4–8.3)
TRICHOMONAS: ABNORMAL
TROPONIN INTERP: ABNORMAL
TROPONIN INTERP: ABNORMAL
TROPONIN T: 0.17 NG/ML
TROPONIN T: 0.2 NG/ML
TURBIDITY: ABNORMAL
URINE HGB: ABNORMAL
UROBILINOGEN, URINE: NORMAL
WBC # BLD: 8.5 K/UL (ref 3.5–11)
WBC # BLD: ABNORMAL 10*3/UL
WBC UA: ABNORMAL /HPF (ref 0–5)
YEAST: ABNORMAL

## 2018-02-02 PROCEDURE — G8420 CALC BMI NORM PARAMETERS: HCPCS | Performed by: NURSE PRACTITIONER

## 2018-02-02 PROCEDURE — 94664 DEMO&/EVAL PT USE INHALER: CPT

## 2018-02-02 PROCEDURE — G8427 DOCREV CUR MEDS BY ELIG CLIN: HCPCS | Performed by: NURSE PRACTITIONER

## 2018-02-02 PROCEDURE — 6360000002 HC RX W HCPCS: Performed by: PHYSICIAN ASSISTANT

## 2018-02-02 PROCEDURE — 2580000003 HC RX 258: Performed by: PHYSICIAN ASSISTANT

## 2018-02-02 PROCEDURE — 1090F PRES/ABSN URINE INCON ASSESS: CPT | Performed by: NURSE PRACTITIONER

## 2018-02-02 PROCEDURE — 80053 COMPREHEN METABOLIC PANEL: CPT

## 2018-02-02 PROCEDURE — 70450 CT HEAD/BRAIN W/O DYE: CPT

## 2018-02-02 PROCEDURE — 83880 ASSAY OF NATRIURETIC PEPTIDE: CPT

## 2018-02-02 PROCEDURE — 6370000000 HC RX 637 (ALT 250 FOR IP): Performed by: PHYSICIAN ASSISTANT

## 2018-02-02 PROCEDURE — 1123F ACP DISCUSS/DSCN MKR DOCD: CPT | Performed by: NURSE PRACTITIONER

## 2018-02-02 PROCEDURE — 71250 CT THORAX DX C-: CPT

## 2018-02-02 PROCEDURE — G8598 ASA/ANTIPLAT THER USED: HCPCS | Performed by: NURSE PRACTITIONER

## 2018-02-02 PROCEDURE — 99285 EMERGENCY DEPT VISIT HI MDM: CPT

## 2018-02-02 PROCEDURE — 96375 TX/PRO/DX INJ NEW DRUG ADDON: CPT

## 2018-02-02 PROCEDURE — 85025 COMPLETE CBC W/AUTO DIFF WBC: CPT

## 2018-02-02 PROCEDURE — 93005 ELECTROCARDIOGRAM TRACING: CPT

## 2018-02-02 PROCEDURE — 4040F PNEUMOC VAC/ADMIN/RCVD: CPT | Performed by: NURSE PRACTITIONER

## 2018-02-02 PROCEDURE — 87040 BLOOD CULTURE FOR BACTERIA: CPT

## 2018-02-02 PROCEDURE — 84484 ASSAY OF TROPONIN QUANT: CPT

## 2018-02-02 PROCEDURE — 99213 OFFICE O/P EST LOW 20 MIN: CPT | Performed by: NURSE PRACTITIONER

## 2018-02-02 PROCEDURE — 1036F TOBACCO NON-USER: CPT | Performed by: NURSE PRACTITIONER

## 2018-02-02 PROCEDURE — 71046 X-RAY EXAM CHEST 2 VIEWS: CPT

## 2018-02-02 PROCEDURE — 81001 URINALYSIS AUTO W/SCOPE: CPT

## 2018-02-02 PROCEDURE — 1200000000 HC SEMI PRIVATE

## 2018-02-02 PROCEDURE — G8484 FLU IMMUNIZE NO ADMIN: HCPCS | Performed by: NURSE PRACTITIONER

## 2018-02-02 PROCEDURE — 96365 THER/PROPH/DIAG IV INF INIT: CPT

## 2018-02-02 PROCEDURE — 36415 COLL VENOUS BLD VENIPUNCTURE: CPT

## 2018-02-02 RX ORDER — SODIUM CHLORIDE 0.9 % (FLUSH) 0.9 %
10 SYRINGE (ML) INJECTION EVERY 12 HOURS SCHEDULED
Status: CANCELLED | OUTPATIENT
Start: 2018-02-02

## 2018-02-02 RX ORDER — ACETAMINOPHEN 650 MG/1
650 SUPPOSITORY RECTAL EVERY 4 HOURS PRN
Status: CANCELLED | OUTPATIENT
Start: 2018-02-02

## 2018-02-02 RX ORDER — SODIUM CHLORIDE 9 MG/ML
INJECTION, SOLUTION INTRAVENOUS CONTINUOUS
Status: CANCELLED | OUTPATIENT
Start: 2018-02-02

## 2018-02-02 RX ORDER — ALBUTEROL SULFATE 2.5 MG/3ML
2.5 SOLUTION RESPIRATORY (INHALATION) EVERY 6 HOURS PRN
Status: CANCELLED | OUTPATIENT
Start: 2018-02-02

## 2018-02-02 RX ORDER — LISINOPRIL 10 MG/1
10 TABLET ORAL DAILY
Status: CANCELLED | OUTPATIENT
Start: 2018-02-02

## 2018-02-02 RX ORDER — ALBUTEROL SULFATE 90 UG/1
2 AEROSOL, METERED RESPIRATORY (INHALATION) EVERY 6 HOURS PRN
Status: CANCELLED | OUTPATIENT
Start: 2018-02-02

## 2018-02-02 RX ORDER — AZITHROMYCIN 250 MG/1
500 TABLET, FILM COATED ORAL DAILY
Status: CANCELLED | OUTPATIENT
Start: 2018-02-02 | End: 2018-02-03

## 2018-02-02 RX ORDER — ACETAMINOPHEN 325 MG/1
650 TABLET ORAL EVERY 4 HOURS PRN
Status: CANCELLED | OUTPATIENT
Start: 2018-02-02

## 2018-02-02 RX ORDER — SODIUM CHLORIDE 9 MG/ML
INJECTION, SOLUTION INTRAVENOUS CONTINUOUS
Status: DISCONTINUED | OUTPATIENT
Start: 2018-02-02 | End: 2018-02-02 | Stop reason: HOSPADM

## 2018-02-02 RX ORDER — DILTIAZEM HYDROCHLORIDE 60 MG/1
60 TABLET, FILM COATED ORAL 3 TIMES DAILY
Status: CANCELLED | OUTPATIENT
Start: 2018-02-02

## 2018-02-02 RX ORDER — ASPIRIN 325 MG
325 TABLET ORAL ONCE
Status: COMPLETED | OUTPATIENT
Start: 2018-02-02 | End: 2018-02-02

## 2018-02-02 RX ORDER — SODIUM POLYSTYRENE SULFONATE 15 G/60ML
15 SUSPENSION ORAL; RECTAL ONCE
Status: CANCELLED | OUTPATIENT
Start: 2018-02-02 | End: 2018-02-02

## 2018-02-02 RX ORDER — SIMVASTATIN 20 MG
20 TABLET ORAL NIGHTLY
Status: CANCELLED | OUTPATIENT
Start: 2018-02-02

## 2018-02-02 RX ORDER — NITROGLYCERIN 0.4 MG/1
0.4 TABLET SUBLINGUAL EVERY 5 MIN PRN
Status: CANCELLED | OUTPATIENT
Start: 2018-02-02

## 2018-02-02 RX ORDER — METHYLPREDNISOLONE SODIUM SUCCINATE 125 MG/2ML
125 INJECTION, POWDER, LYOPHILIZED, FOR SOLUTION INTRAMUSCULAR; INTRAVENOUS ONCE
Status: COMPLETED | OUTPATIENT
Start: 2018-02-02 | End: 2018-02-02

## 2018-02-02 RX ORDER — SODIUM CHLORIDE 0.9 % (FLUSH) 0.9 %
10 SYRINGE (ML) INJECTION PRN
Status: CANCELLED | OUTPATIENT
Start: 2018-02-02

## 2018-02-02 RX ORDER — CLOPIDOGREL BISULFATE 75 MG/1
75 TABLET ORAL DAILY
Status: CANCELLED | OUTPATIENT
Start: 2018-02-02

## 2018-02-02 RX ORDER — FAMOTIDINE 20 MG/1
20 TABLET, FILM COATED ORAL 2 TIMES DAILY
Status: CANCELLED | OUTPATIENT
Start: 2018-02-02

## 2018-02-02 RX ORDER — BISACODYL 10 MG
10 SUPPOSITORY, RECTAL RECTAL DAILY PRN
Status: CANCELLED | OUTPATIENT
Start: 2018-02-02

## 2018-02-02 RX ORDER — IPRATROPIUM BROMIDE AND ALBUTEROL SULFATE 2.5; .5 MG/3ML; MG/3ML
1 SOLUTION RESPIRATORY (INHALATION) ONCE
Status: COMPLETED | OUTPATIENT
Start: 2018-02-02 | End: 2018-02-02

## 2018-02-02 RX ORDER — ALBUTEROL SULFATE 2.5 MG/3ML
2.5 SOLUTION RESPIRATORY (INHALATION)
Status: CANCELLED | OUTPATIENT
Start: 2018-02-02

## 2018-02-02 RX ORDER — FUROSEMIDE 20 MG/1
20 TABLET ORAL
Status: CANCELLED | OUTPATIENT
Start: 2018-02-02

## 2018-02-02 RX ORDER — IPRATROPIUM BROMIDE AND ALBUTEROL SULFATE 2.5; .5 MG/3ML; MG/3ML
1 SOLUTION RESPIRATORY (INHALATION)
Status: CANCELLED | OUTPATIENT
Start: 2018-02-02

## 2018-02-02 RX ORDER — AZITHROMYCIN 250 MG/1
250 TABLET, FILM COATED ORAL DAILY
Status: CANCELLED | OUTPATIENT
Start: 2018-02-03 | End: 2018-02-07

## 2018-02-02 RX ORDER — METHYLPREDNISOLONE SODIUM SUCCINATE 125 MG/2ML
80 INJECTION, POWDER, LYOPHILIZED, FOR SOLUTION INTRAMUSCULAR; INTRAVENOUS EVERY 8 HOURS
Status: CANCELLED | OUTPATIENT
Start: 2018-02-02

## 2018-02-02 RX ORDER — ONDANSETRON 2 MG/ML
4 INJECTION INTRAMUSCULAR; INTRAVENOUS EVERY 6 HOURS PRN
Status: CANCELLED | OUTPATIENT
Start: 2018-02-02

## 2018-02-02 RX ADMIN — CEFTRIAXONE 1 G: 1 INJECTION, POWDER, FOR SOLUTION INTRAMUSCULAR; INTRAVENOUS at 18:33

## 2018-02-02 RX ADMIN — METHYLPREDNISOLONE SODIUM SUCCINATE 125 MG: 125 INJECTION, POWDER, FOR SOLUTION INTRAMUSCULAR; INTRAVENOUS at 13:28

## 2018-02-02 RX ADMIN — ASPIRIN 325 MG: 325 TABLET, COATED ORAL at 15:31

## 2018-02-02 RX ADMIN — SODIUM CHLORIDE: 9 INJECTION, SOLUTION INTRAVENOUS at 14:12

## 2018-02-02 RX ADMIN — IPRATROPIUM BROMIDE AND ALBUTEROL SULFATE 1 AMPULE: .5; 3 SOLUTION RESPIRATORY (INHALATION) at 13:54

## 2018-02-02 ASSESSMENT — ENCOUNTER SYMPTOMS
RHINORRHEA: 0
ABDOMINAL PAIN: 0
WHEEZING: 1
SINUS PRESSURE: 0
SHORTNESS OF BREATH: 1
EYES NEGATIVE: 1
WHEEZING: 1
CHEST TIGHTNESS: 0
GASTROINTESTINAL NEGATIVE: 1
TROUBLE SWALLOWING: 0
SORE THROAT: 0
SHORTNESS OF BREATH: 1
EYE ITCHING: 0
COUGH: 1
BACK PAIN: 0
DIARRHEA: 0
NAUSEA: 0
COUGH: 1
VOMITING: 0
RHINORRHEA: 0
SINUS PAIN: 0
SORE THROAT: 0
EYE PAIN: 0
VOMITING: 0
SPUTUM PRODUCTION: 1

## 2018-02-02 ASSESSMENT — HEART SCORE: ECG: 0

## 2018-02-02 NOTE — ED NOTES
Dr. Garry Barksdale at bedside     Olena Zhou, [facility]  02/02/18 1012

## 2018-02-02 NOTE — ED PROVIDER NOTES
within normal limits   BRAIN NATRIURETIC PEPTIDE - Abnormal; Notable for the following:     Pro- (*)     All other components within normal limits   URINALYSIS - Abnormal; Notable for the following:     Turbidity UA SLIGHTLY CLOUDY (*)     Specific Gravity, UA 1.025 (*)     Urine Hgb TRACE (*)     Protein, UA TRACE (*)     Nitrite, Urine POSITIVE (*)     Leukocyte Esterase, Urine MODERATE (*)     All other components within normal limits   MICROSCOPIC URINALYSIS - Abnormal; Notable for the following:     Bacteria, UA 1+ (*)     Amorphous, UA TRACE (*)     All other components within normal limits   CULTURE BLOOD #1   CULTURE BLOOD #1   TROPONIN       All other labs were within normal range or not returned as of this dictation. EMERGENCY DEPARTMENT COURSE and Medical Decision Making:     MDM/  ED Course as of Feb 02 1834 Fri Feb 02, 2018 1831 Casts UA: HYALINE []      ED Course User Index  [JM] Chelsi Dietrich PA-C     Repeat EKG after 3 hours as obtained showed a sinus rhythm with a rate of 59. A DC interval of 190 and a QTC of 461. No acute injury pattern identified. Patient received Rocephin for her urinary tract infection. I spoke with Dr. Faraz Collins who was agreeable for admission. After he assessed the patient ER he preferred the patient to be transferred. Therefore the patient we transfer to [facility]. I spoke with Dr. Ramsey Newsome at [facility] who did agree to accept the transfer  Strict return precautions and follow up instructions were discussed with the patient with which the patient agrees    CONSULTS: (None if blank)  None    Procedures: (None if blank)       CLINICAL IMPRESSION      1. COPD exacerbation ([address]    2. Urinary tract infection without hematuria, site unspecified    3. Elevated troponin    4.  Acute renal failure, unspecified acute renal failure type [facility])          DISPOSITION/PLAN   DISPOSITION Admitted 02/02/2018 05:53:45 PM      PATIENT REFERRED TO:  Jana Watts

## 2018-02-02 NOTE — Clinical Note
Patient Class: Inpatient [101]   REQUIRED: Diagnosis: UTI (urinary tract infection) [109964]   Estimated Length of Stay: Estimated stay of more than 2 midnights   Future Attending Provider: Lynda Ortega [[id number]]   Admitting Provider: Lynda Ortega [[id number]]   Telemetry Bed Required?: Yes

## 2018-02-02 NOTE — PROGRESS NOTES
transesophageal echocardiography (KRISTOPHER) for monitoring 02/20/2017    No clots  were visulaized in the left atrial appendage EF 55%.  Hyperlipidemia     Hypertension     MI (myocardial infarction)     2001    Shingles     15 years ago        Current Outpatient Prescriptions   Medication Sig Dispense Refill    ferrous sulfate 325 (65 Fe) MG tablet take 1 tablet by mouth once daily WITH BREAKFAST 30 tablet 3    furosemide (LASIX) 20 MG tablet take 1 tablet by mouth every other day 15 tablet 5    simvastatin (ZOCOR) 20 MG tablet take 1 tablet by mouth every evening 30 tablet 3    furosemide (LASIX) 20 MG tablet Take 20 mg by mouth every 48 hours      beclomethasone (QVAR) 80 MCG/ACT inhaler Inhale 1 puff into the lungs 2 times daily 1 Inhaler 11    albuterol (PROVENTIL) (2.5 MG/3ML) 0.083% nebulizer solution INHALE CONTENETS OF 3 ML IN NEBULIZER EVERY 6 HOURS AS NEED FOR WHEEZE 150 vial 0    nitroGLYCERIN (NITROSTAT) 0.4 MG SL tablet up to max of 3 total doses. If no relief after 1 dose, call 911. 25 tablet 3    apixaban (ELIQUIS) 2.5 MG TABS tablet Take 1 tablet by mouth 2 times daily 60 tablet 6    diltiazem (CARDIZEM) 30 MG tablet Take 1 tablet by mouth 3 times daily (Patient taking differently: Take 60 mg by mouth 3 times daily ) 120 tablet 3    clopidogrel (PLAVIX) 75 MG tablet Take 1 tablet by mouth daily 30 tablet 11    lisinopril (PRINIVIL;ZESTRIL) 10 MG tablet Take 1 tablet by mouth daily 30 tablet 3    albuterol sulfate HFA (PROAIR HFA) 108 (90 BASE) MCG/ACT inhaler Inhale 2 puffs into the lungs every 6 hours as needed for Wheezing 1 Inhaler 11    sodium polystyrene (SPS) 15 GM/60ML suspension Take 60 mLs by mouth once for 1 dose 1 Bottle 0    vitamin D (CHOLECALCIFEROL) 77196 UNIT CAPS Take 1 capsule by mouth once a week for 16 doses 16 capsule 0     No current facility-administered medications for this visit.       Allergies   Allergen Reactions    Belladonna        Subjective:      Review

## 2018-02-03 PROBLEM — R79.89 ELEVATED BRAIN NATRIURETIC PEPTIDE (BNP) LEVEL: Status: ACTIVE | Noted: 2018-02-03

## 2018-02-03 LAB
DIRECT EXAM: NORMAL
Lab: NORMAL
SPECIMEN DESCRIPTION: NORMAL
STATUS: NORMAL
TROPONIN INTERP: ABNORMAL
TROPONIN T: 0.1 NG/ML

## 2018-02-03 PROCEDURE — 1200000000 HC SEMI PRIVATE

## 2018-02-03 PROCEDURE — 6370000000 HC RX 637 (ALT 250 FOR IP): Performed by: INTERNAL MEDICINE

## 2018-02-03 PROCEDURE — 2580000003 HC RX 258: Performed by: INTERNAL MEDICINE

## 2018-02-03 PROCEDURE — 87804 INFLUENZA ASSAY W/OPTIC: CPT

## 2018-02-03 PROCEDURE — 84484 ASSAY OF TROPONIN QUANT: CPT

## 2018-02-03 PROCEDURE — 94640 AIRWAY INHALATION TREATMENT: CPT

## 2018-02-03 PROCEDURE — 36415 COLL VENOUS BLD VENIPUNCTURE: CPT

## 2018-02-03 PROCEDURE — 6360000002 HC RX W HCPCS: Performed by: NURSE PRACTITIONER

## 2018-02-03 PROCEDURE — 93005 ELECTROCARDIOGRAM TRACING: CPT

## 2018-02-03 PROCEDURE — 6360000002 HC RX W HCPCS: Performed by: INTERNAL MEDICINE

## 2018-02-03 PROCEDURE — 99222 1ST HOSP IP/OBS MODERATE 55: CPT | Performed by: INTERNAL MEDICINE

## 2018-02-03 RX ORDER — NITROGLYCERIN 0.4 MG/1
0.4 TABLET SUBLINGUAL EVERY 5 MIN PRN
Status: DISCONTINUED | OUTPATIENT
Start: 2018-02-03 | End: 2018-02-06 | Stop reason: HOSPADM

## 2018-02-03 RX ORDER — ALBUTEROL SULFATE 2.5 MG/3ML
2.5 SOLUTION RESPIRATORY (INHALATION)
Status: DISCONTINUED | OUTPATIENT
Start: 2018-02-03 | End: 2018-02-06 | Stop reason: HOSPADM

## 2018-02-03 RX ORDER — ALBUTEROL SULFATE 90 UG/1
2 AEROSOL, METERED RESPIRATORY (INHALATION) EVERY 6 HOURS PRN
Status: DISCONTINUED | OUTPATIENT
Start: 2018-02-03 | End: 2018-02-06 | Stop reason: HOSPADM

## 2018-02-03 RX ORDER — SODIUM CHLORIDE 9 MG/ML
INJECTION, SOLUTION INTRAVENOUS CONTINUOUS
Status: DISCONTINUED | OUTPATIENT
Start: 2018-02-03 | End: 2018-02-03

## 2018-02-03 RX ORDER — HYDRALAZINE HYDROCHLORIDE 20 MG/ML
20 INJECTION INTRAMUSCULAR; INTRAVENOUS EVERY 6 HOURS PRN
Status: DISCONTINUED | OUTPATIENT
Start: 2018-02-03 | End: 2018-02-06 | Stop reason: HOSPADM

## 2018-02-03 RX ORDER — ACETAMINOPHEN 325 MG/1
650 TABLET ORAL EVERY 4 HOURS PRN
Status: DISCONTINUED | OUTPATIENT
Start: 2018-02-03 | End: 2018-02-06 | Stop reason: HOSPADM

## 2018-02-03 RX ORDER — SODIUM CHLORIDE 0.9 % (FLUSH) 0.9 %
10 SYRINGE (ML) INJECTION EVERY 12 HOURS SCHEDULED
Status: DISCONTINUED | OUTPATIENT
Start: 2018-02-03 | End: 2018-02-06 | Stop reason: HOSPADM

## 2018-02-03 RX ORDER — BISACODYL 10 MG
10 SUPPOSITORY, RECTAL RECTAL DAILY PRN
Status: DISCONTINUED | OUTPATIENT
Start: 2018-02-03 | End: 2018-02-06 | Stop reason: HOSPADM

## 2018-02-03 RX ORDER — FAMOTIDINE 20 MG/1
20 TABLET, FILM COATED ORAL 2 TIMES DAILY
Status: DISCONTINUED | OUTPATIENT
Start: 2018-02-03 | End: 2018-02-06 | Stop reason: HOSPADM

## 2018-02-03 RX ORDER — SODIUM CHLORIDE 0.9 % (FLUSH) 0.9 %
10 SYRINGE (ML) INJECTION PRN
Status: DISCONTINUED | OUTPATIENT
Start: 2018-02-03 | End: 2018-02-06 | Stop reason: HOSPADM

## 2018-02-03 RX ORDER — HYDRALAZINE HYDROCHLORIDE 20 MG/ML
20 INJECTION INTRAMUSCULAR; INTRAVENOUS ONCE
Status: DISCONTINUED | OUTPATIENT
Start: 2018-02-03 | End: 2018-02-03

## 2018-02-03 RX ORDER — AZITHROMYCIN 250 MG/1
500 TABLET, FILM COATED ORAL DAILY
Status: COMPLETED | OUTPATIENT
Start: 2018-02-03 | End: 2018-02-03

## 2018-02-03 RX ORDER — HYDRALAZINE HYDROCHLORIDE 20 MG/ML
10 INJECTION INTRAMUSCULAR; INTRAVENOUS EVERY 6 HOURS PRN
Status: DISCONTINUED | OUTPATIENT
Start: 2018-02-03 | End: 2018-02-06 | Stop reason: HOSPADM

## 2018-02-03 RX ORDER — CLOPIDOGREL BISULFATE 75 MG/1
75 TABLET ORAL DAILY
Status: DISCONTINUED | OUTPATIENT
Start: 2018-02-03 | End: 2018-02-06 | Stop reason: HOSPADM

## 2018-02-03 RX ORDER — HYDRALAZINE HYDROCHLORIDE 20 MG/ML
10 INJECTION INTRAMUSCULAR; INTRAVENOUS EVERY 6 HOURS PRN
Status: DISCONTINUED | OUTPATIENT
Start: 2018-02-03 | End: 2018-02-03 | Stop reason: SDUPTHER

## 2018-02-03 RX ORDER — HYDRALAZINE HYDROCHLORIDE 20 MG/ML
20 INJECTION INTRAMUSCULAR; INTRAVENOUS EVERY 6 HOURS PRN
Status: DISCONTINUED | OUTPATIENT
Start: 2018-02-03 | End: 2018-02-03 | Stop reason: SDUPTHER

## 2018-02-03 RX ORDER — IPRATROPIUM BROMIDE AND ALBUTEROL SULFATE 2.5; .5 MG/3ML; MG/3ML
1 SOLUTION RESPIRATORY (INHALATION)
Status: DISCONTINUED | OUTPATIENT
Start: 2018-02-03 | End: 2018-02-06 | Stop reason: HOSPADM

## 2018-02-03 RX ORDER — AZITHROMYCIN 250 MG/1
250 TABLET, FILM COATED ORAL DAILY
Status: DISCONTINUED | OUTPATIENT
Start: 2018-02-04 | End: 2018-02-04

## 2018-02-03 RX ORDER — ONDANSETRON 2 MG/ML
4 INJECTION INTRAMUSCULAR; INTRAVENOUS EVERY 6 HOURS PRN
Status: DISCONTINUED | OUTPATIENT
Start: 2018-02-03 | End: 2018-02-06 | Stop reason: HOSPADM

## 2018-02-03 RX ORDER — METHYLPREDNISOLONE SODIUM SUCCINATE 125 MG/2ML
80 INJECTION, POWDER, LYOPHILIZED, FOR SOLUTION INTRAMUSCULAR; INTRAVENOUS EVERY 8 HOURS
Status: DISCONTINUED | OUTPATIENT
Start: 2018-02-03 | End: 2018-02-04

## 2018-02-03 RX ORDER — SODIUM POLYSTYRENE SULFONATE 15 G/60ML
15 SUSPENSION ORAL; RECTAL ONCE
Status: DISCONTINUED | OUTPATIENT
Start: 2018-02-03 | End: 2018-02-06 | Stop reason: HOSPADM

## 2018-02-03 RX ADMIN — IPRATROPIUM BROMIDE AND ALBUTEROL SULFATE 1 AMPULE: .5; 3 SOLUTION RESPIRATORY (INHALATION) at 11:47

## 2018-02-03 RX ADMIN — Medication 10 ML: at 22:04

## 2018-02-03 RX ADMIN — APIXABAN 2.5 MG: 2.5 TABLET, FILM COATED ORAL at 01:57

## 2018-02-03 RX ADMIN — DILTIAZEM HYDROCHLORIDE 30 MG: 30 TABLET, FILM COATED ORAL at 15:16

## 2018-02-03 RX ADMIN — METHYLPREDNISOLONE SODIUM SUCCINATE 80 MG: 125 INJECTION, POWDER, FOR SOLUTION INTRAMUSCULAR; INTRAVENOUS at 08:23

## 2018-02-03 RX ADMIN — FAMOTIDINE 20 MG: 20 TABLET, FILM COATED ORAL at 08:22

## 2018-02-03 RX ADMIN — APIXABAN 2.5 MG: 2.5 TABLET, FILM COATED ORAL at 22:01

## 2018-02-03 RX ADMIN — BECLOMETHASONE DIPROPIONATE 1 PUFF: 80 AEROSOL, METERED RESPIRATORY (INHALATION) at 20:21

## 2018-02-03 RX ADMIN — FAMOTIDINE 20 MG: 20 TABLET, FILM COATED ORAL at 01:57

## 2018-02-03 RX ADMIN — SODIUM CHLORIDE: 9 INJECTION, SOLUTION INTRAVENOUS at 01:22

## 2018-02-03 RX ADMIN — IPRATROPIUM BROMIDE AND ALBUTEROL SULFATE 1 AMPULE: .5; 3 SOLUTION RESPIRATORY (INHALATION) at 20:21

## 2018-02-03 RX ADMIN — FAMOTIDINE 20 MG: 20 TABLET, FILM COATED ORAL at 22:00

## 2018-02-03 RX ADMIN — AZITHROMYCIN 500 MG: 250 TABLET, FILM COATED ORAL at 08:22

## 2018-02-03 RX ADMIN — METHYLPREDNISOLONE SODIUM SUCCINATE 80 MG: 125 INJECTION, POWDER, FOR SOLUTION INTRAMUSCULAR; INTRAVENOUS at 01:58

## 2018-02-03 RX ADMIN — IPRATROPIUM BROMIDE AND ALBUTEROL SULFATE 1 AMPULE: .5; 3 SOLUTION RESPIRATORY (INHALATION) at 08:45

## 2018-02-03 RX ADMIN — METHYLPREDNISOLONE SODIUM SUCCINATE 80 MG: 125 INJECTION, POWDER, FOR SOLUTION INTRAMUSCULAR; INTRAVENOUS at 17:46

## 2018-02-03 RX ADMIN — DILTIAZEM HYDROCHLORIDE 30 MG: 30 TABLET, FILM COATED ORAL at 10:46

## 2018-02-03 RX ADMIN — IPRATROPIUM BROMIDE AND ALBUTEROL SULFATE 1 AMPULE: .5; 3 SOLUTION RESPIRATORY (INHALATION) at 16:28

## 2018-02-03 RX ADMIN — APIXABAN 2.5 MG: 2.5 TABLET, FILM COATED ORAL at 08:22

## 2018-02-03 RX ADMIN — HYDRALAZINE HYDROCHLORIDE 20 MG: 20 INJECTION INTRAMUSCULAR; INTRAVENOUS at 02:00

## 2018-02-03 RX ADMIN — BECLOMETHASONE DIPROPIONATE 1 PUFF: 80 AEROSOL, METERED RESPIRATORY (INHALATION) at 08:46

## 2018-02-03 RX ADMIN — DILTIAZEM HYDROCHLORIDE 30 MG: 30 TABLET, FILM COATED ORAL at 22:01

## 2018-02-03 RX ADMIN — CLOPIDOGREL 75 MG: 75 TABLET, FILM COATED ORAL at 08:22

## 2018-02-03 ASSESSMENT — ENCOUNTER SYMPTOMS
BACK PAIN: 1
BLOOD IN STOOL: 0
COUGH: 0
SHORTNESS OF BREATH: 1
ORTHOPNEA: 0
SPUTUM PRODUCTION: 0
HEMOPTYSIS: 0
NAUSEA: 0
BLURRED VISION: 0
VOMITING: 0
ABDOMINAL PAIN: 0

## 2018-02-03 ASSESSMENT — PAIN SCALES - GENERAL: PAINLEVEL_OUTOF10: 0

## 2018-02-03 NOTE — H&P
[address]    HISTORY AND PHYSICAL EXAMINATION            Date:   2/3/2018  Patient name: Mary Baeza  Date of admission:  2/2/2018 10:03 PM  MRN:   [id number]  Account:  [de-identified]  YOB: 1928  PCP:    Jonathan Doll NP  Room:   2017/2017-01  Code Status:    Full Code    Chief Complaint:     Shortness of breath    History Obtained From:     patient, electronic medical record    History of Present Illness: The patient is a 80 y.o. female who presents with:   Shortness of breath     Patient was admitted thru ER with:  \"Geovanna Luna is a 80 y.o. female who presents to the emergency department From her doctor's office with complaints of shortness of breath. Patient has COPD. According to the daughter the patient's symptoms of been getting worse with past several days. The patient went to her doctor today where they noticed wheezing so they sent her to the ER because they cannot perform a chest x-ray. Patient denies any chest pain. Patient denies any fevers or chills. States the cough is nonproductive. Denies vomiting or diarrhea. \"    Was seen at the [facility] in [place] [address] on 2/2  \"Geovanna Luna is a 80 y.o. female who presents to the [facility] in Care today for her medical conditions/complaints as noted below. Mary Baeza is c/o of Shortness of Breath   \"i am only up for a couple minutes\" \"I cant breathe\"   Patient states she has felt like this for a week and has gotten worse. States this is for she has gotten up and dressed in one week. Wheezes notable in office audible wheezes noted in office. Patient does have a history of CHF and COPD. No noticeable recent weight gain, no lower extremity edema. \"    The patient has had increasing dyspnea on exertion  Initial workup revealed elevated BNP, troponin, and possible UTI  Her blood pressure has been low  Prior cardiac workup has is having trouble providing historical data    Skin: She is not diaphoretic. Investigations:      Laboratory Testing:  Recent Results (from the past 24 hour(s))   Culture Blood #1    Collection Time: 02/02/18  1:19 PM   Result Value Ref Range    Specimen Description . BLOOD     Special Requests LAC 6ML     Culture NO GROWTH 14 HOURS     Culture       Performed at [place] [address] [facility]. [facility], [address] [place] [address]    Culture  ([phone]     Status Pending    EKG 12 Lead    Collection Time: 02/02/18  1:22 PM   Result Value Ref Range    Ventricular Rate 60 BPM    Atrial Rate 60 BPM    P-R Interval 200 ms    QRS Duration 78 ms    Q-T Interval 426 ms    QTc Calculation (Bazett) 426 ms    P Axis 71 degrees    R Axis -29 degrees    T Axis 78 degrees   CBC Auto Differential    Collection Time: 02/02/18  1:28 PM   Result Value Ref Range    WBC 8.5 3.5 - 11.0 k/uL    RBC 4.66 4.0 - 5.2 m/uL    Hemoglobin 13.7 12.0 - 16.0 g/dL    Hematocrit 42.2 36 - 46 %    MCV 90.6 80 - 100 fL    MCH 29.5 26 - 34 pg    MCHC 32.5 31 - 37 g/dL    RDW 14.0 12.1 - 15.2 %    Platelets 729 083 - 711 k/uL    MPV 10.2 6.0 - 12.0 fL    NRBC Automated NOT REPORTED per 100 WBC    Differential Type NOT REPORTED     Seg Neutrophils 64 36 - 66 %    Lymphocytes 22 (L) 24 - 44 %    Monocytes 10 0 - 12 %    Eosinophils % 3 0 - 8 %    Basophils 1 0 - 2 %    Immature Granulocytes NOT REPORTED 0 %    Segs Absolute 5.50 1.8 - 7.7 k/uL    Absolute Lymph # 1.90 1.0 - 4.8 k/uL    Absolute Mono # 0.90 0.0 - 1.0 k/uL    Absolute Eos # 0.30 0.0 - 0.4 k/uL    Basophils # 0.00 0.0 - 0.2 k/uL    Absolute Immature Granulocyte NOT REPORTED 0.00 - 0.30 k/uL    WBC Morphology NOT REPORTED     RBC Morphology NOT REPORTED     Platelet Estimate NOT REPORTED    Comprehensive Metabolic Panel    Collection Time: 02/02/18  1:28 PM   Result Value Ref Range    Glucose 112 (H) 70 - 99 mg/dL    BUN 60 (H) 8 - 23 mg/dL    CREATININE 2.38 (H) 0.50 - 0.90 mg/dL Bun/Cre Ratio 25 (H) 9 - 20    Calcium 9.6 8.6 - 10.4 mg/dL    Sodium 138 135 - 144 mmol/L    Potassium 3.7 3.7 - 5.3 mmol/L    Chloride 98 98 - 107 mmol/L    CO2 21 20 - 31 mmol/L    Anion Gap 19 (H) 9 - 17 mmol/L    Alkaline Phosphatase 84 35 - 104 U/L    ALT 12 5 - 33 U/L    AST 22 <32 U/L    Total Bilirubin 0.20 (L) 0.3 - 1.2 mg/dL    Total Protein 7.2 6.4 - 8.3 g/dL    Alb 3.9 3.5 - 5.2 g/dL    Albumin/Globulin Ratio 1.2 1.0 - 2.5    GFR Non- 19 (L) >60 mL/min    GFR  23 (L) >60 mL/min    GFR Comment          GFR Staging         Troponin    Collection Time: 02/02/18  1:28 PM   Result Value Ref Range    Troponin T 0.20 (HH) <0.03 ng/mL    Troponin Interp         Brain Natriuretic Peptide    Collection Time: 02/02/18  1:28 PM   Result Value Ref Range    Pro- (H) <300 pg/mL    BNP Interpretation         Culture Blood #1    Collection Time: 02/02/18  1:28 PM   Result Value Ref Range    Specimen Description . BLOOD     Special Requests RAC 6ML     Culture NO GROWTH 14 HOURS     Culture       Performed at [address]    Culture  ([phone]     Status Pending    Urinalysis    Collection Time: 02/02/18  4:15 PM   Result Value Ref Range    Color, UA YELLOW YEL    Turbidity UA SLIGHTLY CLOUDY (A) CLEAR    Glucose, Ur NEGATIVE NEG    Bilirubin Urine NEGATIVE NEG    Ketones, Urine NEGATIVE NEG    Specific Gravity, UA 1.025 (H) 1.010 - 1.020    Urine Hgb TRACE (A) NEG    pH, UA 5.5 5.0 - 9.0    Protein, UA TRACE (A) NEG    Urobilinogen, Urine Normal NORM    Nitrite, Urine POSITIVE (A) NEG    Leukocyte Esterase, Urine MODERATE (A) NEG    Urinalysis Comments NOT REPORTED    Microscopic Urinalysis    Collection Time: 02/02/18  4:15 PM   Result Value Ref Range    -          WBC, UA 50  0 - 5 /HPF    RBC, UA 0 TO 2 0 - 2 /HPF    Casts UA HYALINE /LPF    Crystals UA NOT REPORTED NONE /HPF    Epithelial Cells UA 0 TO 2 0 - 25

## 2018-02-03 NOTE — PROGRESS NOTES
The patient arrived in room 2017; vital signs obtained, patient oriented to the room. Bedside table and call light within reach. Paged internal med NP for orders, will continue to monitor patient.

## 2018-02-03 NOTE — CONSULTS
polystyrene (SPS) 15 GM/60ML suspension Take 60 mLs by mouth once for 1 dose 9/26/17 9/26/17  John Sigala MD   beclomethasone (QVAR) 80 MCG/ACT inhaler Inhale 1 puff into the lungs 2 times daily 8/10/17   Lamberto Fitzpatrick MD   albuterol (PROVENTIL) (2.5 MG/3ML) 0.083% nebulizer solution INHALE CONTENETS OF 3 ML IN NEBULIZER EVERY 6 HOURS AS NEED FOR WHEEZE 5/17/17   Krishna Damon MD   nitroGLYCERIN (NITROSTAT) 0.4 MG SL tablet up to max of 3 total doses.  If no relief after 1 dose, call 911. 2/21/17   Ayan Ochoa CNP   apixaban (ELIQUIS) 2.5 MG TABS tablet Take 1 tablet by mouth 2 times daily 2/21/17   Ayan Ochoa CNP   diltiazem (CARDIZEM) 30 MG tablet Take 1 tablet by mouth 3 times daily  Patient taking differently: Take 60 mg by mouth 3 times daily  2/21/17   Ayan Ochoa CNP   clopidogrel (PLAVIX) 75 MG tablet Take 1 tablet by mouth daily 2/21/17   Ayan Ochoa CNP   lisinopril (PRINIVIL;ZESTRIL) 10 MG tablet Take 1 tablet by mouth daily 2/21/17   Ayan Ochoa CNP   albuterol sulfate HFA (PROAIR HFA) 108 (90 BASE) MCG/ACT inhaler Inhale 2 puffs into the lungs every 6 hours as needed for Wheezing 2/9/17   Krishna Damon MD   vitamin D (CHOLECALCIFEROL) 70955 UNIT CAPS Take 1 capsule by mouth once a week for 16 doses 6/23/16 10/7/16  Alissa Garvey MD        Current Facility-Administered Medications: albuterol sulfate  (90 Base) MCG/ACT inhaler 2 puff, 2 puff, Inhalation, Q6H PRN  nitroGLYCERIN (NITROSTAT) SL tablet 0.4 mg, 0.4 mg, Sublingual, Q5 Min PRN  apixaban (ELIQUIS) tablet 2.5 mg, 2.5 mg, Oral, BID  diltiazem (CARDIZEM) tablet 30 mg, 30 mg, Oral, TID  clopidogrel (PLAVIX) tablet 75 mg, 75 mg, Oral, Daily  beclomethasone (QVAR) 80 MCG/ACT inhaler 1 puff, 1 puff, Inhalation, BID  sodium polystyrene (KAYEXALATE) 15 GM/60ML suspension 15 g, 15 g, Oral, Once  0.9 % sodium chloride infusion, , Intravenous, Continuous  sodium chloride flush 0.9 % injection 10 mL, 10 mL,

## 2018-02-04 ENCOUNTER — APPOINTMENT (OUTPATIENT)
Dept: GENERAL RADIOLOGY | Age: 83
DRG: 190 | End: 2018-02-04
Attending: INTERNAL MEDICINE
Payer: MEDICARE

## 2018-02-04 LAB
ANION GAP SERPL CALCULATED.3IONS-SCNC: 18 MMOL/L (ref 9–17)
ANION GAP SERPL CALCULATED.3IONS-SCNC: 19 MMOL/L (ref 9–17)
BUN BLDV-MCNC: 52 MG/DL (ref 8–23)
BUN BLDV-MCNC: 54 MG/DL (ref 8–23)
BUN/CREAT BLD: ABNORMAL (ref 9–20)
BUN/CREAT BLD: ABNORMAL (ref 9–20)
CALCIUM IONIZED: 1.29 MMOL/L (ref 1.13–1.33)
CALCIUM SERPL-MCNC: 9.2 MG/DL (ref 8.6–10.4)
CALCIUM SERPL-MCNC: 9.7 MG/DL (ref 8.6–10.4)
CHLORIDE BLD-SCNC: 105 MMOL/L (ref 98–107)
CHLORIDE BLD-SCNC: 107 MMOL/L (ref 98–107)
CO2: 15 MMOL/L (ref 20–31)
CO2: 16 MMOL/L (ref 20–31)
CREAT SERPL-MCNC: 1.88 MG/DL (ref 0.5–0.9)
CREAT SERPL-MCNC: 2.02 MG/DL (ref 0.5–0.9)
GFR AFRICAN AMERICAN: 28 ML/MIN
GFR AFRICAN AMERICAN: 31 ML/MIN
GFR NON-AFRICAN AMERICAN: 23 ML/MIN
GFR NON-AFRICAN AMERICAN: 25 ML/MIN
GFR SERPL CREATININE-BSD FRML MDRD: ABNORMAL ML/MIN/{1.73_M2}
GLUCOSE BLD-MCNC: 168 MG/DL (ref 70–99)
GLUCOSE BLD-MCNC: 215 MG/DL (ref 70–99)
HCT VFR BLD CALC: 40.1 % (ref 36.3–47.1)
HEMOGLOBIN: 12.5 G/DL (ref 11.9–15.1)
MAGNESIUM: 1.9 MG/DL (ref 1.6–2.6)
MCH RBC QN AUTO: 28.9 PG (ref 25.2–33.5)
MCHC RBC AUTO-ENTMCNC: 31.2 G/DL (ref 28.4–34.8)
MCV RBC AUTO: 92.6 FL (ref 82.6–102.9)
NRBC AUTOMATED: 0 PER 100 WBC
PDW BLD-RTO: 13.3 % (ref 11.8–14.4)
PHOSPHORUS: 2.3 MG/DL (ref 2.6–4.5)
PLATELET # BLD: 262 K/UL (ref 138–453)
PMV BLD AUTO: 12 FL (ref 8.1–13.5)
POTASSIUM SERPL-SCNC: 3.7 MMOL/L (ref 3.7–5.3)
POTASSIUM SERPL-SCNC: 4.1 MMOL/L (ref 3.7–5.3)
RBC # BLD: 4.33 M/UL (ref 3.95–5.11)
SODIUM BLD-SCNC: 140 MMOL/L (ref 135–144)
SODIUM BLD-SCNC: 140 MMOL/L (ref 135–144)
TROPONIN INTERP: ABNORMAL
TROPONIN INTERP: ABNORMAL
TROPONIN T: 0.09 NG/ML
TROPONIN T: 0.1 NG/ML
WBC # BLD: 15.2 K/UL (ref 3.5–11.3)

## 2018-02-04 PROCEDURE — 94762 N-INVAS EAR/PLS OXIMTRY CONT: CPT

## 2018-02-04 PROCEDURE — 80048 BASIC METABOLIC PNL TOTAL CA: CPT

## 2018-02-04 PROCEDURE — 84484 ASSAY OF TROPONIN QUANT: CPT

## 2018-02-04 PROCEDURE — 6370000000 HC RX 637 (ALT 250 FOR IP): Performed by: INTERNAL MEDICINE

## 2018-02-04 PROCEDURE — 83735 ASSAY OF MAGNESIUM: CPT

## 2018-02-04 PROCEDURE — 36415 COLL VENOUS BLD VENIPUNCTURE: CPT

## 2018-02-04 PROCEDURE — 93005 ELECTROCARDIOGRAM TRACING: CPT

## 2018-02-04 PROCEDURE — 6360000002 HC RX W HCPCS: Performed by: INTERNAL MEDICINE

## 2018-02-04 PROCEDURE — 85027 COMPLETE CBC AUTOMATED: CPT

## 2018-02-04 PROCEDURE — 71045 X-RAY EXAM CHEST 1 VIEW: CPT

## 2018-02-04 PROCEDURE — 2500000003 HC RX 250 WO HCPCS

## 2018-02-04 PROCEDURE — 99232 SBSQ HOSP IP/OBS MODERATE 35: CPT | Performed by: INTERNAL MEDICINE

## 2018-02-04 PROCEDURE — 94640 AIRWAY INHALATION TREATMENT: CPT

## 2018-02-04 PROCEDURE — 2580000003 HC RX 258: Performed by: INTERNAL MEDICINE

## 2018-02-04 PROCEDURE — 84100 ASSAY OF PHOSPHORUS: CPT

## 2018-02-04 PROCEDURE — 6360000002 HC RX W HCPCS: Performed by: NURSE PRACTITIONER

## 2018-02-04 PROCEDURE — 1200000000 HC SEMI PRIVATE

## 2018-02-04 PROCEDURE — 82330 ASSAY OF CALCIUM: CPT

## 2018-02-04 RX ORDER — DILTIAZEM HYDROCHLORIDE 120 MG/1
120 CAPSULE, COATED, EXTENDED RELEASE ORAL DAILY
Status: DISCONTINUED | OUTPATIENT
Start: 2018-02-04 | End: 2018-02-06 | Stop reason: HOSPADM

## 2018-02-04 RX ORDER — PREDNISONE 1 MG/1
5 TABLET ORAL DAILY
Qty: 10 TABLET | Refills: 0 | Status: SHIPPED | OUTPATIENT
Start: 2018-02-13 | End: 2018-02-21 | Stop reason: ALTCHOICE

## 2018-02-04 RX ORDER — PREDNISONE 1 MG/1
15 TABLET ORAL DAILY
Qty: 30 TABLET | Refills: 0 | Status: SHIPPED | OUTPATIENT
Start: 2018-02-07 | End: 2018-02-17

## 2018-02-04 RX ORDER — HYDRALAZINE HYDROCHLORIDE 10 MG/1
10 TABLET, FILM COATED ORAL EVERY 12 HOURS SCHEDULED
Status: DISCONTINUED | OUTPATIENT
Start: 2018-02-04 | End: 2018-02-06 | Stop reason: HOSPADM

## 2018-02-04 RX ORDER — METOPROLOL TARTRATE 5 MG/5ML
INJECTION INTRAVENOUS
Status: COMPLETED
Start: 2018-02-04 | End: 2018-02-04

## 2018-02-04 RX ORDER — PREDNISONE 20 MG/1
20 TABLET ORAL DAILY
Qty: 10 TABLET | Refills: 0 | Status: SHIPPED | OUTPATIENT
Start: 2018-02-04 | End: 2018-02-14

## 2018-02-04 RX ORDER — PREDNISONE 1 MG/1
5 TABLET ORAL DAILY
Status: DISCONTINUED | OUTPATIENT
Start: 2018-02-13 | End: 2018-02-06 | Stop reason: HOSPADM

## 2018-02-04 RX ORDER — CEFUROXIME AXETIL 250 MG/1
250 TABLET ORAL EVERY 12 HOURS SCHEDULED
Qty: 10 TABLET | Refills: 0 | Status: SHIPPED | OUTPATIENT
Start: 2018-02-04 | End: 2018-02-06

## 2018-02-04 RX ORDER — CEFUROXIME AXETIL 250 MG/1
250 TABLET ORAL DAILY
Status: DISCONTINUED | OUTPATIENT
Start: 2018-02-05 | End: 2018-02-06 | Stop reason: HOSPADM

## 2018-02-04 RX ORDER — HYDRALAZINE HYDROCHLORIDE 10 MG/1
10 TABLET, FILM COATED ORAL EVERY 12 HOURS SCHEDULED
Qty: 90 TABLET | Refills: 3 | Status: SHIPPED | OUTPATIENT
Start: 2018-02-04 | End: 2018-02-06 | Stop reason: HOSPADM

## 2018-02-04 RX ORDER — PREDNISONE 10 MG/1
10 TABLET ORAL DAILY
Status: DISCONTINUED | OUTPATIENT
Start: 2018-02-10 | End: 2018-02-06 | Stop reason: HOSPADM

## 2018-02-04 RX ORDER — METOPROLOL TARTRATE 5 MG/5ML
5 INJECTION INTRAVENOUS ONCE
Status: DISCONTINUED | OUTPATIENT
Start: 2018-02-04 | End: 2018-02-05

## 2018-02-04 RX ORDER — PREDNISONE 20 MG/1
20 TABLET ORAL DAILY
Status: COMPLETED | OUTPATIENT
Start: 2018-02-04 | End: 2018-02-06

## 2018-02-04 RX ORDER — CEFUROXIME AXETIL 250 MG/1
250 TABLET ORAL EVERY 12 HOURS SCHEDULED
Status: DISCONTINUED | OUTPATIENT
Start: 2018-02-04 | End: 2018-02-04

## 2018-02-04 RX ORDER — PREDNISONE 10 MG/1
10 TABLET ORAL DAILY
Qty: 10 TABLET | Refills: 0 | Status: SHIPPED | OUTPATIENT
Start: 2018-02-10 | End: 2018-02-20

## 2018-02-04 RX ADMIN — BECLOMETHASONE DIPROPIONATE 1 PUFF: 80 AEROSOL, METERED RESPIRATORY (INHALATION) at 19:46

## 2018-02-04 RX ADMIN — DILTIAZEM HYDROCHLORIDE 30 MG: 30 TABLET, FILM COATED ORAL at 09:19

## 2018-02-04 RX ADMIN — METHYLPREDNISOLONE SODIUM SUCCINATE 80 MG: 125 INJECTION, POWDER, FOR SOLUTION INTRAMUSCULAR; INTRAVENOUS at 09:19

## 2018-02-04 RX ADMIN — APIXABAN 2.5 MG: 2.5 TABLET, FILM COATED ORAL at 09:19

## 2018-02-04 RX ADMIN — BECLOMETHASONE DIPROPIONATE 1 PUFF: 80 AEROSOL, METERED RESPIRATORY (INHALATION) at 09:06

## 2018-02-04 RX ADMIN — NITROGLYCERIN 0.4 MG: 0.4 TABLET SUBLINGUAL at 10:21

## 2018-02-04 RX ADMIN — METOPROLOL TARTRATE 5 MG: 5 INJECTION, SOLUTION INTRAVENOUS at 10:44

## 2018-02-04 RX ADMIN — Medication 10 ML: at 09:20

## 2018-02-04 RX ADMIN — DILTIAZEM HYDROCHLORIDE 120 MG: 120 CAPSULE, COATED, EXTENDED RELEASE ORAL at 15:21

## 2018-02-04 RX ADMIN — METHYLPREDNISOLONE SODIUM SUCCINATE 80 MG: 125 INJECTION, POWDER, FOR SOLUTION INTRAMUSCULAR; INTRAVENOUS at 01:37

## 2018-02-04 RX ADMIN — HYDRALAZINE HYDROCHLORIDE 10 MG: 10 TABLET, FILM COATED ORAL at 12:59

## 2018-02-04 RX ADMIN — HYDRALAZINE HYDROCHLORIDE 10 MG: 10 TABLET, FILM COATED ORAL at 22:44

## 2018-02-04 RX ADMIN — IPRATROPIUM BROMIDE AND ALBUTEROL SULFATE 1 AMPULE: .5; 3 SOLUTION RESPIRATORY (INHALATION) at 17:07

## 2018-02-04 RX ADMIN — AZITHROMYCIN 250 MG: 250 TABLET, FILM COATED ORAL at 09:19

## 2018-02-04 RX ADMIN — APIXABAN 2.5 MG: 2.5 TABLET, FILM COATED ORAL at 22:44

## 2018-02-04 RX ADMIN — PREDNISONE 20 MG: 20 TABLET ORAL at 13:00

## 2018-02-04 RX ADMIN — CLOPIDOGREL 75 MG: 75 TABLET, FILM COATED ORAL at 09:19

## 2018-02-04 RX ADMIN — IPRATROPIUM BROMIDE AND ALBUTEROL SULFATE 1 AMPULE: .5; 3 SOLUTION RESPIRATORY (INHALATION) at 09:06

## 2018-02-04 RX ADMIN — Medication 10 ML: at 22:45

## 2018-02-04 RX ADMIN — HYDRALAZINE HYDROCHLORIDE 10 MG: 20 INJECTION INTRAMUSCULAR; INTRAVENOUS at 09:23

## 2018-02-04 RX ADMIN — FAMOTIDINE 20 MG: 20 TABLET, FILM COATED ORAL at 22:44

## 2018-02-04 ASSESSMENT — ENCOUNTER SYMPTOMS
BLOOD IN STOOL: 0
COUGH: 1
WHEEZING: 0
VOMITING: 0
HEMOPTYSIS: 0
SPUTUM PRODUCTION: 0
NAUSEA: 0
SHORTNESS OF BREATH: 1

## 2018-02-04 NOTE — PROGRESS NOTES
Ht 5' 2\" (1.575 m)   Wt 124 lb 14.4 oz (56.7 kg)   SpO2 96%   BMI 22.84 kg/m²     General appearance: awake, alert, in no apparent respiratory distress   HEENT: Head: Normocephalic, no lesions, without obvious abnormality  Neck: no JVD  Lungs: clear to auscultation bilaterally, no basilar rales, no wheezing   Heart: regular rate and rhythm, S1, S2 normal, no murmur, click, rub or gallop  Abdomen: soft, non-tender; bowel sounds normal  Extremities: No LE edema  Neurologic: Mental status: Alert, oriented. Motor and sensory not done. EKG: A fib with RVR      CATH 2/20/17: Patent LCx and PDA stents. Diffuse disease in OM, LAD and PL branches.      KRISTOPHER/CV 2/20/17:EF 55%, no thrombus/veg. Successful CV.      STRESS 2/17/17: Predominantly reversible defect in the anteroseptal segment consistent with ischemia in LAD territory. EF 51%.      ECHO 2/14/17: EF 55%, DD, mild TR.          Assessment / Acute Cardiac Problems:   1. Dyspnea & acute COPD exacerbation  2. Elevated troponins, likely secondary to COPD exacerbation. doubt ACS  3. UTI  4. CAD status post history of stenting in the past  5. History of paroxysmal A. fib, Eliquis  6. Essential hypertension  7. Hyperlipidemia  8. CKD stage IV Baseline creatinine 1.82.1    Patient Active Problem List:     Diastolic CHF ([facility]     Asthma     Status post coronary artery stent placement     CKD (chronic kidney disease) stage 4, GFR 15-29 ml/min (HCC)     Renal artery stenosis (HCC)     Renovascular hypertension     COPD (chronic obstructive pulmonary disease) (HCC)     Ex-smoker     Elevated troponin     Atrial fibrillation with rapid ventricular response (HCC)     Panlobular emphysema ([address]     Acute respiratory failure with hypoxemia (HCC)     COPD with acute exacerbation (HCC)     UTI (urinary tract infection)     CAD (coronary artery disease)     Elevated brain natriuretic peptide (BNP) level      Plan of Treatment:   1. Continue to trend troponin  2.  Continue

## 2018-02-05 LAB
LV EF: 55 %
LVEF MODALITY: NORMAL

## 2018-02-05 PROCEDURE — G8979 MOBILITY GOAL STATUS: HCPCS

## 2018-02-05 PROCEDURE — G8978 MOBILITY CURRENT STATUS: HCPCS

## 2018-02-05 PROCEDURE — 6370000000 HC RX 637 (ALT 250 FOR IP): Performed by: INTERNAL MEDICINE

## 2018-02-05 PROCEDURE — 99232 SBSQ HOSP IP/OBS MODERATE 35: CPT | Performed by: INTERNAL MEDICINE

## 2018-02-05 PROCEDURE — 97162 PT EVAL MOD COMPLEX 30 MIN: CPT

## 2018-02-05 PROCEDURE — 93306 TTE W/DOPPLER COMPLETE: CPT

## 2018-02-05 PROCEDURE — 6370000000 HC RX 637 (ALT 250 FOR IP): Performed by: NURSE PRACTITIONER

## 2018-02-05 PROCEDURE — 1200000000 HC SEMI PRIVATE

## 2018-02-05 PROCEDURE — 87086 URINE CULTURE/COLONY COUNT: CPT

## 2018-02-05 PROCEDURE — 97530 THERAPEUTIC ACTIVITIES: CPT

## 2018-02-05 PROCEDURE — 94640 AIRWAY INHALATION TREATMENT: CPT

## 2018-02-05 PROCEDURE — G8980 MOBILITY D/C STATUS: HCPCS

## 2018-02-05 PROCEDURE — 2580000003 HC RX 258: Performed by: INTERNAL MEDICINE

## 2018-02-05 RX ADMIN — IPRATROPIUM BROMIDE AND ALBUTEROL SULFATE 1 AMPULE: .5; 3 SOLUTION RESPIRATORY (INHALATION) at 21:10

## 2018-02-05 RX ADMIN — BECLOMETHASONE DIPROPIONATE 1 PUFF: 80 AEROSOL, METERED RESPIRATORY (INHALATION) at 21:10

## 2018-02-05 RX ADMIN — Medication 10 ML: at 10:09

## 2018-02-05 RX ADMIN — APIXABAN 2.5 MG: 2.5 TABLET, FILM COATED ORAL at 10:06

## 2018-02-05 RX ADMIN — PREDNISONE 20 MG: 20 TABLET ORAL at 10:06

## 2018-02-05 RX ADMIN — CEFUROXIME AXETIL 250 MG: 250 TABLET ORAL at 15:37

## 2018-02-05 RX ADMIN — APIXABAN 2.5 MG: 2.5 TABLET, FILM COATED ORAL at 21:05

## 2018-02-05 RX ADMIN — CLOPIDOGREL 75 MG: 75 TABLET, FILM COATED ORAL at 10:06

## 2018-02-05 RX ADMIN — BECLOMETHASONE DIPROPIONATE 1 PUFF: 80 AEROSOL, METERED RESPIRATORY (INHALATION) at 08:19

## 2018-02-05 RX ADMIN — IPRATROPIUM BROMIDE AND ALBUTEROL SULFATE 1 AMPULE: .5; 3 SOLUTION RESPIRATORY (INHALATION) at 08:19

## 2018-02-05 RX ADMIN — HYDRALAZINE HYDROCHLORIDE 10 MG: 10 TABLET, FILM COATED ORAL at 21:05

## 2018-02-05 RX ADMIN — DILTIAZEM HYDROCHLORIDE 120 MG: 120 CAPSULE, COATED, EXTENDED RELEASE ORAL at 10:06

## 2018-02-05 RX ADMIN — Medication 10 ML: at 21:05

## 2018-02-05 RX ADMIN — FAMOTIDINE 20 MG: 20 TABLET, FILM COATED ORAL at 21:05

## 2018-02-05 RX ADMIN — IPRATROPIUM BROMIDE AND ALBUTEROL SULFATE 1 AMPULE: .5; 3 SOLUTION RESPIRATORY (INHALATION) at 16:02

## 2018-02-05 RX ADMIN — FAMOTIDINE 20 MG: 20 TABLET, FILM COATED ORAL at 10:06

## 2018-02-05 RX ADMIN — HYDRALAZINE HYDROCHLORIDE 10 MG: 10 TABLET, FILM COATED ORAL at 10:06

## 2018-02-05 RX ADMIN — METOPROLOL TARTRATE 25 MG: 25 TABLET ORAL at 15:40

## 2018-02-05 RX ADMIN — METOPROLOL TARTRATE 25 MG: 25 TABLET ORAL at 21:05

## 2018-02-05 NOTE — PROGRESS NOTES
Smoking Cessation - topics covered   []  Health Risks  []  Benefits of Quitting   []  Smoking Cessation  []  Patient has no history of tobacco use  [x]  Patient is former smoker. [x]  No need for tobacco cessation education. []  Booklet given  []  Patient verbalizes understanding. []  Patient denies need for tobacco cessation education.   Willam Wesley  8:30 AM

## 2018-02-05 NOTE — PROGRESS NOTES
[facility] [address]    Progress Note    2/5/2018    10:38 AM    Name:   Jim Castellanos  MRN:     [id number]     Acct:      [de-identified]   Room:   2017/2017-01   Day:  3  Admit Date:  2/2/2018 10:03 PM    PCP:   Slime Carreon NP  Code Status:  Full Code    Subjective:     C/C:  SOB    Interval History Status: improved. Patient is feeling less SOB. She was just up walking with PT and did well. She denies any chest tightness this morning. Waiting for Echo. -122 while talking to her    Brief History:     Patient admitted for acute COPD exacerbation, found to have  UTI and A,fib with RVR. Slightly elevated Trop, Cardiology ordered an Echo which is pending. She is on antibiotics for a UTI. Review of Systems:     Constitutional:  negative for chills, fevers, sweats  Respiratory:  negative for cough, dyspnea on exertion, hemoptysis, shortness of breath, wheezing  Cardiovascular:  negative for chest pain, chest pressure/discomfort, lower extremity edema, palpitations  Gastrointestinal:  negative for abdominal pain, constipation, diarrhea, nausea, vomiting  Neurological:  negative for dizziness, headache    Medications: Allergies:     Allergies   Allergen Reactions    Belladonna        Current Meds:   Scheduled Meds:    metoprolol tartrate  25 mg Oral BID    predniSONE  20 mg Oral Daily    Followed by   August Lizarraga ON 2/7/2018] predniSONE  15 mg Oral Daily    Followed by   August Lizarraga ON 2/10/2018] predniSONE  10 mg Oral Daily    Followed by   August Lizarraga ON 2/13/2018] predniSONE  5 mg Oral Daily    hydrALAZINE  10 mg Oral 2 times per day    cefUROXime  250 mg Oral Daily    diltiazem  120 mg Oral Daily    apixaban  2.5 mg Oral BID    clopidogrel  75 mg Oral Daily    beclomethasone  1 puff Inhalation BID    sodium polystyrene  15 g Oral Once    sodium chloride flush  10 mL Intravenous 2 times per day    famotidine  20 mg Oral BID     Renal artery stenosis (HCC) [I70.1] 06/22/2016    Renovascular hypertension [I15.0] 06/22/2016    Status post coronary artery stent placement [Z95.5] 05/20/2013       Plan:        1. Acute COPD exac- aerosols, CXR clear, Prednisone taper  2. Elevated Trop- Echo pending, cardiology following  3. A.fib with RVR- HR still elevated at times, on Cardizem and Eliquis  4. UTI- Ceftin, culture never sent, will repeat UA/UC  5. HTN- BP stable  6. Gi/DVT proph  7.  PT/OT    DC planning home once ok with Cardiology, dw nurse    Ynoatan Palacio MD  2/5/2018  10:38 AM

## 2018-02-05 NOTE — PROGRESS NOTES
Hypertension; MI (myocardial infarction); and Shingles. has a past surgical history that includes Cholecystectomy; Coronary angioplasty with stent; Spine surgery; and Cardiac catheterization (02/2017). Restrictions  Restrictions/Precautions  Restrictions/Precautions: Fall Risk  Vision/Hearing  Vision: Within Functional Limits  Hearing: Exceptions to [facility]  Hearing Exceptions: Hard of hearing/hearing concerns     Subjective  General  Family / Caregiver Present: No  Follows Commands: Within Functional Limits  Subjective  Subjective: Pt resting in bed when therapy entered. Agreeable to ambulate.  States to feeling tired by denies any pain  Pain Screening  Patient Currently in Pain: Denies  Vital Signs  Patient Currently in Pain: Denies       Orientation  Orientation  Overall Orientation Status: Within Normal Limits    Social/Functional History  Social/Functional History  Lives With: Daughter  Type of Home: House  Home Layout: One level  Home Access: Stairs to enter with rails  Entrance Stairs - Number of Steps: 3 steps  Entrance Stairs - Rails: Both  Bathroom Shower/Tub: Tub/Shower unit  Bathroom Toilet: Standard  Home Equipment: 4 wheeled walker  Receives Help From: Family  ADL Assistance: Independent  Homemaking Assistance: Independent  Homemaking Responsibilities: Yes  Ambulation Assistance: Independent (Pt states uses 4WW for longer distances on;y)  Transfer Assistance: Independent  Active : No  Mode of Transportation: Family  Objective          AROM RLE (degrees)  RLE AROM: WFL  AROM LLE (degrees)  LLE AROM : WFL  AROM RUE (degrees)  RUE AROM : WFL  AROM LUE (degrees)  LUE AROM : WFL  Strength RLE  Strength RLE: WFL  Strength LLE  Strength LLE: WFL  Strength RUE  Strength RUE: WFL  Strength LUE  Strength LUE: WFL        Bed mobility  Supine to Sit: Contact guard assistance  Sit to Supine: Stand by assistance  Scooting: Stand by assistance  Transfers  Sit to Stand: Independent  Stand to sit:

## 2018-02-05 NOTE — PROGRESS NOTES
level     Acute on chronic diastolic congestive heart failure ([facility] [address]      Plan of Treatment:   1. Awaiting ECHO   2. Afib with RVR- Rate remains elevated. Continue CCB, eliquis. Will start low dose BB for better rate control.     3. COPD per primary     Electronically signed by Bailey Hunter CNP on 2/5/2018 at 10:25 [facility].  [phone]

## 2018-02-06 VITALS
OXYGEN SATURATION: 93 % | DIASTOLIC BLOOD PRESSURE: 68 MMHG | HEIGHT: 62 IN | TEMPERATURE: 98.6 F | SYSTOLIC BLOOD PRESSURE: 141 MMHG | BODY MASS INDEX: 23.52 KG/M2 | RESPIRATION RATE: 23 BRPM | WEIGHT: 127.8 LBS | HEART RATE: 81 BPM

## 2018-02-06 LAB
CULTURE: NORMAL
CULTURE: NORMAL
Lab: NORMAL
SPECIMEN DESCRIPTION: NORMAL
STATUS: NORMAL

## 2018-02-06 PROCEDURE — 2580000003 HC RX 258: Performed by: INTERNAL MEDICINE

## 2018-02-06 PROCEDURE — 99239 HOSP IP/OBS DSCHRG MGMT >30: CPT | Performed by: INTERNAL MEDICINE

## 2018-02-06 PROCEDURE — 6370000000 HC RX 637 (ALT 250 FOR IP): Performed by: NURSE PRACTITIONER

## 2018-02-06 PROCEDURE — 6370000000 HC RX 637 (ALT 250 FOR IP): Performed by: INTERNAL MEDICINE

## 2018-02-06 PROCEDURE — 94640 AIRWAY INHALATION TREATMENT: CPT

## 2018-02-06 RX ORDER — ALBUTEROL SULFATE 2.5 MG/3ML
2.5 SOLUTION RESPIRATORY (INHALATION) EVERY 4 HOURS PRN
Qty: 150 VIAL | Refills: 1 | Status: SHIPPED | OUTPATIENT
Start: 2018-02-06 | End: 2018-04-23 | Stop reason: SDUPTHER

## 2018-02-06 RX ORDER — DILTIAZEM HYDROCHLORIDE 120 MG/1
120 CAPSULE, COATED, EXTENDED RELEASE ORAL DAILY
Qty: 30 CAPSULE | Refills: 1 | Status: ON HOLD | OUTPATIENT
Start: 2018-02-07 | End: 2019-04-10 | Stop reason: ALTCHOICE

## 2018-02-06 RX ORDER — NITROGLYCERIN 0.4 MG/1
TABLET SUBLINGUAL
Qty: 25 TABLET | Refills: 1 | Status: ON HOLD | OUTPATIENT
Start: 2018-02-06 | End: 2020-01-01 | Stop reason: SDUPTHER

## 2018-02-06 RX ORDER — CEFUROXIME AXETIL 250 MG/1
250 TABLET ORAL EVERY 12 HOURS SCHEDULED
Qty: 10 TABLET | Refills: 0 | Status: SHIPPED | OUTPATIENT
Start: 2018-02-06 | End: 2018-02-11

## 2018-02-06 RX ADMIN — PREDNISONE 20 MG: 20 TABLET ORAL at 09:26

## 2018-02-06 RX ADMIN — METOPROLOL TARTRATE 25 MG: 25 TABLET ORAL at 09:26

## 2018-02-06 RX ADMIN — ACETAMINOPHEN 650 MG: 325 TABLET ORAL at 12:31

## 2018-02-06 RX ADMIN — FAMOTIDINE 20 MG: 20 TABLET, FILM COATED ORAL at 09:25

## 2018-02-06 RX ADMIN — DILTIAZEM HYDROCHLORIDE 120 MG: 120 CAPSULE, COATED, EXTENDED RELEASE ORAL at 09:26

## 2018-02-06 RX ADMIN — BECLOMETHASONE DIPROPIONATE 1 PUFF: 80 AEROSOL, METERED RESPIRATORY (INHALATION) at 08:41

## 2018-02-06 RX ADMIN — CLOPIDOGREL 75 MG: 75 TABLET, FILM COATED ORAL at 09:26

## 2018-02-06 RX ADMIN — CEFUROXIME AXETIL 250 MG: 250 TABLET ORAL at 09:25

## 2018-02-06 RX ADMIN — HYDRALAZINE HYDROCHLORIDE 10 MG: 10 TABLET, FILM COATED ORAL at 09:26

## 2018-02-06 RX ADMIN — IPRATROPIUM BROMIDE AND ALBUTEROL SULFATE 1 AMPULE: .5; 3 SOLUTION RESPIRATORY (INHALATION) at 08:41

## 2018-02-06 RX ADMIN — APIXABAN 2.5 MG: 2.5 TABLET, FILM COATED ORAL at 09:26

## 2018-02-06 RX ADMIN — Medication 10 ML: at 09:27

## 2018-02-06 ASSESSMENT — PAIN SCALES - GENERAL: PAINLEVEL_OUTOF10: 1

## 2018-02-06 NOTE — PLAN OF CARE
Problem: Falls - Risk of  Goal: Absence of falls  Call light in reach.
Problem: Respiratory  Intervention: Respiratory assessment  PRN FOR BRONCHOSPASM/BRONCHOCONSTRICTION     [x]         IMPROVE AERATION/BREATH SOUNDS  [x]   ADMINISTER BRONCHODILATOR THERAPY AS APPROPRIATE  [x]   ASSESS BREATH SOUNDS  []   IMPLEMENT AEROSOL/MDI PROTOCOL  [x]   PATIENT EDUCATION AS NEEDED    PROVIDE ADEQUATE OXYGENATION WITH ACCEPTABLE SP02/ABG'S    [x]  IDENTIFY APPROPRIATE OXYGEN THERAPY  [x]   MONITOR SP02/ABG'S AS NEEDED   [x]   PATIENT EDUCATION AS NEEDED
60*   CREATININE  2.38*   CALCIUM  9.6     Recent Labs      02/02/18   1328   PROT  7.2   LABALBU  3.9   AST  22   ALT  12   ALKPHOS  80   BILITOT  0.20*       PROBLEMS:  Principal Problem:    COPD with acute exacerbation (HCC)  Active Problems:    Diastolic CHF (HCC)    Status post coronary artery stent placement    CKD (chronic kidney disease) stage 4, GFR 15-29 ml/min (HCC)    Renal artery stenosis (HCC)    Renovascular hypertension    Elevated troponin    Panlobular emphysema (HCC)    UTI (urinary tract infection)    CAD (coronary artery disease)    Elevated brain natriuretic peptide (BNP) level      UPDATED PLAN:  See current orders  Optimize cardiopulmonary function  Respiratory Therapy and Bronchodilators prn   Will monitor and control Blood Pressure  Check bun and creatinine  Renal follow-up Dr. Soco Jones  Antibiotics per C&S   Physical therapy and rehabilitation   Social Service consult for discharge planning  Eliquis    IP CONSULT TO CARDIOLOGY  IP CONSULT TO [address] [place] [address],   2/3/2018  6:04 PM
mg Q6H PRN   Or    hydrALAZINE (APRESOLINE) injection 20 mg Q6H PRN       VITALS:  /76   Pulse 78   Temp 98.6 °F (37 °C) (Oral)   Resp 14   Ht 5' 2\" (1.575 m)   Wt 124 lb 14.4 oz (56.7 kg)   SpO2 96%   BMI 22.84 kg/m²     LABS:   Hematology:  Recent Labs      02/02/18   1328  02/04/18   0635   WBC  8.5  15.2*   HGB  13.7  12.5   HCT  42.2  40.1   PLT  220  262     Chemistry:  Recent Labs      02/02/18   1328  02/04/18   0635  02/04/18   1125   NA  138  140  140   K  3.7  3.7  4.1   CL  98  105  107   CO2  21  16*  15*   GLUCOSE  112*  168*  215*   BUN  60*  52*  54*   CREATININE  2.38*  2.02*  1.88*   MG   --    --   1.9   CALCIUM  9.6  9.7  9.2   CAION   --    --   1.29   PHOS   --    --   2.3*     Recent Labs      02/02/18   1328   PROT  7.2   LABALBU  3.9   AST  22   ALT  12   ALKPHOS  84   BILITOT  0.20*       PROBLEMS:  Principal Problem:    COPD with acute exacerbation (HCC)  Active Problems:    Diastolic CHF (HCC)    Status post coronary artery stent placement    CKD (chronic kidney disease) stage 4, GFR 15-29 ml/min (HCC)    Renal artery stenosis (HCC)    Renovascular hypertension    Elevated troponin    Panlobular emphysema (HCC)    UTI (urinary tract infection)    Coronary artery disease involving native coronary artery of native heart without angina pectoris    Elevated brain natriuretic peptide (BNP) level    Acute on chronic diastolic congestive heart failure (HCC)      UPDATED PLAN  Cardizem ordered  Rate control  Discussed with Dr. Piedra Height  See current orders  Optimize cardiopulmonary function  Cardiology evaluation - awaiting echo  Respiratory Therapy and Bronchodilators prn   Will monitor and control Blood Pressure - hydralazine added  Check bun and creatinine  Renal follow-up Dr. Puente Precise on hold  Antibiotics per C&S - possible UTI, awaiting C&S  Physical therapy and rehabilitation   Social Service consult for discharge planning - patient plans on going home to

## 2018-02-06 NOTE — PROGRESS NOTES
2/20/17:EF 55%, no thrombus/veg. Successful CV.      STRESS 2/17/17: Predominantly reversible defect in the anteroseptal segment consistent with ischemia in LAD territory. EF 51%.      ECHO 2/14/17: EF 55%, DD, mild TR.     ECHO 2/5/18  Summary  Technically difficult study. Tachycardia noted during study. Normal LV size and wall thickness. No obvious wall motion abnormality noted. Normal LV systolic function. EF > 55%. Normal RV size and function. Normal size LA and RA. No obvious significant structural valvular abnormality noted. Anterior Echo free space due to adipose tissue,  Objective:   Vitals: BP (!) 141/68   Pulse 81   Temp 98.6 °F (37 °C) (Oral)   Resp 23   Ht 5' 2\" (1.575 m)   Wt 127 lb 12.8 oz (58 kg)   SpO2 93%   BMI 23.37 kg/m²   General appearance: alert and cooperative with exam  HEENT: Head: Normocephalic, no lesions, without obvious abnormality. Neck: no JVD, trachea midline, no adenopathy  Lungs: Clear to auscultation  Heart: irregular rate and rhythm, s1/s2 auscultated, no murmurs, afib   Abdomen: soft, non-tender, bowel sounds active  Extremities: no edema  Neurologic: not done        Assessment / Acute Cardiac Problems:   1. Dyspnea & acute COPD exacerbation  2. Elevated troponins, likely secondary to COPD exacerbation. doubt ACS  3. UTI  4. CAD status post history of stenting in the past  5. History of paroxysmal A. fib, Eliquis  6. Essential hypertension  7. Hyperlipidemia  8.  CKD stage IV Baseline creatinine 1.82.1    Patient Active Problem List:     Diastolic CHF ([address]     Asthma     Status post coronary artery stent placement     CKD (chronic kidney disease) stage 4, GFR 15-29 ml/min ([facility])     Renal artery stenosis (HCC)     Renovascular hypertension     COPD (chronic obstructive pulmonary disease) ([facility])     Ex-smoker     Elevated troponin     Atrial fibrillation with rapid ventricular response ([address]     Panlobular emphysema ([address]     Acute respiratory failure with hypoxemia ([facility] [address]     COPD with acute exacerbation ([facility]     UTI (urinary tract infection)     Coronary artery disease involving native coronary artery of native heart without angina pectoris     Elevated brain natriuretic peptide (BNP) level     Acute on chronic diastolic congestive heart failure ([facility] [address]      Plan of Treatment:   1. ECHO reviewed. 2. Afib with RVR- Rate controlled today. Continue CCB, eliquis, and BB. 3. Flat trops- ECHO wnl. No CP. Likely type 2 MI.    4. COPD per primary   5. Will follow up as outpatient.       Electronically signed by Stephanie Guzman CNP on 2/6/2018 at 10:28 [facility].  [phone]

## 2018-02-06 NOTE — CARE COORDINATION
Discharge [facility] Case Management Department  Written by: Audrey Oleary RN    Patient Name: Khloe Carey  Attending Provider: No att. providers found  Admit Date: 2018 10:03 PM  MRN: [id number]  Account: [de-identified]                     : 1928  Discharge Date: 2018      Disposition: home    Audrey Oleary RN

## 2018-02-06 NOTE — PROGRESS NOTES
albuterol sulfate HFA, nitroGLYCERIN, sodium chloride flush, acetaminophen, magnesium hydroxide, bisacodyl, ondansetron, albuterol, hydrALAZINE **OR** hydrALAZINE    Data:     Past Medical History:   has a past medical history of Arthritis; Asthma; Atrial fibrillation with rapid ventricular response (HCC); CAD (coronary artery disease); CHF (congestive heart failure) ([facility] Chronic kidney disease; COPD (chronic obstructive pulmonary disease) ([facility] Examination of participant in clinical trial; H/O cardiovascular stress test; H/O echocardiogram; Hx of transesophageal echocardiography ([facility]) for monitoring; Hyperlipidemia; Hypertension; MI (myocardial infarction); and Shingles. Social History:   reports that she has quit smoking. Her smoking use included Cigarettes. She has a 90.00 pack-year smoking history. She has never used smokeless tobacco. She reports that she does not drink alcohol or use drugs. Family History:   Family History   Problem Relation Age of Onset    Heart Disease Mother     Cancer Father        Vitals:  BP (!) 141/68   Pulse 81   Temp 98.6 °F (37 °C) (Oral)   Resp 23   Ht 5' 2\" (1.575 m)   Wt 127 lb 12.8 oz (58 kg)   SpO2 93%   BMI 23.37 kg/m²   Temp (24hrs), Av.9 °F (36.6 °C), Min:97.2 °F (36.2 °C), Max:98.6 °F (37 °C)    No results for input(s): POCGLU in the last 72 hours. I/O (24Hr):     Intake/Output Summary (Last 24 hours) at 18 1143  Last data filed at 18   Gross per 24 hour   Intake              720 ml   Output                0 ml   Net              720 ml       Labs:    Hematology:  Recent Labs      18   0635   WBC  15.2*   HGB  12.5   HCT  40.1   PLT  262     Chemistry:  Recent Labs      18   0635  18   1125   NA  140  140   K  3.7  4.1   CL  105  107   CO2  16*  15*   GLUCOSE  168*  215*   BUN  52*  54*   CREATININE  2.02*  1.88*   MG   --   1.9   CALCIUM  9.7  9.2   CAION   --   1.29   PHOS   --   2.3*     No results for input(s): PROT, LABALBU, LABA1C, H0PWSCY, A0OCQHA, FT4, TSH, AST, ALT, LDH, GGT, ALKPHOS, BILITOT, BILIDIR, AMMONIA, AMYLASE, LIPASE, LACTATE, CHOL, TRIG, HDL, LDLCALC, LDLDIRECT, LABVLDL, BNP, TROPONINI, CKTOTAL, CKMB, CKMBINDEX in the last 72 hours. Lab Results   Component Value Date/Time    SPECIAL NOT REPORTED 02/05/2018 11:34 AM     Lab Results   Component Value Date/Time    CULTURE CULTURE IN PROGRESS 02/05/2018 11:34 AM    CULTURE  02/05/2018 11:34 AM     [facility] [address] [facility], [address] [place] [address] [phone]       [facility]    Radiology:    CXR:  Impression:        No acute cardiopulmonary disease     Echo:  Summary  Technically difficult study. Tachycardia noted during study. Normal LV size and wall thickness. No obvious wall motion abnormality noted. Normal LV systolic function. EF > 55%. Normal RV size and function. Normal size LA and RA. No obvious significant structural valvular abnormality noted.   Anterior Echo free space due to adipose tissue,      Physical Examination:        General appearance:  alert, cooperative and no distress  Mental Status:  oriented to person, place and time and normal affect  Lungs:  clear to auscultation bilaterally, normal effort  Heart:  Irregularly irregular, no murmur  Abdomen:  soft, nontender, nondistended, normal bowel sounds, no masses, hepatomegaly, splenomegaly  Extremities:  no edema, redness, tenderness in the calves  Skin:  no gross lesions, rashes, induration    Assessment:        Primary Problem  COPD with acute exacerbation [facility])    Active Hospital Problems    Diagnosis Date Noted    COPD with acute exacerbation ([facility] [address] [J44.1] 03/24/2017     Priority: High     Class: Acute    Diastolic CHF ([facility] [P55.60] 01/22/2013     Priority: High    Acute on chronic diastolic congestive heart failure (HCC) [I50.33]     Elevated brain natriuretic peptide (BNP) level [R79.89] 02/03/2018    Coronary artery disease involving native coronary artery of native

## 2018-02-06 NOTE — DISCHARGE SUMMARY
meningioma incidentally noted Final report electronically signed by Joan Andrews on 2/2/2018 3:40 PM    Ct Chest Wo Contrast    Result Date: 2/2/2018  REPORT: CT chest without contrast TECHNIQUE: Contiguous axial slices through the chest obtained without IV contrast. Axial, coronal and sagittal reformats were made from the source images. INDICATION: Cough, shortness of breath  FINDINGS: Compared to 1/27/2017. Stable benign nodule of the left upper lobe. No focal consolidation, pleural effusion or pneumothorax. No mediastinal or hilar lymphadenopathy. Normal cardiac size. Nonaneurysmal thoracic aorta. Visualized upper abdomen is normal. Hypertrophic degenerative changes thoracic spine. Final report electronically signed by Joan Andrews on 2/2/2018 3:38 PM    No acute pulmonary process    Xr Chest Portable    Result Date: 2/4/2018  EXAMINATION: SINGLE VIEW OF THE CHEST 2/4/2018 6:12 am COMPARISON: 02/02/2018 HISTORY: ORDERING SYSTEM PROVIDED HISTORY: AECOPD TECHNOLOGIST PROVIDED HISTORY: Reason for exam:->AECOPD FINDINGS: The cardiothoracic ratio is normal.  Trachea is midline. There is no pulmonary consolidation, edema, effusion or pneumothorax. There is a large calcified granuloma in the lateral aspect of left upper lobe, unchanged. Underlying COPD is suggested. Bilateral shoulder osteoarthritis. No acute cardiopulmonary disease. Echo:    Echo:  Summary  Technically difficult study. Tachycardia noted during study. Normal LV size and wall thickness. No obvious wall motion abnormality noted. Normal LV systolic function. EF > 55%. Normal RV size and function. Normal size LA and RA. No obvious significant structural valvular abnormality noted.   Anterior Echo free space due to adipose tissue,      Consultations:    Consults:     Final Specialist Recommendations/Findings:   IP CONSULT TO CARDIOLOGY  IP CONSULT TO SOCIAL WORK      The patient was seen and examined on day of discharge and this discharge and when to take each. * albuterol (2.5 MG/3ML) 0.083% nebulizer solution  Commonly known as:  PROVENTIL  Take 3 mLs by nebulization every 4 hours as needed for Wheezing  What changed:  See the new instructions. * This list has 2 medication(s) that are the same as other medications prescribed for you. Read the directions carefully, and ask your doctor or other care provider to review them with you. CONTINUE taking these medications    apixaban 2.5 MG Tabs tablet  Commonly known as:  ELIQUIS  Take 1 tablet by mouth 2 times daily     beclomethasone 80 MCG/ACT inhaler  Commonly known as:  QVAR  Inhale 1 puff into the lungs 2 times daily     clopidogrel 75 MG tablet  Commonly known as:  PLAVIX  Take 1 tablet by mouth daily     ferrous sulfate 325 (65 Fe) MG tablet  take 1 tablet by mouth once daily WITH BREAKFAST     LASIX 20 MG tablet  Generic drug:  furosemide     nitroGLYCERIN 0.4 MG SL tablet  Commonly known as:  NITROSTAT  up to max of 3 total doses. If no relief after 1 dose, call 911.      simvastatin 20 MG tablet  Commonly known as:  ZOCOR  take 1 tablet by mouth every evening     sodium polystyrene 15 GM/60ML suspension  Commonly known as:  SPS  Take 60 mLs by mouth once for 1 dose        STOP taking these medications    diltiazem 30 MG tablet  Commonly known as:  CARDIZEM     lisinopril 10 MG tablet  Commonly known as:  PRINIVIL;ZESTRIL     vitamin D 76624 UNIT Caps  Commonly known as:  CHOLECALCIFEROL           Where to Get Your Medications      These medications were sent to [address] - [place], [place] - [address] Latina Messenger [phone]  [place] [address]    Phone:  [phone]   · predniSONE 10 MG tablet  · predniSONE 20 MG tablet  · predniSONE 5 MG tablet  · predniSONE 5 MG tablet     You can get these medications from any pharmacy    Bring a paper prescription for each of these medications  · albuterol (2.5 MG/3ML) 0.083% nebulizer solution  · cefUROXime 250 MG tablet  · diltiazem 120 MG extended release capsule  · metoprolol tartrate 25 MG tablet  · nitroGLYCERIN 0.4 MG SL tablet         Time Spent on discharge is  35 mins in patient examination, evaluation, counseling as well as medication reconciliation, prescriptions for required medications, discharge plan and follow up. Electronically signed by   Kim Gonzalez MD  2/6/2018  12:19 PM      Thank you Dr. Sarah Romero NP for the opportunity to be involved in this patient's care.

## 2018-02-07 ENCOUNTER — CARE COORDINATION (OUTPATIENT)
Dept: CASE MANAGEMENT | Age: 83
End: 2018-02-07

## 2018-02-07 DIAGNOSIS — J44.1 COPD WITH ACUTE EXACERBATION (HCC): Primary | ICD-10-CM

## 2018-02-07 PROCEDURE — 1111F DSCHRG MED/CURRENT MED MERGE: CPT | Performed by: NURSE PRACTITIONER

## 2018-02-07 NOTE — CARE COORDINATION
[facility] Transitions Initial Follow Up Call    Call within 2 business days of discharge: Yes    Patient: Lalo Jha Patient : 1928   MRN: [id number]  Reason for Admission: There are no discharge diagnoses documented for the most recent discharge. Discharge Date: 18 RARS: [facility] Risk Score: 23.5     Attempted initial 24 hour transitional call to patient. Left VM to return call directly to [phone], [phone].  1st attempt.       Facility: [facility]      Care Transitions 24 Hour Call    Care Transitions Interventions         Follow Up  Future Appointments  Date Time Provider Olivia Deng   2/15/2018 10:00 AM MD Carol Macedo [facility]   2018 11:00 AM John Mae MD [facility] None       Thien Del Valle RN

## 2018-02-09 LAB
EKG ATRIAL RATE: 122 BPM
EKG ATRIAL RATE: 83 BPM
EKG P AXIS: 82 DEGREES
EKG P-R INTERVAL: 184 MS
EKG Q-T INTERVAL: 278 MS
EKG Q-T INTERVAL: 404 MS
EKG QRS DURATION: 76 MS
EKG QRS DURATION: 86 MS
EKG QTC CALCULATION (BAZETT): 424 MS
EKG QTC CALCULATION (BAZETT): 474 MS
EKG R AXIS: -22 DEGREES
EKG R AXIS: -24 DEGREES
EKG T AXIS: 70 DEGREES
EKG T AXIS: 80 DEGREES
EKG VENTRICULAR RATE: 140 BPM
EKG VENTRICULAR RATE: 83 BPM

## 2018-02-13 ENCOUNTER — CARE COORDINATION (OUTPATIENT)
Dept: CASE MANAGEMENT | Age: 83
End: 2018-02-13

## 2018-02-13 NOTE — CARE COORDINATION
[facility] Transitions Follow Up Call    2018    Patient: Jasmina Hogan  Patient : 1928   MRN: [id number]  Reason for Admission: COPD  Discharge Date: 18 RARS: Risk Score: 23.5       Spoke with: Shawna Get, patient's daughter    Patient doing well per daughter. She has had no further cough, shortness of breath or other respiratory issues. She is scheduled to see pulmonary on Thursday however daughter is trying to schedule for later in the day. She has yet to schedule with PCP and was again encouraged to make a f/u appointment. Daughter states she will call and declined assistance from [address] [place] [address]. Denies any further needs or concerns at this time. Care Transitions Subsequent and Final Call    Schedule Follow Up Appointment with PCP:  Declined  Subsequent and Final Calls  Do you have any ongoing symptoms?:  No  Have your medications changed?:  No  Do you have any questions related to your medications?:  No  Do you currently have any active services?:  No  Do you have any needs or concerns that I can assist you with?:  No  Identified Barriers:  Lack of Support, Lack of Education  Care Transitions Interventions  Other Interventions:             Follow Up  Future Appointments  Date Time Provider Olivia Deng   2018 11:00 AM Raheem Baldwin MD [facility] None       Kranthi Stewart RN

## 2018-02-15 ENCOUNTER — OFFICE VISIT (OUTPATIENT)
Dept: PULMONOLOGY | Age: 83
End: 2018-02-15
Payer: MEDICARE

## 2018-02-15 VITALS
WEIGHT: 126 LBS | HEART RATE: 81 BPM | DIASTOLIC BLOOD PRESSURE: 71 MMHG | OXYGEN SATURATION: 95 % | RESPIRATION RATE: 16 BRPM | HEIGHT: 62 IN | TEMPERATURE: 97.8 F | SYSTOLIC BLOOD PRESSURE: 132 MMHG | BODY MASS INDEX: 23.19 KG/M2

## 2018-02-15 DIAGNOSIS — J44.9 CHRONIC OBSTRUCTIVE PULMONARY DISEASE, UNSPECIFIED COPD TYPE (HCC): ICD-10-CM

## 2018-02-15 DIAGNOSIS — R91.1 LUNG NODULE: ICD-10-CM

## 2018-02-15 DIAGNOSIS — J45.40 MODERATE PERSISTENT ASTHMA WITHOUT COMPLICATION: Primary | ICD-10-CM

## 2018-02-15 PROCEDURE — 1090F PRES/ABSN URINE INCON ASSESS: CPT | Performed by: INTERNAL MEDICINE

## 2018-02-15 PROCEDURE — G8420 CALC BMI NORM PARAMETERS: HCPCS | Performed by: INTERNAL MEDICINE

## 2018-02-15 PROCEDURE — G8598 ASA/ANTIPLAT THER USED: HCPCS | Performed by: INTERNAL MEDICINE

## 2018-02-15 PROCEDURE — 3023F SPIROM DOC REV: CPT | Performed by: INTERNAL MEDICINE

## 2018-02-15 PROCEDURE — 99214 OFFICE O/P EST MOD 30 MIN: CPT | Performed by: INTERNAL MEDICINE

## 2018-02-15 PROCEDURE — 1036F TOBACCO NON-USER: CPT | Performed by: INTERNAL MEDICINE

## 2018-02-15 PROCEDURE — G8926 SPIRO NO PERF OR DOC: HCPCS | Performed by: INTERNAL MEDICINE

## 2018-02-15 PROCEDURE — 1111F DSCHRG MED/CURRENT MED MERGE: CPT | Performed by: INTERNAL MEDICINE

## 2018-02-15 PROCEDURE — 1123F ACP DISCUSS/DSCN MKR DOCD: CPT | Performed by: INTERNAL MEDICINE

## 2018-02-15 PROCEDURE — G8484 FLU IMMUNIZE NO ADMIN: HCPCS | Performed by: INTERNAL MEDICINE

## 2018-02-15 PROCEDURE — 4040F PNEUMOC VAC/ADMIN/RCVD: CPT | Performed by: INTERNAL MEDICINE

## 2018-02-15 PROCEDURE — G8427 DOCREV CUR MEDS BY ELIG CLIN: HCPCS | Performed by: INTERNAL MEDICINE

## 2018-02-15 NOTE — PROGRESS NOTES
congestion  RESPIRATORY:  negative for cough wheezing chest tightness hemoptysis  CARDIOVASCULAR:  negative for orthopnea or proximal nocturnal dyspnea or pedal edema syncope  GASTROINTESTINAL:  negative for nausea vomiting diarrhea abdominal pain and hematochezia melena  GENITOURINARY:  negative for hematuria and is  HEMATOLOGIC/LYMPHATIC:  negative for easy bleeding bruising and petechia  ALLERGIC/IMMUNOLOGIC:  negative for repeated infection and drug reaction  ENDOCRINE:  negative for weight changes tremors heat and cold intolerance  MUSCULOSKELETAL:  negative for joint stiffness and morning stiffness  NEUROLOGICAL:  negative for dizziness syncope diplopia or headache seizure tingling numbness  BEHAVIOR/PSYCH:  negative      Physical Exam:    Vitals:   /71   Pulse 81   Temp 97.8 °F (36.6 °C)   Resp 16   Ht 5' 2\" (1.575 m)   Wt 126 lb (57.2 kg)   SpO2 95%   BMI 23.05 kg/m²     Physical Examination:   General appearance - alert, well appearing, and in no distress  Mental status - alert, oriented to person, place, and time  Eyes - pupils equal and reactive, extraocular eye movements intact  Nose - normal and patent, no erythema, discharge or polyps  Mouth - mucous membranes moist, pharynx normal without lesions  Neck - supple, no significant adenopathy  Chest - no distres or WOB,No expiratory wheezes, no rales or rhonchi, symmetric air entry. Heart - normal rate, regular rhythm, normal S1, S2, no murmurs, rubs, clicks or gallops  Abdomen - soft, nontender, nondistended, no masses or organomegaly  Neurological - alert, oriented, normal speech, no focal findings or movement disorder noted}  Extremities - peripheral pulses normal, no pedal edema, no clubbing or cyanosis  Skin - normal coloration and turgor, no rashes, no suspicious skin lesions noted       :PFTs  DATE: 02/12/2014   INTERPRETATION:     Spirometry   shows normal flow volume loop. The patient's FEV1 is 87% predicted.  FEC   is 84% predicted, LUNG, MASS, NEEDLE CORE BIOPSY:    LEFT LUNG, MASS, NEEDLE CORE BIOPSY:  - FOCAL INTRA-ALVEOLAR FIBRIN AND INFLAMMATORY CELLS. - OCCASIONAL REFRACTIVE FOREIGN MATERIAL IN ALVEOLAR  MACROPHAGES, NONSPECIFIC FOR IDENTIFICATION. Diagnosis Comment  THE FINDINGS ARE NOT SPECIFIC, BUT THERE IS NO EVIDENCE OF  GRANULOMATA, VASCULITIS, NEOPLASM OR MALIGNANCY. Assessment and Plan        Asthma moderate persistent recent Asthma exacerbation  Non Compliance with ICS  COPD  Dyspnea on exertion  SHRUTI Lung Mass with h/o smoking in the past with PET with only small uptake and suv of 1.3, she had CT guided needle biopsy which was negative for malignancy in 2014, follow up ct scans is stable since 2014 and depending on the new ct scan showed no change and no new mass and  stable for > 3 year and no follow up is needed    Patient Active Problem List   Diagnosis     hypertension    ASHD (arteriosclerotic heart disease)    Diastolic CHF    LVH (left ventricular hypertrophy)    CAD (coronary artery disease)    Status post coronary artery stent placement    Atrial Fibrillation     Plan    Continue Qvar 80 mcg 2 puff BID  Discuss compliance with medicine with the patient and family  Follow up in office in 6 months  Albuterol PRN  Refuse pneumonia or flu shot  Follow up PCP for HTN and cardiology for A fib  All discussed with the pt and the family and both understands and agree. It was my pleasure to evaluate Trevor Sanchez today. Please call with questions.     Que Quiñones MD             2/15/2018, 10:54 AM

## 2018-02-16 ENCOUNTER — CARE COORDINATION (OUTPATIENT)
Dept: CASE MANAGEMENT | Age: 83
End: 2018-02-16

## 2018-02-21 ENCOUNTER — APPOINTMENT (OUTPATIENT)
Dept: GENERAL RADIOLOGY | Age: 83
DRG: 191 | End: 2018-02-21
Payer: MEDICARE

## 2018-02-21 ENCOUNTER — HOSPITAL ENCOUNTER (INPATIENT)
Age: 83
LOS: 5 days | Discharge: NURSING, REHAB OR LONG-TERM CARE | DRG: 191 | End: 2018-02-26
Attending: EMERGENCY MEDICINE | Admitting: INTERNAL MEDICINE
Payer: MEDICARE

## 2018-02-21 DIAGNOSIS — N18.9 CHRONIC KIDNEY DISEASE, UNSPECIFIED CKD STAGE: ICD-10-CM

## 2018-02-21 DIAGNOSIS — R50.9 FEVER, UNSPECIFIED FEVER CAUSE: ICD-10-CM

## 2018-02-21 DIAGNOSIS — J44.1 COPD EXACERBATION (HCC): Primary | ICD-10-CM

## 2018-02-21 LAB
-: ABNORMAL
ABSOLUTE EOS #: 1 K/UL (ref 0–0.4)
ABSOLUTE IMMATURE GRANULOCYTE: ABNORMAL K/UL (ref 0–0.3)
ABSOLUTE LYMPH #: 0.6 K/UL (ref 1–4.8)
ABSOLUTE MONO #: 1.6 K/UL (ref 0–1)
AMORPHOUS: ABNORMAL
ANION GAP SERPL CALCULATED.3IONS-SCNC: 15 MMOL/L (ref 9–17)
BACTERIA: ABNORMAL
BASOPHILS # BLD: 0 % (ref 0–2)
BASOPHILS ABSOLUTE: 0 K/UL (ref 0–0.2)
BILIRUBIN URINE: NEGATIVE
BNP INTERPRETATION: ABNORMAL
BUN BLDV-MCNC: 31 MG/DL (ref 8–23)
BUN/CREAT BLD: 18 (ref 9–20)
CALCIUM SERPL-MCNC: 9.9 MG/DL (ref 8.6–10.4)
CASTS UA: ABNORMAL /LPF
CHLORIDE BLD-SCNC: 100 MMOL/L (ref 98–107)
CO2: 24 MMOL/L (ref 20–31)
COLOR: YELLOW
COMMENT UA: ABNORMAL
CREAT SERPL-MCNC: 1.7 MG/DL (ref 0.5–0.9)
CRYSTALS, UA: ABNORMAL /HPF
DIFFERENTIAL TYPE: ABNORMAL
DIRECT EXAM: NORMAL
EOSINOPHILS RELATIVE PERCENT: 5 % (ref 0–8)
EPITHELIAL CELLS UA: ABNORMAL /HPF (ref 0–25)
GFR AFRICAN AMERICAN: 34 ML/MIN
GFR NON-AFRICAN AMERICAN: 28 ML/MIN
GFR SERPL CREATININE-BSD FRML MDRD: ABNORMAL ML/MIN/{1.73_M2}
GFR SERPL CREATININE-BSD FRML MDRD: ABNORMAL ML/MIN/{1.73_M2}
GLUCOSE BLD-MCNC: 207 MG/DL (ref 70–99)
GLUCOSE URINE: NEGATIVE
HCT VFR BLD CALC: 39.7 % (ref 36–46)
HEMOGLOBIN: 13 G/DL (ref 12–16)
IMMATURE GRANULOCYTES: ABNORMAL %
KETONES, URINE: NEGATIVE
LACTIC ACID, WHOLE BLOOD: NORMAL MMOL/L (ref 0.7–2.1)
LACTIC ACID: 1.9 MMOL/L (ref 0.5–2.2)
LEUKOCYTE ESTERASE, URINE: NEGATIVE
LYMPHOCYTES # BLD: 3 % (ref 24–44)
Lab: NORMAL
MCH RBC QN AUTO: 29.6 PG (ref 26–34)
MCHC RBC AUTO-ENTMCNC: 32.6 G/DL (ref 31–37)
MCV RBC AUTO: 90.8 FL (ref 80–100)
MONOCYTES # BLD: 8 % (ref 0–12)
MORPHOLOGY: ABNORMAL
MUCUS: ABNORMAL
NITRITE, URINE: NEGATIVE
NRBC AUTOMATED: ABNORMAL PER 100 WBC
OTHER OBSERVATIONS UA: ABNORMAL
PDW BLD-RTO: 15.1 % (ref 12.1–15.2)
PH UA: 5.5 (ref 5–9)
PLATELET # BLD: 164 K/UL (ref 140–450)
PLATELET ESTIMATE: ABNORMAL
PMV BLD AUTO: 9.4 FL (ref 6–12)
POTASSIUM SERPL-SCNC: 4.2 MMOL/L (ref 3.7–5.3)
PRO-BNP: 2353 PG/ML
PROTEIN UA: ABNORMAL
RBC # BLD: 4.38 M/UL (ref 4–5.2)
RBC # BLD: ABNORMAL 10*6/UL
RBC UA: ABNORMAL /HPF (ref 0–2)
RENAL EPITHELIAL, UA: ABNORMAL /HPF
SEG NEUTROPHILS: 84 % (ref 36–66)
SEGMENTED NEUTROPHILS ABSOLUTE COUNT: 16.8 K/UL (ref 1.8–7.7)
SODIUM BLD-SCNC: 139 MMOL/L (ref 135–144)
SPECIFIC GRAVITY UA: 1.02 (ref 1.01–1.02)
SPECIMEN DESCRIPTION: NORMAL
STATUS: NORMAL
TRICHOMONAS: ABNORMAL
TURBIDITY: CLEAR
URINE HGB: ABNORMAL
UROBILINOGEN, URINE: NORMAL
WBC # BLD: 20 K/UL (ref 3.5–11)
WBC # BLD: ABNORMAL 10*3/UL
WBC UA: ABNORMAL /HPF (ref 0–5)
YEAST: ABNORMAL

## 2018-02-21 PROCEDURE — 2000000000 HC ICU R&B

## 2018-02-21 PROCEDURE — 6370000000 HC RX 637 (ALT 250 FOR IP): Performed by: PHYSICIAN ASSISTANT

## 2018-02-21 PROCEDURE — 94640 AIRWAY INHALATION TREATMENT: CPT

## 2018-02-21 PROCEDURE — 96365 THER/PROPH/DIAG IV INF INIT: CPT

## 2018-02-21 PROCEDURE — 83605 ASSAY OF LACTIC ACID: CPT

## 2018-02-21 PROCEDURE — 94664 DEMO&/EVAL PT USE INHALER: CPT

## 2018-02-21 PROCEDURE — 99285 EMERGENCY DEPT VISIT HI MDM: CPT

## 2018-02-21 PROCEDURE — 93005 ELECTROCARDIOGRAM TRACING: CPT

## 2018-02-21 PROCEDURE — 6360000002 HC RX W HCPCS: Performed by: PHYSICIAN ASSISTANT

## 2018-02-21 PROCEDURE — 96375 TX/PRO/DX INJ NEW DRUG ADDON: CPT

## 2018-02-21 PROCEDURE — 87070 CULTURE OTHR SPECIMN AEROBIC: CPT

## 2018-02-21 PROCEDURE — 2580000003 HC RX 258: Performed by: INTERNAL MEDICINE

## 2018-02-21 PROCEDURE — 6370000000 HC RX 637 (ALT 250 FOR IP): Performed by: INTERNAL MEDICINE

## 2018-02-21 PROCEDURE — 85025 COMPLETE CBC W/AUTO DIFF WBC: CPT

## 2018-02-21 PROCEDURE — 87205 SMEAR GRAM STAIN: CPT

## 2018-02-21 PROCEDURE — 71046 X-RAY EXAM CHEST 2 VIEWS: CPT

## 2018-02-21 PROCEDURE — 80048 BASIC METABOLIC PNL TOTAL CA: CPT

## 2018-02-21 PROCEDURE — 83880 ASSAY OF NATRIURETIC PEPTIDE: CPT

## 2018-02-21 PROCEDURE — 87804 INFLUENZA ASSAY W/OPTIC: CPT

## 2018-02-21 PROCEDURE — 87040 BLOOD CULTURE FOR BACTERIA: CPT

## 2018-02-21 PROCEDURE — 6360000002 HC RX W HCPCS: Performed by: INTERNAL MEDICINE

## 2018-02-21 PROCEDURE — 94760 N-INVAS EAR/PLS OXIMETRY 1: CPT

## 2018-02-21 PROCEDURE — 36415 COLL VENOUS BLD VENIPUNCTURE: CPT

## 2018-02-21 PROCEDURE — 81001 URINALYSIS AUTO W/SCOPE: CPT

## 2018-02-21 RX ORDER — IPRATROPIUM BROMIDE AND ALBUTEROL SULFATE 2.5; .5 MG/3ML; MG/3ML
1 SOLUTION RESPIRATORY (INHALATION) 4 TIMES DAILY
Status: DISCONTINUED | OUTPATIENT
Start: 2018-02-22 | End: 2018-02-26 | Stop reason: HOSPADM

## 2018-02-21 RX ORDER — FUROSEMIDE 20 MG/1
20 TABLET ORAL
Status: DISCONTINUED | OUTPATIENT
Start: 2018-02-21 | End: 2018-02-26 | Stop reason: HOSPADM

## 2018-02-21 RX ORDER — DOXYCYCLINE HYCLATE 100 MG/1
100 CAPSULE ORAL EVERY 12 HOURS
Status: DISCONTINUED | OUTPATIENT
Start: 2018-02-21 | End: 2018-02-22

## 2018-02-21 RX ORDER — ALBUTEROL SULFATE 2.5 MG/3ML
2.5 SOLUTION RESPIRATORY (INHALATION) EVERY 4 HOURS PRN
Status: DISCONTINUED | OUTPATIENT
Start: 2018-02-21 | End: 2018-02-26 | Stop reason: HOSPADM

## 2018-02-21 RX ORDER — LEVOFLOXACIN 5 MG/ML
500 INJECTION, SOLUTION INTRAVENOUS EVERY 24 HOURS
Status: DISCONTINUED | OUTPATIENT
Start: 2018-02-21 | End: 2018-02-22

## 2018-02-21 RX ORDER — CLOPIDOGREL BISULFATE 75 MG/1
75 TABLET ORAL DAILY
Status: DISCONTINUED | OUTPATIENT
Start: 2018-02-21 | End: 2018-02-26 | Stop reason: HOSPADM

## 2018-02-21 RX ORDER — SODIUM CHLORIDE 0.9 % (FLUSH) 0.9 %
10 SYRINGE (ML) INJECTION EVERY 12 HOURS SCHEDULED
Status: DISCONTINUED | OUTPATIENT
Start: 2018-02-21 | End: 2018-02-26 | Stop reason: HOSPADM

## 2018-02-21 RX ORDER — DILTIAZEM HYDROCHLORIDE 120 MG/1
120 CAPSULE, COATED, EXTENDED RELEASE ORAL DAILY
Status: DISCONTINUED | OUTPATIENT
Start: 2018-02-21 | End: 2018-02-26 | Stop reason: HOSPADM

## 2018-02-21 RX ORDER — ACETAMINOPHEN 500 MG
1000 TABLET ORAL ONCE
Status: COMPLETED | OUTPATIENT
Start: 2018-02-21 | End: 2018-02-21

## 2018-02-21 RX ORDER — METHYLPREDNISOLONE SODIUM SUCCINATE 125 MG/2ML
125 INJECTION, POWDER, LYOPHILIZED, FOR SOLUTION INTRAMUSCULAR; INTRAVENOUS ONCE
Status: COMPLETED | OUTPATIENT
Start: 2018-02-21 | End: 2018-02-21

## 2018-02-21 RX ORDER — METHYLPREDNISOLONE SODIUM SUCCINATE 40 MG/ML
40 INJECTION, POWDER, LYOPHILIZED, FOR SOLUTION INTRAMUSCULAR; INTRAVENOUS EVERY 12 HOURS
Status: DISCONTINUED | OUTPATIENT
Start: 2018-02-21 | End: 2018-02-25

## 2018-02-21 RX ORDER — SIMVASTATIN 20 MG
20 TABLET ORAL NIGHTLY
Status: DISCONTINUED | OUTPATIENT
Start: 2018-02-21 | End: 2018-02-26 | Stop reason: HOSPADM

## 2018-02-21 RX ORDER — LORAZEPAM 0.5 MG/1
0.5 TABLET ORAL EVERY 8 HOURS PRN
Status: DISCONTINUED | OUTPATIENT
Start: 2018-02-21 | End: 2018-02-26 | Stop reason: HOSPADM

## 2018-02-21 RX ORDER — NITROGLYCERIN 0.4 MG/1
0.4 TABLET SUBLINGUAL EVERY 5 MIN PRN
Status: DISCONTINUED | OUTPATIENT
Start: 2018-02-21 | End: 2018-02-26 | Stop reason: HOSPADM

## 2018-02-21 RX ORDER — FERROUS SULFATE 325(65) MG
325 TABLET ORAL
Status: DISCONTINUED | OUTPATIENT
Start: 2018-02-22 | End: 2018-02-26 | Stop reason: HOSPADM

## 2018-02-21 RX ORDER — SODIUM CHLORIDE 0.9 % (FLUSH) 0.9 %
10 SYRINGE (ML) INJECTION PRN
Status: DISCONTINUED | OUTPATIENT
Start: 2018-02-21 | End: 2018-02-26 | Stop reason: HOSPADM

## 2018-02-21 RX ORDER — IPRATROPIUM BROMIDE AND ALBUTEROL SULFATE 2.5; .5 MG/3ML; MG/3ML
1 SOLUTION RESPIRATORY (INHALATION)
Status: DISCONTINUED | OUTPATIENT
Start: 2018-02-21 | End: 2018-02-21

## 2018-02-21 RX ORDER — FERROUS SULFATE 325(65) MG
325 TABLET ORAL
COMMUNITY
End: 2018-03-29 | Stop reason: SDUPTHER

## 2018-02-21 RX ORDER — ONDANSETRON 2 MG/ML
4 INJECTION INTRAMUSCULAR; INTRAVENOUS EVERY 6 HOURS PRN
Status: DISCONTINUED | OUTPATIENT
Start: 2018-02-21 | End: 2018-02-26 | Stop reason: HOSPADM

## 2018-02-21 RX ORDER — FAMOTIDINE 20 MG/1
20 TABLET, FILM COATED ORAL 2 TIMES DAILY
Status: DISCONTINUED | OUTPATIENT
Start: 2018-02-21 | End: 2018-02-22

## 2018-02-21 RX ORDER — ACETAMINOPHEN 325 MG/1
650 TABLET ORAL EVERY 4 HOURS PRN
Status: DISCONTINUED | OUTPATIENT
Start: 2018-02-21 | End: 2018-02-26 | Stop reason: HOSPADM

## 2018-02-21 RX ORDER — IPRATROPIUM BROMIDE AND ALBUTEROL SULFATE 2.5; .5 MG/3ML; MG/3ML
1 SOLUTION RESPIRATORY (INHALATION) ONCE
Status: COMPLETED | OUTPATIENT
Start: 2018-02-21 | End: 2018-02-21

## 2018-02-21 RX ORDER — SODIUM CHLORIDE 9 MG/ML
INJECTION, SOLUTION INTRAVENOUS CONTINUOUS
Status: DISCONTINUED | OUTPATIENT
Start: 2018-02-21 | End: 2018-02-24

## 2018-02-21 RX ORDER — SIMVASTATIN 20 MG
20 TABLET ORAL NIGHTLY
COMMUNITY
End: 2018-03-29 | Stop reason: SDUPTHER

## 2018-02-21 RX ADMIN — LEVOFLOXACIN 500 MG: 5 INJECTION, SOLUTION INTRAVENOUS at 16:23

## 2018-02-21 RX ADMIN — METOPROLOL TARTRATE 25 MG: 25 TABLET ORAL at 20:31

## 2018-02-21 RX ADMIN — BECLOMETHASONE DIPROPIONATE 1 PUFF: 80 AEROSOL, METERED RESPIRATORY (INHALATION) at 20:53

## 2018-02-21 RX ADMIN — SIMVASTATIN 20 MG: 20 TABLET, FILM COATED ORAL at 20:31

## 2018-02-21 RX ADMIN — METHYLPREDNISOLONE SODIUM SUCCINATE 40 MG: 40 INJECTION, POWDER, FOR SOLUTION INTRAMUSCULAR; INTRAVENOUS at 20:30

## 2018-02-21 RX ADMIN — METHYLPREDNISOLONE SODIUM SUCCINATE 125 MG: 125 INJECTION, POWDER, FOR SOLUTION INTRAMUSCULAR; INTRAVENOUS at 16:20

## 2018-02-21 RX ADMIN — SODIUM CHLORIDE: 9 INJECTION, SOLUTION INTRAVENOUS at 19:00

## 2018-02-21 RX ADMIN — IPRATROPIUM BROMIDE AND ALBUTEROL SULFATE 1 AMPULE: .5; 3 SOLUTION RESPIRATORY (INHALATION) at 14:39

## 2018-02-21 RX ADMIN — FUROSEMIDE 20 MG: 20 TABLET ORAL at 20:31

## 2018-02-21 RX ADMIN — DOXYCYCLINE HYCLATE 100 MG: 100 CAPSULE, GELATIN COATED ORAL at 20:31

## 2018-02-21 RX ADMIN — FAMOTIDINE 20 MG: 20 TABLET, FILM COATED ORAL at 20:31

## 2018-02-21 RX ADMIN — IPRATROPIUM BROMIDE AND ALBUTEROL SULFATE 1 AMPULE: .5; 3 SOLUTION RESPIRATORY (INHALATION) at 20:52

## 2018-02-21 RX ADMIN — APIXABAN 2.5 MG: 2.5 TABLET, FILM COATED ORAL at 20:31

## 2018-02-21 RX ADMIN — ACETAMINOPHEN 1000 MG: 500 TABLET ORAL at 14:29

## 2018-02-21 RX ADMIN — Medication 1 G: at 20:30

## 2018-02-21 RX ADMIN — DILTIAZEM HYDROCHLORIDE 120 MG: 120 CAPSULE, COATED, EXTENDED RELEASE ORAL at 20:32

## 2018-02-21 RX ADMIN — CLOPIDOGREL BISULFATE 75 MG: 75 TABLET ORAL at 20:31

## 2018-02-21 RX ADMIN — IPRATROPIUM BROMIDE AND ALBUTEROL SULFATE 1 AMPULE: .5; 3 SOLUTION RESPIRATORY (INHALATION) at 16:51

## 2018-02-21 RX ADMIN — Medication 10 ML: at 20:30

## 2018-02-21 RX ADMIN — LORAZEPAM 0.5 MG: 0.5 TABLET ORAL at 20:31

## 2018-02-21 ASSESSMENT — ENCOUNTER SYMPTOMS
CONSTIPATION: 0
ABDOMINAL PAIN: 0
SHORTNESS OF BREATH: 0
COUGH: 1
WHEEZING: 1
EYE DISCHARGE: 0
NAUSEA: 0
RHINORRHEA: 0
CHEST TIGHTNESS: 0
EYE REDNESS: 0
BACK PAIN: 0
DIARRHEA: 0
VOMITING: 0
SORE THROAT: 0
BLOOD IN STOOL: 0

## 2018-02-21 ASSESSMENT — PAIN SCALES - GENERAL: PAINLEVEL_OUTOF10: 0

## 2018-02-21 NOTE — ED NOTES
Home medications given to patients daughter at this time in a ziploc bag.       Mehul Nuñez RN  02/21/18 1275

## 2018-02-21 NOTE — ED PROVIDER NOTES
Social History Narrative    None       SCREENINGS    [place] Coma Scale  Eye Opening: Spontaneous  Best Verbal Response: Oriented  Best Motor Response: Obeys commands  [place] Coma Scale Score: 15        PHYSICAL EXAM    (up to 7 for level 4, 8 or more for level 5)     ED Triage Vitals [02/21/18 1355]   BP Temp Temp Source Pulse Resp SpO2 Height Weight   (!) 200/97 102.7 °F (39.3 °C) Tympanic 109 28 93 % 5' 2\" (1.575 m) 116 lb (52.6 kg)       Physical Exam   Constitutional: She is oriented to person, place, and time. She appears well-developed and well-nourished. Non-toxic appearance. HENT:   Head: Normocephalic and atraumatic. Right Ear: External ear normal.   Left Ear: External ear normal.   Mouth/Throat: Uvula is midline, oropharynx is clear and moist and mucous membranes are normal. No trismus in the jaw. No uvula swelling. No oropharyngeal exudate, posterior oropharyngeal edema, posterior oropharyngeal erythema or tonsillar abscesses. Eyes: Conjunctivae and EOM are normal. Pupils are equal, round, and reactive to light. Right eye exhibits no discharge. Left eye exhibits no discharge. No scleral icterus. Neck: Normal range of motion. Neck supple. No tracheal deviation present. Cardiovascular: Normal rate, regular rhythm and intact distal pulses. Exam reveals no gallop and no friction rub. No murmur heard. Pulmonary/Chest: No stridor. She is in respiratory distress. She has decreased breath sounds (at the bases bilaterally). She has wheezes. She has no rhonchi. She exhibits no tenderness. Abdominal: Soft. Bowel sounds are normal. She exhibits no distension. There is no tenderness. There is no rebound and no guarding. Musculoskeletal: Normal range of motion. She exhibits no edema, tenderness or deformity. Neurological: She is alert and oriented to person, place, and time. No cranial nerve deficit. Skin: Skin is warm and dry. No rash noted. She is not diaphoretic. No erythema.

## 2018-02-21 NOTE — ED NOTES
Dr. Para Goldberg returned call.   Currently speaking with TEGAN Gómez.     Koffi HOOKER Reser  02/21/18 6903

## 2018-02-21 NOTE — H&P
Authorizing Provider   ferrous sulfate 325 (65 Fe) MG tablet Take 325 mg by mouth daily (with breakfast)   Yes Historical Provider, MD   simvastatin (ZOCOR) 20 MG tablet Take 20 mg by mouth nightly   Yes Historical Provider, MD   PROAIR  (90 Base) MCG/ACT inhaler INHALE 2 PUFFS EVERY 6 HOURS AS NEEDED FOR WHEEZING 2/20/18  Yes Ralf Mendoza MD   albuterol (PROVENTIL) (2.5 MG/3ML) 0.083% nebulizer solution Take 3 mLs by nebulization every 4 hours as needed for Wheezing 2/6/18  Yes Usman Montalvo MD   nitroGLYCERIN (NITROSTAT) 0.4 MG SL tablet up to max of 3 total doses. If no relief after 1 dose, call 911. 2/6/18  Yes Usman Montalvo MD   diltiazem (CARDIZEM CD) 120 MG extended release capsule Take 1 capsule by mouth daily 2/7/18  Yes Usman Montalvo MD   metoprolol tartrate (LOPRESSOR) 25 MG tablet Take 1 tablet by mouth 2 times daily 2/6/18  Yes Usman Montalvo MD   furosemide (LASIX) 20 MG tablet Take 20 mg by mouth every 48 hours   Yes Historical Provider, MD   beclomethasone (QVAR) 80 MCG/ACT inhaler Inhale 1 puff into the lungs 2 times daily 8/10/17  Yes Ralf Mendoza MD   apixaban (ELIQUIS) 2.5 MG TABS tablet Take 1 tablet by mouth 2 times daily 2/21/17  Yes Idris Hale CNP   clopidogrel (PLAVIX) 75 MG tablet Take 1 tablet by mouth daily 2/21/17  Yes Idris Hale CNP   sodium polystyrene (SPS) 15 GM/60ML suspension Take 60 mLs by mouth once for 1 dose 9/26/17 9/26/17  Marimar Coleman MD       Allergies:  Belladonna    Social History:   TOBACCO:   reports that she has quit smoking. Her smoking use included Cigarettes. She has a 90.00 pack-year smoking history. She has never used smokeless tobacco.  ETOH:   reports that she does not drink alcohol.   OCCUPATION:       Family History:       Problem Relation Age of Onset    Heart Disease Mother     Cancer Father        Review of Systems:  Constitutional: positive for anorexia, chills, fatigue, fevers, malaise and sweats, negative for weight

## 2018-02-22 PROBLEM — I50.32 CHRONIC DIASTOLIC CHF (CONGESTIVE HEART FAILURE) (HCC): Status: ACTIVE | Noted: 2018-02-22

## 2018-02-22 LAB
ABSOLUTE EOS #: 0 K/UL (ref 0–0.4)
ABSOLUTE IMMATURE GRANULOCYTE: ABNORMAL K/UL (ref 0–0.3)
ABSOLUTE LYMPH #: 0.71 K/UL (ref 1–4.8)
ABSOLUTE MONO #: 0.94 K/UL (ref 0–1)
ANION GAP SERPL CALCULATED.3IONS-SCNC: 14 MMOL/L (ref 9–17)
BASOPHILS # BLD: 0 % (ref 0–2)
BASOPHILS ABSOLUTE: 0 K/UL (ref 0–0.2)
BUN BLDV-MCNC: 36 MG/DL (ref 8–23)
BUN/CREAT BLD: 19 (ref 9–20)
CALCIUM SERPL-MCNC: 9 MG/DL (ref 8.6–10.4)
CHLORIDE BLD-SCNC: 102 MMOL/L (ref 98–107)
CO2: 24 MMOL/L (ref 20–31)
CREAT SERPL-MCNC: 1.88 MG/DL (ref 0.5–0.9)
DIFFERENTIAL TYPE: ABNORMAL
EKG ATRIAL RATE: 86 BPM
EKG P AXIS: 80 DEGREES
EKG P-R INTERVAL: 176 MS
EKG Q-T INTERVAL: 356 MS
EKG QRS DURATION: 72 MS
EKG QTC CALCULATION (BAZETT): 426 MS
EKG R AXIS: -26 DEGREES
EKG T AXIS: 79 DEGREES
EKG VENTRICULAR RATE: 86 BPM
EOSINOPHILS RELATIVE PERCENT: 0 % (ref 0–8)
GFR AFRICAN AMERICAN: 31 ML/MIN
GFR NON-AFRICAN AMERICAN: 25 ML/MIN
GFR SERPL CREATININE-BSD FRML MDRD: ABNORMAL ML/MIN/{1.73_M2}
GFR SERPL CREATININE-BSD FRML MDRD: ABNORMAL ML/MIN/{1.73_M2}
GLUCOSE BLD-MCNC: 167 MG/DL (ref 70–99)
HCT VFR BLD CALC: 36 % (ref 36–46)
HEMOGLOBIN: 11.7 G/DL (ref 12–16)
IMMATURE GRANULOCYTES: ABNORMAL %
LYMPHOCYTES # BLD: 3 % (ref 24–44)
MCH RBC QN AUTO: 29.8 PG (ref 26–34)
MCHC RBC AUTO-ENTMCNC: 32.4 G/DL (ref 31–37)
MCV RBC AUTO: 92 FL (ref 80–100)
MONOCYTES # BLD: 4 % (ref 0–12)
MORPHOLOGY: NORMAL
NRBC AUTOMATED: ABNORMAL PER 100 WBC
PDW BLD-RTO: 15.4 % (ref 12.1–15.2)
PLATELET # BLD: 128 K/UL (ref 140–450)
PLATELET ESTIMATE: ABNORMAL
PMV BLD AUTO: 9.3 FL (ref 6–12)
POTASSIUM SERPL-SCNC: 4.3 MMOL/L (ref 3.7–5.3)
RBC # BLD: 3.91 M/UL (ref 4–5.2)
RBC # BLD: ABNORMAL 10*6/UL
SEG NEUTROPHILS: 93 % (ref 36–66)
SEGMENTED NEUTROPHILS ABSOLUTE COUNT: 21.85 K/UL (ref 1.8–7.7)
SODIUM BLD-SCNC: 140 MMOL/L (ref 135–144)
WBC # BLD: 23.5 K/UL (ref 3.5–11)
WBC # BLD: ABNORMAL 10*3/UL

## 2018-02-22 PROCEDURE — 6360000002 HC RX W HCPCS: Performed by: INTERNAL MEDICINE

## 2018-02-22 PROCEDURE — G8978 MOBILITY CURRENT STATUS: HCPCS

## 2018-02-22 PROCEDURE — 97162 PT EVAL MOD COMPLEX 30 MIN: CPT

## 2018-02-22 PROCEDURE — 80048 BASIC METABOLIC PNL TOTAL CA: CPT

## 2018-02-22 PROCEDURE — 99222 1ST HOSP IP/OBS MODERATE 55: CPT | Performed by: INTERNAL MEDICINE

## 2018-02-22 PROCEDURE — 94640 AIRWAY INHALATION TREATMENT: CPT

## 2018-02-22 PROCEDURE — 93005 ELECTROCARDIOGRAM TRACING: CPT

## 2018-02-22 PROCEDURE — 6370000000 HC RX 637 (ALT 250 FOR IP): Performed by: INTERNAL MEDICINE

## 2018-02-22 PROCEDURE — 85025 COMPLETE CBC W/AUTO DIFF WBC: CPT

## 2018-02-22 PROCEDURE — 36415 COLL VENOUS BLD VENIPUNCTURE: CPT

## 2018-02-22 PROCEDURE — 94664 DEMO&/EVAL PT USE INHALER: CPT

## 2018-02-22 PROCEDURE — G8979 MOBILITY GOAL STATUS: HCPCS

## 2018-02-22 PROCEDURE — 2580000003 HC RX 258: Performed by: INTERNAL MEDICINE

## 2018-02-22 PROCEDURE — 2000000000 HC ICU R&B

## 2018-02-22 RX ORDER — FAMOTIDINE 20 MG/1
20 TABLET, FILM COATED ORAL NIGHTLY
Status: DISCONTINUED | OUTPATIENT
Start: 2018-02-22 | End: 2018-02-26 | Stop reason: HOSPADM

## 2018-02-22 RX ORDER — LEVOFLOXACIN 5 MG/ML
250 INJECTION, SOLUTION INTRAVENOUS
Status: DISCONTINUED | OUTPATIENT
Start: 2018-02-23 | End: 2018-02-25

## 2018-02-22 RX ADMIN — CLOPIDOGREL BISULFATE 75 MG: 75 TABLET ORAL at 08:15

## 2018-02-22 RX ADMIN — SIMVASTATIN 20 MG: 20 TABLET, FILM COATED ORAL at 20:39

## 2018-02-22 RX ADMIN — APIXABAN 2.5 MG: 2.5 TABLET, FILM COATED ORAL at 20:39

## 2018-02-22 RX ADMIN — METOPROLOL TARTRATE 25 MG: 25 TABLET ORAL at 08:16

## 2018-02-22 RX ADMIN — DOXYCYCLINE HYCLATE 100 MG: 100 CAPSULE, GELATIN COATED ORAL at 07:34

## 2018-02-22 RX ADMIN — IPRATROPIUM BROMIDE AND ALBUTEROL SULFATE 1 AMPULE: .5; 3 SOLUTION RESPIRATORY (INHALATION) at 19:38

## 2018-02-22 RX ADMIN — IPRATROPIUM BROMIDE AND ALBUTEROL SULFATE 1 AMPULE: .5; 3 SOLUTION RESPIRATORY (INHALATION) at 05:37

## 2018-02-22 RX ADMIN — BECLOMETHASONE DIPROPIONATE 1 PUFF: 80 AEROSOL, METERED RESPIRATORY (INHALATION) at 09:40

## 2018-02-22 RX ADMIN — SODIUM CHLORIDE: 9 INJECTION, SOLUTION INTRAVENOUS at 08:13

## 2018-02-22 RX ADMIN — FAMOTIDINE 20 MG: 20 TABLET, FILM COATED ORAL at 20:39

## 2018-02-22 RX ADMIN — METOPROLOL TARTRATE 25 MG: 25 TABLET ORAL at 20:39

## 2018-02-22 RX ADMIN — METHYLPREDNISOLONE SODIUM SUCCINATE 40 MG: 40 INJECTION, POWDER, FOR SOLUTION INTRAMUSCULAR; INTRAVENOUS at 20:40

## 2018-02-22 RX ADMIN — APIXABAN 2.5 MG: 2.5 TABLET, FILM COATED ORAL at 08:15

## 2018-02-22 RX ADMIN — IPRATROPIUM BROMIDE AND ALBUTEROL SULFATE 1 AMPULE: .5; 3 SOLUTION RESPIRATORY (INHALATION) at 14:50

## 2018-02-22 RX ADMIN — Medication 10 ML: at 08:16

## 2018-02-22 RX ADMIN — DILTIAZEM HYDROCHLORIDE 120 MG: 120 CAPSULE, COATED, EXTENDED RELEASE ORAL at 08:16

## 2018-02-22 RX ADMIN — LORAZEPAM 0.5 MG: 0.5 TABLET ORAL at 23:21

## 2018-02-22 RX ADMIN — IPRATROPIUM BROMIDE AND ALBUTEROL SULFATE 1 AMPULE: .5; 3 SOLUTION RESPIRATORY (INHALATION) at 09:40

## 2018-02-22 RX ADMIN — SODIUM CHLORIDE: 9 INJECTION, SOLUTION INTRAVENOUS at 20:44

## 2018-02-22 RX ADMIN — FERROUS SULFATE TAB 325 MG (65 MG ELEMENTAL FE) 325 MG: 325 (65 FE) TAB at 08:16

## 2018-02-22 RX ADMIN — BECLOMETHASONE DIPROPIONATE 1 PUFF: 80 AEROSOL, METERED RESPIRATORY (INHALATION) at 19:38

## 2018-02-22 RX ADMIN — Medication 1 G: at 19:30

## 2018-02-22 RX ADMIN — METHYLPREDNISOLONE SODIUM SUCCINATE 40 MG: 40 INJECTION, POWDER, FOR SOLUTION INTRAMUSCULAR; INTRAVENOUS at 08:16

## 2018-02-22 ASSESSMENT — PAIN SCALES - GENERAL
PAINLEVEL_OUTOF10: 0

## 2018-02-22 NOTE — PROGRESS NOTES
walker  Receives Help From: Family  ADL Assistance: Independent  Homemaking Assistance: Needs assistance  Ambulation Assistance: Independent  Transfer Assistance: Independent  Active : No  Additional Comments: She reports her daughter helps when needed and prepares meals for her  Objective    AROM RLE (degrees)  RLE AROM: WFL  AROM LLE (degrees)  LLE AROM : WFL  Strength RLE  Strength RLE: WFL  Strength LLE  Strength LLE: WFL        Bed mobility  Rolling to Left: Supervision  Rolling to Right: Supervision  Supine to Sit: Supervision  Sit to Supine: Supervision  Scooting: Supervision  Transfers  Sit to Stand: Contact guard assistance  Stand to sit: Contact guard assistance  Ambulation  Ambulation?: Yes  WB Status: FWB  Ambulation 1  Surface: level tile  Device: No Device  Assistance: Contact guard assistance  Quality of Gait: Patient ambulates with short stride length and forward flexed posture  Distance: 10'  Comments: Patient's SpO2 dropped to 87% following ambulation     Balance  Sitting - Static: Good  Sitting - Dynamic: Good  Standing - Static: Good  Standing - Dynamic: Fair        Assessment   Body structures, Functions, Activity limitations: Decreased functional mobility ; Decreased ADL status; Decreased endurance;Decreased balance  Assessment: The patient is an 80 y.o. female admitted for a COPD exacerbation. Patient demonstrates impaired endurance, balance, and strength and would benefit from skilled physical therapy to address these deficits.   Treatment Diagnosis: Generalized weakness, decreased activity endurance  Prognosis: Fair  Decision Making: Medium Complexity  Patient Education: Patient educated on POC  REQUIRES PT FOLLOW UP: Yes  Activity Tolerance  Activity Tolerance: Patient limited by endurance  Activity Tolerance: SpO2 dropped to 87% following ambulation     Discharge Recommendations:  Continue to assess pending progress      Plan   Plan  Times per week: 7  Times per day: Twice a day  Plan

## 2018-02-22 NOTE — PLAN OF CARE
Problem: Falls - Risk of  Goal: Absence of falls  Outcome: Ongoing  Pt up with assistance, call light in reach, instructed pt to use call light for assistance, bed alarm on. Problem: Airway Clearance - Ineffective:  Goal: Ability to maintain a clear airway will improve  Ability to maintain a clear airway will improve   Outcome: Ongoing  Will continue to monitor.     Problem: Gas Exchange - Impaired:  Goal: Levels of oxygenation will improve  Levels of oxygenation will improve   Outcome: Ongoing  Will continue to monitor    Problem: Pain:  Goal: Pain level will decrease  Pain level will decrease   Outcome: Ongoing

## 2018-02-22 NOTE — PROGRESS NOTES
[facility] Department of Pharmacy   Pharmacy Renal Adjustment Note    Noemy Gavin is a 80 y.o. female. Pharmacist assessment of renally cleared medications. Recent Labs      02/21/18   1405  02/22/18   0547   CREATININE  1.70*  1.88*     Estimated Creatinine Clearance: 16 mL/min (based on SCr of 1.88 mg/dL). Height:   Ht Readings from Last 1 Encounters:   02/21/18 5' 2\" (1.575 m)     Weight:  Wt Readings from Last 1 Encounters:   02/22/18 124 lb (56.2 kg)       The following medication has been adjusted based upon renal function:             Pepcid adjusted to once daily for Crcl < 50 ml/min.          Ronda Cisneros,2/22/2018,6:35 AM

## 2018-02-22 NOTE — PLAN OF CARE
Problem: Airway Clearance - Ineffective:  Goal: Ability to maintain a clear airway will improve  Ability to maintain a clear airway will improve   Outcome: Ongoing  Resting quietly in bed. Denies SOB. Lungs with rhonchi and rales throughout, clears with cough. High flow NC at 33% continued. Problem: Anxiety/Stress:  Goal: Level of anxiety will decrease  Level of anxiety will decrease   Outcome: Ongoing  States feels anxious. Problem: Pain:  Goal: Pain level will decrease  Pain level will decrease   Outcome: Ongoing  Denies pain.

## 2018-02-22 NOTE — PROGRESS NOTES
Discussed discharge plans with the patient. Patient is a 80year old female here with  COPD exacerbation . She is alert and oriented. Patient is a  and lives with her daughter. She has a walker if needed. The daughter does the cooking and the cleaning. She also manager the medication and does the driving. Her PCP is Braeden Cook CNP. She has medical insurance that helps with medication costs. The discharge plan is home with no services. Talked about home health since she just got out of [facility]. Patient still feels she does not need home health.     ROSEMARY Gao

## 2018-02-22 NOTE — PROGRESS NOTES
FLOW  Predicted:     Personal Best:        VITAL CAPACITY  Predicted value:  ml  Actual Value:  ml  30% of Predicted:  ml  Patient Acuity 0 1 2 3 4 Score   Level of Concious (LOC) [x]  Alert & Oriented or Pt normal LOC []  Confused;follows directions []  Confused & uncooper-ative []  Obtunded []  Comatose 0   Respiratory Rate  (RR) []  Reg. rate & pattern. 12 - 20 bpm  []  Increased RR. Greater than 20 bpm   [x]  SOB w/ exertion or RR greater than 24 bpm []  Access- ory muscle use at rest. Abn.  resp. []  SOB at rest.   2   Bilateral Breath Sounds (BBS) []  Clear []  Diminish-ed bases  []  Diminish-ed t/o, or rales   [x]  Sporadic, scattered wheezes or rhonchi []  Persistentwheezes and, or absent BBS 3   Cough [x]  Strong, effective, & non-prod. []  Effective & prod. Less than 25 ml (2 TBSP) over past 24 hrs []  Ineffective & non-prod to less than 25 ML over past 24 hrs []  Ineffective and, or greater than 25 ml sputum prod. past 24 hrs. []  Nonspon- taneous; Requires suctioning 0   Pulmonary History  (PULM HX) []  No smoking and no chronic pulmonaryhistory []  Former smoker. Quit over 12 mos. ago []  Current smoker or quit w/ in 12 mos []  Pulm. History and, or 20 pk/yr smoking hx [x]  Admitted w/ acute pulm. dx and, or has been admitted w/ pulm. dx 2 or more times over past 12 mos 4   Surgical History this Admit  (SURG HX) [x]  No surgery []  General surgery []  Lower abdominal []  Thoracic or upper abdominal   []  Thoracic w/ pulm. disease 0   Chest X-Ray (CXR)/CT Scan [x]  Clear or not applicable []  Not available []  Atelect- asis or pleural effusions []  Localized infiltrate or pulm. edema []  Con-solidated Infiltrates, bilateral, or in more than 1 lobe 0   Slow or Forced VC, FEV1 OR PEFR (PULM FXN)  [x]  80% or greater, or not indicated []  Pt. unable to perform []  FEV1 or PEFR or VC 51-79%.   []  FEV1 or PEFR or VC  30-49%   []  FEV1 or PEFR or VC less than 30%   0   TOTAL ACUITY: 9       CARE PLAN    If otherwise:  PD&P, PEP, or Vest QID & PRN  NT Sxn PRN for ineffective cough  METANEB QID with [physician-ordered bronchodilator(s)] if CXR Acuity = 4; otherwise:  PD&P, PEP, or Vest TID & PRN  NT Sxn PRN for ineffective cough  Instruct patient to self-perform IS q1hr WA   Directed Cough self-performed q1hr WA     If Acuity Level is 2 or above in the following:    [] PULMONARY HISTORY (PULM HX)    Goal: Assist patient in quitting smoking to slow or stop the progression of lung disease.     [] Smoking Cessation Protocol    SMOKING CESSATION EDUCATION provided according to policy [id number]: (sandra with an X)  ____Yes    ____ No     ____ NA    Smoking Cessation Booklet given:  ____Yes  ____No ____Patient Shannen Arce

## 2018-02-22 NOTE — PROGRESS NOTES
[facility] Department of Pharmacy   Pharmacy Renal Adjustment Note    Freddie Diaz is a 80 y.o. female. Pharmacist assessment of renally cleared medications. Recent Labs      02/21/18   1405  02/22/18   0547   CREATININE  1.70*  1.88*     Estimated Creatinine Clearance: 16 mL/min (based on SCr of 1.88 mg/dL). Height:   Ht Readings from Last 1 Encounters:   02/21/18 5' 2\" (1.575 m)     Weight:  Wt Readings from Last 1 Encounters:   02/22/18 124 lb (56.2 kg)       The following medication has been adjusted based upon renal function:             Levaquin adjusted to 250 mg every other day for Crcl 16 ml/min. Normal renal function dosing of 500 mg daily:  CrCl 20 to 49 mL/minute: Administer 500 mg initial dose, followed by 250 mg every 24 hours. CrCl 10 to 19 mL/minute: Administer 500 mg initial dose, followed by 250 mg every 48 hours.       Ronda Cisneros,2/22/2018,6:40 AM

## 2018-02-22 NOTE — PLAN OF CARE
Problem: Nutrition  Goal: Optimal nutrition therapy  Outcome: Ongoing  Nutrition Problem: Inadequate oral intake  Intervention: Food and/or Nutrient Delivery:  Alter current diet  Nutritional Goals: PO >75% meals   Lower carb and sodium in diet (recommended change)

## 2018-02-22 NOTE — PROGRESS NOTES
respiratory functions in patients with airway disease and decrease WOB    [x] AEROSOL PROTOCOL    Total Acuity:   16-32  []  Secondary Assessment in 24 hrs Total Acuity:  9-15  [x]  Secondary Assessment in 24 hrs Total Acuity:  4-8  []  Secondary Assessment in 48 hrs Total Acuity:  0-3  []  Secondary Assessment in 72 hrs   HHN AEROSOL THERAPY with  [physician-ordered bronchodilator(s)] q 4 & Albuterol PRN q2 hrs. Breath-Actuated Neb if BBS Acuity = 4, and pt. can use MP. Notify physician if condition deteriorates. HHN AEROSOL THERAPY with  [physician-ordered bronchodilator(s)]  QID and Albuterol PRN q4 hrs. Breath-Actuated Neb if BBS Acuity = 4, and pt. can use MP. Notify physician if condition deteriorates. MDI THERAPY with  2 actuations of [physician-ordered bronchodilator(s)] via spacer TID Albuterol and PRNq4 hrs. If unable to utilize MDI: HHN [physician-ordered bronchodilator(s)] TID and Albuterol PRN q4 hrs. Notify physician if condition deteriorates. MDI THERAPY with  [physician-ordered bronchodilator(s)] via spacer TID PRN. If unable to utilize MDI: HHN [physician-ordered bronchodilator(s)] TID PRN. Notify physician if condition deteriorates. If Acuity Level is 2, 3, or 4 in any of the following:    [] COUGH     [] SURGICAL HISTORY (SURG HX)  [] CHEST XRAY (CXR)    Goal: Improvement in sputum mobilization in patients with ineffective airway clearance. Reverse atelectasis.     [] Bronchopulmonary Hygiene Protocol    Total Acuity:   16-32  []  Secondary Assessment in 24 hrs Total Acuity:  9-15  []  Secondary Assessment in 24 hrs Total Acuity:  4-8  []  Secondary Assessment in 48 hrs Total Acuity:  0-3  []  Secondary Assessment in 72 hrs   METANEB QID with [physician-ordered bronchodilator(s)] if CXR Acuity = 4; otherwise:  PD&P, PEP, or Vest QID & PRN  NT Sxn PRN for ineffective cough  METANEB QID with [physician-ordered bronchodilator(s)] if CXR Acuity = 4; otherwise:  PD&P, PEP, or Vest TID & PRN  NT Sxn PRN for ineffective cough  Instruct patient to self-perform IS q1hr WA   Directed Cough self-performed q1hr WA     If Acuity Level is 2 or above in the following:    [] PULMONARY HISTORY (PULM HX)    Goal: Assist patient in quitting smoking to slow or stop the progression of lung disease.     [] Smoking Cessation Protocol    SMOKING CESSATION EDUCATION provided according to policy [id number]: (sandra with an X)  ____Yes    ____ No     ____ NA    Smoking Cessation Booklet given:  ____Yes  ____No ____Patient Yanira López

## 2018-02-23 ENCOUNTER — APPOINTMENT (OUTPATIENT)
Dept: GENERAL RADIOLOGY | Age: 83
DRG: 191 | End: 2018-02-23
Payer: MEDICARE

## 2018-02-23 LAB
ABSOLUTE EOS #: 0.17 K/UL (ref 0–0.4)
ABSOLUTE IMMATURE GRANULOCYTE: ABNORMAL K/UL (ref 0–0.3)
ABSOLUTE LYMPH #: 0.52 K/UL (ref 1–4.8)
ABSOLUTE MONO #: 0.35 K/UL (ref 0–1)
ANION GAP SERPL CALCULATED.3IONS-SCNC: 14 MMOL/L (ref 9–17)
BASOPHILS # BLD: 0 % (ref 0–2)
BASOPHILS ABSOLUTE: 0 K/UL (ref 0–0.2)
BUN BLDV-MCNC: 35 MG/DL (ref 8–23)
BUN/CREAT BLD: 20 (ref 9–20)
CALCIUM SERPL-MCNC: 8.9 MG/DL (ref 8.6–10.4)
CHLORIDE BLD-SCNC: 107 MMOL/L (ref 98–107)
CO2: 24 MMOL/L (ref 20–31)
CREAT SERPL-MCNC: 1.73 MG/DL (ref 0.5–0.9)
DIFFERENTIAL TYPE: ABNORMAL
EKG ATRIAL RATE: 81 BPM
EKG P AXIS: 59 DEGREES
EKG P-R INTERVAL: 174 MS
EKG Q-T INTERVAL: 400 MS
EKG QRS DURATION: 72 MS
EKG QTC CALCULATION (BAZETT): 464 MS
EKG R AXIS: -21 DEGREES
EKG T AXIS: 83 DEGREES
EKG VENTRICULAR RATE: 81 BPM
EOSINOPHILS RELATIVE PERCENT: 1 % (ref 0–8)
GFR AFRICAN AMERICAN: 34 ML/MIN
GFR NON-AFRICAN AMERICAN: 28 ML/MIN
GFR SERPL CREATININE-BSD FRML MDRD: ABNORMAL ML/MIN/{1.73_M2}
GFR SERPL CREATININE-BSD FRML MDRD: ABNORMAL ML/MIN/{1.73_M2}
GLUCOSE BLD-MCNC: 182 MG/DL (ref 70–99)
HCT VFR BLD CALC: 33.1 % (ref 36–46)
HEMOGLOBIN: 10.7 G/DL (ref 12–16)
IMMATURE GRANULOCYTES: ABNORMAL %
LYMPHOCYTES # BLD: 3 % (ref 24–44)
MCH RBC QN AUTO: 29.7 PG (ref 26–34)
MCHC RBC AUTO-ENTMCNC: 32.2 G/DL (ref 31–37)
MCV RBC AUTO: 92.1 FL (ref 80–100)
MONOCYTES # BLD: 2 % (ref 0–12)
MORPHOLOGY: NORMAL
NRBC AUTOMATED: ABNORMAL PER 100 WBC
PDW BLD-RTO: 15.8 % (ref 12.1–15.2)
PLATELET # BLD: 117 K/UL (ref 140–450)
PLATELET ESTIMATE: ABNORMAL
PMV BLD AUTO: 9.4 FL (ref 6–12)
POTASSIUM SERPL-SCNC: 3.9 MMOL/L (ref 3.7–5.3)
RBC # BLD: 3.6 M/UL (ref 4–5.2)
RBC # BLD: ABNORMAL 10*6/UL
SEG NEUTROPHILS: 94 % (ref 36–66)
SEGMENTED NEUTROPHILS ABSOLUTE COUNT: 16.36 K/UL (ref 1.8–7.7)
SODIUM BLD-SCNC: 145 MMOL/L (ref 135–144)
WBC # BLD: 17.4 K/UL (ref 3.5–11)
WBC # BLD: ABNORMAL 10*3/UL

## 2018-02-23 PROCEDURE — 97116 GAIT TRAINING THERAPY: CPT

## 2018-02-23 PROCEDURE — 1200000000 HC SEMI PRIVATE

## 2018-02-23 PROCEDURE — 94640 AIRWAY INHALATION TREATMENT: CPT

## 2018-02-23 PROCEDURE — 94664 DEMO&/EVAL PT USE INHALER: CPT

## 2018-02-23 PROCEDURE — 6370000000 HC RX 637 (ALT 250 FOR IP): Performed by: INTERNAL MEDICINE

## 2018-02-23 PROCEDURE — 6360000002 HC RX W HCPCS: Performed by: INTERNAL MEDICINE

## 2018-02-23 PROCEDURE — 2580000003 HC RX 258: Performed by: INTERNAL MEDICINE

## 2018-02-23 PROCEDURE — G8988 SELF CARE GOAL STATUS: HCPCS

## 2018-02-23 PROCEDURE — 80048 BASIC METABOLIC PNL TOTAL CA: CPT

## 2018-02-23 PROCEDURE — 36415 COLL VENOUS BLD VENIPUNCTURE: CPT

## 2018-02-23 PROCEDURE — G8987 SELF CARE CURRENT STATUS: HCPCS

## 2018-02-23 PROCEDURE — 85025 COMPLETE CBC W/AUTO DIFF WBC: CPT

## 2018-02-23 PROCEDURE — 97110 THERAPEUTIC EXERCISES: CPT

## 2018-02-23 PROCEDURE — 97166 OT EVAL MOD COMPLEX 45 MIN: CPT

## 2018-02-23 PROCEDURE — 71046 X-RAY EXAM CHEST 2 VIEWS: CPT

## 2018-02-23 RX ADMIN — IPRATROPIUM BROMIDE AND ALBUTEROL SULFATE 1 AMPULE: .5; 3 SOLUTION RESPIRATORY (INHALATION) at 05:37

## 2018-02-23 RX ADMIN — IPRATROPIUM BROMIDE AND ALBUTEROL SULFATE 1 AMPULE: .5; 3 SOLUTION RESPIRATORY (INHALATION) at 15:42

## 2018-02-23 RX ADMIN — APIXABAN 2.5 MG: 2.5 TABLET, FILM COATED ORAL at 08:57

## 2018-02-23 RX ADMIN — IPRATROPIUM BROMIDE AND ALBUTEROL SULFATE 1 AMPULE: .5; 3 SOLUTION RESPIRATORY (INHALATION) at 10:48

## 2018-02-23 RX ADMIN — SODIUM CHLORIDE: 9 INJECTION, SOLUTION INTRAVENOUS at 09:30

## 2018-02-23 RX ADMIN — SODIUM CHLORIDE: 9 INJECTION, SOLUTION INTRAVENOUS at 23:24

## 2018-02-23 RX ADMIN — FERROUS SULFATE TAB 325 MG (65 MG ELEMENTAL FE) 325 MG: 325 (65 FE) TAB at 08:56

## 2018-02-23 RX ADMIN — BECLOMETHASONE DIPROPIONATE 1 PUFF: 80 AEROSOL, METERED RESPIRATORY (INHALATION) at 20:13

## 2018-02-23 RX ADMIN — SIMVASTATIN 20 MG: 20 TABLET, FILM COATED ORAL at 20:22

## 2018-02-23 RX ADMIN — Medication 1 G: at 19:27

## 2018-02-23 RX ADMIN — METHYLPREDNISOLONE SODIUM SUCCINATE 40 MG: 40 INJECTION, POWDER, FOR SOLUTION INTRAMUSCULAR; INTRAVENOUS at 08:57

## 2018-02-23 RX ADMIN — Medication 10 ML: at 08:58

## 2018-02-23 RX ADMIN — METOPROLOL TARTRATE 25 MG: 25 TABLET ORAL at 08:57

## 2018-02-23 RX ADMIN — DILTIAZEM HYDROCHLORIDE 120 MG: 120 CAPSULE, COATED, EXTENDED RELEASE ORAL at 08:56

## 2018-02-23 RX ADMIN — METHYLPREDNISOLONE SODIUM SUCCINATE 40 MG: 40 INJECTION, POWDER, FOR SOLUTION INTRAMUSCULAR; INTRAVENOUS at 20:21

## 2018-02-23 RX ADMIN — LEVOFLOXACIN 250 MG: 5 INJECTION, SOLUTION INTRAVENOUS at 16:04

## 2018-02-23 RX ADMIN — CLOPIDOGREL BISULFATE 75 MG: 75 TABLET ORAL at 08:57

## 2018-02-23 RX ADMIN — APIXABAN 2.5 MG: 2.5 TABLET, FILM COATED ORAL at 20:22

## 2018-02-23 RX ADMIN — BECLOMETHASONE DIPROPIONATE 1 PUFF: 80 AEROSOL, METERED RESPIRATORY (INHALATION) at 10:48

## 2018-02-23 RX ADMIN — FAMOTIDINE 20 MG: 20 TABLET, FILM COATED ORAL at 20:22

## 2018-02-23 RX ADMIN — METOPROLOL TARTRATE 25 MG: 25 TABLET ORAL at 20:22

## 2018-02-23 RX ADMIN — FUROSEMIDE 20 MG: 20 TABLET ORAL at 19:27

## 2018-02-23 RX ADMIN — IPRATROPIUM BROMIDE AND ALBUTEROL SULFATE 1 AMPULE: .5; 3 SOLUTION RESPIRATORY (INHALATION) at 20:13

## 2018-02-23 ASSESSMENT — PAIN SCALES - GENERAL
PAINLEVEL_OUTOF10: 0

## 2018-02-23 NOTE — PROGRESS NOTES
RESPIRATORY ASSESSMENT PROTOCOL                                                                                              Patient Name: Guerda Sport Room#: S520/E365-30 : 1928     Admitting diagnosis: COPD exacerbation ([facility] [J44.1]       Medical History:   Past Medical History:   Diagnosis Date    Arthritis     Asthma     [facility]    CAD (coronary artery disease)     [facility]est[facility])     Chronic kidney disease     COPD [facility]chronic obstructive pulmonary disease) ([facility]     Examination of participant in clinical trial 13    6 year follow up, estimated completion 2019    Gout 2017    Gout     H/O cardiovascular stress test 2017    H/O echocardiogram 2017    EF 55%. Mildly increased wall thickness of the LV. Evidence of diastolic dysfunction. Normal right ventricular size and function. Normal tricuspid valve structure and function. Mild tricuspid regurg    Hx of transesophageal echocardiography ([facility]) for monitoring 2017    No clots  were visulaized in the left atrial appendage EF 55%.     Hyperlipidemia     Hypertension     MI (myocardial infarction)         Shingles     15 years ago       [address] DATA  Hematology:   Lab Results   Component Value Date    WBC 23.5 2018    RBC 3.91 2018    RBC 4.12 2012    HGB 11.7 2018    HCT 36.0 2018     2018     2012     Chemistry:  No results found for: PHART, DYE0UAE, PO2ART, J2HMECSD, OPO3ROI, PBEA    Blood Culture:   Sputum Culture:     VITALS  Pulse: 70   Resp: 22  BP: (!) 152/78  SpO2: 93 % O2 Device: None (Room air)  Temp: 98.1 °F (36.7 °C)  Comment:   SKIN COLOR  [] Normal  [] Pale  [] Dusky  [] Cyanotic      RESPIRATORY PATTERN  [] Normal  [] Dyspnea  [] Cheyne-Joe  [] Kussmaul  [] Biots  AMBULATORY  [] Yes  [] No  [] With Assistance    PEAK Acuity Level is 2, 3, or 4 in any of the following:    [x] BILATERAL BREATH SOUNDS (BBS)     [x] PULMONARY HISTORY (PULM HX)  [] PULMONARY FUNCTION (PULM FX)    Goal: Improve respiratory functions in patients with airway disease and decrease WOB    [x] AEROSOL PROTOCOL    Total Acuity:   16-32  []  Secondary Assessment in 24 hrs Total Acuity:  9-15  [x]  Secondary Assessment in 24 hrs Total Acuity:  4-8  []  Secondary Assessment in 48 hrs Total Acuity:  0-3  []  Secondary Assessment in 72 hrs   HHN AEROSOL THERAPY with  [physician-ordered bronchodilator(s)] q 4 & Albuterol PRN q2 hrs. Breath-Actuated Neb if BBS Acuity = 4, and pt. can use MP. Notify physician if condition deteriorates. HHN AEROSOL THERAPY with  [physician-ordered bronchodilator(s)]  QID and Albuterol PRN q4 hrs. Breath-Actuated Neb if BBS Acuity = 4, and pt. can use MP. Notify physician if condition deteriorates. MDI THERAPY with  2 actuations of [physician-ordered bronchodilator(s)] via spacer TID Albuterol and PRNq4 hrs. If unable to utilize MDI: HHN [physician-ordered bronchodilator(s)] TID and Albuterol PRN q4 hrs. Notify physician if condition deteriorates. MDI THERAPY with  [physician-ordered bronchodilator(s)] via spacer TID PRN. If unable to utilize MDI: HHN [physician-ordered bronchodilator(s)] TID PRN. Notify physician if condition deteriorates. If Acuity Level is 2, 3, or 4 in any of the following:    [] COUGH     [] SURGICAL HISTORY (SURG HX)  [] CHEST XRAY (CXR)    Goal: Improvement in sputum mobilization in patients with ineffective airway clearance. Reverse atelectasis.     [] Bronchopulmonary Hygiene Protocol    Total Acuity:   16-32  []  Secondary Assessment in 24 hrs Total Acuity:  9-15  []  Secondary Assessment in 24 hrs Total Acuity:  4-8  []  Secondary Assessment in 48 hrs Total Acuity:  0-3  []  Secondary Assessment in 72 hrs   METANEB QID with [physician-ordered bronchodilator(s)] if CXR Acuity = 4; otherwise:  PD&P, PEP, or Vest QID & PRN  NT Sxn PRN for ineffective cough  METANEB QID with [physician-ordered bronchodilator(s)] if CXR Acuity = 4; otherwise:  PD&P, PEP, or Vest TID & PRN  NT Sxn PRN for ineffective cough  Instruct patient to self-perform IS q1hr WA   Directed Cough self-performed q1hr WA     If Acuity Level is 2 or above in the following:    [] PULMONARY HISTORY (PULM HX)    Goal: Assist patient in quitting smoking to slow or stop the progression of lung disease.     [] Smoking Cessation Protocol    SMOKING CESSATION EDUCATION provided according to policy [id number]: (sandra with an X)  ____Yes    ____ No     ____ NA    Smoking Cessation Booklet given:  ____Yes  ____No ____Patient Holger Carmichael

## 2018-02-23 NOTE — PROGRESS NOTES
Normal right ventricular size and function. Normal tricuspid valve structure and function. Mild tricuspid regurg    Hx of transesophageal echocardiography ([facility]) for monitoring 02/20/2017    No clots  were visulaized in the left atrial appendage EF 55%.  Hyperlipidemia     Hypertension     MI (myocardial infarction)     2001    Shingles     15 years ago     Past Surgical History:   Procedure Laterality Date    CARDIAC CATHETERIZATION  02/2017    LMCA: Normal 0% stenosis. LAD: Mid area 50-60% stenosis. LCx: Patent proximal stent 30% stenosis distal to the stent in OM proximal area 50% stenosis. RCA: Minimal 30% mid area PDA stent  is patent <20% stenosis. PL branch diffuse disease.  CHOLECYSTECTOMY      CORONARY ANGIOPLASTY WITH STENT PLACEMENT      SPINE SURGERY         Restrictions  Restrictions/Precautions  Restrictions/Precautions: General Precautions, Fall Risk  Subjective   General  Chart Reviewed: Yes  Subjective  Subjective: Pt reported no pain. Pain Screening  Patient Currently in Pain: Denies  Pain Assessment  Pain Assessment: 0-10  Pain Level: 0  Vital Signs  Patient Currently in Pain: Denies       Orientation  Orientation  Overall Orientation Status: Within Functional Limits  Objective   Bed mobility  Supine to Sit: Supervision  Sit to Supine: Supervision  Scooting: Supervision  Transfers  Sit to Stand: Contact guard assistance  Stand to sit: Contact guard assistance  Comment: Cues for hand placement/slower decent. Ambulation  Ambulation?: Yes  WB Status: no restrictions  Ambulation 1  Surface: level tile  Device: No Device  Assistance: Contact guard assistance  Distance: 75 feet x 1  Comments: SPO2 93% or higher during treatment. Pt often reached for external support with amb. Pt needed 2 short standing rest breaks d/t MEDEL.    Stairs/Curb  Stairs?: No     Balance  Posture: Fair  Sitting - Static: Good  Sitting - Dynamic: Good  Standing - Static: Good  Standing - Dynamic: Fair  Exercises  Hip Flexion: 20x  Hip Abduction: 20x  Knee Long Arc Quad: 20x  Ankle Pumps: 20x  Comments: Above exercises completed in sitting. Assessment      Patient Education: Minimal cues for safety with transfers. Pt demonstrated good understanding. REQUIRES PT FOLLOW UP: Yes  Activity Tolerance  Activity Tolerance: Patient Tolerated treatment well;Patient limited by endurance       Discharge Recommendations:  Continue to assess pending progress    G-Code     OutComes Score    AM-PAC Score    Goals  Short term goals  Time Frame for Short term goals: 14 visits  Short term goal 1: Patient to ambulate 80' with no LOB  Short term goal 2: Patient will demonstrate good dynamic standing balance to return to home environment  Short term goal 3: Patient to tolerate 20-30 minutes of therex/act for improved strength and endurance for ADLs  Short term goal 4: Patient will perform all bed mobility and transfers independently  Short term goal 5: Patient will be able to negotiate 3 steps for a safe return to home environment  Patient Goals   Patient goals : Return home with daughter    Plan    Plan  Times per week: 7  Times per day: Twice a day  Plan Comment: 1x/day on weekends  Safety Devices  Type of devices:  All fall risk precautions in place, Bed alarm in place, Call light within reach, Nurse notified, Left in bed, Gait belt     Therapy Time   Individual Concurrent Group Co-treatment   Time In [facility]         Time Out [address]

## 2018-02-23 NOTE — PROGRESS NOTES
ventricular size and function. Normal tricuspid valve structure and function. Mild tricuspid regurg    Hx of transesophageal echocardiography (KRISTOPHER) for monitoring 02/20/2017    No clots  were visulaized in the left atrial appendage EF 55%.  Hyperlipidemia     Hypertension     MI (myocardial infarction)     2001    Shingles     15 years ago     Past Surgical History:   Procedure Laterality Date    CARDIAC CATHETERIZATION  02/2017    LMCA: Normal 0% stenosis. LAD: Mid area 50-60% stenosis. LCx: Patent proximal stent 30% stenosis distal to the stent in OM proximal area 50% stenosis. RCA: Minimal 30% mid area PDA stent  is patent <20% stenosis. PL branch diffuse disease.  CHOLECYSTECTOMY      CORONARY ANGIOPLASTY WITH STENT PLACEMENT      SPINE SURGERY         Restrictions  Restrictions/Precautions  Restrictions/Precautions: General Precautions  Subjective   General  Chart Reviewed: Yes  Subjective  Subjective: Pt reported no pain. Pain Screening  Patient Currently in Pain: Denies  Pain Assessment  Pain Assessment: 0-10  Pain Level: 0  Vital Signs  Patient Currently in Pain: Denies       Orientation  Orientation  Overall Orientation Status: Within Functional Limits  Objective   Bed mobility  Supine to Sit: Supervision  Scooting: Supervision  Transfers  Sit to Stand: Contact guard assistance  Stand to sit: Contact guard assistance  Comment: Cues for safety. Ambulation  Ambulation?: Yes  Ambulation 1  Surface: level tile  Device: No Device  Assistance: Contact guard assistance  Distance: 10 feet x 1  Comments: SPO2 92% or higher after amb. SOB after amb that decreased with a short seated rest break. Stairs/Curb  Stairs?: No     Balance  Posture: Fair  Sitting - Static: Good  Sitting - Dynamic: Good  Standing - Static: Good  Standing - Dynamic: Fair  Exercises  Hip Flexion: 20x  Hip Abduction: 20x  Knee Long Arc Quad: 20x  Ankle Pumps: 20x  Comments: Above exercises completed in sitting.        Assessment

## 2018-02-23 NOTE — PROGRESS NOTES
Occupational Therapy   Occupational Therapy Initial Assessment  Date: 2018   Patient Name: Taiwo Lange  MRN: [id number]     : 1928    Patient Diagnosis(es): The primary encounter diagnosis was COPD exacerbation ([facility] Diagnoses of Chronic kidney disease, unspecified CKD stage and Fever, unspecified fever cause were also pertinent to this visit. has a past medical history of Arthritis; Asthma; Atrial fibrillation with rapid ventricular response (HCC); CAD (coronary artery disease); CHF (congestive heart failure) ([facility] Chronic kidney disease; COPD (chronic obstructive pulmonary disease) ([facility] Examination of participant in clinical trial; Gout; Gout; H/O cardiovascular stress test; H/O echocardiogram; Hx of transesophageal echocardiography (KRISTOPHER) for monitoring; Hyperlipidemia; Hypertension; MI (myocardial infarction); and Shingles. has a past surgical history that includes Cholecystectomy; Coronary angioplasty with stent; Spine surgery; and Cardiac catheterization (2017).            Restrictions  Restrictions/Precautions  Restrictions/Precautions: General Precautions, Fall Risk    Subjective   General  Chart Reviewed: Yes  Patient assessed for rehabilitation services?: Yes  Pain Assessment  Patient Currently in Pain: Denies  Pain Assessment: 0-10  Pain Level: 0  Oxygen Therapy  SpO2: 96 %  Pulse Oximeter Device Mode: Continuous  Pulse Oximeter Device Location: Finger;Left  O2 Device: None (Room air)  Social/Functional History  Social/Functional History  Lives With: Daughter  Type of Home: House  Home Layout: One level  Home Access: Stairs to enter with rails  Bathroom Shower/Tub: Tub/Shower unit  Bathroom Toilet: Standard  Bathroom Accessibility: Accessible  Home Equipment: Rolling walker  Receives Help From: Family  ADL Assistance: Independent  Homemaking Assistance: Needs assistance  Ambulation Assistance: Independent  Transfer Assistance: Independent  Active : No  Additional Comments:

## 2018-02-24 LAB
ABSOLUTE EOS #: 0 K/UL (ref 0–0.4)
ABSOLUTE IMMATURE GRANULOCYTE: ABNORMAL K/UL (ref 0–0.3)
ABSOLUTE LYMPH #: 0.32 K/UL (ref 1–4.8)
ABSOLUTE MONO #: 0 K/UL (ref 0–1)
ANION GAP SERPL CALCULATED.3IONS-SCNC: 14 MMOL/L (ref 9–17)
BASOPHILS # BLD: 0 % (ref 0–2)
BASOPHILS ABSOLUTE: 0 K/UL (ref 0–0.2)
BUN BLDV-MCNC: 32 MG/DL (ref 8–23)
BUN/CREAT BLD: 20 (ref 9–20)
CALCIUM SERPL-MCNC: 8.8 MG/DL (ref 8.6–10.4)
CHLORIDE BLD-SCNC: 107 MMOL/L (ref 98–107)
CO2: 23 MMOL/L (ref 20–31)
CREAT SERPL-MCNC: 1.57 MG/DL (ref 0.5–0.9)
CULTURE: ABNORMAL
CULTURE: ABNORMAL
DIFFERENTIAL TYPE: ABNORMAL
DIRECT EXAM: ABNORMAL
EOSINOPHILS RELATIVE PERCENT: 0 % (ref 0–8)
GFR AFRICAN AMERICAN: 38 ML/MIN
GFR NON-AFRICAN AMERICAN: 31 ML/MIN
GFR SERPL CREATININE-BSD FRML MDRD: ABNORMAL ML/MIN/{1.73_M2}
GFR SERPL CREATININE-BSD FRML MDRD: ABNORMAL ML/MIN/{1.73_M2}
GLUCOSE BLD-MCNC: 191 MG/DL (ref 70–99)
HCT VFR BLD CALC: 34.6 % (ref 36–46)
HEMOGLOBIN: 11 G/DL (ref 12–16)
IMMATURE GRANULOCYTES: ABNORMAL %
LYMPHOCYTES # BLD: 2 % (ref 24–44)
Lab: ABNORMAL
MCH RBC QN AUTO: 29.2 PG (ref 26–34)
MCHC RBC AUTO-ENTMCNC: 31.8 G/DL (ref 31–37)
MCV RBC AUTO: 91.9 FL (ref 80–100)
MONOCYTES # BLD: 0 % (ref 0–12)
MORPHOLOGY: NORMAL
NRBC AUTOMATED: ABNORMAL PER 100 WBC
PDW BLD-RTO: 15.8 % (ref 12.1–15.2)
PLATELET # BLD: 133 K/UL (ref 140–450)
PLATELET ESTIMATE: ABNORMAL
PMV BLD AUTO: 10.1 FL (ref 6–12)
POTASSIUM SERPL-SCNC: 3.4 MMOL/L (ref 3.7–5.3)
RBC # BLD: 3.77 M/UL (ref 4–5.2)
RBC # BLD: ABNORMAL 10*6/UL
SEG NEUTROPHILS: 98 % (ref 36–66)
SEGMENTED NEUTROPHILS ABSOLUTE COUNT: 15.48 K/UL (ref 1.8–7.7)
SODIUM BLD-SCNC: 144 MMOL/L (ref 135–144)
SPECIMEN DESCRIPTION: ABNORMAL
SPECIMEN DESCRIPTION: ABNORMAL
STATUS: ABNORMAL
WBC # BLD: 15.8 K/UL (ref 3.5–11)
WBC # BLD: ABNORMAL 10*3/UL

## 2018-02-24 PROCEDURE — 97110 THERAPEUTIC EXERCISES: CPT

## 2018-02-24 PROCEDURE — 85025 COMPLETE CBC W/AUTO DIFF WBC: CPT

## 2018-02-24 PROCEDURE — 94664 DEMO&/EVAL PT USE INHALER: CPT

## 2018-02-24 PROCEDURE — 6370000000 HC RX 637 (ALT 250 FOR IP): Performed by: INTERNAL MEDICINE

## 2018-02-24 PROCEDURE — 1200000000 HC SEMI PRIVATE

## 2018-02-24 PROCEDURE — 97116 GAIT TRAINING THERAPY: CPT

## 2018-02-24 PROCEDURE — 2580000003 HC RX 258: Performed by: INTERNAL MEDICINE

## 2018-02-24 PROCEDURE — 6360000002 HC RX W HCPCS: Performed by: INTERNAL MEDICINE

## 2018-02-24 PROCEDURE — 6360000002 HC RX W HCPCS: Performed by: FAMILY MEDICINE

## 2018-02-24 PROCEDURE — 36415 COLL VENOUS BLD VENIPUNCTURE: CPT

## 2018-02-24 PROCEDURE — 94760 N-INVAS EAR/PLS OXIMETRY 1: CPT

## 2018-02-24 PROCEDURE — 94640 AIRWAY INHALATION TREATMENT: CPT

## 2018-02-24 PROCEDURE — 6370000000 HC RX 637 (ALT 250 FOR IP): Performed by: FAMILY MEDICINE

## 2018-02-24 PROCEDURE — 80048 BASIC METABOLIC PNL TOTAL CA: CPT

## 2018-02-24 RX ORDER — POTASSIUM CHLORIDE 750 MG/1
10 TABLET, EXTENDED RELEASE ORAL 2 TIMES DAILY
Status: DISCONTINUED | OUTPATIENT
Start: 2018-02-24 | End: 2018-02-26 | Stop reason: HOSPADM

## 2018-02-24 RX ORDER — SODIUM CHLORIDE AND POTASSIUM CHLORIDE .9; .15 G/100ML; G/100ML
SOLUTION INTRAVENOUS CONTINUOUS
Status: DISCONTINUED | OUTPATIENT
Start: 2018-02-24 | End: 2018-02-25

## 2018-02-24 RX ADMIN — METOPROLOL TARTRATE 25 MG: 25 TABLET ORAL at 08:16

## 2018-02-24 RX ADMIN — IPRATROPIUM BROMIDE AND ALBUTEROL SULFATE 1 AMPULE: .5; 3 SOLUTION RESPIRATORY (INHALATION) at 21:27

## 2018-02-24 RX ADMIN — BECLOMETHASONE DIPROPIONATE 1 PUFF: 80 AEROSOL, METERED RESPIRATORY (INHALATION) at 11:34

## 2018-02-24 RX ADMIN — METOPROLOL TARTRATE 25 MG: 25 TABLET ORAL at 20:18

## 2018-02-24 RX ADMIN — FERROUS SULFATE TAB 325 MG (65 MG ELEMENTAL FE) 325 MG: 325 (65 FE) TAB at 08:16

## 2018-02-24 RX ADMIN — IPRATROPIUM BROMIDE AND ALBUTEROL SULFATE 1 AMPULE: .5; 3 SOLUTION RESPIRATORY (INHALATION) at 15:13

## 2018-02-24 RX ADMIN — METHYLPREDNISOLONE SODIUM SUCCINATE 40 MG: 40 INJECTION, POWDER, FOR SOLUTION INTRAMUSCULAR; INTRAVENOUS at 08:15

## 2018-02-24 RX ADMIN — BECLOMETHASONE DIPROPIONATE 1 PUFF: 80 AEROSOL, METERED RESPIRATORY (INHALATION) at 21:27

## 2018-02-24 RX ADMIN — IPRATROPIUM BROMIDE AND ALBUTEROL SULFATE 1 AMPULE: .5; 3 SOLUTION RESPIRATORY (INHALATION) at 11:34

## 2018-02-24 RX ADMIN — Medication 1 G: at 20:18

## 2018-02-24 RX ADMIN — POTASSIUM CHLORIDE AND SODIUM CHLORIDE: 900; 150 INJECTION, SOLUTION INTRAVENOUS at 22:46

## 2018-02-24 RX ADMIN — APIXABAN 2.5 MG: 2.5 TABLET, FILM COATED ORAL at 20:18

## 2018-02-24 RX ADMIN — POTASSIUM CHLORIDE 10 MEQ: 10 TABLET, EXTENDED RELEASE ORAL at 20:19

## 2018-02-24 RX ADMIN — CLOPIDOGREL BISULFATE 75 MG: 75 TABLET ORAL at 08:16

## 2018-02-24 RX ADMIN — METHYLPREDNISOLONE SODIUM SUCCINATE 40 MG: 40 INJECTION, POWDER, FOR SOLUTION INTRAMUSCULAR; INTRAVENOUS at 20:18

## 2018-02-24 RX ADMIN — Medication 10 ML: at 20:24

## 2018-02-24 RX ADMIN — APIXABAN 2.5 MG: 2.5 TABLET, FILM COATED ORAL at 08:15

## 2018-02-24 RX ADMIN — DILTIAZEM HYDROCHLORIDE 120 MG: 120 CAPSULE, COATED, EXTENDED RELEASE ORAL at 08:16

## 2018-02-24 RX ADMIN — IPRATROPIUM BROMIDE AND ALBUTEROL SULFATE 1 AMPULE: .5; 3 SOLUTION RESPIRATORY (INHALATION) at 05:31

## 2018-02-24 RX ADMIN — FAMOTIDINE 20 MG: 20 TABLET, FILM COATED ORAL at 20:18

## 2018-02-24 RX ADMIN — SIMVASTATIN 20 MG: 20 TABLET, FILM COATED ORAL at 20:19

## 2018-02-24 RX ADMIN — POTASSIUM CHLORIDE AND SODIUM CHLORIDE: 900; 150 INJECTION, SOLUTION INTRAVENOUS at 08:19

## 2018-02-24 RX ADMIN — POTASSIUM CHLORIDE 10 MEQ: 10 TABLET, EXTENDED RELEASE ORAL at 08:16

## 2018-02-24 ASSESSMENT — PAIN SCALES - GENERAL
PAINLEVEL_OUTOF10: 0

## 2018-02-24 NOTE — PLAN OF CARE
Problem: Falls - Risk of  Goal: Absence of falls  Outcome: Ongoing  Patient is alert and oriented and has demonstrated the use of using the call light for assistance before getting up. Education has been provided to defer the use of the bed alarm and/or restraint free alarm as the patient has shown competency in calling for assistance and verbalizing the risk.         Problem: Airway Clearance - Ineffective:  Goal: Ability to maintain a clear airway will improve  Ability to maintain a clear airway will improve   Outcome: Ongoing  Airway remains clear     Problem: Gas Exchange - Impaired:  Goal: Levels of oxygenation will improve  Levels of oxygenation will improve   Outcome: Ongoing  Currently on room air, dyspnea with exertion noted    Problem: Pain:  Goal: Pain level will decrease  Pain level will decrease   Outcome: Ongoing  Denies any pain at this time

## 2018-02-24 NOTE — PLAN OF CARE
Problem: Falls - Risk of  Goal: Absence of falls  Outcome: Ongoing  Remains free from falls. Fall precautions in place, slipper socks on. Pathway clear. Call light and belongings in reach. Instructed to use call light prior to getting up. Problem: Airway Clearance - Ineffective:  Goal: Ability to maintain a clear airway will improve  Ability to maintain a clear airway will improve   Outcome: Ongoing  Airway remains clear at this time. Inspiratory and expiratory wheezing noted throughout lungs. Pt experiencing shortness of breath with exertion. IV ATB's and breathing treatments continued. Problem: Gas Exchange - Impaired:  Goal: Levels of oxygenation will improve  Levels of oxygenation will improve   Outcome: Ongoing  Pulse ox greater than 92% on room air, breathing treatments given as ordered, no shortness of breath or difficulty breathing noted with exertion. Problem: Pain:  Goal: Pain level will decrease  Pain level will decrease   Outcome: Ongoing  Pt. Denies and pain or discomfort. Will continue to monitor.

## 2018-02-24 NOTE — PROGRESS NOTES
FLOW  Predicted:     Personal Best:        VITAL CAPACITY  Predicted value:  ml  Actual Value:  ml  30% of Predicted:  ml   Patient Acuity 0 1 2 3 4 Score   Level of Concious (LOC) [x]  Alert & Oriented or Pt normal LOC []  Confused;follows directions []  Confused & uncooper-ative []  Obtunded []  Comatose 0   Respiratory Rate  (RR) []  Reg. rate & pattern. 12 - 20 bpm  []  Increased RR. Greater than 20 bpm   [x]  SOB w/ exertion or RR greater than 24 bpm []  Access- ory muscle use at rest. Abn.  resp. []  SOB at rest.   2   Bilateral Breath Sounds (BBS) []  Clear []  Diminish-ed bases  []  Diminish-ed t/o, or rales   [x]  Sporadic, scattered wheezes or rhonchi []  Persistentwheezes and, or absent BBS 3   Cough []  Strong, effective, & non-prod. [x]  Effective & prod. Less than 25 ml (2 TBSP) over past 24 hrs []  Ineffective & non-prod to less than 25 ML over past 24 hrs []  Ineffective and, or greater than 25 ml sputum prod. past 24 hrs. []  Nonspon- taneous; Requires suctioning 1   Pulmonary History  (PULM HX) []  No smoking and no chronic pulmonaryhistory []  Former smoker. Quit over 12 mos. ago []  Current smoker or quit w/ in 12 mos []  Pulm. History and, or 20 pk/yr smoking hx [x]  Admitted w/ acute pulm. dx and, or has been admitted w/ pulm. dx 2 or more times over past 12 mos 4   Surgical History this Admit  (SURG HX) [x]  No surgery []  General surgery []  Lower abdominal []  Thoracic or upper abdominal   []  Thoracic w/ pulm. disease 0   Chest X-Ray (CXR)/CT Scan [x]  Clear or not applicable []  Not available []  Atelect- asis or pleural effusions []  Localized infiltrate or pulm. edema []  Con-solidated Infiltrates, bilateral, or in more than 1 lobe 0   Slow or Forced VC, FEV1 OR PEFR (PULM FXN)  [x]  80% or greater, or not indicated []  Pt. unable to perform []  FEV1 or PEFR or VC 51-79%.   []  FEV1 or PEFR or VC  30-49%   []  FEV1 or PEFR or VC less than 30%   0   TOTAL ACUITY: 10       CARE PLAN    If Acuity Level is 2, 3, or 4 in any of the following:    [x] BILATERAL BREATH SOUNDS (BBS)     [x] PULMONARY HISTORY (PULM HX)  [] PULMONARY FUNCTION (PULM FX)    Goal: Improve respiratory functions in patients with airway disease and decrease WOB    [x] AEROSOL PROTOCOL    Total Acuity:   16-32  []  Secondary Assessment in 24 hrs Total Acuity:  9-15  [x]  Secondary Assessment in 24 hrs Total Acuity:  4-8  []  Secondary Assessment in 48 hrs Total Acuity:  0-3  []  Secondary Assessment in 72 hrs   HHN AEROSOL THERAPY with  [physician-ordered bronchodilator(s)] q 4 & Albuterol PRN q2 hrs. Breath-Actuated Neb if BBS Acuity = 4, and pt. can use MP. Notify physician if condition deteriorates. HHN AEROSOL THERAPY with  [physician-ordered bronchodilator(s)]  QID and Albuterol PRN q4 hrs. Breath-Actuated Neb if BBS Acuity = 4, and pt. can use MP. Notify physician if condition deteriorates. MDI THERAPY with  2 actuations of [physician-ordered bronchodilator(s)] via spacer TID Albuterol and PRNq4 hrs. If unable to utilize MDI: HHN [physician-ordered bronchodilator(s)] TID and Albuterol PRN q4 hrs. Notify physician if condition deteriorates. MDI THERAPY with  [physician-ordered bronchodilator(s)] via spacer TID PRN. If unable to utilize MDI: HHN [physician-ordered bronchodilator(s)] TID PRN. Notify physician if condition deteriorates. If Acuity Level is 2, 3, or 4 in any of the following:    [] COUGH     [] SURGICAL HISTORY (SURG HX)  [] CHEST XRAY (CXR)    Goal: Improvement in sputum mobilization in patients with ineffective airway clearance. Reverse atelectasis.     [] Bronchopulmonary Hygiene Protocol    Total Acuity:   16-32  []  Secondary Assessment in 24 hrs Total Acuity:  9-15  []  Secondary Assessment in 24 hrs Total Acuity:  4-8  []  Secondary Assessment in 48 hrs Total Acuity:  0-3  []  Secondary Assessment in 72 hrs   METANEB QID with [physician-ordered bronchodilator(s)] if CXR Acuity = 4;

## 2018-02-25 LAB
ABSOLUTE EOS #: 0 K/UL (ref 0–0.4)
ABSOLUTE IMMATURE GRANULOCYTE: ABNORMAL K/UL (ref 0–0.3)
ABSOLUTE LYMPH #: 0.64 K/UL (ref 1–4.8)
ABSOLUTE MONO #: 0 K/UL (ref 0–1)
ANION GAP SERPL CALCULATED.3IONS-SCNC: 13 MMOL/L (ref 9–17)
BASOPHILS # BLD: 0 % (ref 0–2)
BASOPHILS ABSOLUTE: 0 K/UL (ref 0–0.2)
BUN BLDV-MCNC: 29 MG/DL (ref 8–23)
BUN/CREAT BLD: 23 (ref 9–20)
CALCIUM SERPL-MCNC: 9 MG/DL (ref 8.6–10.4)
CHLORIDE BLD-SCNC: 109 MMOL/L (ref 98–107)
CO2: 23 MMOL/L (ref 20–31)
CREAT SERPL-MCNC: 1.28 MG/DL (ref 0.5–0.9)
DIFFERENTIAL TYPE: ABNORMAL
EOSINOPHILS RELATIVE PERCENT: 0 % (ref 0–8)
GFR AFRICAN AMERICAN: 48 ML/MIN
GFR NON-AFRICAN AMERICAN: 39 ML/MIN
GFR SERPL CREATININE-BSD FRML MDRD: ABNORMAL ML/MIN/{1.73_M2}
GFR SERPL CREATININE-BSD FRML MDRD: ABNORMAL ML/MIN/{1.73_M2}
GLUCOSE BLD-MCNC: 197 MG/DL (ref 70–99)
HCT VFR BLD CALC: 33.7 % (ref 36–46)
HEMOGLOBIN: 12 G/DL (ref 12–16)
IMMATURE GRANULOCYTES: ABNORMAL %
LYMPHOCYTES # BLD: 6 % (ref 24–44)
MCH RBC QN AUTO: 31.7 PG (ref 26–34)
MCHC RBC AUTO-ENTMCNC: 35.5 G/DL (ref 31–37)
MCV RBC AUTO: 89.4 FL (ref 80–100)
MONOCYTES # BLD: 0 % (ref 0–12)
MORPHOLOGY: NORMAL
NRBC AUTOMATED: ABNORMAL PER 100 WBC
PDW BLD-RTO: 14.6 % (ref 12.1–15.2)
PLATELET # BLD: 127 K/UL (ref 140–450)
PLATELET ESTIMATE: ABNORMAL
PMV BLD AUTO: 9.4 FL (ref 6–12)
POTASSIUM SERPL-SCNC: 3.7 MMOL/L (ref 3.7–5.3)
RBC # BLD: 3.77 M/UL (ref 4–5.2)
RBC # BLD: ABNORMAL 10*6/UL
SEG NEUTROPHILS: 94 % (ref 36–66)
SEGMENTED NEUTROPHILS ABSOLUTE COUNT: 10.06 K/UL (ref 1.8–7.7)
SODIUM BLD-SCNC: 145 MMOL/L (ref 135–144)
WBC # BLD: 10.7 K/UL (ref 3.5–11)
WBC # BLD: ABNORMAL 10*3/UL

## 2018-02-25 PROCEDURE — 36415 COLL VENOUS BLD VENIPUNCTURE: CPT

## 2018-02-25 PROCEDURE — 94664 DEMO&/EVAL PT USE INHALER: CPT

## 2018-02-25 PROCEDURE — 6370000000 HC RX 637 (ALT 250 FOR IP): Performed by: INTERNAL MEDICINE

## 2018-02-25 PROCEDURE — 6360000002 HC RX W HCPCS: Performed by: INTERNAL MEDICINE

## 2018-02-25 PROCEDURE — 97110 THERAPEUTIC EXERCISES: CPT

## 2018-02-25 PROCEDURE — 6370000000 HC RX 637 (ALT 250 FOR IP): Performed by: FAMILY MEDICINE

## 2018-02-25 PROCEDURE — 85025 COMPLETE CBC W/AUTO DIFF WBC: CPT

## 2018-02-25 PROCEDURE — 97116 GAIT TRAINING THERAPY: CPT

## 2018-02-25 PROCEDURE — 94640 AIRWAY INHALATION TREATMENT: CPT

## 2018-02-25 PROCEDURE — 1200000000 HC SEMI PRIVATE

## 2018-02-25 PROCEDURE — 2580000003 HC RX 258: Performed by: INTERNAL MEDICINE

## 2018-02-25 PROCEDURE — 94667 MNPJ CHEST WALL 1ST: CPT

## 2018-02-25 PROCEDURE — 80048 BASIC METABOLIC PNL TOTAL CA: CPT

## 2018-02-25 RX ORDER — LEVOFLOXACIN 250 MG/1
250 TABLET ORAL EVERY OTHER DAY
Status: DISCONTINUED | OUTPATIENT
Start: 2018-02-25 | End: 2018-02-26 | Stop reason: HOSPADM

## 2018-02-25 RX ORDER — PREDNISONE 20 MG/1
20 TABLET ORAL 2 TIMES DAILY WITH MEALS
Status: DISCONTINUED | OUTPATIENT
Start: 2018-02-25 | End: 2018-02-26 | Stop reason: HOSPADM

## 2018-02-25 RX ADMIN — FAMOTIDINE 20 MG: 20 TABLET, FILM COATED ORAL at 20:43

## 2018-02-25 RX ADMIN — PREDNISONE 20 MG: 20 TABLET ORAL at 16:29

## 2018-02-25 RX ADMIN — SIMVASTATIN 20 MG: 20 TABLET, FILM COATED ORAL at 20:43

## 2018-02-25 RX ADMIN — POTASSIUM CHLORIDE 10 MEQ: 10 TABLET, EXTENDED RELEASE ORAL at 07:47

## 2018-02-25 RX ADMIN — LEVOFLOXACIN 250 MG: 250 TABLET, FILM COATED ORAL at 16:29

## 2018-02-25 RX ADMIN — DILTIAZEM HYDROCHLORIDE 120 MG: 120 CAPSULE, COATED, EXTENDED RELEASE ORAL at 07:47

## 2018-02-25 RX ADMIN — POTASSIUM CHLORIDE 10 MEQ: 10 TABLET, EXTENDED RELEASE ORAL at 20:43

## 2018-02-25 RX ADMIN — CLOPIDOGREL BISULFATE 75 MG: 75 TABLET ORAL at 07:47

## 2018-02-25 RX ADMIN — Medication 10 ML: at 20:43

## 2018-02-25 RX ADMIN — METOPROLOL TARTRATE 25 MG: 25 TABLET ORAL at 07:47

## 2018-02-25 RX ADMIN — APIXABAN 2.5 MG: 2.5 TABLET, FILM COATED ORAL at 07:47

## 2018-02-25 RX ADMIN — IPRATROPIUM BROMIDE AND ALBUTEROL SULFATE 1 AMPULE: .5; 3 SOLUTION RESPIRATORY (INHALATION) at 11:08

## 2018-02-25 RX ADMIN — IPRATROPIUM BROMIDE AND ALBUTEROL SULFATE 1 AMPULE: .5; 3 SOLUTION RESPIRATORY (INHALATION) at 14:33

## 2018-02-25 RX ADMIN — APIXABAN 2.5 MG: 2.5 TABLET, FILM COATED ORAL at 20:43

## 2018-02-25 RX ADMIN — METOPROLOL TARTRATE 25 MG: 25 TABLET ORAL at 20:42

## 2018-02-25 RX ADMIN — FERROUS SULFATE TAB 325 MG (65 MG ELEMENTAL FE) 325 MG: 325 (65 FE) TAB at 07:47

## 2018-02-25 RX ADMIN — METHYLPREDNISOLONE SODIUM SUCCINATE 40 MG: 40 INJECTION, POWDER, FOR SOLUTION INTRAMUSCULAR; INTRAVENOUS at 07:47

## 2018-02-25 RX ADMIN — ALBUTEROL SULFATE 2.5 MG: 2.5 SOLUTION RESPIRATORY (INHALATION) at 05:49

## 2018-02-25 RX ADMIN — FUROSEMIDE 20 MG: 20 TABLET ORAL at 18:49

## 2018-02-25 RX ADMIN — Medication 1 G: at 18:49

## 2018-02-25 RX ADMIN — BECLOMETHASONE DIPROPIONATE 1 PUFF: 80 AEROSOL, METERED RESPIRATORY (INHALATION) at 11:08

## 2018-02-25 ASSESSMENT — PAIN SCALES - GENERAL
PAINLEVEL_OUTOF10: 0
PAINLEVEL_OUTOF10: 0

## 2018-02-25 NOTE — PLAN OF CARE
Problem: Falls - Risk of  Goal: Absence of falls  Outcome: Ongoing  Patient remains free from falls. Fall precautions taken to ensure patient safety during ambulation. Call light in reach to reach assistance PRN. Fall risk sticker in place on patient's ID band as is fall risk magnet intact on door frame. Problem: Airway Clearance - Ineffective:  Goal: Ability to maintain a clear airway will improve  Ability to maintain a clear airway will improve   Outcome: Ongoing      Problem: Gas Exchange - Impaired:  Goal: Levels of oxygenation will improve  Levels of oxygenation will improve   Outcome: Ongoing      Problem: Pain:  Goal: Pain level will decrease  Pain level will decrease   Pain assessed throughout shift. Pain treated according to pain scale utilized. Patient made aware that pain may not be completely relieved with pain intervention, but that pain should be tolerable. Patient made aware of pain treatments so that he/she can choose intervention(s).

## 2018-02-25 NOTE — PLAN OF CARE
Problem: Falls - Risk of  Goal: Absence of falls  Outcome: Ongoing  [place] fall assessment scale complete. Fall band on ID bracelet and falling star posted on patient doorway. Non-skid socks remain in place with bed in lowest position and wheels locked. Call light within reach and bed alarm on. Will continue to monitor. Problem: Airway Clearance - Ineffective:  Goal: Ability to maintain a clear airway will improve  Ability to maintain a clear airway will improve   Outcome: Ongoing  Patient able to cough and clear airway. Dyspnea with exertion continues. Will continue to monitor. Problem: Gas Exchange - Impaired:  Goal: Levels of oxygenation will improve  Levels of oxygenation will improve   Outcome: Ongoing  O2 sat WDL, patient on room air. Problem: Pain:  Goal: Pain level will decrease  Pain level will decrease   Outcome: Ongoing  Pain scale of 0/10 being used to assess patient pain level. Patient rates pain at this time 0/10. Will assess pain level with hourly rounding and will continue to monitor.

## 2018-02-25 NOTE — PROGRESS NOTES
RESPIRATORY ASSESSMENT PROTOCOL                                                                                              Patient Name: Carmenza Hamlin Room#: [id number] : 1928     Admitting diagnosis: COPD exacerbation ([facility] [J44.1]       Medical History:   Past Medical History:   Diagnosis Date    Arthritis     Asthma     Atrial fibrillation with rapid ventricular response ([facility] 2017    CAD (coronary artery disease)     [facility]     Chronic kidney disease     COPD (chronic obstructive pulmonary disease) ([facility]     Examination of participant in clinical trial 13    6 year follow up, estimated completion 2019    Gout 2017    Gout     H/O cardiovascular stress test 2017    H/O echocardiogram 2017    EF 55%. Mildly increased wall thickness of the LV. Evidence of diastolic dysfunction. Normal right ventricular size and function. Normal tricuspid valve structure and function. Mild tricuspid regurg    Hx of transesophageal echocardiography (KRISTOPHER) for monitoring 2017    No clots  were visulaized in the left atrial appendage EF 55%.     Hyperlipidemia     Hypertension     MI (myocardial infarction)         Shingles     15 years ago       PATIENT ASSESSMENT    LABORATORY DATA  Hematology:   Lab Results   Component Value Date    WBC 15.8 2018    RBC 3.77 2018    RBC 4.12 2012    HGB 11.0 2018    HCT 34.6 2018     2018     2012     Chemistry:  No results found for: PHART, DMJ1OXT, PO2ART, U3JGSSIA, WJT3SBL, PBEA    Blood Culture:  Sputum Culture:     VITALS  Pulse: 84   Resp: 16  BP: (!) 165/92  SpO2: 95 % O2 Device: None (Room air)  Temp: 97.8 °F (36.6 °C)  Comment:     SKIN COLOR  [x] Normal  [] Pale  [] Dusky  [] Cyanotic      RESPIRATORY PATTERN  [] Normal  [x] Dyspnea  [] Cheyne-Joe  [] Kussmaul  [] Biots    AMBULATORY  [] Yes  [] No  [x] With Assistance    PEAK

## 2018-02-25 NOTE — PROGRESS NOTES
Normal right ventricular size and function. Normal tricuspid valve structure and function. Mild tricuspid regurg    Hx of transesophageal echocardiography (KRISTOPHER) for monitoring 02/20/2017    No clots  were visulaized in the left atrial appendage EF 55%.  Hyperlipidemia     Hypertension     MI (myocardial infarction)     2001    Shingles     15 years ago     Past Surgical History:   Procedure Laterality Date    CARDIAC CATHETERIZATION  02/2017    LMCA: Normal 0% stenosis. LAD: Mid area 50-60% stenosis. LCx: Patent proximal stent 30% stenosis distal to the stent in OM proximal area 50% stenosis. RCA: Minimal 30% mid area PDA stent  is patent <20% stenosis. PL branch diffuse disease.  CHOLECYSTECTOMY      CORONARY ANGIOPLASTY WITH STENT PLACEMENT      SPINE SURGERY         Restrictions  Restrictions/Precautions  Restrictions/Precautions: General Precautions, Fall Risk  Subjective   General  Chart Reviewed: Yes  Subjective  Subjective: Pt reported no pain and stated that she is feeling better. Pain Screening  Patient Currently in Pain: Denies  Pain Assessment  Pain Assessment: 0-10  Pain Level: 0  Vital Signs  Patient Currently in Pain: Denies       Orientation  Orientation  Overall Orientation Status: Within Functional Limits  Objective      Transfers  Sit to Stand: Contact guard assistance  Stand to sit: Contact guard assistance  Comment: Verbal cues for safety. Ambulation  Ambulation?: Yes  WB Status: no restrictions  Ambulation 1  Surface: level tile  Device: No Device; Carver rail  Assistance: Contact guard assistance  Distance: 100 feet x 1  Comments: Pt reached for external support of HR. Verbal cues for posture. SPO2 95% after amb.    Stairs/Curb  Stairs?: No     Balance  Posture: Fair  Sitting - Static: Good  Sitting - Dynamic: Good  Standing - Static: Fair;+  Standing - Dynamic: Fair  Exercises  Hip Flexion: 20x  Hip Abduction: 20x  Knee Long Arc Quad: 20x  Ankle Pumps: 20x  Comments: Above

## 2018-02-26 VITALS
HEART RATE: 90 BPM | SYSTOLIC BLOOD PRESSURE: 163 MMHG | DIASTOLIC BLOOD PRESSURE: 96 MMHG | WEIGHT: 133 LBS | OXYGEN SATURATION: 94 % | TEMPERATURE: 97.9 F | BODY MASS INDEX: 24.48 KG/M2 | RESPIRATION RATE: 18 BRPM | HEIGHT: 62 IN

## 2018-02-26 PROCEDURE — 94668 MNPJ CHEST WALL SBSQ: CPT

## 2018-02-26 PROCEDURE — 6370000000 HC RX 637 (ALT 250 FOR IP): Performed by: FAMILY MEDICINE

## 2018-02-26 PROCEDURE — 6370000000 HC RX 637 (ALT 250 FOR IP): Performed by: INTERNAL MEDICINE

## 2018-02-26 PROCEDURE — 2580000003 HC RX 258: Performed by: INTERNAL MEDICINE

## 2018-02-26 PROCEDURE — 97116 GAIT TRAINING THERAPY: CPT

## 2018-02-26 PROCEDURE — 97530 THERAPEUTIC ACTIVITIES: CPT

## 2018-02-26 PROCEDURE — 94640 AIRWAY INHALATION TREATMENT: CPT

## 2018-02-26 PROCEDURE — 94664 DEMO&/EVAL PT USE INHALER: CPT

## 2018-02-26 RX ORDER — METHYLPREDNISOLONE 4 MG/1
4 TABLET ORAL SEE ADMIN INSTRUCTIONS
Qty: 1 KIT | Refills: 0 | Status: SHIPPED | OUTPATIENT
Start: 2018-02-26 | End: 2018-03-04

## 2018-02-26 RX ADMIN — PREDNISONE 20 MG: 20 TABLET ORAL at 08:38

## 2018-02-26 RX ADMIN — IPRATROPIUM BROMIDE AND ALBUTEROL SULFATE 1 AMPULE: .5; 3 SOLUTION RESPIRATORY (INHALATION) at 11:38

## 2018-02-26 RX ADMIN — Medication 10 ML: at 08:38

## 2018-02-26 RX ADMIN — APIXABAN 2.5 MG: 2.5 TABLET, FILM COATED ORAL at 08:38

## 2018-02-26 RX ADMIN — PREDNISONE 20 MG: 20 TABLET ORAL at 16:21

## 2018-02-26 RX ADMIN — DILTIAZEM HYDROCHLORIDE 120 MG: 120 CAPSULE, COATED, EXTENDED RELEASE ORAL at 08:37

## 2018-02-26 RX ADMIN — BECLOMETHASONE DIPROPIONATE 1 PUFF: 80 AEROSOL, METERED RESPIRATORY (INHALATION) at 11:38

## 2018-02-26 RX ADMIN — POTASSIUM CHLORIDE 10 MEQ: 10 TABLET, EXTENDED RELEASE ORAL at 08:37

## 2018-02-26 RX ADMIN — CLOPIDOGREL BISULFATE 75 MG: 75 TABLET ORAL at 08:37

## 2018-02-26 RX ADMIN — IPRATROPIUM BROMIDE AND ALBUTEROL SULFATE 1 AMPULE: .5; 3 SOLUTION RESPIRATORY (INHALATION) at 05:32

## 2018-02-26 RX ADMIN — METOPROLOL TARTRATE 25 MG: 25 TABLET ORAL at 08:38

## 2018-02-26 RX ADMIN — FERROUS SULFATE TAB 325 MG (65 MG ELEMENTAL FE) 325 MG: 325 (65 FE) TAB at 08:37

## 2018-02-26 ASSESSMENT — PAIN SCALES - GENERAL
PAINLEVEL_OUTOF10: 0
PAINLEVEL_OUTOF10: 0

## 2018-02-26 NOTE — DISCHARGE SUMMARY
Mildly increased wall thickness of the LV. Evidence of diastolic dysfunction. Normal right ventricular size and function. Normal tricuspid valve structure and function. Mild tricuspid regurg    Hx of transesophageal echocardiography (KRISTOPHER) for monitoring 02/20/2017    No clots  were visulaized in the left atrial appendage EF 55%.  Hyperlipidemia     Hypertension     MI (myocardial infarction)     2001    Shingles     15 years ago       Physical exam:      -----------------------------------------------------------------  Exam:  GEN:   A & O x3, no apparent distress  EYES: No gross abnormalities. NECK: normal,  no carotid bruits  PULM: decreased breath sounds noted- chronic  COR: regular rate & rhythm, no murmurs and no gallops  ABD:  soft, non-tender, non-distended, normal bowel sounds, no masses or organomegaly  EXT:   no cyanosis, clubbing or edema present    NEURO: negative  SKIN:  no rashes or significant lesions  -----------------------------------------------------------------          Hospital Course: The patient was admitted for the above. She was treated with IV antibiotics, steroids, respiratory therapy and improved over the course of her hospitalization. Consultants:    · Pulmonary     Procedures:    · none    Complications:   · none    Significant Diagnostic Studies:   Xr Chest Standard (2 Vw)    Result Date: 2/23/2018  FINAL REPORT EXAM:  XR CHEST (2 VW) HISTORY:  shortness of breath TECHNIQUE:  Chest, PA and lateral PRIORS:  2/21/2018 FINDINGS:  Left upper lobe nodule remains stable. There is some new mild parenchymal density at the left lung base which is probably atelectasis. Remaining lungs are clear. There is no pneumothorax. There is no pleural effusion seen. Impression:  There is new minimal parenchymal density at the left base which is most likely atelectasis.  Electronically Signed By: Morgan Ozuna   on  02/23/2018  13:58    Xr Chest Standard (2 Vw)    Result Date:

## 2018-02-26 NOTE — PROGRESS NOTES
Physical Therapy  Facility/Department: [facility] AT THE [facility] MED SURG  Daily Treatment Note  NAME: Noemy Gavin  : 1928  MRN: [id number]    Date of Service: 2018    Patient Diagnosis(es):   Patient Active Problem List    Diagnosis Date Noted    Acute respiratory failure with hypoxemia ([facility] 2017     Priority: High     Class: Acute    COPD with acute exacerbation ([facility] 2017     Priority: High     Class: Acute    Asthma 2013     Priority: High    Diastolic CHF ([address]      Priority: High    Chronic diastolic CHF (congestive heart failure) (HCC) 2018    COPD exacerbation ([facility]) 2018    Acute on chronic diastolic congestive heart failure ([facility])     Elevated brain natriuretic peptide (BNP) level 2018    Coronary artery disease involving native coronary artery of native heart without angina pectoris     UTI (urinary tract infection) 2018    Panlobular emphysema ([facility])     Atrial fibrillation with rapid ventricular response ([facility]) 2017    Elevated troponin     Ex-smoker 2017    COPD (chronic obstructive pulmonary disease) ([facility])     CKD (chronic kidney disease) stage 4, GFR 15-29 ml/min ([facility]) 2016    Renal artery stenosis ([facility] 2016    Renovascular hypertension 2016    Status post coronary artery stent placement 2013       Past Medical History:   Diagnosis Date    Arthritis     Asthma     Atrial fibrillation with rapid ventricular response ([facility] 2017    CAD (coronary artery disease)     CHF (congestive heart failure) ([facility])     Chronic kidney disease     COPD (chronic obstructive pulmonary disease) ([facility]     Examination of participant in clinical trial 13    6 year follow up, estimated completion 2019    Gout 2017    Gout     H/O cardiovascular stress test 2017    H/O echocardiogram 2017    EF 55%. Mildly increased wall thickness of the LV. Evidence of diastolic dysfunction.

## 2018-02-26 NOTE — PROGRESS NOTES
Physical Therapy  Facility/Department: [facility] AT THE [facility] MED SURG  Daily Treatment Note  NAME: Claudette Etienne  : 1928  MRN: [id number]    Date of Service: 2018    Patient Diagnosis(es):   Patient Active Problem List    Diagnosis Date Noted    Acute respiratory failure with hypoxemia ([facility] 2017     Priority: High     Class: Acute    COPD with acute exacerbation ([address] 2017     Priority: High     Class: Acute    Asthma 2013     Priority: High    Diastolic CHF ([address]      Priority: High    Chronic diastolic CHF (congestive heart failure) (HCC) 2018    [facility] 2018    Acute on chronic diastolic congestive heart failure ([facility])     Elevated brain natriuretic peptide (BNP) level 2018    Coronary artery disease involving native coronary artery of native heart without angina pectoris     UTI (urinary tract infection) 2018    Panlobular emphysema ([facility])     Atrial fibrillation with rapid ventricular response ([facility]) 2017    Elevated troponin     Ex-smoker 2017    COPD (chronic obstructive pulmonary disease) ([facility])     CKD (chronic kidney disease) stage 4, GFR 15-29 ml/min ([facility]) 2016    Renal artery stenosis ([facility] 2016    Renovascular hypertension 2016    Status post coronary artery stent placement 2013       Past Medical History:   Diagnosis Date    Arthritis     Asthma     Atrial fibrillation with rapid ventricular response ([address] 2017    CAD (coronary artery disease)     CHF (congestive heart failure) ([facility])     Chronic kidney disease     COPD (chronic obstructive pulmonary disease) ([facility]     Examination of participant in clinical trial 13    6 year follow up, estimated completion 2019    Gout 2017    Gout     H/O cardiovascular stress test 2017    H/O echocardiogram 2017    EF 55%. Mildly increased wall thickness of the LV. Evidence of diastolic dysfunction.

## 2018-02-28 ENCOUNTER — HOSPITAL ENCOUNTER (OUTPATIENT)
Age: 83
Setting detail: SPECIMEN
Discharge: HOME | End: 2018-02-28
Payer: COMMERCIAL

## 2018-02-28 LAB
ALBUMIN SERPL-MCNC: 3.3 G/DL (ref 3.5–5.2)
ALBUMIN/GLOBULIN RATIO: 1.2 (ref 1–2.5)
ALP BLD-CCNC: 60 U/L (ref 35–104)
ALT SERPL-CCNC: 23 U/L (ref 5–33)
ANION GAP SERPL CALCULATED.3IONS-SCNC: 13 MMOL/L (ref 9–17)
AST SERPL-CCNC: 10 U/L
BILIRUB SERPL-MCNC: 0.46 MG/DL (ref 0.3–1.2)
BUN BLDV-MCNC: 45 MG/DL (ref 8–23)
BUN/CREAT BLD: 30 (ref 9–20)
CALCIUM SERPL-MCNC: 9.3 MG/DL (ref 8.6–10.4)
CHLORIDE BLD-SCNC: 101 MMOL/L (ref 98–107)
CO2: 29 MMOL/L (ref 20–31)
CREAT SERPL-MCNC: 1.5 MG/DL (ref 0.5–0.9)
GFR AFRICAN AMERICAN: 40 ML/MIN
GFR NON-AFRICAN AMERICAN: 33 ML/MIN
GFR SERPL CREATININE-BSD FRML MDRD: ABNORMAL ML/MIN/{1.73_M2}
GFR SERPL CREATININE-BSD FRML MDRD: ABNORMAL ML/MIN/{1.73_M2}
GLUCOSE BLD-MCNC: 169 MG/DL (ref 70–99)
HCT VFR BLD CALC: 42.5 % (ref 36.3–47.1)
HEMOGLOBIN: 13.4 G/DL (ref 11.9–15.1)
MCH RBC QN AUTO: 29.1 PG (ref 25.2–33.5)
MCHC RBC AUTO-ENTMCNC: 31.5 G/DL (ref 28.4–34.8)
MCV RBC AUTO: 92.2 FL (ref 82.6–102.9)
NRBC AUTOMATED: 0 PER 100 WBC
PDW BLD-RTO: 14.6 % (ref 11.8–14.4)
PLATELET # BLD: 223 K/UL (ref 138–453)
PMV BLD AUTO: 11 FL (ref 8.1–13.5)
POTASSIUM SERPL-SCNC: 4.5 MMOL/L (ref 3.7–5.3)
RBC # BLD: 4.61 M/UL (ref 3.95–5.11)
SODIUM BLD-SCNC: 143 MMOL/L (ref 135–144)
TOTAL PROTEIN: 6 G/DL (ref 6.4–8.3)
WBC # BLD: 13.9 K/UL (ref 3.5–11.3)

## 2018-02-28 PROCEDURE — 85027 COMPLETE CBC AUTOMATED: CPT

## 2018-02-28 PROCEDURE — 80053 COMPREHEN METABOLIC PANEL: CPT

## 2018-02-28 PROCEDURE — 36415 COLL VENOUS BLD VENIPUNCTURE: CPT

## 2018-02-28 PROCEDURE — P9604 ONE-WAY ALLOW PRORATED TRIP: HCPCS

## 2018-03-13 ENCOUNTER — HOSPITAL ENCOUNTER (OUTPATIENT)
Age: 83
Setting detail: SPECIMEN
Discharge: HOME | End: 2018-03-13
Payer: MEDICARE

## 2018-03-13 LAB
ABSOLUTE EOS #: 0.11 K/UL (ref 0–0.44)
ABSOLUTE IMMATURE GRANULOCYTE: 0 K/UL (ref 0–0.3)
ABSOLUTE LYMPH #: 0.79 K/UL (ref 1.1–3.7)
ABSOLUTE MONO #: 1.24 K/UL (ref 0.1–1.2)
ALBUMIN SERPL-MCNC: 3.4 G/DL (ref 3.5–5.2)
ALBUMIN/GLOBULIN RATIO: 1.1 (ref 1–2.5)
ALP BLD-CCNC: 57 U/L (ref 35–104)
ALT SERPL-CCNC: 16 U/L (ref 5–33)
ANION GAP SERPL CALCULATED.3IONS-SCNC: 13 MMOL/L (ref 9–17)
AST SERPL-CCNC: 11 U/L
BASOPHILS # BLD: 0 % (ref 0–2)
BASOPHILS ABSOLUTE: 0 K/UL (ref 0–0.2)
BILIRUB SERPL-MCNC: 0.37 MG/DL (ref 0.3–1.2)
BUN BLDV-MCNC: 26 MG/DL (ref 8–23)
BUN/CREAT BLD: 15 (ref 9–20)
CALCIUM SERPL-MCNC: 9.8 MG/DL (ref 8.6–10.4)
CHLORIDE BLD-SCNC: 99 MMOL/L (ref 98–107)
CO2: 26 MMOL/L (ref 20–31)
CREAT SERPL-MCNC: 1.72 MG/DL (ref 0.5–0.9)
DIFFERENTIAL TYPE: ABNORMAL
EOSINOPHILS RELATIVE PERCENT: 1 % (ref 1–4)
GFR AFRICAN AMERICAN: 34 ML/MIN
GFR NON-AFRICAN AMERICAN: 28 ML/MIN
GFR SERPL CREATININE-BSD FRML MDRD: ABNORMAL ML/MIN/{1.73_M2}
GFR SERPL CREATININE-BSD FRML MDRD: ABNORMAL ML/MIN/{1.73_M2}
GLUCOSE BLD-MCNC: 130 MG/DL (ref 70–99)
HCT VFR BLD CALC: 39 % (ref 36.3–47.1)
HEMOGLOBIN: 11.4 G/DL (ref 11.9–15.1)
IMMATURE GRANULOCYTES: 0 %
LYMPHOCYTES # BLD: 7 % (ref 24–43)
MCH RBC QN AUTO: 29.1 PG (ref 25.2–33.5)
MCHC RBC AUTO-ENTMCNC: 29.2 G/DL (ref 28.4–34.8)
MCV RBC AUTO: 99.5 FL (ref 82.6–102.9)
MONOCYTES # BLD: 11 % (ref 3–12)
MORPHOLOGY: NORMAL
NRBC AUTOMATED: 0 PER 100 WBC
PDW BLD-RTO: 15.2 % (ref 11.8–14.4)
PLATELET # BLD: 142 K/UL (ref 138–453)
PLATELET ESTIMATE: ABNORMAL
PMV BLD AUTO: 11.3 FL (ref 8.1–13.5)
POTASSIUM SERPL-SCNC: 4.6 MMOL/L (ref 3.7–5.3)
RBC # BLD: 3.92 M/UL (ref 3.95–5.11)
RBC # BLD: ABNORMAL 10*6/UL
SEG NEUTROPHILS: 81 % (ref 36–65)
SEGMENTED NEUTROPHILS ABSOLUTE COUNT: 9.16 K/UL (ref 1.5–8.1)
SODIUM BLD-SCNC: 138 MMOL/L (ref 135–144)
TOTAL PROTEIN: 6.4 G/DL (ref 6.4–8.3)
WBC # BLD: 11.3 K/UL (ref 3.5–11.3)
WBC # BLD: ABNORMAL 10*3/UL

## 2018-03-13 PROCEDURE — 80053 COMPREHEN METABOLIC PANEL: CPT

## 2018-03-13 PROCEDURE — 85025 COMPLETE CBC W/AUTO DIFF WBC: CPT

## 2018-03-19 ENCOUNTER — CARE COORDINATION (OUTPATIENT)
Dept: CASE MANAGEMENT | Age: 83
End: 2018-03-19

## 2018-03-20 ENCOUNTER — CARE COORDINATION (OUTPATIENT)
Dept: CASE MANAGEMENT | Age: 83
End: 2018-03-20

## 2018-03-22 ENCOUNTER — CARE COORDINATION (OUTPATIENT)
Dept: CASE MANAGEMENT | Age: 83
End: 2018-03-22

## 2018-03-26 ENCOUNTER — CARE COORDINATION (OUTPATIENT)
Dept: CARE COORDINATION | Age: 83
End: 2018-03-26

## 2018-03-26 NOTE — CARE COORDINATION
Attempted to contact patient today. Left voice message requesting patient call this [facility] Nurse at [phone]. Reminded patient of appt with PCP 3/28/2018 1400. Mailed [facility] introductory letter.

## 2018-03-27 ENCOUNTER — CARE COORDINATION (OUTPATIENT)
Dept: CARE COORDINATION | Age: 83
End: 2018-03-27

## 2018-03-29 ENCOUNTER — CARE COORDINATION (OUTPATIENT)
Dept: CARE COORDINATION | Age: 83
End: 2018-03-29

## 2018-03-29 ENCOUNTER — OFFICE VISIT (OUTPATIENT)
Dept: PRIMARY CARE CLINIC | Age: 83
End: 2018-03-29
Payer: MEDICARE

## 2018-03-29 VITALS
HEART RATE: 84 BPM | RESPIRATION RATE: 18 BRPM | BODY MASS INDEX: 22.31 KG/M2 | OXYGEN SATURATION: 97 % | WEIGHT: 122 LBS | DIASTOLIC BLOOD PRESSURE: 64 MMHG | SYSTOLIC BLOOD PRESSURE: 123 MMHG

## 2018-03-29 DIAGNOSIS — N18.4 CKD (CHRONIC KIDNEY DISEASE) STAGE 4, GFR 15-29 ML/MIN (HCC): Chronic | ICD-10-CM

## 2018-03-29 DIAGNOSIS — J44.1 COPD WITH ACUTE EXACERBATION (HCC): Primary | ICD-10-CM

## 2018-03-29 DIAGNOSIS — R73.09 ELEVATED GLUCOSE: ICD-10-CM

## 2018-03-29 LAB — HBA1C MFR BLD: 7.1 %

## 2018-03-29 PROCEDURE — 1090F PRES/ABSN URINE INCON ASSESS: CPT | Performed by: NURSE PRACTITIONER

## 2018-03-29 PROCEDURE — 3023F SPIROM DOC REV: CPT | Performed by: NURSE PRACTITIONER

## 2018-03-29 PROCEDURE — G8420 CALC BMI NORM PARAMETERS: HCPCS | Performed by: NURSE PRACTITIONER

## 2018-03-29 PROCEDURE — 1036F TOBACCO NON-USER: CPT | Performed by: NURSE PRACTITIONER

## 2018-03-29 PROCEDURE — G8598 ASA/ANTIPLAT THER USED: HCPCS | Performed by: NURSE PRACTITIONER

## 2018-03-29 PROCEDURE — 4040F PNEUMOC VAC/ADMIN/RCVD: CPT | Performed by: NURSE PRACTITIONER

## 2018-03-29 PROCEDURE — 99213 OFFICE O/P EST LOW 20 MIN: CPT | Performed by: NURSE PRACTITIONER

## 2018-03-29 PROCEDURE — G8427 DOCREV CUR MEDS BY ELIG CLIN: HCPCS | Performed by: NURSE PRACTITIONER

## 2018-03-29 PROCEDURE — G8484 FLU IMMUNIZE NO ADMIN: HCPCS | Performed by: NURSE PRACTITIONER

## 2018-03-29 PROCEDURE — G8926 SPIRO NO PERF OR DOC: HCPCS | Performed by: NURSE PRACTITIONER

## 2018-03-29 PROCEDURE — 83036 HEMOGLOBIN GLYCOSYLATED A1C: CPT | Performed by: NURSE PRACTITIONER

## 2018-03-29 PROCEDURE — 1123F ACP DISCUSS/DSCN MKR DOCD: CPT | Performed by: NURSE PRACTITIONER

## 2018-03-29 RX ORDER — FERROUS SULFATE 325(65) MG
325 TABLET ORAL
Qty: 30 TABLET | Refills: 2 | Status: SHIPPED | OUTPATIENT
Start: 2018-03-29 | End: 2018-11-01 | Stop reason: SDUPTHER

## 2018-03-29 RX ORDER — SIMVASTATIN 20 MG
20 TABLET ORAL NIGHTLY
Qty: 30 TABLET | Refills: 3 | Status: SHIPPED | OUTPATIENT
Start: 2018-03-29 | End: 2018-10-19 | Stop reason: SDUPTHER

## 2018-03-29 ASSESSMENT — ENCOUNTER SYMPTOMS
WHEEZING: 0
EYES NEGATIVE: 1
TROUBLE SWALLOWING: 0
SHORTNESS OF BREATH: 0
COUGH: 0
GASTROINTESTINAL NEGATIVE: 1
RESPIRATORY NEGATIVE: 1

## 2018-03-29 ASSESSMENT — PATIENT HEALTH QUESTIONNAIRE - PHQ9
SUM OF ALL RESPONSES TO PHQ QUESTIONS 1-9: 0
2. FEELING DOWN, DEPRESSED OR HOPELESS: 0
SUM OF ALL RESPONSES TO PHQ9 QUESTIONS 1 & 2: 0
1. LITTLE INTEREST OR PLEASURE IN DOING THINGS: 0

## 2018-03-30 ASSESSMENT — ENCOUNTER SYMPTOMS: DYSPNEA ASSOCIATED WITH: EXERTION

## 2018-04-04 ENCOUNTER — TELEPHONE (OUTPATIENT)
Dept: PRIMARY CARE CLINIC | Age: 83
End: 2018-04-04

## 2018-04-04 RX ORDER — AMLODIPINE BESYLATE 5 MG/1
5 TABLET ORAL DAILY
COMMUNITY
End: 2018-04-19 | Stop reason: ALTCHOICE

## 2018-04-06 ENCOUNTER — CARE COORDINATION (OUTPATIENT)
Dept: CARE COORDINATION | Age: 83
End: 2018-04-06

## 2018-04-09 ENCOUNTER — APPOINTMENT (OUTPATIENT)
Dept: GENERAL RADIOLOGY | Age: 83
End: 2018-04-09
Payer: MEDICARE

## 2018-04-09 ENCOUNTER — HOSPITAL ENCOUNTER (EMERGENCY)
Age: 83
Discharge: HOME | End: 2018-04-09
Attending: EMERGENCY MEDICINE
Payer: MEDICARE

## 2018-04-09 ENCOUNTER — CARE COORDINATION (OUTPATIENT)
Dept: CARE COORDINATION | Age: 83
End: 2018-04-09

## 2018-04-09 VITALS
WEIGHT: 113 LBS | RESPIRATION RATE: 17 BRPM | BODY MASS INDEX: 20.8 KG/M2 | HEART RATE: 83 BPM | OXYGEN SATURATION: 91 % | SYSTOLIC BLOOD PRESSURE: 131 MMHG | DIASTOLIC BLOOD PRESSURE: 61 MMHG | HEIGHT: 62 IN | TEMPERATURE: 99.8 F

## 2018-04-09 DIAGNOSIS — D72.829 LEUKOCYTOSIS, UNSPECIFIED TYPE: ICD-10-CM

## 2018-04-09 DIAGNOSIS — R53.1 GENERAL WEAKNESS: Primary | ICD-10-CM

## 2018-04-09 LAB
-: ABNORMAL
ABSOLUTE EOS #: 0 K/UL (ref 0–0.44)
ABSOLUTE IMMATURE GRANULOCYTE: 0 K/UL (ref 0–0.3)
ABSOLUTE LYMPH #: 0.9 K/UL (ref 1.1–3.7)
ABSOLUTE MONO #: 2.49 K/UL (ref 0.1–1.2)
ALBUMIN SERPL-MCNC: 2.9 G/DL (ref 3.5–5.2)
ALBUMIN/GLOBULIN RATIO: 0.7 (ref 1–2.5)
ALP BLD-CCNC: 85 U/L (ref 35–104)
ALT SERPL-CCNC: 18 U/L (ref 5–33)
AMORPHOUS: ABNORMAL
ANION GAP SERPL CALCULATED.3IONS-SCNC: 14 MMOL/L (ref 9–17)
AST SERPL-CCNC: 23 U/L
BACTERIA: ABNORMAL
BASOPHILS # BLD: 0 % (ref 0–2)
BASOPHILS ABSOLUTE: 0 K/UL (ref 0–0.2)
BILIRUB SERPL-MCNC: 0.36 MG/DL (ref 0.3–1.2)
BILIRUBIN URINE: ABNORMAL
BNP INTERPRETATION: ABNORMAL
BUN BLDV-MCNC: 31 MG/DL (ref 8–23)
BUN/CREAT BLD: 19 (ref 9–20)
CALCIUM SERPL-MCNC: 10.4 MG/DL (ref 8.6–10.4)
CASTS UA: ABNORMAL /LPF
CHLORIDE BLD-SCNC: 97 MMOL/L (ref 98–107)
CO2: 25 MMOL/L (ref 20–31)
COLOR: YELLOW
COMMENT UA: ABNORMAL
CREAT SERPL-MCNC: 1.61 MG/DL (ref 0.5–0.9)
CRYSTALS, UA: ABNORMAL /HPF
DIFFERENTIAL TYPE: ABNORMAL
EOSINOPHILS RELATIVE PERCENT: 0 % (ref 1–4)
EPITHELIAL CELLS UA: ABNORMAL /HPF (ref 0–25)
GFR AFRICAN AMERICAN: 37 ML/MIN
GFR NON-AFRICAN AMERICAN: 30 ML/MIN
GFR SERPL CREATININE-BSD FRML MDRD: ABNORMAL ML/MIN/{1.73_M2}
GFR SERPL CREATININE-BSD FRML MDRD: ABNORMAL ML/MIN/{1.73_M2}
GLUCOSE BLD-MCNC: 143 MG/DL (ref 70–99)
GLUCOSE URINE: NEGATIVE
HCT VFR BLD CALC: 37.6 % (ref 36.3–47.1)
HEMOGLOBIN: 12 G/DL (ref 11.9–15.1)
IMMATURE GRANULOCYTES: 0 %
KETONES, URINE: NEGATIVE
LEUKOCYTE ESTERASE, URINE: NEGATIVE
LIPASE: 24 U/L (ref 13–60)
LYMPHOCYTES # BLD: 4 % (ref 24–43)
MCH RBC QN AUTO: 29.8 PG (ref 25.2–33.5)
MCHC RBC AUTO-ENTMCNC: 31.9 G/DL (ref 28.4–34.8)
MCV RBC AUTO: 93.3 FL (ref 82.6–102.9)
MONOCYTES # BLD: 11 % (ref 3–12)
MORPHOLOGY: NORMAL
MUCUS: ABNORMAL
NITRITE, URINE: NEGATIVE
NRBC AUTOMATED: 0.1 PER 100 WBC
OTHER OBSERVATIONS UA: ABNORMAL
PDW BLD-RTO: 15.3 % (ref 11.8–14.4)
PH UA: 5.5 (ref 5–9)
PLATELET # BLD: 403 K/UL (ref 138–453)
PLATELET ESTIMATE: ABNORMAL
PMV BLD AUTO: 10 FL (ref 8.1–13.5)
POTASSIUM SERPL-SCNC: 4.8 MMOL/L (ref 3.7–5.3)
PRO-BNP: 2053 PG/ML
PROTEIN UA: ABNORMAL
RBC # BLD: 4.03 M/UL (ref 3.95–5.11)
RBC # BLD: ABNORMAL 10*6/UL
RBC UA: ABNORMAL /HPF (ref 0–2)
RENAL EPITHELIAL, UA: ABNORMAL /HPF
SEG NEUTROPHILS: 85 % (ref 36–65)
SEGMENTED NEUTROPHILS ABSOLUTE COUNT: 19.21 K/UL (ref 1.5–8.1)
SODIUM BLD-SCNC: 136 MMOL/L (ref 135–144)
SPECIFIC GRAVITY UA: >1.03 (ref 1.01–1.02)
TOTAL PROTEIN: 6.8 G/DL (ref 6.4–8.3)
TRICHOMONAS: ABNORMAL
TROPONIN INTERP: NORMAL
TROPONIN T: <0.03 NG/ML
TURBIDITY: CLEAR
URINE HGB: ABNORMAL
UROBILINOGEN, URINE: NORMAL
WBC # BLD: 22.6 K/UL (ref 3.5–11.3)
WBC # BLD: ABNORMAL 10*3/UL
WBC UA: ABNORMAL /HPF (ref 0–5)
YEAST: ABNORMAL

## 2018-04-09 PROCEDURE — 84484 ASSAY OF TROPONIN QUANT: CPT

## 2018-04-09 PROCEDURE — 36415 COLL VENOUS BLD VENIPUNCTURE: CPT

## 2018-04-09 PROCEDURE — 99285 EMERGENCY DEPT VISIT HI MDM: CPT

## 2018-04-09 PROCEDURE — 71046 X-RAY EXAM CHEST 2 VIEWS: CPT

## 2018-04-09 PROCEDURE — 80053 COMPREHEN METABOLIC PANEL: CPT

## 2018-04-09 PROCEDURE — 85025 COMPLETE CBC W/AUTO DIFF WBC: CPT

## 2018-04-09 PROCEDURE — 83880 ASSAY OF NATRIURETIC PEPTIDE: CPT

## 2018-04-09 PROCEDURE — 2580000003 HC RX 258: Performed by: EMERGENCY MEDICINE

## 2018-04-09 PROCEDURE — 83690 ASSAY OF LIPASE: CPT

## 2018-04-09 PROCEDURE — 81001 URINALYSIS AUTO W/SCOPE: CPT

## 2018-04-09 RX ORDER — 0.9 % SODIUM CHLORIDE 0.9 %
500 INTRAVENOUS SOLUTION INTRAVENOUS ONCE
Status: COMPLETED | OUTPATIENT
Start: 2018-04-09 | End: 2018-04-09

## 2018-04-09 RX ORDER — ONDANSETRON 2 MG/ML
4 INJECTION INTRAMUSCULAR; INTRAVENOUS ONCE
Status: DISCONTINUED | OUTPATIENT
Start: 2018-04-09 | End: 2018-04-10 | Stop reason: HOSPADM

## 2018-04-09 RX ADMIN — SODIUM CHLORIDE 500 ML: 9 INJECTION, SOLUTION INTRAVENOUS at 21:07

## 2018-04-09 ASSESSMENT — ENCOUNTER SYMPTOMS: DYSPNEA ASSOCIATED WITH: EXERTION

## 2018-04-11 PROBLEM — N39.0 UTI (URINARY TRACT INFECTION): Status: RESOLVED | Noted: 2018-02-02 | Resolved: 2018-04-11

## 2018-04-13 ENCOUNTER — CARE COORDINATION (OUTPATIENT)
Dept: CARE COORDINATION | Age: 83
End: 2018-04-13

## 2018-04-16 ENCOUNTER — CARE COORDINATION (OUTPATIENT)
Dept: CARE COORDINATION | Age: 83
End: 2018-04-16

## 2018-04-19 ENCOUNTER — OFFICE VISIT (OUTPATIENT)
Dept: PRIMARY CARE CLINIC | Age: 83
End: 2018-04-19
Payer: MEDICARE

## 2018-04-19 VITALS
DIASTOLIC BLOOD PRESSURE: 68 MMHG | TEMPERATURE: 97.2 F | BODY MASS INDEX: 20.85 KG/M2 | RESPIRATION RATE: 20 BRPM | HEART RATE: 76 BPM | SYSTOLIC BLOOD PRESSURE: 118 MMHG | WEIGHT: 114 LBS

## 2018-04-19 DIAGNOSIS — M10.9 ACUTE GOUT INVOLVING TOE OF RIGHT FOOT, UNSPECIFIED CAUSE: Primary | ICD-10-CM

## 2018-04-19 PROCEDURE — 1036F TOBACCO NON-USER: CPT | Performed by: NURSE PRACTITIONER

## 2018-04-19 PROCEDURE — G8598 ASA/ANTIPLAT THER USED: HCPCS | Performed by: NURSE PRACTITIONER

## 2018-04-19 PROCEDURE — G8420 CALC BMI NORM PARAMETERS: HCPCS | Performed by: NURSE PRACTITIONER

## 2018-04-19 PROCEDURE — G8427 DOCREV CUR MEDS BY ELIG CLIN: HCPCS | Performed by: NURSE PRACTITIONER

## 2018-04-19 PROCEDURE — 99213 OFFICE O/P EST LOW 20 MIN: CPT | Performed by: NURSE PRACTITIONER

## 2018-04-19 PROCEDURE — 1123F ACP DISCUSS/DSCN MKR DOCD: CPT | Performed by: NURSE PRACTITIONER

## 2018-04-19 PROCEDURE — 4040F PNEUMOC VAC/ADMIN/RCVD: CPT | Performed by: NURSE PRACTITIONER

## 2018-04-19 PROCEDURE — 1090F PRES/ABSN URINE INCON ASSESS: CPT | Performed by: NURSE PRACTITIONER

## 2018-04-19 RX ORDER — PREDNISONE 20 MG/1
20 TABLET ORAL DAILY
Qty: 5 TABLET | Refills: 0 | Status: SHIPPED | OUTPATIENT
Start: 2018-04-19 | End: 2018-04-24

## 2018-04-19 ASSESSMENT — ENCOUNTER SYMPTOMS
GASTROINTESTINAL NEGATIVE: 1
ROS SKIN COMMENTS: RIGHT GREAT TOE
COLOR CHANGE: 1
RESPIRATORY NEGATIVE: 1

## 2018-04-23 RX ORDER — ALBUTEROL SULFATE 2.5 MG/3ML
2.5 SOLUTION RESPIRATORY (INHALATION) EVERY 4 HOURS PRN
Qty: 150 VIAL | Refills: 1 | Status: SHIPPED | OUTPATIENT
Start: 2018-04-23 | End: 2019-06-05 | Stop reason: SDUPTHER

## 2018-04-24 ENCOUNTER — CARE COORDINATION (OUTPATIENT)
Dept: CARE COORDINATION | Age: 83
End: 2018-04-24

## 2018-05-07 ENCOUNTER — CARE COORDINATION (OUTPATIENT)
Dept: CARE COORDINATION | Age: 83
End: 2018-05-07

## 2018-06-12 ENCOUNTER — CARE COORDINATION (OUTPATIENT)
Dept: CARE COORDINATION | Age: 83
End: 2018-06-12

## 2018-07-26 ENCOUNTER — CARE COORDINATION (OUTPATIENT)
Dept: CARE COORDINATION | Age: 83
End: 2018-07-26

## 2018-07-27 ENCOUNTER — HOSPITAL ENCOUNTER (INPATIENT)
Age: 83
LOS: 3 days | Discharge: HOME HEALTH | DRG: 554 | End: 2018-07-30
Attending: INTERNAL MEDICINE | Admitting: INTERNAL MEDICINE
Payer: MEDICARE

## 2018-07-27 ENCOUNTER — APPOINTMENT (OUTPATIENT)
Dept: GENERAL RADIOLOGY | Age: 83
DRG: 554 | End: 2018-07-27
Payer: MEDICARE

## 2018-07-27 DIAGNOSIS — M11.211: ICD-10-CM

## 2018-07-27 DIAGNOSIS — M25.011: ICD-10-CM

## 2018-07-27 DIAGNOSIS — D72.829 LEUKOCYTOSIS, UNSPECIFIED TYPE: ICD-10-CM

## 2018-07-27 DIAGNOSIS — N28.9 RENAL INSUFFICIENCY: ICD-10-CM

## 2018-07-27 DIAGNOSIS — M25.511 ACUTE PAIN OF RIGHT SHOULDER: ICD-10-CM

## 2018-07-27 DIAGNOSIS — M25.511 RIGHT SHOULDER PAIN, UNSPECIFIED CHRONICITY: Primary | ICD-10-CM

## 2018-07-27 PROBLEM — M25.519 SHOULDER PAIN: Status: ACTIVE | Noted: 2018-07-27

## 2018-07-27 LAB
-: ABNORMAL
ABSOLUTE BANDS #: 0.21 K/UL (ref 0–1)
ABSOLUTE EOS #: 0 K/UL (ref 0–0.44)
ABSOLUTE IMMATURE GRANULOCYTE: 0 K/UL (ref 0–0.3)
ABSOLUTE LYMPH #: 0.83 K/UL (ref 1.1–3.7)
ABSOLUTE MONO #: 2.5 K/UL (ref 0.1–1.2)
ALBUMIN SERPL-MCNC: 4.1 G/DL (ref 3.5–5.2)
ALBUMIN/GLOBULIN RATIO: 1.2 (ref 1–2.5)
ALP BLD-CCNC: 96 U/L (ref 35–104)
ALT SERPL-CCNC: 12 U/L (ref 5–33)
AMORPHOUS: ABNORMAL
ANION GAP SERPL CALCULATED.3IONS-SCNC: 17 MMOL/L (ref 9–17)
AST SERPL-CCNC: 15 U/L
BACTERIA: ABNORMAL
BANDS: 1 % (ref 0–10)
BASOPHILS # BLD: 0 % (ref 0–2)
BASOPHILS ABSOLUTE: 0 K/UL (ref 0–0.2)
BILIRUB SERPL-MCNC: 0.45 MG/DL (ref 0.3–1.2)
BILIRUBIN URINE: NEGATIVE
BUN BLDV-MCNC: 32 MG/DL (ref 8–23)
BUN/CREAT BLD: 17 (ref 9–20)
C-REACTIVE PROTEIN: 32 MG/L (ref 0–5)
CALCIUM SERPL-MCNC: 10.3 MG/DL (ref 8.6–10.4)
CASTS UA: ABNORMAL /LPF
CHLORIDE BLD-SCNC: 99 MMOL/L (ref 98–107)
CO2: 24 MMOL/L (ref 20–31)
COLOR: YELLOW
COMMENT UA: ABNORMAL
CREAT SERPL-MCNC: 1.93 MG/DL (ref 0.5–0.9)
CRYSTALS, UA: ABNORMAL /HPF
DIFFERENTIAL TYPE: ABNORMAL
EKG ATRIAL RATE: 74 BPM
EKG P AXIS: 10 DEGREES
EKG P-R INTERVAL: 148 MS
EKG Q-T INTERVAL: 414 MS
EKG QRS DURATION: 70 MS
EKG QTC CALCULATION (BAZETT): 459 MS
EKG R AXIS: -26 DEGREES
EKG T AXIS: 69 DEGREES
EKG VENTRICULAR RATE: 74 BPM
EOSINOPHILS RELATIVE PERCENT: 0 % (ref 1–4)
EPITHELIAL CELLS UA: ABNORMAL /HPF (ref 0–25)
GFR AFRICAN AMERICAN: 30 ML/MIN
GFR NON-AFRICAN AMERICAN: 24 ML/MIN
GFR SERPL CREATININE-BSD FRML MDRD: ABNORMAL ML/MIN/{1.73_M2}
GFR SERPL CREATININE-BSD FRML MDRD: ABNORMAL ML/MIN/{1.73_M2}
GLUCOSE BLD-MCNC: 157 MG/DL (ref 70–99)
GLUCOSE URINE: NEGATIVE
HCT VFR BLD CALC: 41.3 % (ref 36.3–47.1)
HEMOGLOBIN: 13.6 G/DL (ref 11.9–15.1)
IMMATURE GRANULOCYTES: 0 %
KETONES, URINE: NEGATIVE
LACTIC ACID, WHOLE BLOOD: NORMAL MMOL/L (ref 0.7–2.1)
LACTIC ACID: 1.7 MMOL/L (ref 0.5–2.2)
LEUKOCYTE ESTERASE, URINE: NEGATIVE
LYMPHOCYTES # BLD: 4 % (ref 24–43)
MCH RBC QN AUTO: 29.8 PG (ref 25.2–33.5)
MCHC RBC AUTO-ENTMCNC: 32.9 G/DL (ref 28.4–34.8)
MCV RBC AUTO: 90.6 FL (ref 82.6–102.9)
MONOCYTES # BLD: 12 % (ref 3–12)
MORPHOLOGY: NORMAL
MUCUS: ABNORMAL
NITRITE, URINE: NEGATIVE
NRBC AUTOMATED: 0 PER 100 WBC
OTHER OBSERVATIONS UA: ABNORMAL
PDW BLD-RTO: 13.3 % (ref 11.8–14.4)
PH UA: 5.5 (ref 5–9)
PLATELET # BLD: 282 K/UL (ref 138–453)
PLATELET ESTIMATE: ABNORMAL
PMV BLD AUTO: 10.5 FL (ref 8.1–13.5)
POTASSIUM SERPL-SCNC: 4.4 MMOL/L (ref 3.7–5.3)
PROTEIN UA: NEGATIVE
RBC # BLD: 4.56 M/UL (ref 3.95–5.11)
RBC # BLD: ABNORMAL 10*6/UL
RBC UA: ABNORMAL /HPF (ref 0–2)
RENAL EPITHELIAL, UA: ABNORMAL /HPF
SEDIMENTATION RATE, ERYTHROCYTE: 36 MM (ref 0–20)
SEG NEUTROPHILS: 83 % (ref 36–65)
SEGMENTED NEUTROPHILS ABSOLUTE COUNT: 17.26 K/UL (ref 1.5–8.1)
SODIUM BLD-SCNC: 140 MMOL/L (ref 135–144)
SPECIFIC GRAVITY UA: 1.01 (ref 1.01–1.02)
TOTAL PROTEIN: 7.6 G/DL (ref 6.4–8.3)
TRICHOMONAS: ABNORMAL
TROPONIN INTERP: NORMAL
TROPONIN T: <0.03 NG/ML
TURBIDITY: CLEAR
URIC ACID: 8.9 MG/DL (ref 2.4–5.7)
URINE HGB: ABNORMAL
UROBILINOGEN, URINE: NORMAL
WBC # BLD: 20.8 K/UL (ref 3.5–11.3)
WBC # BLD: ABNORMAL 10*3/UL
WBC UA: ABNORMAL /HPF (ref 0–5)
YEAST: ABNORMAL

## 2018-07-27 PROCEDURE — 1200000000 HC SEMI PRIVATE

## 2018-07-27 PROCEDURE — 87040 BLOOD CULTURE FOR BACTERIA: CPT

## 2018-07-27 PROCEDURE — 99285 EMERGENCY DEPT VISIT HI MDM: CPT

## 2018-07-27 PROCEDURE — 6370000000 HC RX 637 (ALT 250 FOR IP): Performed by: PHYSICIAN ASSISTANT

## 2018-07-27 PROCEDURE — 71046 X-RAY EXAM CHEST 2 VIEWS: CPT

## 2018-07-27 PROCEDURE — 84484 ASSAY OF TROPONIN QUANT: CPT

## 2018-07-27 PROCEDURE — 86140 C-REACTIVE PROTEIN: CPT

## 2018-07-27 PROCEDURE — 36415 COLL VENOUS BLD VENIPUNCTURE: CPT

## 2018-07-27 PROCEDURE — 84550 ASSAY OF BLOOD/URIC ACID: CPT

## 2018-07-27 PROCEDURE — 80053 COMPREHEN METABOLIC PANEL: CPT

## 2018-07-27 PROCEDURE — 94760 N-INVAS EAR/PLS OXIMETRY 1: CPT

## 2018-07-27 PROCEDURE — 85025 COMPLETE CBC W/AUTO DIFF WBC: CPT

## 2018-07-27 PROCEDURE — 73030 X-RAY EXAM OF SHOULDER: CPT

## 2018-07-27 PROCEDURE — 83605 ASSAY OF LACTIC ACID: CPT

## 2018-07-27 PROCEDURE — 94664 DEMO&/EVAL PT USE INHALER: CPT

## 2018-07-27 PROCEDURE — 85651 RBC SED RATE NONAUTOMATED: CPT

## 2018-07-27 PROCEDURE — 81001 URINALYSIS AUTO W/SCOPE: CPT

## 2018-07-27 PROCEDURE — 93005 ELECTROCARDIOGRAM TRACING: CPT

## 2018-07-27 RX ORDER — ALBUTEROL SULFATE 2.5 MG/3ML
2.5 SOLUTION RESPIRATORY (INHALATION) EVERY 4 HOURS PRN
Status: DISCONTINUED | OUTPATIENT
Start: 2018-07-27 | End: 2018-07-28

## 2018-07-27 RX ORDER — FAMOTIDINE 20 MG/1
20 TABLET, FILM COATED ORAL DAILY
Status: DISCONTINUED | OUTPATIENT
Start: 2018-07-28 | End: 2018-07-30 | Stop reason: HOSPADM

## 2018-07-27 RX ORDER — SODIUM CHLORIDE 0.9 % (FLUSH) 0.9 %
10 SYRINGE (ML) INJECTION PRN
Status: DISCONTINUED | OUTPATIENT
Start: 2018-07-27 | End: 2018-07-30 | Stop reason: HOSPADM

## 2018-07-27 RX ORDER — SIMVASTATIN 20 MG
20 TABLET ORAL NIGHTLY
Status: DISCONTINUED | OUTPATIENT
Start: 2018-07-28 | End: 2018-07-30 | Stop reason: HOSPADM

## 2018-07-27 RX ORDER — HYDROCODONE BITARTRATE AND ACETAMINOPHEN 5; 325 MG/1; MG/1
1 TABLET ORAL ONCE
Status: COMPLETED | OUTPATIENT
Start: 2018-07-27 | End: 2018-07-27

## 2018-07-27 RX ORDER — CLOPIDOGREL BISULFATE 75 MG/1
75 TABLET ORAL DAILY
Status: DISCONTINUED | OUTPATIENT
Start: 2018-07-28 | End: 2018-07-28

## 2018-07-27 RX ORDER — SODIUM POLYSTYRENE SULFONATE 15 G/60ML
15 SUSPENSION ORAL; RECTAL ONCE
Status: DISCONTINUED | OUTPATIENT
Start: 2018-07-28 | End: 2018-07-28

## 2018-07-27 RX ORDER — ALBUTEROL SULFATE 90 UG/1
2 AEROSOL, METERED RESPIRATORY (INHALATION) EVERY 6 HOURS PRN
Status: DISCONTINUED | OUTPATIENT
Start: 2018-07-27 | End: 2018-07-28

## 2018-07-27 RX ORDER — FERROUS SULFATE 325(65) MG
325 TABLET ORAL
Status: DISCONTINUED | OUTPATIENT
Start: 2018-07-28 | End: 2018-07-30 | Stop reason: HOSPADM

## 2018-07-27 RX ORDER — ONDANSETRON 2 MG/ML
4 INJECTION INTRAMUSCULAR; INTRAVENOUS EVERY 6 HOURS PRN
Status: DISCONTINUED | OUTPATIENT
Start: 2018-07-27 | End: 2018-07-30 | Stop reason: HOSPADM

## 2018-07-27 RX ORDER — FUROSEMIDE 20 MG/1
20 TABLET ORAL
Status: DISCONTINUED | OUTPATIENT
Start: 2018-07-28 | End: 2018-07-28

## 2018-07-27 RX ORDER — SODIUM CHLORIDE 0.9 % (FLUSH) 0.9 %
10 SYRINGE (ML) INJECTION EVERY 12 HOURS SCHEDULED
Status: DISCONTINUED | OUTPATIENT
Start: 2018-07-28 | End: 2018-07-30 | Stop reason: HOSPADM

## 2018-07-27 RX ORDER — DILTIAZEM HYDROCHLORIDE 120 MG/1
120 CAPSULE, COATED, EXTENDED RELEASE ORAL DAILY
Status: DISCONTINUED | OUTPATIENT
Start: 2018-07-28 | End: 2018-07-30 | Stop reason: HOSPADM

## 2018-07-27 RX ADMIN — HYDROCODONE BITARTRATE AND ACETAMINOPHEN 1 TABLET: 5; 325 TABLET ORAL at 19:18

## 2018-07-27 ASSESSMENT — PAIN SCALES - GENERAL
PAINLEVEL_OUTOF10: 7
PAINLEVEL_OUTOF10: 3
PAINLEVEL_OUTOF10: 0
PAINLEVEL_OUTOF10: 0
PAINLEVEL_OUTOF10: 9

## 2018-07-27 ASSESSMENT — PAIN DESCRIPTION - FREQUENCY: FREQUENCY: CONTINUOUS

## 2018-07-27 ASSESSMENT — ENCOUNTER SYMPTOMS
NAUSEA: 0
SORE THROAT: 0
VOMITING: 0
COUGH: 0
ABDOMINAL PAIN: 0

## 2018-07-27 ASSESSMENT — PAIN DESCRIPTION - LOCATION
LOCATION: SHOULDER
LOCATION: SHOULDER

## 2018-07-27 ASSESSMENT — PAIN DESCRIPTION - DIRECTION: RADIATING_TOWARDS: DOWN RIGHT ARM

## 2018-07-27 ASSESSMENT — PAIN DESCRIPTION - PAIN TYPE
TYPE: ACUTE PAIN
TYPE: ACUTE PAIN

## 2018-07-27 ASSESSMENT — PAIN DESCRIPTION - ORIENTATION
ORIENTATION: RIGHT
ORIENTATION: RIGHT

## 2018-07-27 ASSESSMENT — PAIN DESCRIPTION - DESCRIPTORS: DESCRIPTORS: ACHING;SHARP

## 2018-07-28 LAB
ABSOLUTE EOS #: 0 K/UL (ref 0–0.44)
ABSOLUTE IMMATURE GRANULOCYTE: 0 K/UL (ref 0–0.3)
ABSOLUTE LYMPH #: 3.13 K/UL (ref 1.1–3.7)
ABSOLUTE MONO #: 2.78 K/UL (ref 0.1–1.2)
ALBUMIN SERPL-MCNC: 3.2 G/DL (ref 3.5–5.2)
ALBUMIN/GLOBULIN RATIO: 1 (ref 1–2.5)
ALP BLD-CCNC: 82 U/L (ref 35–104)
ALT SERPL-CCNC: 10 U/L (ref 5–33)
ANION GAP SERPL CALCULATED.3IONS-SCNC: 13 MMOL/L (ref 9–17)
APPEARANCE FLUID: ABNORMAL
AST SERPL-CCNC: 13 U/L
BASO FLUID: ABNORMAL %
BASOPHILS # BLD: 0 % (ref 0–2)
BASOPHILS ABSOLUTE: 0 K/UL (ref 0–0.2)
BILIRUB SERPL-MCNC: 0.44 MG/DL (ref 0.3–1.2)
BUN BLDV-MCNC: 35 MG/DL (ref 8–23)
BUN/CREAT BLD: 18 (ref 9–20)
C-REACTIVE PROTEIN: 236.3 MG/L (ref 0–5)
CALCIUM SERPL-MCNC: 9.5 MG/DL (ref 8.6–10.4)
CHLORIDE BLD-SCNC: 99 MMOL/L (ref 98–107)
CO2: 24 MMOL/L (ref 20–31)
COLOR FLUID: ABNORMAL
CREAT SERPL-MCNC: 1.98 MG/DL (ref 0.5–0.9)
DIFFERENTIAL TYPE: ABNORMAL
EOSINOPHIL FLUID: ABNORMAL %
EOSINOPHILS RELATIVE PERCENT: 0 % (ref 1–4)
FLUID DIFF COMMENT: ABNORMAL
GFR AFRICAN AMERICAN: 29 ML/MIN
GFR NON-AFRICAN AMERICAN: 24 ML/MIN
GFR SERPL CREATININE-BSD FRML MDRD: ABNORMAL ML/MIN/{1.73_M2}
GFR SERPL CREATININE-BSD FRML MDRD: ABNORMAL ML/MIN/{1.73_M2}
GLUCOSE BLD-MCNC: 139 MG/DL (ref 70–99)
HCT VFR BLD CALC: 36.9 % (ref 36.3–47.1)
HEMOGLOBIN: 12 G/DL (ref 11.9–15.1)
IMMATURE GRANULOCYTES: 0 %
INR BLD: 1 (ref 0.9–1.2)
LYMPHOCYTES # BLD: 18 % (ref 24–43)
LYMPHOCYTES, BODY FLUID: ABNORMAL %
MCH RBC QN AUTO: 29.4 PG (ref 25.2–33.5)
MCHC RBC AUTO-ENTMCNC: 32.5 G/DL (ref 28.4–34.8)
MCV RBC AUTO: 90.4 FL (ref 82.6–102.9)
MONOCYTE, FLUID: 3 %
MONOCYTES # BLD: 16 % (ref 3–12)
MORPHOLOGY: NORMAL
NEUTROPHIL, FLUID: 97 %
NRBC AUTOMATED: 0 PER 100 WBC
OTHER CELLS FLUID: ABNORMAL %
PARTIAL THROMBOPLASTIN TIME: 34.5 SEC (ref 23.2–34.4)
PDW BLD-RTO: 13.3 % (ref 11.8–14.4)
PLATELET # BLD: 240 K/UL (ref 138–453)
PLATELET ESTIMATE: ABNORMAL
PMV BLD AUTO: 10.6 FL (ref 8.1–13.5)
POTASSIUM SERPL-SCNC: 4.3 MMOL/L (ref 3.7–5.3)
PROCALCITONIN: 0.46 NG/ML
PROTHROMBIN TIME: 10.7 SEC (ref 9.7–12.2)
RBC # BLD: 4.08 M/UL (ref 3.95–5.11)
RBC # BLD: ABNORMAL 10*6/UL
RBC FLUID: ABNORMAL /MM3
SEDIMENTATION RATE, ERYTHROCYTE: 56 MM (ref 0–20)
SEG NEUTROPHILS: 66 % (ref 36–65)
SEGMENTED NEUTROPHILS ABSOLUTE COUNT: 11.49 K/UL (ref 1.5–8.1)
SODIUM BLD-SCNC: 136 MMOL/L (ref 135–144)
SPECIMEN TYPE: ABNORMAL
TOTAL PROTEIN: 6.3 G/DL (ref 6.4–8.3)
WBC # BLD: 17.4 K/UL (ref 3.5–11.3)
WBC # BLD: ABNORMAL 10*3/UL
WBC FLUID: ABNORMAL /MM3

## 2018-07-28 PROCEDURE — 36415 COLL VENOUS BLD VENIPUNCTURE: CPT

## 2018-07-28 PROCEDURE — G8978 MOBILITY CURRENT STATUS: HCPCS

## 2018-07-28 PROCEDURE — 85730 THROMBOPLASTIN TIME PARTIAL: CPT

## 2018-07-28 PROCEDURE — 2580000003 HC RX 258: Performed by: INTERNAL MEDICINE

## 2018-07-28 PROCEDURE — 89051 BODY FLUID CELL COUNT: CPT

## 2018-07-28 PROCEDURE — 85651 RBC SED RATE NONAUTOMATED: CPT

## 2018-07-28 PROCEDURE — 1200000000 HC SEMI PRIVATE

## 2018-07-28 PROCEDURE — 97116 GAIT TRAINING THERAPY: CPT

## 2018-07-28 PROCEDURE — 97165 OT EVAL LOW COMPLEX 30 MIN: CPT

## 2018-07-28 PROCEDURE — 0R9J3ZZ DRAINAGE OF RIGHT SHOULDER JOINT, PERCUTANEOUS APPROACH: ICD-10-PCS | Performed by: ORTHOPAEDIC SURGERY

## 2018-07-28 PROCEDURE — 80053 COMPREHEN METABOLIC PANEL: CPT

## 2018-07-28 PROCEDURE — 87070 CULTURE OTHR SPECIMN AEROBIC: CPT

## 2018-07-28 PROCEDURE — 89060 EXAM SYNOVIAL FLUID CRYSTALS: CPT

## 2018-07-28 PROCEDURE — 97530 THERAPEUTIC ACTIVITIES: CPT

## 2018-07-28 PROCEDURE — 86140 C-REACTIVE PROTEIN: CPT

## 2018-07-28 PROCEDURE — 94640 AIRWAY INHALATION TREATMENT: CPT

## 2018-07-28 PROCEDURE — 6370000000 HC RX 637 (ALT 250 FOR IP): Performed by: INTERNAL MEDICINE

## 2018-07-28 PROCEDURE — 87205 SMEAR GRAM STAIN: CPT

## 2018-07-28 PROCEDURE — 85025 COMPLETE CBC W/AUTO DIFF WBC: CPT

## 2018-07-28 PROCEDURE — 87075 CULTR BACTERIA EXCEPT BLOOD: CPT

## 2018-07-28 PROCEDURE — G8979 MOBILITY GOAL STATUS: HCPCS

## 2018-07-28 PROCEDURE — 6360000002 HC RX W HCPCS: Performed by: INTERNAL MEDICINE

## 2018-07-28 PROCEDURE — 84145 PROCALCITONIN (PCT): CPT

## 2018-07-28 PROCEDURE — 97162 PT EVAL MOD COMPLEX 30 MIN: CPT

## 2018-07-28 PROCEDURE — 85610 PROTHROMBIN TIME: CPT

## 2018-07-28 RX ORDER — FUROSEMIDE 20 MG/1
20 TABLET ORAL
Status: DISCONTINUED | OUTPATIENT
Start: 2018-07-29 | End: 2018-07-30 | Stop reason: HOSPADM

## 2018-07-28 RX ORDER — ACETAMINOPHEN 500 MG
1000 TABLET ORAL EVERY 6 HOURS PRN
Status: DISCONTINUED | OUTPATIENT
Start: 2018-07-28 | End: 2018-07-30 | Stop reason: HOSPADM

## 2018-07-28 RX ORDER — ALBUTEROL SULFATE 2.5 MG/3ML
2.5 SOLUTION RESPIRATORY (INHALATION) 3 TIMES DAILY PRN
Status: DISCONTINUED | OUTPATIENT
Start: 2018-07-28 | End: 2018-07-30 | Stop reason: HOSPADM

## 2018-07-28 RX ORDER — TRAMADOL HYDROCHLORIDE 50 MG/1
50 TABLET ORAL EVERY 12 HOURS PRN
Status: DISCONTINUED | OUTPATIENT
Start: 2018-07-28 | End: 2018-07-30 | Stop reason: HOSPADM

## 2018-07-28 RX ADMIN — APIXABAN 2.5 MG: 2.5 TABLET, FILM COATED ORAL at 08:39

## 2018-07-28 RX ADMIN — Medication 10 ML: at 08:41

## 2018-07-28 RX ADMIN — METOPROLOL TARTRATE 25 MG: 25 TABLET ORAL at 00:11

## 2018-07-28 RX ADMIN — CLOPIDOGREL BISULFATE 75 MG: 75 TABLET ORAL at 08:39

## 2018-07-28 RX ADMIN — DILTIAZEM HYDROCHLORIDE 120 MG: 120 CAPSULE, COATED, EXTENDED RELEASE ORAL at 08:39

## 2018-07-28 RX ADMIN — SIMVASTATIN 20 MG: 20 TABLET, FILM COATED ORAL at 21:02

## 2018-07-28 RX ADMIN — TRAMADOL HYDROCHLORIDE 50 MG: 50 TABLET, FILM COATED ORAL at 08:39

## 2018-07-28 RX ADMIN — ACETAMINOPHEN 1000 MG: 500 TABLET ORAL at 12:19

## 2018-07-28 RX ADMIN — VANCOMYCIN HYDROCHLORIDE 750 MG: 750 INJECTION, POWDER, LYOPHILIZED, FOR SOLUTION INTRAVENOUS at 00:11

## 2018-07-28 RX ADMIN — APIXABAN 2.5 MG: 2.5 TABLET, FILM COATED ORAL at 00:11

## 2018-07-28 RX ADMIN — METOPROLOL TARTRATE 25 MG: 25 TABLET ORAL at 21:02

## 2018-07-28 RX ADMIN — BECLOMETHASONE DIPROPIONATE 1 PUFF: 80 AEROSOL, METERED RESPIRATORY (INHALATION) at 10:30

## 2018-07-28 RX ADMIN — BECLOMETHASONE DIPROPIONATE 1 PUFF: 80 AEROSOL, METERED RESPIRATORY (INHALATION) at 19:54

## 2018-07-28 RX ADMIN — SIMVASTATIN 20 MG: 20 TABLET, FILM COATED ORAL at 00:11

## 2018-07-28 RX ADMIN — FERROUS SULFATE TAB 325 MG (65 MG ELEMENTAL FE) 325 MG: 325 (65 FE) TAB at 08:39

## 2018-07-28 RX ADMIN — Medication 10 ML: at 21:03

## 2018-07-28 RX ADMIN — METOPROLOL TARTRATE 25 MG: 25 TABLET ORAL at 08:39

## 2018-07-28 RX ADMIN — FAMOTIDINE 20 MG: 20 TABLET, FILM COATED ORAL at 08:39

## 2018-07-28 ASSESSMENT — PAIN DESCRIPTION - DIRECTION: RADIATING_TOWARDS: R SHOULDER

## 2018-07-28 ASSESSMENT — PAIN DESCRIPTION - ORIENTATION
ORIENTATION: RIGHT

## 2018-07-28 ASSESSMENT — PAIN DESCRIPTION - DESCRIPTORS
DESCRIPTORS: ACHING;SHARP
DESCRIPTORS: ACHING

## 2018-07-28 ASSESSMENT — PAIN DESCRIPTION - PAIN TYPE
TYPE: ACUTE PAIN

## 2018-07-28 ASSESSMENT — PAIN SCALES - GENERAL
PAINLEVEL_OUTOF10: 3
PAINLEVEL_OUTOF10: 3
PAINLEVEL_OUTOF10: 0
PAINLEVEL_OUTOF10: 4

## 2018-07-28 ASSESSMENT — PAIN DESCRIPTION - FREQUENCY
FREQUENCY: CONTINUOUS
FREQUENCY: INTERMITTENT

## 2018-07-28 ASSESSMENT — PAIN DESCRIPTION - LOCATION
LOCATION: SHOULDER

## 2018-07-28 NOTE — PROGRESS NOTES
Occupational Therapy   Occupational Therapy Initial Assessment  Date: 2018   Patient Name: Blank Hernandez  MRN: [id number]     : 1928    Date of Service: 2018    Discharge Recommendations:            Patient Diagnosis(es): The primary encounter diagnosis was Right shoulder pain, unspecified chronicity. Diagnoses of Leukocytosis, unspecified type, Renal insufficiency, and Acute pain of right shoulder were also pertinent to this visit. has a past medical history of Arthritis; Asthma; Atrial fibrillation with rapid ventricular response (HCC); CAD (coronary artery disease); CHF (congestive heart failure) ([facility] Chronic kidney disease; COPD (chronic obstructive pulmonary disease) ([facility] Examination of participant in clinical trial; Gout; Gout; H/O cardiovascular stress test; H/O echocardiogram; Hx of transesophageal echocardiography ([facility]) for monitoring; Hyperlipidemia; Hypertension; MI (myocardial infarction); and Shingles. has a past surgical history that includes Cholecystectomy; Coronary angioplasty with stent; Spine surgery; and Cardiac catheterization (2017).            Restrictions  Restrictions/Precautions  Restrictions/Precautions: Fall Risk    Subjective   General  Chart Reviewed: Yes  Patient assessed for rehabilitation services?: Yes  Pain Assessment  Patient Currently in Pain: Yes  Pain Assessment: 0-10  Pain Level: 3  Pain Type: Acute pain  Pain Location: Shoulder  Pain Orientation: Right  Pain Radiating Towards: R shoulder  Pain Descriptors: Aching, Sharp  Pain Frequency: Continuous  Pain Intervention(s): Medication (see eMar)  Response to Pain Intervention: Asleep with RR greater than 10  Pre Treatment Pain Screening  Intervention List: Nurse/Physician notified  Social/Functional History  Social/Functional History  Lives With: Family  Type of Home: House  Home Layout: One level  Home Access: Level entry  Bathroom Shower/Tub: Tub/Shower unit  Bathroom Equipment: Shower

## 2018-07-28 NOTE — H&P
Admission:    Prior to Admission medications    Medication Sig Start Date End Date Taking? Authorizing Provider   albuterol (PROVENTIL) (2.5 MG/3ML) 0.083% nebulizer solution Take 3 mLs by nebulization every 4 hours as needed for Wheezing 4/23/18  Yes Harjit López MD   ferrous sulfate 325 (65 Fe) MG tablet Take 1 tablet by mouth daily (with breakfast) 3/29/18  Yes JOANIE Thorpe CNP   simvastatin (ZOCOR) 20 MG tablet Take 1 tablet by mouth nightly 3/29/18  Yes JOANIE Thorpe CNP   PROAIR  (90 Base) MCG/ACT inhaler INHALE 2 PUFFS EVERY 6 HOURS AS NEEDED FOR WHEEZING 2/20/18  Yes Harjit López MD   nitroGLYCERIN (NITROSTAT) 0.4 MG SL tablet up to max of 3 total doses. If no relief after 1 dose, call 911. Patient taking differently: Place 0.4 mg under the tongue every 5 minutes as needed for Chest pain up to max of 3 total doses. If no relief after 1 dose, call 911. 2/6/18  Yes Shahid Solis MD   diltiazem (CARDIZEM CD) 120 MG extended release capsule Take 1 capsule by mouth daily 2/7/18  Yes Shahid Solis MD   furosemide (LASIX) 20 MG tablet Take 20 mg by mouth every 48 hours   Yes Historical Provider, MD   beclomethasone (QVAR) 80 MCG/ACT inhaler Inhale 1 puff into the lungs 2 times daily 8/10/17  Yes Harjit López MD   apixaban (ELIQUIS) 2.5 MG TABS tablet Take 1 tablet by mouth 2 times daily 2/21/17  Yes JOANIE Wahl CNP   clopidogrel (PLAVIX) 75 MG tablet Take 1 tablet by mouth daily 2/21/17  Yes JOANIE Wahl CNP   metoprolol tartrate (LOPRESSOR) 25 MG tablet Take 1 tablet by mouth 2 times daily 2/6/18   Shahid Solis MD   sodium polystyrene (SPS) 15 GM/60ML suspension Take 60 mLs by mouth once for 1 dose 9/26/17 9/26/17  Alisha Li MD       Allergies:  Belladonna    Social History:   TOBACCO:   reports that she has quit smoking. Her smoking use included Cigarettes. She has a 90.00 pack-year smoking history.  She has never used smokeless lymphadenopathy,  no carotid bruits  PULM: decreased breath sounds noted- clear  COR: regular rate & rhythm, no murmurs and no gallops  ABD:  soft, non-tender, non-distended, normal bowel sounds, no masses or organomegaly  EXT:   no cyanosis, clubbing or edema present in LE's,  Right shoulder is swollen, warm. No redness  NEURO: negative  SKIN:  no rashes or significant lesions      -----------------------------------------------------------------  Diagnostic Data: Reviewed    Assessment:      Hospital Problems:  Active Problems:    Acute pain of right shoulder    Shoulder pain  Resolved Problems:    * No resolved hospital problems.  *      All Problems:  Patient Active Problem List   Diagnosis    Diastolic CHF ([address]    Asthma    Status post coronary artery stent placement    CKD (chronic kidney disease) stage 4, GFR 15-29 ml/min ([facility])    Renal artery stenosis ([facility])    Renovascular hypertension    COPD (chronic obstructive pulmonary disease) ([address]    Ex-smoker    Atrial fibrillation with rapid ventricular response ([facility])    Panlobular emphysema ([address]    Acute respiratory failure with hypoxemia ([address]    COPD with acute exacerbation ([address]    Coronary artery disease involving native coronary artery of native heart without angina pectoris    Elevated brain natriuretic peptide (BNP) level    Acute on chronic diastolic congestive heart failure (HCC)    COPD exacerbation (HCC)    Chronic diastolic CHF (congestive heart failure) ([facility])    Acute gout involving toe of right foot    Acute pain of right shoulder    Shoulder pain           Plan:       · This patient requires inpatient admission because of shoulder pain   · Factors affecting the medical complexity of this patient include COPD  · Estimated length of stay is 3 days  · Ortho for aspiration, blood cultures, pain control  ·   High risk medications: none    Sara Alba MD  CORE MEASURES  · DVT prophylaxis: already on chronic anticoagulation  · Decubitus ulcer present on admission: No  · CODE STATUS: FULL CODE  · Nutrition Status: poor  · Physical therapy: Yes   · Old Charts reviewed: Yes  · EKG Reviewed:  No  · Advance Directive Addressed: Yes    Simba Weems M.D.

## 2018-07-28 NOTE — FLOWSHEET NOTE
Dr Veliz Ang in to see patient rt shoulder fluid aspirated and sent to lab. 2x2 over site. Patient tolerated well.

## 2018-07-28 NOTE — CONSULTS
Serum creatinine ordered daily while on Vancomycin therapy. Vancomycin level ordered for 7/29/18 at 2330. Thank you. Rylee Wright.  Melissa De La Garza

## 2018-07-28 NOTE — PLAN OF CARE
Problem: Falls - Risk of:  Goal: Will remain free from falls  Will remain free from falls   Outcome: Ongoing  Bed alarm in place. Call light in reach. Fall risk assessed daily. Will continue to assess and monitor. Problem: Infection:  Goal: Will remain free from infection  Will remain free from infection  Outcome: Ongoing  IV antibiotics in place. Continuing to assess and monitor. Problem: Safety:  Goal: Free from accidental physical injury  Free from accidental physical injury  Outcome: Ongoing  Continuing to assess and monitor. Problem: Daily Care:  Goal: Daily care needs are met  Daily care needs are met  Outcome: Ongoing  Continuing to assess and monitor.

## 2018-07-28 NOTE — PLAN OF CARE
Problem: Falls - Risk of:  Goal: Will remain free from falls  Will remain free from falls   Outcome: Ongoing  Patient is alert and oriented and has demonstrated the use of using the call light for assistance before getting up. Education has been provided to defer the use of the bed alarm and/or restraint free alarm as the patient has shown competency in calling for assistance and verbalizing the risk. Problem: Pain:  Goal: Pain level will decrease  Pain level will decrease   Outcome: Ongoing  Continuing to monitor/assess pain at least every 4 hours. Informed patient of adverse complications of uncontrolled pain. Verbalized understanding and agrees to report increases in pain level. Educated that pain medication addiction risk is greatly reduced when medication is used for acute pain on a short term basis. Plan patient's day so activities occur at the peak level of medication. Will be medicated before procedures and activities that increase pain so to prevent uncontrollable pain. Will provide distractions such as TV, music, reading, and/or visitors. Will inform physician of ineffective pain control. Will continue to monitor and medicate as ordered. Problem: Infection:  Goal: Will remain free from infection  Will remain free from infection   Outcome: Ongoing  Continue to monitor right shoulder redness and edema, WBC. IV antibiotics as ordered    Problem: Daily Care:  Goal: Daily care needs are met  Daily care needs are met   Outcome: Ongoing  Daily care needs continuing to be assessed. Able to perform self care. Able to verbalize need to for assistance, and encourage to express needs/concerns. Will continue to assist with daily care as verbalized and encourage independence with out sacrificing safety. Call light and personal belongings within reach.

## 2018-07-28 NOTE — PROGRESS NOTES
Physical Therapy    Facility/Department: [facility] AT THE [facility] MED SURG  Initial Assessment    NAME: Blank Hernandez  : 1928  MRN: [id number]    Date of Service: 2018    Discharge Recommendations:  Continue to assess pending progress   PT Equipment Recommendations  Equipment Needed: No    Patient Diagnosis(es): The primary encounter diagnosis was Right shoulder pain, unspecified chronicity. Diagnoses of Leukocytosis, unspecified type, Renal insufficiency, and Acute pain of right shoulder were also pertinent to this visit. has a past medical history of Arthritis; Asthma; Atrial fibrillation with rapid ventricular response (HCC); CAD (coronary artery disease); CHF (congestive heart failure) ([facility] [address] Chronic kidney disease; COPD (chronic obstructive pulmonary disease) ([facility] Examination of participant in clinical trial; Gout; Gout; H/O cardiovascular stress test; H/O echocardiogram; Hx of transesophageal echocardiography ([facility]) for monitoring; Hyperlipidemia; Hypertension; MI (myocardial infarction); and Shingles. has a past surgical history that includes Cholecystectomy; Coronary angioplasty with stent; Spine surgery; and Cardiac catheterization (2017). Restrictions  Restrictions/Precautions  Restrictions/Precautions: Fall Risk  Vision/Hearing  Vision: Within Functional Limits  Hearing: Within functional limits     Subjective  General  Chart Reviewed: Yes  Patient assessed for rehabilitation services?: Yes  Referring Practitioner: Madeline Dueñas.  Ar Ochoa MD  Referral Date : 18  Diagnosis: R shoulder pain  Follows Commands: Within Functional Limits  Subjective  Subjective: Patient reports 0/10 at rest, 4/10 shoulder pain with movement  Pain Screening  Patient Currently in Pain: Yes          Orientation  Orientation  Overall Orientation Status: Within Functional Limits    Social/Functional History  Social/Functional History  Lives With: Family  Type of Home: House  Home Layout: One level  Home Access: Level entry  Bathroom Shower/Tub: Tub/Shower unit  Bathroom Equipment: Shower chair  Bathroom Accessibility: Accessible  Receives Help From: Family  ADL Assistance: Independent  Homemaking Assistance: Independent  Ambulation Assistance: Independent  Transfer Assistance: Independent  Active : No  Occupation: Retired  Objective     Observation/Palpation  Body Mechanics: Patient bracing arm to avoid R shoulder movement    AROM RLE (degrees)  RLE AROM: WFL  AROM LLE (degrees)  LLE AROM : WFL  Strength RLE  Strength RLE: WFL  Strength LLE  Strength LLE: WFL     Sensation  Overall Sensation Status: Impaired  Light Touch: Partial deficits in the LUE     Transfers  Sit to Stand: Contact guard assistance  Stand to sit: Contact guard assistance  Ambulation  Ambulation?: Yes  WB Status: FWB  Ambulation 1  Surface: level tile  Device: No Device  Assistance: Contact guard assistance  Quality of Gait: Patient amb with forward flexed posture, no arm swing on right  Distance: 20'  Stairs/Curb  Stairs?: No     Balance  Posture: Fair  Sitting - Static: Good  Sitting - Dynamic: Good  Standing - Static: Good  Standing - Dynamic: Fair;-        Assessment   Body structures, Functions, Activity limitations: Decreased functional mobility ; Decreased endurance;Decreased balance  Assessment: The patient is a 80 y.o. female who was admitted due to R shoulder pain. Patient demonstrates impaired balance with one LOB when turning that was corrected. Patient would benefit from skilled PT to address balance deficits and endurance while admitted. Treatment Diagnosis: decreased activity endurance, decreased balance. Prognosis: Good  Decision Making: Low Complexity  REQUIRES PT FOLLOW UP: Yes  Activity Tolerance  Activity Tolerance: Patient Tolerated treatment well         Plan   Plan  Times per week: 7  Times per day: Twice a day  Plan Comment: 1x/day on weekends  Safety Devices  Type of devices:  All fall risk precautions in place    G-Code  PT

## 2018-07-28 NOTE — PROGRESS NOTES
CXR Acuity = 4; otherwise:  PD&P, PEP, or Vest QID & PRN  NT Sxn PRN for ineffective cough  METANEB QID with [physician-ordered bronchodilator(s)] if CXR Acuity = 4; otherwise:  PD&P, PEP, or Vest TID & PRN  NT Sxn PRN for ineffective cough  Instruct patient to self-perform IS q1hr WA   Directed Cough self-performed q1hr WA     If Acuity Level is 2 or above in the following:    [x] PULMONARY HISTORY (PULM HX)    Goal: Assist patient in quitting smoking to slow or stop the progression of lung disease.     [x] Smoking Cessation Protocol    SMOKING CESSATION EDUCATION provided according to policy ND_153: (sandra with an X)  ____Yes    ____ No     ___x_ NA    Smoking Cessation Booklet given:  ____Yes  ____No ____Patient Robert Martell

## 2018-07-28 NOTE — ED PROVIDER NOTES
[facility] AT THE [facility] MED SURG  eMERGENCY dEPARTMENT eNCOUnter      Pt Name: Cm Kelley  MRN: [id number]  [place] 5/16/1928  Date of evaluation: 7/27/2018  Provider: Mirella Perry PA-C,    [address] [place] [address]       Chief Complaint   Patient presents with    Shoulder Pain     states right shoulder pain started last night, took motrin last night and woke up this am 0800 with shoulder pain which didn't get worse or better       HISTORY OF PRESENT ILLNESS    Cm Kelley is a 80 y.o. female who presents to the emergency department from home Complaining of right shoulder pain which started yesterday. She states constant achy in nature and worse with any movement of her shoulder. Patient denies any cough any shortness of breath, nausea or vomiting, fever or chills no abdominal or flank pain or syncope, chest or back pain      Triage notes and Nursing notes were reviewed by myself. Any discrepancies are addressed above. PAST MEDICAL HISTORY     Past Medical History:   Diagnosis Date    Arthritis     Asthma     Atrial fibrillation with rapid ventricular response ([address] 2/17/2017    CAD (coronary artery disease)     CHF (congestive heart failure) (HCC)     Chronic kidney disease     COPD (ch[facility] obstructive [facility] disease) ([facility]     Examination of participant in clinical trial 5/20/13    6 year follow up, estimated completion JUN 2019    Gout 12/28/2017    Gout     H/O cardiovascular stress test 02/14/2017    H/O echocardiogram 02/14/2017    EF 55%. Mildly increased wall thickness of the LV. Evidence of diastolic dysfunction. Normal right ventricular size and function. Normal tricuspid valve structure and function. Mild tricuspid regurg    Hx of transesophageal echocardiography ([facility]HER) for monitoring 02/20/2017    No clots  were visulaized in the left atrial appendage EF 55%.     Hyperlipidemia     Hypertension     MI (myocardial infarction)     2001    Shingles     15 years ago       SURGICAL HISTORY secondary to pain. Mild swelling of the shoulder but negative for any increased erythema or warmth. Diffuse pain on palpation of right shoulder increased in the anterior and superior aspect of the shoulder. Neurological: She is alert and oriented to person, place, and time. No cranial nerve deficit. Skin: Skin is warm and dry. Psychiatric: She has a normal mood and affect. Her behavior is normal. Judgment and thought content normal.   Nursing note and vitals reviewed. DIAGNOSTIC RESULTS     EKG: (none if blank)  All EKG's are interpreted by the Emergency Department Physician who either signs or Co-signs this chart in the absence of a cardiologist.        RADIOLOGY: (none if blank)   Interpretation per the Radiologist below, if available at the time of this note:    XR CHEST STANDARD (2 VW)   Final Result   Impression:     No acute radiographic abnormality. Electronically Signed By: Mercedes Reed   on  07/27/2018  20:30      XR SHOULDER RIGHT (MIN 2 VIEWS)   Final Result   Impression:     Mild degenerative changes. Decreased acromiohumeral distance, suggestive of chronic rotator cuff injury. No displaced fracture or dislocation.           Electronically Signed By: Mercedes Reed   on  07/27/2018  20:28          LABS:  Labs Reviewed   CBC WITH AUTO DIFFERENTIAL - Abnormal; Notable for the following:        Result Value    WBC 20.8 (*)     Seg Neutrophils 83 (*)     Lymphocytes 4 (*)     Eosinophils % 0 (*)     Segs Absolute 17.26 (*)     Absolute Lymph # 0.83 (*)     Absolute Mono # 2.50 (*)     All other components within normal limits   COMPREHENSIVE METABOLIC PANEL - Abnormal; Notable for the following:     Glucose 157 (*)     BUN 32 (*)     CREATININE 1.93 (*)     GFR Non- 24 (*)     GFR  30 (*)     All other components within normal limits   URINALYSIS - Abnormal; Notable for the following:     Urine Hgb TRACE (*)     All other components within [facility]) tablet 2.5 mg (2.5 mg Oral Given 7/28/18 0839)   beclomethasone (QVAR) 80 MCG/ACT inhaler 1 puff (1 puff Inhalation Given 7/28/18 1030)   clopidogrel (PLAVIX) tablet 75 mg (75 mg Oral Given 7/28/18 0839)   diltiazem (CARDIZEM CD) extended release capsule 120 mg (120 mg Oral Given 7/28/18 0839)   ferrous sulfate tablet 325 mg (325 mg Oral Given 7/28/18 0839)   metoprolol tartrate (LOPRESSOR) tablet 25 mg (25 mg Oral Given 7/28/18 0839)   simvastatin (ZOCOR) tablet 20 mg (20 mg Oral Given 7/28/18 0011)   sodium chloride flush 0.9 % injection 10 mL (10 mLs Intravenous Given 7/28/18 0841)   sodium chloride flush 0.9 % injection 10 mL (not administered)   magnesium hydroxide (MILK OF MAGNESIA) 400 MG/5ML suspension 30 mL (not administered)   ondansetron (ZOFRAN) injection 4 mg (not administered)   famotidine (PEPCID) tablet 20 mg (20 mg Oral Given 7/28/18 0839)   vancomycin (VANCOCIN) 750 mg in dextrose 5 % 250 mL IVPB (0 mg Intravenous Stopped 7/28/18 0115)   vancomycin (VANCOCIN) intermittent dosing (placeholder) ( Other Not Given 7/28/18 0018)   albuterol (PROVENTIL) nebulizer solution 2.5 mg (not administered)   acetaminophen (TYLENOL) tablet 1,000 mg (not administered)   traMADol (ULTRAM) tablet 50 mg (50 mg Oral Given 7/28/18 0839)   furosemide (LASIX) tablet 20 mg (not administered)   HYDROcodone-acetaminophen (NORCO) 5-325 MG per tablet 1 tablet (1 tablet Oral Given 7/27/18 1918)       CONSULTS: (None if blank)  IP CONSULT TO CASE MANAGEMENT  PHARMACY TO DOSE VANCOMYCIN  IP CONSULT TO ORTHOPEDIC SURGERY    Procedures: (None if blank)       CLINICAL IMPRESSION      1. Right shoulder pain, unspecified chronicity    2. Leukocytosis, unspecified type    3. Renal insufficiency    4.  Acute pain of right shoulder          DISPOSITION/PLAN   DISPOSITION Admitted 07/27/2018 11:04:08 PM      PATIENT REFERRED TO:  JOANIE Blevins - CNP  [address] [facility] [id number]  [phone]            DISCHARGE MEDICATIONS:  Current Discharge Medication List                 (Please note that portions of this note were completed with a voice recognition program.  Efforts were made to edit the dictations but occasionally words are mis-transcribed.)      Electronically signed by Trinidad Soares PA-C on 7/27/18 at 8:43 PM             Trinidad Soares PA-C  07/28/18 1053

## 2018-07-28 NOTE — CONSULTS
Department of Orthopedic Surgery  Attending Consult Note        Reason for Consult:  Right shoulder pain    CHIEF COMPLAINT:  Right shoulder pain    History Obtained From:  patient, electronic medical record    HISTORY OF PRESENT ILLNESS:               The patient is a 80 y.o. female who presents with acute onset right shoulder pain. She denies an history of injury. Pain started 2 days ago when she awoke from sleep. Patient presented to the ER and was noted to have swelling in the shoulder with leukocytosis. She was admitted to medical service. Patient denies any recent illness. Denies diabetes. She has h/o of coronary stent for which she is on Plavix. Currently, patient complains of constant, severe, dull, throbbing pain localized to the right shoulder. Pain is exacerbated by touch to the shoulder or movement of the shoulder. Past Medical History:        Diagnosis Date    Arthritis     Asthma     Atrial fibrillation with rapid ventricular response ([facility] 2/17/2017    CAD (coronary artery disease)     CHF (congestive heart failure) (HCC)     Chronic kidney disease     COPD [facility]chronic obstructive pulmonary disease) ([facility]     Examination of participant in clinical trial 5/20/13    6 year follow up, estimated completion JUN 2019    Gout 12/28/2017    Gout     H/O cardiovascular stress test 02/14/2017    H/O echocardiogram 02/14/2017    EF 55%. Mildly increased wall thickness of the LV. Evidence of diastolic dysfunction. Normal right ventricular size and function. Normal tricuspid valve structure and function. Mild tricuspid regurg    Hx of transesophageal echocardiography ([facility]) for monitoring 02/20/2017    No clots  were visulaized in the left atrial appendage EF 55%.     Hyperlipidemia     Hypertension     MI (myocardial infarction)     2001    Shingles     15 years ago     Past Surgical History:        Procedure Laterality Date    CARDIAC CATHETERIZATION  02/2017    LMCA: Normal 0% stenosis. LAD: Mid area 50-60% stenosis. LCx: Patent proximal stent 30% stenosis distal to the stent in OM proximal area 50% stenosis. RCA: Minimal 30% mid area PDA stent  is patent <20% stenosis. PL branch diffuse disease.  CHOLECYSTECTOMY      CORONARY ANGIOPLASTY WITH STENT PLACEMENT      SPINE SURGERY       Current Medications:   Current Facility-Administered Medications: vancomycin (VANCOCIN) intermittent dosing (placeholder), , Other, RX Placeholder  albuterol (PROVENTIL) nebulizer solution 2.5 mg, 2.5 mg, Nebulization, TID PRN  acetaminophen (TYLENOL) tablet 1,000 mg, 1,000 mg, Oral, Q6H PRN  traMADol (ULTRAM) tablet 50 mg, 50 mg, Oral, Q12H PRN  [START ON 7/29/2018] furosemide (LASIX) tablet 20 mg, 20 mg, Oral, Q48H  apixaban (ELIQUIS) tablet 2.5 mg, 2.5 mg, Oral, BID  beclomethasone (QVAR) 80 MCG/ACT inhaler 1 puff, 1 puff, Inhalation, BID  clopidogrel (PLAVIX) tablet 75 mg, 75 mg, Oral, Daily  diltiazem (CARDIZEM CD) extended release capsule 120 mg, 120 mg, Oral, Daily  ferrous sulfate tablet 325 mg, 325 mg, Oral, Daily with breakfast  metoprolol tartrate (LOPRESSOR) tablet 25 mg, 25 mg, Oral, BID  simvastatin (ZOCOR) tablet 20 mg, 20 mg, Oral, Nightly  sodium chloride flush 0.9 % injection 10 mL, 10 mL, Intravenous, 2 times per day  sodium chloride flush 0.9 % injection 10 mL, 10 mL, Intravenous, PRN  magnesium hydroxide (MILK OF MAGNESIA) 400 MG/5ML suspension 30 mL, 30 mL, Oral, Daily PRN  ondansetron (ZOFRAN) injection 4 mg, 4 mg, Intravenous, Q6H PRN  famotidine (PEPCID) tablet 20 mg, 20 mg, Oral, Daily  vancomycin (VANCOCIN) 750 mg in dextrose 5 % 250 mL IVPB, 15 mg/kg, Intravenous, Q48H    Allergies:  Belladonna    Social History: Former smoker  Denies alcohol, drugs    Family History:   Family History   Problem Relation Age of Onset    Heart Disease Mother     Cancer Father        REVIEW OF SYSTEMS:     CONSTITUTIONAL: No weight loss, fever, chills, weakness or fatigue.    HEENT:  No visual loss, blurred vision, double vision or yellow sclerae. No hearing loss, sneezing, congestion, runny nose or sore throat. SKIN: No rash or itching. CARDIOVASCULAR: No chest pain, chest pressure or chest discomfort. No palpitations or edema. RESPIRATORY: No shortness of breath, cough or sputum. GASTROINTESTINAL: No anorexia, nausea, vomiting or diarrhea. No abdominal pain or blood. GENITOURINARY: No burning on urination. No urinary urgency, No hematuria. NEUROLOGICAL: No headache, dizziness, syncope, paralysis, ataxia, numbness or tingling in the extremities. MUSCULOSKELETAL: as per HPI  HEMATOLOGIC: No anemia, bleeding or bruising. LYMPHATICS: No enlarged nodes. No history of splenectomy. PSYCHIATRIC: No depression or anxiety. ENDOCRINOLOGIC: No reports of sweating, cold or heat intolerance. No polyuria or polydipsia. ALLERGIES: No history of asthma, hives, eczema or rhinitis. PHYSICAL EXAM:    VITALS:  BP (!) 137/49   Pulse 55   Temp 97.7 °F (36.5 °C) (Temporal)   Resp 16   Ht 5' 2\" (1.575 m)   Wt 120 lb 1.6 oz (54.5 kg)   SpO2 94%   BMI 21.97 kg/m²   CONSTITUTIONAL:  Awake & alert, cooperative, no apparent distress, and appears stated age  MUSCULOSKELETAL:    Right upper extremity: Skin intact. Moderate edema of the right shoulder. Mild warmth. No erythema. Shoulder ROM severely limited secondary to pain. Severe tenderness of the shoulder. Joints are stable. 5/5 strength distally with motor function intact. Sensation intact. 2+ radial pulse. Left upper extremity:  Skin intact without signs of trauma. No edema or erythema. Shoulder ROm limited. Mild tenderness left shoulder. Joints are stable. 5/5 strength distally with motor function intact. Sensation intact. 2+ pedal pulses.     DATA:    CBC with Differential:    Lab Results   Component Value Date    WBC 17.4 07/28/2018    RBC 4.08 07/28/2018    RBC 4.12 01/31/2012    HGB 12.0 07/28/2018    HCT 36.9 07/28/2018

## 2018-07-28 NOTE — PROGRESS NOTES
Pharmacy Note     Renal Dose Adjustment    Mague Fuller is a 80 y.o. female. Pharmacist assessment of renally cleared medications. Recent Labs      07/27/18 1900   BUN  32*       Recent Labs      07/27/18 1900   CREATININE  1.93*       Estimated Creatinine Clearance: 15 mL/min (A) (based on SCr of 1.93 mg/dL (H)). Estimated CrCl using Ideal Body Weight: 15.33 mL/min (based on IBW 50.12 kg)    Height:   Ht Readings from Last 1 Encounters:   07/27/18 5' 2\" (1.575 m)     Weight:  Wt Readings from Last 1 Encounters:   07/27/18 120 lb 1.6 oz (54.5 kg)       The following medication dose has been adjusted based upon renal function per P&T Guidelines:             Famotidine 20 mg by mouth twice daily changed to 20 mg by mouth once daily.     Kandice Swift [place]  7/28/2018 12:06 AM

## 2018-07-29 LAB
ABSOLUTE EOS #: 0 K/UL (ref 0–0.44)
ABSOLUTE IMMATURE GRANULOCYTE: 0 K/UL (ref 0–0.3)
ABSOLUTE LYMPH #: 2.38 K/UL (ref 1.1–3.7)
ABSOLUTE MONO #: 1.45 K/UL (ref 0.1–1.2)
ALBUMIN SERPL-MCNC: 3 G/DL (ref 3.5–5.2)
ALBUMIN/GLOBULIN RATIO: 0.9 (ref 1–2.5)
ALP BLD-CCNC: 73 U/L (ref 35–104)
ALT SERPL-CCNC: 9 U/L (ref 5–33)
ANION GAP SERPL CALCULATED.3IONS-SCNC: 15 MMOL/L (ref 9–17)
AST SERPL-CCNC: 11 U/L
BASOPHILS # BLD: 1 % (ref 0–2)
BASOPHILS ABSOLUTE: 0.13 K/UL (ref 0–0.2)
BILIRUB SERPL-MCNC: 0.31 MG/DL (ref 0.3–1.2)
BUN BLDV-MCNC: 38 MG/DL (ref 8–23)
BUN/CREAT BLD: 19 (ref 9–20)
CALCIUM SERPL-MCNC: 9.5 MG/DL (ref 8.6–10.4)
CHLORIDE BLD-SCNC: 103 MMOL/L (ref 98–107)
CO2: 23 MMOL/L (ref 20–31)
CREAT SERPL-MCNC: 2.04 MG/DL (ref 0.5–0.9)
CREAT SERPL-MCNC: 2.05 MG/DL (ref 0.5–0.9)
DIFFERENTIAL TYPE: ABNORMAL
EOSINOPHILS RELATIVE PERCENT: 0 % (ref 1–4)
GFR AFRICAN AMERICAN: 28 ML/MIN
GFR AFRICAN AMERICAN: 28 ML/MIN
GFR NON-AFRICAN AMERICAN: 23 ML/MIN
GFR NON-AFRICAN AMERICAN: 23 ML/MIN
GFR SERPL CREATININE-BSD FRML MDRD: ABNORMAL ML/MIN/{1.73_M2}
GLUCOSE BLD-MCNC: 125 MG/DL (ref 70–99)
HCT VFR BLD CALC: 34.6 % (ref 36.3–47.1)
HEMOGLOBIN: 11.1 G/DL (ref 11.9–15.1)
IMMATURE GRANULOCYTES: 0 %
LYMPHOCYTES # BLD: 18 % (ref 24–43)
MCH RBC QN AUTO: 29.4 PG (ref 25.2–33.5)
MCHC RBC AUTO-ENTMCNC: 32.1 G/DL (ref 28.4–34.8)
MCV RBC AUTO: 91.8 FL (ref 82.6–102.9)
MONOCYTES # BLD: 11 % (ref 3–12)
MORPHOLOGY: NORMAL
NRBC AUTOMATED: 0 PER 100 WBC
PDW BLD-RTO: 13.5 % (ref 11.8–14.4)
PLATELET # BLD: 228 K/UL (ref 138–453)
PLATELET ESTIMATE: ABNORMAL
PMV BLD AUTO: 10.4 FL (ref 8.1–13.5)
POTASSIUM SERPL-SCNC: 4.2 MMOL/L (ref 3.7–5.3)
RBC # BLD: 3.77 M/UL (ref 3.95–5.11)
RBC # BLD: ABNORMAL 10*6/UL
SEG NEUTROPHILS: 70 % (ref 36–65)
SEGMENTED NEUTROPHILS ABSOLUTE COUNT: 9.24 K/UL (ref 1.5–8.1)
SODIUM BLD-SCNC: 141 MMOL/L (ref 135–144)
TOTAL PROTEIN: 6.3 G/DL (ref 6.4–8.3)
WBC # BLD: 13.2 K/UL (ref 3.5–11.3)
WBC # BLD: ABNORMAL 10*3/UL

## 2018-07-29 PROCEDURE — 80202 ASSAY OF VANCOMYCIN: CPT

## 2018-07-29 PROCEDURE — 2580000003 HC RX 258: Performed by: INTERNAL MEDICINE

## 2018-07-29 PROCEDURE — 94640 AIRWAY INHALATION TREATMENT: CPT

## 2018-07-29 PROCEDURE — 6360000002 HC RX W HCPCS: Performed by: INTERNAL MEDICINE

## 2018-07-29 PROCEDURE — 97110 THERAPEUTIC EXERCISES: CPT

## 2018-07-29 PROCEDURE — 6370000000 HC RX 637 (ALT 250 FOR IP): Performed by: INTERNAL MEDICINE

## 2018-07-29 PROCEDURE — 80053 COMPREHEN METABOLIC PANEL: CPT

## 2018-07-29 PROCEDURE — 82565 ASSAY OF CREATININE: CPT

## 2018-07-29 PROCEDURE — 36415 COLL VENOUS BLD VENIPUNCTURE: CPT

## 2018-07-29 PROCEDURE — 85025 COMPLETE CBC W/AUTO DIFF WBC: CPT

## 2018-07-29 PROCEDURE — 1200000000 HC SEMI PRIVATE

## 2018-07-29 RX ADMIN — DILTIAZEM HYDROCHLORIDE 120 MG: 120 CAPSULE, COATED, EXTENDED RELEASE ORAL at 08:57

## 2018-07-29 RX ADMIN — FUROSEMIDE 20 MG: 20 TABLET ORAL at 08:58

## 2018-07-29 RX ADMIN — ACETAMINOPHEN 1000 MG: 500 TABLET ORAL at 16:43

## 2018-07-29 RX ADMIN — METOPROLOL TARTRATE 25 MG: 25 TABLET ORAL at 20:54

## 2018-07-29 RX ADMIN — BECLOMETHASONE DIPROPIONATE 1 PUFF: 80 AEROSOL, METERED RESPIRATORY (INHALATION) at 11:25

## 2018-07-29 RX ADMIN — FERROUS SULFATE TAB 325 MG (65 MG ELEMENTAL FE) 325 MG: 325 (65 FE) TAB at 08:58

## 2018-07-29 RX ADMIN — Medication 10 ML: at 20:54

## 2018-07-29 RX ADMIN — ACETAMINOPHEN 1000 MG: 500 TABLET ORAL at 09:02

## 2018-07-29 RX ADMIN — TRAMADOL HYDROCHLORIDE 50 MG: 50 TABLET, FILM COATED ORAL at 09:02

## 2018-07-29 RX ADMIN — BECLOMETHASONE DIPROPIONATE 1 PUFF: 80 AEROSOL, METERED RESPIRATORY (INHALATION) at 19:47

## 2018-07-29 RX ADMIN — FAMOTIDINE 20 MG: 20 TABLET, FILM COATED ORAL at 08:57

## 2018-07-29 RX ADMIN — VANCOMYCIN HYDROCHLORIDE 750 MG: 750 INJECTION, POWDER, LYOPHILIZED, FOR SOLUTION INTRAVENOUS at 23:49

## 2018-07-29 RX ADMIN — METOPROLOL TARTRATE 25 MG: 25 TABLET ORAL at 08:57

## 2018-07-29 RX ADMIN — SIMVASTATIN 20 MG: 20 TABLET, FILM COATED ORAL at 20:55

## 2018-07-29 RX ADMIN — Medication 10 ML: at 08:57

## 2018-07-29 ASSESSMENT — PAIN DESCRIPTION - ORIENTATION
ORIENTATION: RIGHT
ORIENTATION: RIGHT

## 2018-07-29 ASSESSMENT — PAIN SCALES - GENERAL
PAINLEVEL_OUTOF10: 7
PAINLEVEL_OUTOF10: 0
PAINLEVEL_OUTOF10: 6
PAINLEVEL_OUTOF10: 1
PAINLEVEL_OUTOF10: 0
PAINLEVEL_OUTOF10: 1

## 2018-07-29 ASSESSMENT — PAIN DESCRIPTION - PAIN TYPE
TYPE: ACUTE PAIN
TYPE: ACUTE PAIN

## 2018-07-29 ASSESSMENT — PAIN DESCRIPTION - ONSET: ONSET: ON-GOING

## 2018-07-29 ASSESSMENT — PAIN DESCRIPTION - FREQUENCY: FREQUENCY: INTERMITTENT

## 2018-07-29 ASSESSMENT — PAIN DESCRIPTION - DESCRIPTORS
DESCRIPTORS: ACHING
DESCRIPTORS: ACHING

## 2018-07-29 ASSESSMENT — PAIN DESCRIPTION - LOCATION
LOCATION: SHOULDER
LOCATION: SHOULDER

## 2018-07-29 NOTE — PROGRESS NOTES
by assistance  Scooting: Stand by assistance                                         Additional Activities Comment  Additional Activities: Other  Additional Activities: Ed given on d/c folder this date with G understanding. Ed given on one-handed dressing techs with G understanding. Type of ROM/Therapeutic Exercise  Type of ROM/Therapeutic Exercise: Free weights  Comment: (L)UE ther ex, 1# dumbells x15 reps and in all planes for increased UE stg necessary in ADL tasks. Pt required 2 RBs to complete secondary to increased fatigue. Assessment      Activity Tolerance  Activity Tolerance: Patient Tolerated treatment well;Patient limited by pain  Safety Devices  Safety Devices in place: Yes  Type of devices: Bed alarm in place; Left in bed;Call light within reach          Plan   Plan  Times per week: 6-7  Times per day: Daily  Plan weeks: 8 weeks or until d/c  Current Treatment Recommendations: ROM, Functional Mobility Training, Endurance Training, Equipment Evaluation, Education, & procurement, Self-Care / ADL, Patient/Caregiver Education & Training  Plan Comment: Await specialist's orders to determine if an ROM HEP would be appropriate  G-Code     OutComes Score                                           AM-PAC Score             Goals  Short term goals  Time Frame for Short term goals: 3-5 days  Short term goal 1: Pt will demonstrate a compensatory strategy to assist with UB dressing. Short term goal 2: Pt will use the LRAD to complete functional task in standing for 5 minutes. Long term goals  Time Frame for Long term goals : 4 weeks  Long term goal 1: Pt will complete ADLs with MOD I using the LRAD to ensure safe return home. Patient Goals   Patient goals :  To return home       Therapy Time   Individual Concurrent Group Co-treatment   Time In [address] [facility]         Minutes [address] [facility]

## 2018-07-29 NOTE — PROGRESS NOTES
Tried sling on pt at this time, pt refused stating it was uncomfortable and that she just wanted to sleep and she would try it tomorrow during the day.

## 2018-07-29 NOTE — PROGRESS NOTES
[address],  [place].  [facility] [address] 08/30/17 1202-Present   [address] Rdz-Elsa Carrasquillo [place] [address] [address] [place] [address] 08/30/17 1201-Present   Lab and Collection     Body fluid culture on 7/28/2018   Result History     Body fluid culture on 7/28/2018          Result Information     Flag: Abnormal Status: Preliminary result (Collected: 7/28/2018 15:50) Provider Status: Ordered   Routing History     Priority Sent On From To Message Type    7/27/2018  7:26 PM Nick, Camille Incoming Lab Results From Abby Chacon RN CC'd Results   Click to Print Result   View SmartLink Info     Body fluid culture (Order #[id number]) on 7/28/18     Blood cultures---pending    CBC with Differential:    Lab Results   Component Value Date    WBC 13.2 07/29/2018    RBC 3.77 07/29/2018    RBC 4.12 01/31/2012    HGB 11.1 07/29/2018    HCT 34.6 07/29/2018     07/29/2018     01/31/2012    MCV 91.8 07/29/2018    MCH 29.4 07/29/2018    MCHC 32.1 07/29/2018    RDW 13.5 07/29/2018    METASPCT 1 02/13/2017    LYMPHOPCT 18 07/29/2018    MONOPCT 11 07/29/2018    MYELOPCT 1 11/28/2015    BASOPCT 1 07/29/2018    MONOSABS 1.45 07/29/2018    LYMPHSABS 2.38 07/29/2018    EOSABS 0.00 07/29/2018    BASOSABS 0.13 07/29/2018    DIFFTYPE NOT REPORTED 07/29/2018     CMP:    Lab Results   Component Value Date     07/28/2018    K 4.3 07/28/2018    CL 99 07/28/2018    CO2 24 07/28/2018    BUN 35 07/28/2018    CREATININE 2.05 07/29/2018    GFRAA 28 07/29/2018    LABGLOM 23 07/29/2018    GLUCOSE 139 07/28/2018    GLUCOSE 109 01/31/2012    PROT 6.3 07/28/2018    LABALBU 3.2 07/28/2018    CALCIUM 9.5 07/28/2018    BILITOT 0.44 07/28/2018    ALKPHOS 82 07/28/2018    AST 13 07/28/2018    ALT 10 07/28/2018           Xr Chest Standard (2 Vw)    Result Date: 7/27/2018  FINAL REPORT EXAM:  XR CHEST (2 VW) HISTORY:  pain right TECHNIQUE:  PA and Lateral views of the chest PRIORS:  04/09/2018. FINDINGS:  The cardiomediastinal silhouette is within normal limits. Previously noted left upper lobe pulmonary nodule is not visualized on current exam, and may be obscured by overlying lead. The lung parenchyma is otherwise clear. There are no pleural abnormalities. Mild degenerative changes of the spine. Impression:  No acute radiographic abnormality. Electronically Signed By: Kennedy Arellano   on  07/27/2018  20:30    Xr Shoulder Right (min 2 Views)    Result Date: 7/27/2018  FINAL REPORT EXAM:  XR SHOULDER RIGHT (MIN 2 VIEWS) HISTORY:  pain TECHNIQUE:  Four views right shoulder PRIORS:  None. FINDINGS:  Bony demineralization. No displaced fracture or dislocation. Mild degenerative changes present. Decreased acromial humeral distance, suggestive of chronic rotator cuff injury. No suspicious osseous lesions. Soft tissue structures are unremarkable. Impression:  Mild degenerative changes. Decreased acromiohumeral distance, suggestive of chronic rotator cuff injury. No displaced fracture or dislocation. Electronically Signed By: Kennedy Arellano   on  07/27/2018  20:28      ASSESSMENT:              Hospital Problems:  Active Problems:    Acute pain of right shoulder    Shoulder pain  Resolved Problems:    * No resolved hospital problems.  *      All Problems:  Patient Active Problem List   Diagnosis    Diastolic CHF ([address]    Asthma    Status post coronary artery stent placement    CKD (chronic kidney disease) stage 4, GFR 15-29 ml/min (HCC)    Renal artery stenosis (HCC)    Renovascular hypertension    COPD (chronic obstructive pulmonary disease) ([address]    Ex-smoker    Atrial fibrillation with rapid ventricular response (HCC)    Panlobular emphysema ([address]    Acute respiratory failure with hypoxemia ([address]    COPD with acute exacerbation ([address]    Coronary artery disease involving native coronary artery of native heart without angina pectoris    Elevated brain natriuretic peptide (BNP) level    Acute on chronic diastolic congestive heart failure (HCC)    COPD exacerbation (HCC)    Chronic diastolic CHF (congestive heart failure) (HCC)    Acute gout involving toe of right foot    Acute pain of right shoulder    Shoulder pain       PLAN:  · Continue IV vanco, PT/OT await cultures    HIGH RISK MEDS: none    Bam Hernández M.D.

## 2018-07-29 NOTE — PROGRESS NOTES
13.2 07/29/2018    RBC 3.77 07/29/2018    RBC 4.12 01/31/2012    HGB 11.1 07/29/2018    HCT 34.6 07/29/2018    MCV 91.8 07/29/2018    MCH 29.4 07/29/2018    MCHC 32.1 07/29/2018    RDW 13.5 07/29/2018     07/29/2018     01/31/2012    MPV 10.4 07/29/2018     Sed Rate: 56    Culture:  No bacterial growth in 17 hours     ASSESSMENT AND PLAN:  1.)  Hemarthrosis of right shoulder. SED rate and CRP increased since 7/27/18 but clinical picture improved following aspiration of right shoulder. Unlikely septic arthritis d/t improving WBC and negative culture after 17 hours  2.) Continue empiric Vancomycin   3.) Continue to hold anticoagulant  4.)  If WBC continues to improve and culture negative, can be d/c home and follow up outpatinet on 7/31/18 or 8/2/18  5.) Activity as tolerated.   May wear sling for comfort  6.) C/w pain control  7.) C/w OT

## 2018-07-29 NOTE — PROGRESS NOTES
Occupational Therapy  Facility/Department: [facility] MED SURG  Daily Treatment Note  NAME: Leonora Arrieta  : 1928  MRN: [id number]    Date of Service: 2018    Discharge Recommendations:  Continue to assess pending progress       Patient Diagnosis(es): The primary encounter diagnosis was Right shoulder pain, unspecified chronicity. Diagnoses of Leukocytosis, unspecified type, Renal insufficiency, and Acute pain of right shoulder were also pertinent to this visit. has a past medical history of Arthritis; Asthma; Atrial fibrillation with rapid ventricular response (HCC); CAD (coronary artery disease); CHF (congestive heart failure) ([facility] Chronic kidney disease; COPD (chronic obstructive pulmonary disease) ([facility] Examination of participant in clinical trial; Gout; Gout; H/O cardiovascular stress test; H/O echocardiogram; Hx of transesophageal echocardiography ([facility]) for monitoring; Hyperlipidemia; Hypertension; MI (myocardial infarction); and Shingles. has a past surgical history that includes Cholecystectomy; Coronary angioplasty with stent; Spine surgery; and Cardiac catheterization (2017). Restrictions  Restrictions/Precautions  Restrictions/Precautions: Fall Risk, General Precautions  Subjective   General  Chart Reviewed: Yes  Patient assessed for rehabilitation services?: Yes  Response to previous treatment: Patient with no complaints from previous session  Family / Caregiver Present: No  Subjective  Subjective: Pt lying in bed upon therapist arrival. Pt reports 0/10 pain at this date in [address].    General Comment  Comments: Pt declined to complete ADLs with therapist this date due to already completing this am.       Orientation     Objective    ADL  Equipment Provided: Reacher;Sock aid;Long-handled shoe horn;Long-handled sponge (Ed given on use of AE this date with G understanding. )  Additional Comments: Limited by R shoulder pain and decreased AROM           Bed mobility  Supine to Sit: Stand RN- EMILY services PETEY- EMILYS

## 2018-07-30 VITALS
DIASTOLIC BLOOD PRESSURE: 73 MMHG | WEIGHT: 120.2 LBS | BODY MASS INDEX: 22.12 KG/M2 | SYSTOLIC BLOOD PRESSURE: 151 MMHG | RESPIRATION RATE: 16 BRPM | TEMPERATURE: 98.3 F | HEART RATE: 56 BPM | HEIGHT: 62 IN | OXYGEN SATURATION: 93 %

## 2018-07-30 PROBLEM — M11.211: Status: ACTIVE | Noted: 2018-07-30

## 2018-07-30 PROBLEM — M25.011: Status: ACTIVE | Noted: 2018-07-30

## 2018-07-30 LAB
ABSOLUTE EOS #: 0.18 K/UL (ref 0–0.44)
ABSOLUTE IMMATURE GRANULOCYTE: 0.06 K/UL (ref 0–0.3)
ABSOLUTE LYMPH #: 2.26 K/UL (ref 1.1–3.7)
ABSOLUTE MONO #: 1.21 K/UL (ref 0.1–1.2)
ALBUMIN SERPL-MCNC: 2.9 G/DL (ref 3.5–5.2)
ALBUMIN/GLOBULIN RATIO: 0.8 (ref 1–2.5)
ALP BLD-CCNC: 73 U/L (ref 35–104)
ALT SERPL-CCNC: 9 U/L (ref 5–33)
ANION GAP SERPL CALCULATED.3IONS-SCNC: 12 MMOL/L (ref 9–17)
AST SERPL-CCNC: 10 U/L
BASOPHILS # BLD: 0 % (ref 0–2)
BASOPHILS ABSOLUTE: 0.03 K/UL (ref 0–0.2)
BILIRUB SERPL-MCNC: 0.25 MG/DL (ref 0.3–1.2)
BUN BLDV-MCNC: 45 MG/DL (ref 8–23)
BUN/CREAT BLD: 20 (ref 9–20)
CALCIUM SERPL-MCNC: 9.4 MG/DL (ref 8.6–10.4)
CHLORIDE BLD-SCNC: 101 MMOL/L (ref 98–107)
CO2: 25 MMOL/L (ref 20–31)
CREAT SERPL-MCNC: 2.3 MG/DL (ref 0.5–0.9)
CRYSTALS, FLUID: POSITIVE
DIFFERENTIAL TYPE: ABNORMAL
EOSINOPHILS RELATIVE PERCENT: 2 % (ref 1–4)
GFR AFRICAN AMERICAN: 24 ML/MIN
GFR NON-AFRICAN AMERICAN: 20 ML/MIN
GFR SERPL CREATININE-BSD FRML MDRD: ABNORMAL ML/MIN/{1.73_M2}
GFR SERPL CREATININE-BSD FRML MDRD: ABNORMAL ML/MIN/{1.73_M2}
GLUCOSE BLD-MCNC: 118 MG/DL (ref 70–99)
HCT VFR BLD CALC: 36.1 % (ref 36.3–47.1)
HEMOGLOBIN: 11.2 G/DL (ref 11.9–15.1)
IMMATURE GRANULOCYTES: 1 %
LYMPHOCYTES # BLD: 19 % (ref 24–43)
MCH RBC QN AUTO: 29.2 PG (ref 25.2–33.5)
MCHC RBC AUTO-ENTMCNC: 31 G/DL (ref 28.4–34.8)
MCV RBC AUTO: 94 FL (ref 82.6–102.9)
MONOCYTES # BLD: 10 % (ref 3–12)
NRBC AUTOMATED: 0 PER 100 WBC
PDW BLD-RTO: 13.5 % (ref 11.8–14.4)
PLATELET # BLD: 256 K/UL (ref 138–453)
PLATELET ESTIMATE: ABNORMAL
PMV BLD AUTO: 10.7 FL (ref 8.1–13.5)
POTASSIUM SERPL-SCNC: 4.2 MMOL/L (ref 3.7–5.3)
RBC # BLD: 3.84 M/UL (ref 3.95–5.11)
RBC # BLD: ABNORMAL 10*6/UL
SEG NEUTROPHILS: 68 % (ref 36–65)
SEGMENTED NEUTROPHILS ABSOLUTE COUNT: 8.13 K/UL (ref 1.5–8.1)
SODIUM BLD-SCNC: 138 MMOL/L (ref 135–144)
SPECIMEN TYPE: ABNORMAL
TOTAL PROTEIN: 6.4 G/DL (ref 6.4–8.3)
VANCOMYCIN RANDOM DATE LAST DOSE: NORMAL
VANCOMYCIN RANDOM DOSE AMOUNT: NORMAL
VANCOMYCIN RANDOM TIME LAST DOSE: NORMAL
VANCOMYCIN RANDOM: 5.7 UG/ML
WBC # BLD: 11.9 K/UL (ref 3.5–11.3)
WBC # BLD: ABNORMAL 10*3/UL

## 2018-07-30 PROCEDURE — 94664 DEMO&/EVAL PT USE INHALER: CPT

## 2018-07-30 PROCEDURE — 36415 COLL VENOUS BLD VENIPUNCTURE: CPT

## 2018-07-30 PROCEDURE — 2580000003 HC RX 258: Performed by: INTERNAL MEDICINE

## 2018-07-30 PROCEDURE — 6370000000 HC RX 637 (ALT 250 FOR IP): Performed by: INTERNAL MEDICINE

## 2018-07-30 PROCEDURE — 97110 THERAPEUTIC EXERCISES: CPT

## 2018-07-30 PROCEDURE — 97530 THERAPEUTIC ACTIVITIES: CPT

## 2018-07-30 PROCEDURE — 97116 GAIT TRAINING THERAPY: CPT

## 2018-07-30 PROCEDURE — 94640 AIRWAY INHALATION TREATMENT: CPT

## 2018-07-30 PROCEDURE — 85025 COMPLETE CBC W/AUTO DIFF WBC: CPT

## 2018-07-30 PROCEDURE — 80053 COMPREHEN METABOLIC PANEL: CPT

## 2018-07-30 RX ADMIN — FAMOTIDINE 20 MG: 20 TABLET, FILM COATED ORAL at 08:21

## 2018-07-30 RX ADMIN — METOPROLOL TARTRATE 25 MG: 25 TABLET ORAL at 08:21

## 2018-07-30 RX ADMIN — BECLOMETHASONE DIPROPIONATE 1 PUFF: 80 AEROSOL, METERED RESPIRATORY (INHALATION) at 08:58

## 2018-07-30 RX ADMIN — DILTIAZEM HYDROCHLORIDE 120 MG: 120 CAPSULE, COATED, EXTENDED RELEASE ORAL at 08:21

## 2018-07-30 RX ADMIN — Medication 10 ML: at 08:22

## 2018-07-30 RX ADMIN — FERROUS SULFATE TAB 325 MG (65 MG ELEMENTAL FE) 325 MG: 325 (65 FE) TAB at 08:21

## 2018-07-30 RX ADMIN — TRAMADOL HYDROCHLORIDE 50 MG: 50 TABLET, FILM COATED ORAL at 03:14

## 2018-07-30 ASSESSMENT — PAIN SCALES - GENERAL
PAINLEVEL_OUTOF10: 0
PAINLEVEL_OUTOF10: 8

## 2018-07-30 ASSESSMENT — PAIN DESCRIPTION - PAIN TYPE: TYPE: ACUTE PAIN

## 2018-07-30 ASSESSMENT — PAIN DESCRIPTION - DESCRIPTORS: DESCRIPTORS: ACHING;SHARP

## 2018-07-30 ASSESSMENT — PAIN DESCRIPTION - ORIENTATION: ORIENTATION: RIGHT

## 2018-07-30 ASSESSMENT — PAIN DESCRIPTION - LOCATION: LOCATION: SHOULDER;TOE (COMMENT WHICH ONE)

## 2018-07-30 NOTE — PROGRESS NOTES
Palliative Care Notes    Reason for Consult:  goals of care    Patient Active Problem List   Diagnosis    Diastolic [facility]    Asthma    Status post coronary artery stent placement    CKD (chronic kidney disease) stage 4, GFR 15-29 ml/min ([facility])    Renal artery stenosis ([facility])    Renovascular hypertension    COPD (chronic obstructive pulmonary disease) ([facility]    Ex-smoker    Atrial fibrillation with rapid ventricular response ([facility])    Panlobular emphysema ([facility] [facility]    Acute respiratory failure with hypoxemia ([facility]    [facility] with [facility]    Coronary artery disease involving native coronary artery of native heart without angina pectoris    Elevated brain natriuretic peptide (BNP) level    Acute on chronic diastolic congestive heart failure (HCC)    COPD exacerbation (HCC)    Chronic diastolic CHF (congestive heart failure) ([facility])    Acute gout involving toe of right foot    Acute pain of right shoulder    Shoulder pain       Advance Directives:  Code status: Full Code  Patient has capacity for medical decisions: not at present time  [address] [place] [address]: no  Living Will: no        Pain Management:  Pt complained of right shoulder pain and had fluid removed--feeling better \"unless I move it\". Pain medication is giving her hallucinations (tramadol). Consider giving acetaminophen only for pain. Symptom Management:  Are there any other symptoms that are distressing to the patient or family that needs addressed? Anxiety:  realizes she is having hallucinations                          Dyspnea:  chronic dyspnea                          Fatigue:  generalized weakness                          Bladder function:  denies problems                          Bowel function:  denies problems    Other:  none     Spiritual history/needs:   notified: n/a    Palliative Performance Scale:  ___70%  Ambulation reduced;  Some disease; Can't do normal

## 2018-07-30 NOTE — PROGRESS NOTES
Occupational Therapy  [facility]  Daily Treatment Note  NAME: Shahnaz Clements  : 1928  MRN: [id number]    Date of Service: 2018    Discharge Recommendations:  Continue to assess pending progress       Patient Diagnosis(es): The primary encounter diagnosis was Right shoulder pain, unspecified chronicity. Diagnoses of Leukocytosis, unspecified type, Renal insufficiency, and Acute pain of right shoulder were also pertinent to this visit. has a past medical history of Arthritis; Asthma; Atrial fibrillation with rapid ventricular response (HCC); CAD (coronary artery disease); CHF (congestive heart failure) ([facility] Chronic kidney disease; COPD (chronic obstructive pulmonary disease) ([facility] Examination of participant in clinical trial; Gout; Gout; H/O cardiovascular stress test; H/O echocardiogram; Hx of transesophageal echocardiography ([facility]) for monitoring; Hyperlipidemia; Hypertension; MI (myocardial infarction); and Shingles. has a past surgical history that includes Cholecystectomy; Coronary angioplasty with stent; Spine surgery; and Cardiac catheterization (2017). Restrictions  Restrictions/Precautions  Restrictions/Precautions: Fall Risk, General Precautions  Subjective   General  Chart Reviewed: Yes  Patient assessed for rehabilitation services?: Yes  Response to previous treatment: Patient with no complaints from previous session  Family / Caregiver Present: Yes (daughter)  Subjective  Subjective: Pt in bed this date. RN reported patient can stay in bed due to not sleeping well. No resistrictions per RN per TEGAN Doyle note. 0/10 pain while resting but 8-9/10 pain with movements   General Comment  Comments: Pt declined to complete ADLs with therapist this date due to already completing this am.       Orientation     Objective    ADL  Additional Comments: Pts daughter reported that she lives wiht her and patient completes dressing tasks.  Daughter assists patient with bathing and reported that someone is there to help patient at all times. Ed daughter on d/c folder. Receommended grab bars to decrease risk of falls and concerns with suction cup grab bars. No concerns per patient and daughter at this time                                                            Type of ROM/Therapeutic Exercise  Type of ROM/Therapeutic Exercise: PROM;AROM; Free weights  Comment: B UE Strengthening to increase function and endurance for daily tasks. L UE ther ex x6 with use of 1# weight 20 reps each. Min v/c to sequence and physical cues for ending poins with good follow through. Fatigue at end of RBs and multiple RBs. PROM ther ex x4 (wrist, elbow, and shoulder  flexion and extension, supination and pronation). AROM with wrist and forearm movements. AAROM with elbow and shoulder movemens. Increased pain and limited elbow and shoulder movements. Increased pain with extension versus flexion. Pt resting comfotably after treatment                     Assessment      Safety Devices  Type of devices: Bed alarm in place; Left in bed;Call light within reach          Plan   Plan  Times per week: 6-7  Times per day: Daily  Plan weeks: 8 weeks or until d/c  Current Treatment Recommendations: ROM, Functional Mobility Training, Endurance Training, Equipment Evaluation, Education, & procurement, Self-Care / ADL, Patient/Caregiver Education & Training  Plan Comment: Await specialist's orders to determine if an ROM HEP would be appropriate  G-Code     OutComes Score                                           AM-PAC Score             Goals  Short term goals  Time Frame for Short term goals: 3-5 days  Short term goal 1: Pt will demonstrate a compensatory strategy to assist with UB dressing. Short term goal 2: Pt will use the LRAD to complete functional task in standing for 5 minutes.    Long term goals  Time Frame for Long term goals : 4 weeks  Long term goal 1: Pt will complete ADLs with MOD I using the LRAD to ensure safe return home. Patient Goals   Patient goals :  To return home       Therapy Time   Individual Concurrent Group Co-treatment   Time In  1030         Time Out  1115         Minutes  [address] [facility]

## 2018-07-30 NOTE — DISCHARGE SUMMARY
07/30/2018    CO2 25 07/30/2018    LABGLOM 20 (L) 07/30/2018       Lab Results   Component Value Date    WBCUA 0 TO 2 07/27/2018    RBCUA 0 TO 2 07/27/2018    EPITHUA 0 TO 2 07/27/2018    LEUKOCYTESUR NEGATIVE 07/27/2018    SPECGRAV 1.015 07/27/2018    GLUCOSEU NEGATIVE 07/27/2018    KETUA NEGATIVE 07/27/2018    PROTEINU NEGATIVE 07/27/2018    HGBUR TRACE (A) 07/27/2018    CASTUA NOT REPORTED 07/27/2018    CRYSTUA NOT REPORTED 07/27/2018    BACTERIA TRACE (A) 07/27/2018    YEAST NOT REPORTED 07/27/2018       Xr Chest Standard (2 Vw)    Result Date: 7/27/2018  FINAL REPORT EXAM:  XR CHEST (2 VW) HISTORY:  pain right TECHNIQUE:  PA and Lateral views of the chest PRIORS:  04/09/2018. FINDINGS:  The cardiomediastinal silhouette is within normal limits. Previously noted left upper lobe pulmonary nodule is not visualized on current exam, and may be obscured by overlying lead. The lung parenchyma is otherwise clear. There are no pleural abnormalities. Mild degenerative changes of the spine. Impression:  No acute radiographic abnormality. Electronically Signed By: Thelma Naylor   on  07/27/2018  20:30    Xr Shoulder Right (min 2 Views)    Result Date: 7/27/2018  FINAL REPORT EXAM:  XR SHOULDER RIGHT (MIN 2 VIEWS) HISTORY:  pain TECHNIQUE:  Four views right shoulder PRIORS:  None. FINDINGS:  Bony demineralization. No displaced fracture or dislocation. Mild degenerative changes present. Decreased acromial humeral distance, suggestive of chronic rotator cuff injury. No suspicious osseous lesions. Soft tissue structures are unremarkable. Impression:  Mild degenerative changes. Decreased acromiohumeral distance, suggestive of chronic rotator cuff injury. No displaced fracture or dislocation.  Electronically Signed By: Thelma Naylor   on  07/27/2018  20:28      Discharge Diagnoses:    Principal Problem:    Acute pain of right shoulder  Active Problems:    Shoulder pain    Pseudogout of right shoulder    Hemarthrosis

## 2018-07-30 NOTE — PROGRESS NOTES
Nutrition Assessment    Type and Reason for Visit: Initial    Nutrition Recommendations:   1. Continue current diet. 2. Encourage protein foods. Malnutrition Assessment:  · Malnutrition Status: At risk for malnutrition  · Context: Acute illness or injury  · Findings of the 6 clinical characteristics of malnutrition (Minimum of 2 out of 6 clinical characteristics is required to make the diagnosis of moderate or severe Protein Calorie Malnutrition based on AND/ASPEN Guidelines):  1. Energy Intake-Not available,      2. Weight Loss-Unable to assess,    3. Fat Loss-Mild subcutaneous fat loss, Fat overlying the ribs  4. Muscle Loss-Mild muscle mass loss, Clavicles (pectoralis and deltoids)  5. Fluid Accumulation-Mild fluid accumulation, Extremities (R UE +2)  6.   Strength-Not measured    Nutrition Diagnosis:   · Problem: Increased nutrient needs  · Etiology: related to Other (organ/Sx malfunction)     Signs and symptoms:  as evidenced by Lab values (Albumin 2.9)    Nutrition Assessment:  · Subjective Assessment: Pt with therapy at attempted visit  · Nutrition-Focused Physical Findings: Pt with fat and muscle losses  · Wound Type: None  · Current Nutrition Therapies:  · Oral Diet Orders: General   · Oral Diet intake: %, Select  · Anthropometric Measures:  · Ht: 5' 2\" (157.5 cm)   · Current Body Wt: 120 lb 3.2 oz (54.5 kg)  · Admission Body Wt: 120 lb 1.6 oz (54.5 kg)  · Usual Body Wt: 131 lb 8 oz (59.6 kg) (1/24/18)  · % Weight Change: 8.7% weight loss,  6 months  · Ideal Body Wt: 110 lb (49.9 kg), % Ideal Body 109%  · BMI Classification: BMI 18.5 - 24.9 Normal Weight  Wt Readings from Last 10 Encounters:   07/30/18 120 lb 3.2 oz (54.5 kg)   04/19/18 114 lb (51.7 kg)   04/09/18 113 lb (51.3 kg)   03/29/18 122 lb (55.3 kg)   02/26/18 133 lb (60.3 kg)   02/15/18 126 lb (57.2 kg)   02/05/18 127 lb 12.8 oz (58 kg)   02/02/18 125 lb (56.7 kg)   02/02/18 125 lb 12.8 oz (57.1 kg)   01/24/18 131 lb 8 oz (59.6 kg)     Recent Labs      07/28/18   0605  07/29/18   0530  07/29/18   0832  07/30/18   0540   NA  136   --   141  138   K  4.3   --   4.2  4.2   CL  99   --   103  101   CO2  24   --   23  25   BUN  35*   --   38*  45*   CREATININE  1.98*  2.05*  2.04*  2.30*   GLUCOSE  139*   --   125*  118*   ALT  10   --   9  9   ALKPHOS  82   --   73  73   GFR                                                      Lab Results   Component Value Date    LABALBU 2.9 07/30/2018      Estimated Intake vs Estimated Needs: Intake Meets Needs    Nutrition Risk Level: Moderate  Pt with good meal intakes. Renal indices are elevated and increasing. Glucose is better. Moderately depleted visceral protein stores. Weight gain 5.3% x 3 months after noted weight loss trend. 8.7% loss x 6 months. F/u as needed.   Nutrition Interventions:    (encourage protein foods)  Continued Inpatient Monitoring, Education not appropriate at this time    Nutrition Evaluation:   · Evaluation: Goals set   · Goals: PO > 75% of meals    · Monitoring: Meal Intake, Ascites/Edema, Weight, Pertinent Labs        Electronically signed by Ronan Nelson RD, LD on 7/30/18 at 1:23 PM    Contact Number: [phone]

## 2018-07-30 NOTE — PROGRESS NOTES
intact.  Sensation intact.  2+ radial pulse.     Left Upper Extremity:  Left upper extremity:  Skin intact without signs of trauma.  No edema or erythema.  Shoulder ROM limited. Mild tenderness left shoulder.  Joints are stable.  5/5 strength distally with motor function intact.  Sensation intact.  2+ pedal pulses.     ASSESSMENT AND PLAN:  1.)  Nontraumatic hemarthrosis of right shoulder secondary to blood thinners  2.)  WBC again trending down. WBC is 11.9 today (was 20.8 on 7/27/18). No growth after 2 days. 3.)  Proceed w/ Vancomycin dose today  4.)  Ok to d/c when medically appropriate  5.)  F/u on 8/2/18 at [facility]  6.) Recommend continue to hold anticoagulant until assessed in office on 8/2/18  7.) Activity as tolerated.   May wear sling for comfort  8.) C/w pain control

## 2018-07-30 NOTE — PROGRESS NOTES
Progress Note    SUBJECTIVE:  Patient seen for right shoulder pain. Has no pain since vanco and aspiration. ROS:   Constitutional: negative  for fevers, and negative for chills. Respiratory: negative for shortness of breath, negative for cough, and negative for wheezing  Cardiovascular: negative for chest pain, and negative for palpitations  Gastrointestinal: negative for abdominal pain, negative for nausea,negative for vomiting, negative for diarrhea, and negative for constipation     All other systems were reviewed with the patient and are negative unless otherwise stated in HPI    OBJECTIVE:    EXAM:  Vitals:    07/29/18 2348   BP: 131/75   Pulse: 60   Resp: 16   Temp: 97.9 °F (36.6 °C)   SpO2: 96%      Weight: 120 lb 3.2 oz (54.5 kg)    Height: 5' 2\" (157.5 cm)     GEN:   A & O x3, no apparent distress  EYES: No gross abnormalities. , PERRL and EOMI  NECK: normal, supple, no lymphadenopathy,   PULM: clear to auscultation bilaterally- no wheezes, rales or rhonchi, normal air movement, no respiratory distress  COR: regular rate & rhythm  ABD:  soft, non-tender, non-distended, normal bowel sounds, no masses or organomegaly  EXT:   Right shoulder no longer painful, warm or swollen  NEURO: negative  SKIN:  no rashes or significant lesions    microbiology: cultures pending    CBC with Differential:    Lab Results   Component Value Date    WBC 11.9 07/30/2018    RBC 3.84 07/30/2018    RBC 4.12 01/31/2012    HGB 11.2 07/30/2018    HCT 36.1 07/30/2018     07/30/2018     01/31/2012    MCV 94.0 07/30/2018    MCH 29.2 07/30/2018    MCHC 31.0 07/30/2018    RDW 13.5 07/30/2018    METASPCT 1 02/13/2017    LYMPHOPCT 19 07/30/2018    MONOPCT 10 07/30/2018    MYELOPCT 1 11/28/2015    BASOPCT 0 07/30/2018    MONOSABS 1.21 07/30/2018    LYMPHSABS 2.26 07/30/2018    EOSABS 0.18 07/30/2018    BASOSABS 0.03 07/30/2018    DIFFTYPE NOT REPORTED 07/30/2018           Xr Chest Standard (2 Vw)    Result Date: 7/27/2018  FINAL coronary artery of native heart without angina pectoris    Elevated brain natriuretic peptide (BNP) level    Acute on chronic diastolic congestive heart failure (HCC)    COPD exacerbation (HCC)    Chronic diastolic CHF (congestive heart failure) (HCC)    Acute gout involving toe of right foot    Acute pain of right shoulder    Shoulder pain       PLAN:  · Continue IV vanco until culture obtained    HIGH RISK MEDS: Lalo Figueroa M.D.

## 2018-07-30 NOTE — PROGRESS NOTES
Physical Therapy  Facility/Department: [facility] AT THE [facility] MED SURG  Daily Treatment Note  NAME: Annalise Carreno  : 1928  MRN: [id number]    Date of Service: 2018    Discharge Recommendations:  Continue to assess pending progress        Patient Diagnosis(es): The primary encounter diagnosis was Right shoulder pain, unspecified chronicity. Diagnoses of Leukocytosis, unspecified type, Renal insufficiency, and Acute pain of right shoulder were also pertinent to this visit. has a past medical history of Arthritis; Asthma; Atrial fibrillation with rapid ventricular response (HCC); CAD (coronary artery disease); CHF (congestive heart failure) ([facility] [address] Chronic kidney disease; COPD (chronic obstructive pulmonary disease) ([facility] [address] Examination of participant in clinical trial; Gout; Gout; H/O cardiovascular stress test; H/O echocardiogram; Hx of transesophageal echocardiography ([facility]) for monitoring; Hyperlipidemia; Hypertension; MI (myocardial infarction); and Shingles. has a past surgical history that includes Cholecystectomy; Coronary angioplasty with stent; Spine surgery; and Cardiac catheterization (2017). Restrictions  Restrictions/Precautions  Restrictions/Precautions: Fall Risk, General Precautions  Subjective   General  Chart Reviewed: Yes  Referring Practitioner: Chula Castro. Amber Agudelo MD  Subjective  Subjective: Pt reported no pain currently. Orientation  Orientation  Overall Orientation Status: Within Functional Limits  Objective   Bed mobility  Supine to Sit: Contact guard assistance;Minimal assistance  Sit to Supine: Contact guard assistance;Minimal assistance  Scooting: Contact guard assistance  Comment: Cues or use of proper technique. Pt with fair understanding. Transfers  Sit to Stand: Unable to assess  Stand to sit: Unable to assess  Comment: Pt refused d/t fatigue.    Ambulation  Ambulation?: No (Pt refused this PM d/t fatigue. )  Stairs/Curb  Stairs?: No     Balance  Posture: Fair  Sitting - Static: Good  Sitting - Dynamic: Good  Standing - Static: Fair;+  Standing - Dynamic: Fair;-  Exercises  Straight Leg Raise: 20x  Quad Sets: 20x  Heelslides: 20x  Gluteal Sets: 20x  Hip Flexion: 20x  Hip Abduction: 20x  Knee Short Arc Quad: 20x  Ankle Pumps: 20x  Comments: Above exercises completed in supine. Pt needed 2 short rest breaks d/t fatigue. Assessment      Patient Education: Educated pt on safety with bed mobility. Pt with fair to good understanding. REQUIRES PT FOLLOW UP: Yes  Activity Tolerance  Activity Tolerance: Patient Tolerated treatment well;Patient limited by fatigue     G-Code     OutComes Score    AM-PAC Score    Goals  Short term goals  Time Frame for Short term goals: 7 days  Short term goal 1: Patient will amb 100' without AD, supervision, without LOB  Short term goal 2: Patient will be independent with bed mobility and transfers  Short term goal 3: Patient will tolerate 20-30 minutes of therex/act to improve endurance for ADLs. Patient Goals   Patient goals : Find out what's wrong with shoulder and return home    Plan    Plan  Times per week: 7  Times per day: Twice a day  Plan Comment: 1x/day on weekends  Safety Devices  Type of devices:  All fall risk precautions in place, Bed alarm in place, Call light within reach, Nurse notified, Left in bed     Therapy Time   Individual Concurrent Group Co-treatment   Time In . Chris Sun [address] [place] [address]

## 2018-07-30 NOTE — PROGRESS NOTES
1  Comments: Verbal cues for posture and inceased step length. Pt had 1 LOB that required Victor Manuel to correct. Stairs/Curb  Stairs?: No     Balance  Posture: Fair  Sitting - Static: Good  Sitting - Dynamic: Good  Standing - Static: Fair;+  Standing - Dynamic: Fair;-  Exercises  Straight Leg Raise: 15x  Quad Sets: 15x  Heelslides: 15x  Gluteal Sets: 15x  Hip Flexion: 15x  Hip Abduction: 15x  Knee Long Arc Quad: 15x  Knee Short Arc Quad: 15x  Ankle Pumps: 15x  Comments: Above exercises completed in supine/siting. Pt needed 3-4 short rest breaks d/t fatigue/minimal MEDEL. MEDEL resolved after short rest breaks. Assessment      Patient Education: Educated pt on safety with transfers/amb. Also reviewed discharge folder. Pt with fair to good understanding. REQUIRES PT FOLLOW UP: Yes  Activity Tolerance  Activity Tolerance: Patient Tolerated treatment well     G-Code     OutComes Score    AM-PAC Score    Goals  Short term goals  Time Frame for Short term goals: 7 days  Short term goal 1: Patient will amb 100' without AD, supervision, without LOB  Short term goal 2: Patient will be independent with bed mobility and transfers  Short term goal 3: Patient will tolerate 20-30 minutes of therex/act to improve endurance for ADLs. Patient Goals   Patient goals : Find out what's wrong with shoulder and return home    Plan    Plan  Times per week: 7  Times per day: Twice a day  Plan Comment: 1x/day on weekends  Safety Devices  Type of devices:  All fall risk precautions in place, Bed alarm in place, Call light within reach, Nurse notified, Left in bed, Gait belt     Therapy Time   Individual Concurrent Group Co-treatment   Time In 1116         Time Out [address], [place]

## 2018-07-30 NOTE — PROGRESS NOTES
FX)    Goal: Improve respiratory functions in patients with airway disease and decrease WOB    [x] AEROSOL PROTOCOL    Total Acuity:   16-32  []  Secondary Assessment in 24 hrs Total Acuity:  9-15  []  Secondary Assessment in 24 hrs Total Acuity:  4-8  []  Secondary Assessment in 48 hrs Total Acuity:  0-3  [x]  Secondary Assessment in 72 hrs   HHN AEROSOL THERAPY with  [physician-ordered bronchodilator(s)] q 4 & Albuterol PRN q2 hrs. Breath-Actuated Neb if BBS Acuity = 4, and pt. can use MP. Notify physician if condition deteriorates. HHN AEROSOL THERAPY with  [physician-ordered bronchodilator(s)]  QID and Albuterol PRN q4 hrs. Breath-Actuated Neb if BBS Acuity = 4, and pt. can use MP. Notify physician if condition deteriorates. MDI THERAPY with  2 actuations of [physician-ordered bronchodilator(s)] via spacer TID Albuterol and PRNq4 hrs. If unable to utilize MDI: HHN [physician-ordered bronchodilator(s)] TID and Albuterol PRN q4 hrs. Notify physician if condition deteriorates. MDI THERAPY with  [physician-ordered bronchodilator(s)] via spacer TID PRN. If unable to utilize MDI: HHN [physician-ordered bronchodilator(s)] TID PRN. Notify physician if condition deteriorates. If Acuity Level is 2, 3, or 4 in any of the following:    [] COUGH     [] SURGICAL HISTORY (SURG HX)  [] CHEST XRAY (CXR)    Goal: Improvement in sputum mobilization in patients with ineffective airway clearance. Reverse atelectasis.     [] Bronchopulmonary Hygiene Protocol    Total Acuity:   16-32  []  Secondary Assessment in 24 hrs Total Acuity:  9-15  []  Secondary Assessment in 24 hrs Total Acuity:  4-8  []  Secondary Assessment in 48 hrs Total Acuity:  0-3  []  Secondary Assessment in 72 hrs   METANEB QID with [physician-ordered bronchodilator(s)] if CXR Acuity = 4; otherwise:  PD&P, PEP, or Vest QID & PRN  NT Sxn PRN for ineffective cough  METANEB QID with [physician-ordered bronchodilator(s)] if CXR Acuity = 4;

## 2018-07-31 ENCOUNTER — TELEPHONE (OUTPATIENT)
Dept: PRIMARY CARE CLINIC | Age: 83
End: 2018-07-31

## 2018-08-01 ENCOUNTER — TELEPHONE (OUTPATIENT)
Dept: PRIMARY CARE CLINIC | Age: 83
End: 2018-08-01

## 2018-08-01 ENCOUNTER — CARE COORDINATION (OUTPATIENT)
Dept: CASE MANAGEMENT | Age: 83
End: 2018-08-01

## 2018-08-01 DIAGNOSIS — I50.33 ACUTE ON CHRONIC DIASTOLIC CONGESTIVE HEART FAILURE (HCC): Primary | Chronic | ICD-10-CM

## 2018-08-01 LAB
CULTURE: NORMAL
CULTURE: NORMAL
Lab: NORMAL
Lab: NORMAL
SPECIMEN DESCRIPTION: NORMAL
SPECIMEN DESCRIPTION: NORMAL
STATUS: NORMAL
STATUS: NORMAL

## 2018-08-01 NOTE — TELEPHONE ENCOUNTER
are taken for? yes      ( Update medication list and refresh medication smartlinks below)        All Active Meds in Chart - (keep all current active meds, add hospital meds)  Current Outpatient Prescriptions   Medication Sig Dispense Refill    albuterol (PROVENTIL) (2.5 MG/3ML) 0.083% nebulizer solution Take 3 mLs by nebulization every 4 hours as needed for Wheezing 150 vial 1    ferrous sulfate 325 (65 Fe) MG tablet Take 1 tablet by mouth daily (with breakfast) 30 tablet 2    simvastatin (ZOCOR) 20 MG tablet Take 1 tablet by mouth nightly 30 tablet 3    PROAIR  (90 Base) MCG/ACT inhaler INHALE 2 PUFFS EVERY 6 HOURS AS NEEDED FOR WHEEZING 8.5 g 11    nitroGLYCERIN (NITROSTAT) 0.4 MG SL tablet up to max of 3 total doses. If no relief after 1 dose, call 911. (Patient taking differently: Place 0.4 mg under the tongue every 5 minutes as needed for Chest pain up to max of 3 total doses. If no relief after 1 dose, call 911.) 25 tablet 1    diltiazem (CARDIZEM CD) 120 MG extended release capsule Take 1 capsule by mouth daily 30 capsule 1    metoprolol tartrate (LOPRESSOR) 25 MG tablet Take 1 tablet by mouth 2 times daily 60 tablet 3    furosemide (LASIX) 20 MG tablet Take 20 mg by mouth every 48 hours      beclomethasone (QVAR) 80 MCG/ACT inhaler Inhale 1 puff into the lungs 2 times daily 1 Inhaler 11     No current facility-administered medications for this visit. Current Medications (record all meds as 'taken' or 'not taken' in home med activity)  No outpatient prescriptions have been marked as taking for the 8/1/18 encounter (Telephone) with [place], APRN - CNP. Are there any questions about how the patient should take care of herself? No   Does the patient understand her diet regimen? yes   Does the patient understand his or her activity level? yes   Does the patient understand how to care for her wound? N/A   Does the patient understand how to monitor her weight?   N/A   Does the patient understand what to do if she becomes short of breath? N/A   Does the patient understand what to do if she has increased edema? yes    Does the patient understand how to monitor vital signs? Blood Pressure?  n/a    Heart Rate?  n/a    Blood sugar? N/A    Is the patient having any trouble with ADL's? Nothing more than normal, lives with daughter. Any problems showering or bathing? No   Does the patient have trouble eating or feeding themselves? No   Is the patient having trouble getting out of bed or going to the toilet? No   Is the patient able to move around as expected? Yes       Home care services initiated: no     Services provided:       Does that patient have equipment needs? No   Does the patient have the appropriate equipment? N/A   Can the patient use the equipment properly and safely? N/A    Reinforce Follow-Up  - Does patient have an appointment with her PCP? yes  - Does patient have an appointment with her specialist physicians? Yes      Care Coordination  Is patient assigned ambulatory care coordinator for chronic condition management? No    If yes, was Care Coordination informed  -        Follow up appointment date: (7 days for more severe illness, 14 days for others)  Future Appointments  Date Time Provider Olivia Deng   8/3/2018 5:00 PM JOANIE Walton - CNP [facility]   8/16/2018 1:30 PM MD Carol Lewis [facility]   8/27/2018 11:00 AM John Galeas MD [facility] None       Other Interventions or Actions taken as result of  Assessment  - Hospitalized with Acute Pain Right Shoulder  WBC elevated to 20,000- ?septic shoulder  Plavix and Eliquis on HOLD, daughter states she called Dr Julita Solano, cardiologist to see when to restart meds. States is waiting for return call.          Aguila Betancourt

## 2018-08-03 ENCOUNTER — OFFICE VISIT (OUTPATIENT)
Dept: PRIMARY CARE CLINIC | Age: 83
End: 2018-08-03
Payer: MEDICARE

## 2018-08-03 VITALS
HEART RATE: 58 BPM | BODY MASS INDEX: 21.75 KG/M2 | RESPIRATION RATE: 20 BRPM | SYSTOLIC BLOOD PRESSURE: 134 MMHG | WEIGHT: 118.9 LBS | DIASTOLIC BLOOD PRESSURE: 67 MMHG | TEMPERATURE: 97.2 F

## 2018-08-03 DIAGNOSIS — N18.4 CKD (CHRONIC KIDNEY DISEASE) STAGE 4, GFR 15-29 ML/MIN (HCC): Chronic | ICD-10-CM

## 2018-08-03 DIAGNOSIS — M11.211: Primary | ICD-10-CM

## 2018-08-03 DIAGNOSIS — M25.011: ICD-10-CM

## 2018-08-03 LAB
CULTURE: ABNORMAL
DIRECT EXAM: ABNORMAL
DIRECT EXAM: ABNORMAL
Lab: ABNORMAL
SPECIMEN DESCRIPTION: ABNORMAL
STATUS: ABNORMAL

## 2018-08-03 PROCEDURE — 1123F ACP DISCUSS/DSCN MKR DOCD: CPT | Performed by: NURSE PRACTITIONER

## 2018-08-03 PROCEDURE — G8598 ASA/ANTIPLAT THER USED: HCPCS | Performed by: NURSE PRACTITIONER

## 2018-08-03 PROCEDURE — G8427 DOCREV CUR MEDS BY ELIG CLIN: HCPCS | Performed by: NURSE PRACTITIONER

## 2018-08-03 PROCEDURE — G8420 CALC BMI NORM PARAMETERS: HCPCS | Performed by: NURSE PRACTITIONER

## 2018-08-03 PROCEDURE — 1090F PRES/ABSN URINE INCON ASSESS: CPT | Performed by: NURSE PRACTITIONER

## 2018-08-03 PROCEDURE — 1111F DSCHRG MED/CURRENT MED MERGE: CPT | Performed by: NURSE PRACTITIONER

## 2018-08-03 PROCEDURE — 1101F PT FALLS ASSESS-DOCD LE1/YR: CPT | Performed by: NURSE PRACTITIONER

## 2018-08-03 PROCEDURE — 99213 OFFICE O/P EST LOW 20 MIN: CPT | Performed by: NURSE PRACTITIONER

## 2018-08-03 PROCEDURE — 4040F PNEUMOC VAC/ADMIN/RCVD: CPT | Performed by: NURSE PRACTITIONER

## 2018-08-03 PROCEDURE — 1036F TOBACCO NON-USER: CPT | Performed by: NURSE PRACTITIONER

## 2018-08-03 RX ORDER — APIXABAN 2.5 MG/1
2.5 TABLET, FILM COATED ORAL 2 TIMES DAILY
COMMUNITY
Start: 2018-07-13

## 2018-08-03 NOTE — PROGRESS NOTES
20    Care management risk score Rising risk (score 2-5) and Complex Care (Scores >=6): 17     Non face to face  following discharge, date last encounter closed (first attempt may have been earlier): 8/1/2018  3:09 PM    Call initiated 2 business days of discharge: Yes    Patient Active Problem List   Diagnosis    Diastolic CHF ([facility] [address]    Asthma    Status post coronary artery stent placement    CKD (chronic kidney disease) stage 4, GFR 15-29 ml/min ([facility])    Renal artery stenosis ([facility] [address]    Renovascular hypertension    COPD (chronic obstructive pulmonary disease) ([address]    Ex-smoker    Atrial fibrillation with rapid ventricular response ([facility] [address]    Panlobular emphysema ([facility] [address]    Acute respiratory failure with hypoxemia ([facility] [address]    COPD with acute exacerbation ([facility] [address]    Coronary artery disease involving native coronary artery of native heart without angina pectoris    Elevated brain natriuretic peptide (BNP) level    Acute on chronic diastolic congestive heart failure (HCC)    COPD exacerbation (HCC)    Chronic diastolic CHF (congestive heart failure) (HCC)    Acute gout involving toe of right foot    Acute pain of right shoulder    Shoulder pain    Pseudogout of right shoulder    Hemarthrosis involving shoulder region, right       Allergies   Allergen Reactions    [facility]        Medications listed as ordered at the time of discharge from hospital   Romana Pike   [place] Medication Instructions COY:    Printed on:08/13/18 1705   Medication Information                      albuterol (PROVENTIL) (2.5 MG/3ML) 0.083% nebulizer solution  Take 3 mLs by nebulization every 4 hours as needed for Wheezing             beclomethasone (QVAR) 80 MCG/ACT inhaler  Inhale 1 puff into the lungs 2 times daily             diltiazem (CARDIZEM CD) 120 MG extended release capsule  Take 1 capsule by mouth daily             ELIQUIS 2.5 MG TABS tablet               ferrous sulfate 325 (65 Fe) MG tablet  Take 1 tablet by mouth

## 2018-08-13 ENCOUNTER — TELEPHONE (OUTPATIENT)
Dept: PRIMARY CARE CLINIC | Age: 83
End: 2018-08-13

## 2018-08-13 NOTE — TELEPHONE ENCOUNTER
Writer attempted to contact patient to see if she followed up with orthopedics as advised and also with nephrology as discussed in office. No answer on cell phone, Message left.

## 2018-08-16 ENCOUNTER — OFFICE VISIT (OUTPATIENT)
Dept: PULMONOLOGY | Age: 83
End: 2018-08-16
Payer: MEDICARE

## 2018-08-16 VITALS
HEIGHT: 62 IN | DIASTOLIC BLOOD PRESSURE: 73 MMHG | RESPIRATION RATE: 16 BRPM | HEART RATE: 81 BPM | WEIGHT: 120.5 LBS | BODY MASS INDEX: 22.18 KG/M2 | TEMPERATURE: 97.8 F | OXYGEN SATURATION: 97 % | SYSTOLIC BLOOD PRESSURE: 151 MMHG

## 2018-08-16 DIAGNOSIS — J45.40 MODERATE PERSISTENT ASTHMA WITHOUT COMPLICATION: Primary | ICD-10-CM

## 2018-08-16 DIAGNOSIS — R91.8 MASS OF UPPER LOBE OF LEFT LUNG: ICD-10-CM

## 2018-08-16 DIAGNOSIS — J41.0 SIMPLE CHRONIC BRONCHITIS (HCC): ICD-10-CM

## 2018-08-16 PROCEDURE — 4040F PNEUMOC VAC/ADMIN/RCVD: CPT | Performed by: INTERNAL MEDICINE

## 2018-08-16 PROCEDURE — G8420 CALC BMI NORM PARAMETERS: HCPCS | Performed by: INTERNAL MEDICINE

## 2018-08-16 PROCEDURE — 1123F ACP DISCUSS/DSCN MKR DOCD: CPT | Performed by: INTERNAL MEDICINE

## 2018-08-16 PROCEDURE — 3023F SPIROM DOC REV: CPT | Performed by: INTERNAL MEDICINE

## 2018-08-16 PROCEDURE — 1090F PRES/ABSN URINE INCON ASSESS: CPT | Performed by: INTERNAL MEDICINE

## 2018-08-16 PROCEDURE — 99214 OFFICE O/P EST MOD 30 MIN: CPT | Performed by: INTERNAL MEDICINE

## 2018-08-16 PROCEDURE — 1101F PT FALLS ASSESS-DOCD LE1/YR: CPT | Performed by: INTERNAL MEDICINE

## 2018-08-16 PROCEDURE — 1111F DSCHRG MED/CURRENT MED MERGE: CPT | Performed by: INTERNAL MEDICINE

## 2018-08-16 PROCEDURE — 1036F TOBACCO NON-USER: CPT | Performed by: INTERNAL MEDICINE

## 2018-08-16 PROCEDURE — G8427 DOCREV CUR MEDS BY ELIG CLIN: HCPCS | Performed by: INTERNAL MEDICINE

## 2018-08-16 PROCEDURE — G8598 ASA/ANTIPLAT THER USED: HCPCS | Performed by: INTERNAL MEDICINE

## 2018-08-16 PROCEDURE — G8926 SPIRO NO PERF OR DOC: HCPCS | Performed by: INTERNAL MEDICINE

## 2018-08-16 RX ORDER — SODIUM POLYSTYRENE SULFONATE 15 G/60ML
15 SUSPENSION ORAL; RECTAL ONCE
COMMUNITY
End: 2019-04-09 | Stop reason: ALTCHOICE

## 2018-08-16 NOTE — PATIENT INSTRUCTIONS
SURVEY:    You may be receiving a survey from [employer] regarding your visit today. Please complete the survey to enable us to provide the highest quality of care to you and your family. If you cannot score us a very good on any question, please call the office to discuss how we could have made your experience a very good one. Thank you. Please recheck your blood pressure when you go home and make sure you take your prescribed medication if applicable . Please follow up with your PCP or ER if needed.

## 2018-08-16 NOTE — PROGRESS NOTES
pulmonary disease) ([facility]     Examination of participant in clinical trial 5/20/13    6 year follow up, estimated completion JUN 2019    Gout 12/28/2017    Gout     H/O cardiovascular stress test 02/14/2017    H/O echocardiogram 02/14/2017    EF 55%. Mildly increased wall thickness of the LV. Evidence of diastolic dysfunction. Normal right ventricular size and function. Normal tricuspid valve structure and function. Mild tricuspid regurg    Hx of transesophageal echocardiography ([facility]) for monitoring 02/20/2017    No clots  were visulaized in the left atrial appendage EF 55%.  Hyperlipidemia     Hypertension     MI (myocardial infarction) ([facility] [address]     2001    Shingles     15 years ago       Past Surgical History:        Procedure Laterality Date    CARDIAC CATHETERIZATION  02/2017    LMCA: Normal 0% stenosis. LAD: Mid area 50-60% stenosis. LCx: Patent proximal stent 30% stenosis distal to the stent in OM proximal area 50% stenosis. RCA: Minimal 30% mid area PDA stent  is patent <20% stenosis. PL branch diffuse disease.  CHOLECYSTECTOMY      CORONARY ANGIOPLASTY WITH STENT PLACEMENT      SPINE SURGERY         Allergies: Allergies   Allergen Reactions    [facility]          Home Meds:   Prior to Admission medications    Medication Sig Start Date End Date Taking?  Authorizing Provider   sodium polystyrene sulfonate (SPS) 30 GM/120ML SUSP Place 15 g rectally once   Yes Historical Provider, MD   ELIQUIS 2.5 MG TABS tablet  7/13/18  Yes Historical Provider, MD   albuterol (PROVENTIL) (2.5 MG/3ML) 0.083% nebulizer solution Take 3 mLs by nebulization every 4 hours as needed for Wheezing 4/23/18  Yes Abel Perry MD   ferrous sulfate 325 (65 Fe) MG tablet Take 1 tablet by mouth daily (with breakfast) 3/29/18  Yes JOANIE Alvarado CNP   simvastatin (ZOCOR) 20 MG tablet Take 1 tablet by mouth nightly 3/29/18  Yes JOANIE Thorpe CNP   PROAIR  (90 Base) MCG/ACT inhaler INHALE 2 PUFFS EVERY 6 HOURS AS NEEDED FOR WHEEZING 2/20/18  Yes Prakash Vegas MD   nitroGLYCERIN (NITROSTAT) 0.4 MG SL tablet up to max of 3 total doses. If no relief after 1 dose, call 911. Patient taking differently: Place 0.4 mg under the tongue every 5 minutes as needed for Chest pain up to max of 3 total doses. If no relief after 1 dose, call 911. 2/6/18  Yes Lise Power MD   diltiazem (CARDIZEM CD) 120 MG extended release capsule Take 1 capsule by mouth daily  Patient taking differently: Take 60 mg by mouth daily  2/7/18  Yes Lise Power MD   metoprolol tartrate (LOPRESSOR) 25 MG tablet Take 1 tablet by mouth 2 times daily  Patient taking differently: Take 12.5 mg by mouth 2 times daily  2/6/18  Yes Lise Power MD   furosemide (LASIX) 20 MG tablet Take 20 mg by mouth every 48 hours   Yes Michael Duff MD   beclomethasone (QVAR) 80 MCG/ACT inhaler Inhale 1 puff into the lungs 2 times daily 8/10/17  Yes Prakash Vegas MD       Social History:   TOBACCO:  2 ppd for 20 years and quit 35 yrs ago  ETOH:   reports that she does not drink alcohol.   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  Family History:    had lung cancer, skin and bladder cancer        REVIEW OF SYSTEMS:    CONSTITUTIONAL:  Negative for fever or night sweats weight loss or weight gain  HEENT:  negative for postnasal dripping epistaxis nasal congestion  RESPIRATORY:  negative for cough wheezing chest tightness hemoptysis  CARDIOVASCULAR:  negative for orthopnea or proximal nocturnal dyspnea or pedal edema syncope  GASTROINTESTINAL:  negative for nausea vomiting diarrhea abdominal pain and hematochezia melena  GENITOURINARY:  negative for hematuria and is  HEMATOLOGIC/LYMPHATIC:  negative for easy bleeding bruising and petechia  ALLERGIC/IMMUNOLOGIC:  negative for repeated infection and drug reaction  ENDOCRINE:  negative for weight changes tremors heat and cold intolerance  MUSCULOSKELETAL:  negative for joint stiffness and morning stiffness  NEUROLOGICAL:  negative for dizziness syncope diplopia or headache seizure tingling numbness  BEHAVIOR/PSYCH:  negative      Physical Exam:    Vitals:   BP (!) 151/73   Pulse 81   Temp 97.8 °F (36.6 °C)   Resp 16   Ht 5' 2\" (1.575 m)   Wt 120 lb 8 oz (54.7 kg)   SpO2 97%   BMI 22.04 kg/m²     Physical Examination:   General appearance - alert, well appearing, and in no distress  Mental status - alert, oriented to person, place, and time  Eyes - pupils equal and reactive, extraocular eye movements intact  Nose - normal and patent, no erythema, discharge or polyps  Mouth - mucous membranes moist, pharynx normal without lesions  Neck - supple, no significant adenopathy  Chest - no distres or accessory muscles use, bilateral symmetrical air entry, normal resonance on percussion no crackles no wheezing or rhonchi. Heart - normal rate, regular rhythm, normal S1, S2, no murmurs, rubs, clicks or gallops  Abdomen - soft, nontender, nondistended, no masses or organomegaly. Neurological - alert, oriented, normal speech, no focal findings or movement disorder noted. Extremities - peripheral pulses normal, no pedal edema, no clubbing or cyanosis  Skin - normal coloration and turgor, no rashes, no suspicious skin lesions noted.      :PFTs  DATE: 02/12/2014   INTERPRETATION:     Spirometry   shows normal flow volume loop. The patient's FEV1 is 87% predicted. FEC   is 84% predicted, ratio was 76. Airway resistance is normal. [id number] is   102% predicted. Lung volumes by body box are normal. Total lung capacity   92% predicted. RV is 93% predicted. IMPRESSION:   This is essentially a normal pulmonary function test. The patient's upper   airway function also appears to be adequate.     CXR    2/4/18  The cardiothoracic ratio is normal. Juhi Lye is midline.  There is no   pulmonary consolidation, edema, effusion or pneumothorax. Calli Canavan is a large   calcified granuloma in the lateral aspect of left upper lobe, unchanged. Underlying COPD is suggested.  Bilateral shoulder osteoarthritis. July 2016  FINDINGS: Compared to 1/20/2016. Stable nodular density left upper lobe. No focal consolidation, pleural effusion or pneumothorax seen. Stable cardiomegaly. No free intraperitoneal air.      PET/CT scan seen from 2/19/14    IMPRESSION: 1. LOW LEVEL FDG ACCUMULATION IN THE SUSPICIOUS LESION OF THE LEFT UPPER LOBE OF THE LUNG. LEVEL OF FDG ACCUMULATION SUGGESTS A BENIGN ETIOLOGY BUT CONTINUED FOLLOW UP IS RECOMMENDED. 2. PROBABLE DEGENERATIVE OR INFLAMMATORY FDG ACCUMULATION OF THE RIGHT STERNOCLAVICULAR ARTICULATION. IMPRESSION: A 1.5 CM STABLE PULMONARY NODULE IN THE LEFT UPPER LUNG. NO SIGNIFICANT METABOLIC ACTIVITY IS NOTED ON PREVIOUS PET CT SCAN OF 02/192014. CT Chest 2/2/18 (I have reviewed the CT scan)  Compared to 1/27/2017. Stable benign nodule of the left upper lobe. No focal consolidation, pleural effusion or pneumothorax. No mediastinal or hilar lymphadenopathy. Normal cardiac size. Nonaneurysmal thoracic aorta. Visualized upper abdomen    is normal. Hypertrophic degenerative changes thoracic spine. CT scan Chest seen from jan 2017, jan 2016, feb 2015 and from august 2014 and April 2014    CT CHEST  Seen and compare to old cts and i did not see any change in mass as compare to CT in April 2014, Ct in feb 2015 and jan 2016         SURGICAL PATHOLOGY REPORT  LEFT LUNG, MASS, NEEDLE CORE BIOPSY:    LEFT LUNG, MASS, NEEDLE CORE BIOPSY:  - FOCAL INTRA-ALVEOLAR FIBRIN AND INFLAMMATORY CELLS. - OCCASIONAL REFRACTIVE FOREIGN MATERIAL IN ALVEOLAR  MACROPHAGES, NONSPECIFIC FOR IDENTIFICATION. Diagnosis Comment  THE FINDINGS ARE NOT SPECIFIC, BUT THERE IS NO EVIDENCE OF  GRANULOMATA, VASCULITIS, NEOPLASM OR MALIGNANCY.         Assessment and Plan        Asthma moderate persistent recent Asthma exacerbation  H/O Non Compliance with ICS  Likely COPD/ history of smoking in the past  SHRUTI Lung Mass with h/o smoking in the past with PET with only small uptake and suv of 1.3, she had CT guided needle biopsy which was negative for malignancy in 2014, follow up ct scans is stable since 2014 and depending on the new ct scan showed no change and no new mass and  stable for > 3 year and no follow up is needed    Patient Active Problem List   Diagnosis     hypertension    Diastolic CHF    CAD (coronary artery disease)    Status post coronary artery stent placement    Atrial Fibrillation     Plan    Continue Qvar 80 mcg 1 puff BID  Discuss compliance with medicine with the patient and family  Follow up in office in 6 months  Albuterol PRN  Refuse pneumonia or flu shot  Follow up PCP for HTN and cardiology for A fib  All discussed with the pt and the family and both understands and agree. It was my pleasure to evaluate Ed Sims today. Please call with questions. RTC 6 months. Slade Hernandez MD             8/16/2018, 1:34 PM    Please note that this chart was generated using voice recognition Dragon dictation software. Although every effort was made to ensure the accuracy of this automated transcription, some errors in transcription may have occurred.

## 2018-09-10 ENCOUNTER — CARE COORDINATION (OUTPATIENT)
Dept: CARE COORDINATION | Age: 83
End: 2018-09-10

## 2018-09-10 NOTE — CARE COORDINATION
Attempt to contact patient today regarding care coordination, unable to reach. Did patient ever hear from Cardiology regarding Plavix, Eliquis? Recent encounters and notes reviewed.  Most recent hospital stay listed below    7/27/2018 - 7/30/2018 (3 days)  Denisse Segura MD   Attending  Treatment team   Acute pain of right shoulder   Principal problem      Future Appointments  Date Time Provider Olivia Deng   9/13/2018 11:00 AM Zachary Townsend MD [facility] None   2/21/2019 11:45 AM MD Carol Patel [place] [id number] RN Care Coordinator  [phone]

## 2018-09-14 ENCOUNTER — TELEPHONE (OUTPATIENT)
Dept: PRIMARY CARE CLINIC | Age: 83
End: 2018-09-14

## 2018-09-14 DIAGNOSIS — N18.4 CKD (CHRONIC KIDNEY DISEASE) STAGE 4, GFR 15-29 ML/MIN (HCC): Primary | Chronic | ICD-10-CM

## 2018-09-14 RX ORDER — FUROSEMIDE 20 MG/1
20 TABLET ORAL DAILY
Qty: 60 TABLET | OUTPATIENT
Start: 2018-09-14

## 2018-09-14 RX ORDER — FUROSEMIDE 20 MG/1
20 TABLET ORAL
Qty: 15 TABLET | Refills: 5 | Status: SHIPPED | OUTPATIENT
Start: 2018-09-14 | End: 2019-04-05 | Stop reason: SDUPTHER

## 2018-10-18 ENCOUNTER — CARE COORDINATION (OUTPATIENT)
Dept: CARE COORDINATION | Age: 83
End: 2018-10-18

## 2018-10-19 RX ORDER — SIMVASTATIN 20 MG
TABLET ORAL
Qty: 90 TABLET | Refills: 0 | Status: SHIPPED | OUTPATIENT
Start: 2018-10-19 | End: 2019-02-20 | Stop reason: SDUPTHER

## 2018-10-19 NOTE — TELEPHONE ENCOUNTER
Health Maintenance   Topic Date Due    DTaP/Tdap/Td vaccine (1 - Tdap) 05/16/1947    Shingles Vaccine (1 of 2 - 2 Dose Series) 05/16/1978    Pneumococcal high/highest risk (1 of 2 - PCV13) 05/16/1993    Flu vaccine (1) 09/01/2018    Potassium monitoring  07/30/2019    Creatinine monitoring  07/30/2019             (applicable per patient's age: Cancer Screenings, Depression Screening, Fall Risk Screening, Immunizations)    Hemoglobin A1C (%)   Date Value   03/29/2018 7.1   06/16/2017 6.0 (H)   04/01/2017 7.4 (H)     Microalb/Crt.  Ratio (mcg/mg creat)   Date Value   01/23/2018 53 (H)     LDL Cholesterol (mg/dL)   Date Value   10/24/2016 73     AST (U/L)   Date Value   07/30/2018 10     ALT (U/L)   Date Value   07/30/2018 9     BUN (mg/dL)   Date Value   07/30/2018 45 (H)      (goal A1C is < 7)   (goal LDL is <100) need 30-50% reduction from baseline     BP Readings from Last 3 Encounters:   08/16/18 (!) 151/73   08/03/18 134/67   07/30/18 (!) 151/73    (goal /80)      All Future Testing planned in [facility]:  Lab Frequency Next Occurrence   Comprehensive Metabolic Panel Once 44/12/6597   CBC Auto Differential Once 06/27/2018   Urinalysis Once 06/27/2018   Uric Acid Once 09/19/2018       Next Visit Date:  Future Appointments  Date Time Provider Olivia Deng   2/21/2019 11:45 AM MD Carol Larsen [facility]            Patient Active Problem List:     Diastolic CHF ([facility] [address]     Asthma     Status post coronary artery stent placement     CKD (chronic kidney disease) stage 4, GFR 15-29 ml/min ([facility])     Renal artery stenosis ([facility])     Renovascular hypertension     COPD (chronic obstructive pulmonary disease) ([facility])     Ex-smoker     Atrial fibrillation with rapid ventricular response ([facility])     Panlobular emphysema ([address]     Acute respiratory failure with hypoxemia ([facility])     COPD with acute exacerbation ([facility] [address]     Coronary artery disease involving native coronary artery of native heart without angina

## 2018-11-01 RX ORDER — FERROUS SULFATE 325(65) MG
325 TABLET ORAL
Qty: 90 TABLET | Refills: 3 | Status: SHIPPED | OUTPATIENT
Start: 2018-11-01 | End: 2019-01-01 | Stop reason: SDUPTHER

## 2018-11-01 NOTE — TELEPHONE ENCOUNTER
Health Maintenance   Topic Date Due    DTaP/Tdap/Td vaccine (1 - Tdap) 05[birth year]/1947    Shingles Vaccine (1 of 2 - 2 Dose Series) 05/16/1978    Pneumococcal high/highest risk (1 of 2 - PCV13) 05/16/1993    Flu vaccine (1) 09/01/2018    Potassium monitoring  07/30/2019    Creatinine monitoring  07/30/2019             (applicable per patient's age: Cancer Screenings, Depression Screening, Fall Risk Screening, Immunizations)    Hemoglobin A1C (%)   Date Value   03/29/2018 7.1   06/16/2017 6.0 (H)   04/01/2017 7.4 (H)     Microalb/Crt.  Ratio (mcg/mg creat)   Date Value   01/23/2018 53 (H)     LDL Cholesterol (mg/dL)   Date Value   10/24/2016 73     AST (U/L)   Date Value   07/30/2018 10     ALT (U/L)   Date Value   07/30/2018 9     BUN (mg/dL)   Date Value   07/30/2018 45 (H)      (goal A1C is < 7)   (goal LDL is <100) need 30-50% reduction from baseline     BP Readings from Last 3 Encounters:   08/16/18 (!) 151/73   08/03/18 134/67   07/30/18 (!) 151/73    (goal /80)      All Future Testing planned in [facility]:  Lab Frequency Next Occurrence   Comprehensive Metabolic Panel Once 99/99/2190   CBC Auto Differential Once 06/27/2018   Urinalysis Once 06/27/2018   Uric Acid Once 09/19/2018       Next Visit Date:  Future Appointments  Date Time Provider Olivia Deng   2/21/2019 11:45 AM MD Carol [facility]            Patient Active Problem List:     Diastolic CHF ([facility] [address]     Asthma     Status post coronary artery stent placement     CKD (chronic kidney disease) stage 4, GFR 15-29 ml/min ([facility])     Renal artery stenosis ([facility])     Renovascular hypertension     COPD (chronic obstructive pulmonary disease) ([facility])     Ex-smoker     Atrial fibrillation with rapid ventricular response ([facility])     Panlobular emphysema ([address]     Acute respiratory failure with hypoxemia ([facility])     COPD with acute exacerbation ([facility] [address]     Coronary artery disease involving native coronary artery of native heart without angina

## 2018-11-08 ENCOUNTER — CARE COORDINATION (OUTPATIENT)
Dept: CARE COORDINATION | Age: 83
End: 2018-11-08

## 2018-12-18 ENCOUNTER — CARE COORDINATION (OUTPATIENT)
Dept: CARE COORDINATION | Age: 83
End: 2018-12-18

## 2019-01-01 ENCOUNTER — APPOINTMENT (OUTPATIENT)
Dept: GENERAL RADIOLOGY | Age: 84
End: 2019-01-01
Payer: MEDICARE

## 2019-01-01 ENCOUNTER — HOSPITAL ENCOUNTER (EMERGENCY)
Age: 84
Discharge: HOME | End: 2019-12-17
Attending: EMERGENCY MEDICINE
Payer: MEDICARE

## 2019-01-01 ENCOUNTER — CARE COORDINATION (OUTPATIENT)
Dept: CARE COORDINATION | Age: 84
End: 2019-01-01

## 2019-01-01 ENCOUNTER — HOSPITAL ENCOUNTER (EMERGENCY)
Age: 84
Discharge: HOME | End: 2019-11-18
Attending: EMERGENCY MEDICINE
Payer: MEDICARE

## 2019-01-01 VITALS
SYSTOLIC BLOOD PRESSURE: 127 MMHG | BODY MASS INDEX: 22.68 KG/M2 | DIASTOLIC BLOOD PRESSURE: 65 MMHG | WEIGHT: 124 LBS | HEART RATE: 78 BPM | TEMPERATURE: 97.5 F | RESPIRATION RATE: 18 BRPM | OXYGEN SATURATION: 94 %

## 2019-01-01 VITALS
OXYGEN SATURATION: 92 % | HEART RATE: 81 BPM | TEMPERATURE: 98.9 F | DIASTOLIC BLOOD PRESSURE: 79 MMHG | SYSTOLIC BLOOD PRESSURE: 155 MMHG | RESPIRATION RATE: 22 BRPM

## 2019-01-01 DIAGNOSIS — J44.1 COPD EXACERBATION (HCC): Primary | ICD-10-CM

## 2019-01-01 DIAGNOSIS — E78.5 DYSLIPIDEMIA: ICD-10-CM

## 2019-01-01 LAB
ABSOLUTE EOS #: 1.44 K/UL (ref 0–0.44)
ABSOLUTE EOS #: 1.48 K/UL (ref 0–0.44)
ABSOLUTE IMMATURE GRANULOCYTE: 0.08 K/UL (ref 0–0.3)
ABSOLUTE IMMATURE GRANULOCYTE: 0.09 K/UL (ref 0–0.3)
ABSOLUTE LYMPH #: 1.98 K/UL (ref 1.1–3.7)
ABSOLUTE LYMPH #: 2.15 K/UL (ref 1.1–3.7)
ABSOLUTE MONO #: 1.18 K/UL (ref 0.1–1.2)
ABSOLUTE MONO #: 1.32 K/UL (ref 0.1–1.2)
ANION GAP SERPL CALCULATED.3IONS-SCNC: 14 MMOL/L (ref 9–17)
BASOPHILS # BLD: 0 % (ref 0–2)
BASOPHILS # BLD: 1 % (ref 0–2)
BASOPHILS ABSOLUTE: 0.04 K/UL (ref 0–0.2)
BASOPHILS ABSOLUTE: 0.06 K/UL (ref 0–0.2)
BNP INTERPRETATION: ABNORMAL
BNP INTERPRETATION: ABNORMAL
BUN BLDV-MCNC: 31 MG/DL (ref 8–23)
BUN/CREAT BLD: 15 (ref 9–20)
CALCIUM SERPL-MCNC: 9.2 MG/DL (ref 8.6–10.4)
CHLORIDE BLD-SCNC: 102 MMOL/L (ref 98–107)
CK MB: 3.9 NG/ML
CO2: 25 MMOL/L (ref 20–31)
CREAT SERPL-MCNC: 2.05 MG/DL (ref 0.5–0.9)
CULTURE: NORMAL
CULTURE: NORMAL
DIFFERENTIAL TYPE: ABNORMAL
DIFFERENTIAL TYPE: ABNORMAL
EKG ATRIAL RATE: 83 BPM
EKG ATRIAL RATE: 89 BPM
EKG P AXIS: 103 DEGREES
EKG P AXIS: 75 DEGREES
EKG P-R INTERVAL: 174 MS
EKG P-R INTERVAL: 178 MS
EKG Q-T INTERVAL: 366 MS
EKG Q-T INTERVAL: 378 MS
EKG QRS DURATION: 68 MS
EKG QRS DURATION: 72 MS
EKG QTC CALCULATION (BAZETT): 444 MS
EKG QTC CALCULATION (BAZETT): 445 MS
EKG R AXIS: -23 DEGREES
EKG R AXIS: -39 DEGREES
EKG T AXIS: 75 DEGREES
EKG T AXIS: 80 DEGREES
EKG VENTRICULAR RATE: 83 BPM
EKG VENTRICULAR RATE: 89 BPM
EOSINOPHILS RELATIVE PERCENT: 12 % (ref 1–4)
EOSINOPHILS RELATIVE PERCENT: 13 % (ref 1–4)
GFR AFRICAN AMERICAN: 28 ML/MIN
GFR NON-AFRICAN AMERICAN: 23 ML/MIN
GFR SERPL CREATININE-BSD FRML MDRD: ABNORMAL ML/MIN/{1.73_M2}
GFR SERPL CREATININE-BSD FRML MDRD: ABNORMAL ML/MIN/{1.73_M2}
GLUCOSE BLD-MCNC: 84 MG/DL (ref 70–99)
HCT VFR BLD CALC: 39.6 % (ref 36.3–47.1)
HCT VFR BLD CALC: 41.9 % (ref 36.3–47.1)
HEMOGLOBIN: 12.4 G/DL (ref 11.9–15.1)
HEMOGLOBIN: 12.7 G/DL (ref 11.9–15.1)
IMMATURE GRANULOCYTES: 1 %
IMMATURE GRANULOCYTES: 1 %
INR BLD: 1 (ref 0.9–1.2)
LYMPHOCYTES # BLD: 17 % (ref 24–43)
LYMPHOCYTES # BLD: 18 % (ref 24–43)
Lab: NORMAL
Lab: NORMAL
MCH RBC QN AUTO: 29.5 PG (ref 25.2–33.5)
MCH RBC QN AUTO: 30 PG (ref 25.2–33.5)
MCHC RBC AUTO-ENTMCNC: 30.3 G/DL (ref 28.4–34.8)
MCHC RBC AUTO-ENTMCNC: 31.3 G/DL (ref 28.4–34.8)
MCV RBC AUTO: 95.9 FL (ref 82.6–102.9)
MCV RBC AUTO: 97.4 FL (ref 82.6–102.9)
MONOCYTES # BLD: 10 % (ref 3–12)
MONOCYTES # BLD: 11 % (ref 3–12)
NRBC AUTOMATED: 0 PER 100 WBC
NRBC AUTOMATED: 0 PER 100 WBC
PDW BLD-RTO: 14.7 % (ref 11.8–14.4)
PDW BLD-RTO: 14.8 % (ref 11.8–14.4)
PLATELET # BLD: 252 K/UL (ref 138–453)
PLATELET # BLD: 306 K/UL (ref 138–453)
PLATELET ESTIMATE: ABNORMAL
PLATELET ESTIMATE: ABNORMAL
PMV BLD AUTO: 10 FL (ref 8.1–13.5)
PMV BLD AUTO: 9.9 FL (ref 8.1–13.5)
POTASSIUM SERPL-SCNC: 4.4 MMOL/L (ref 3.7–5.3)
PRO-BNP: 837 PG/ML
PRO-BNP: 979 PG/ML
PROTHROMBIN TIME: 10.2 SEC (ref 9.7–12.2)
RBC # BLD: 4.13 M/UL (ref 3.95–5.11)
RBC # BLD: 4.3 M/UL (ref 3.95–5.11)
RBC # BLD: ABNORMAL 10*6/UL
RBC # BLD: ABNORMAL 10*6/UL
SEG NEUTROPHILS: 58 % (ref 36–65)
SEG NEUTROPHILS: 58 % (ref 36–65)
SEGMENTED NEUTROPHILS ABSOLUTE COUNT: 6.63 K/UL (ref 1.5–8.1)
SEGMENTED NEUTROPHILS ABSOLUTE COUNT: 6.65 K/UL (ref 1.5–8.1)
SODIUM BLD-SCNC: 141 MMOL/L (ref 135–144)
SPECIMEN DESCRIPTION: NORMAL
SPECIMEN DESCRIPTION: NORMAL
TOTAL CK: 110 U/L (ref 26–192)
TROPONIN INTERP: NORMAL
TROPONIN T: <0.03 NG/ML
TROPONIN, HIGH SENSITIVITY: NORMAL NG/L (ref 0–14)
WBC # BLD: 11.4 K/UL (ref 3.5–11.3)
WBC # BLD: 11.7 K/UL (ref 3.5–11.3)
WBC # BLD: ABNORMAL 10*3/UL
WBC # BLD: ABNORMAL 10*3/UL

## 2019-01-01 PROCEDURE — 82550 ASSAY OF CK (CPK): CPT

## 2019-01-01 PROCEDURE — 84484 ASSAY OF TROPONIN QUANT: CPT

## 2019-01-01 PROCEDURE — 6370000000 HC RX 637 (ALT 250 FOR IP): Performed by: EMERGENCY MEDICINE

## 2019-01-01 PROCEDURE — 6360000002 HC RX W HCPCS: Performed by: EMERGENCY MEDICINE

## 2019-01-01 PROCEDURE — 99285 EMERGENCY DEPT VISIT HI MDM: CPT

## 2019-01-01 PROCEDURE — 87040 BLOOD CULTURE FOR BACTERIA: CPT

## 2019-01-01 PROCEDURE — 85025 COMPLETE CBC W/AUTO DIFF WBC: CPT

## 2019-01-01 PROCEDURE — 83880 ASSAY OF NATRIURETIC PEPTIDE: CPT

## 2019-01-01 PROCEDURE — 71045 X-RAY EXAM CHEST 1 VIEW: CPT

## 2019-01-01 PROCEDURE — 93010 ELECTROCARDIOGRAM REPORT: CPT | Performed by: INTERNAL MEDICINE

## 2019-01-01 PROCEDURE — 96374 THER/PROPH/DIAG INJ IV PUSH: CPT

## 2019-01-01 PROCEDURE — 94664 DEMO&/EVAL PT USE INHALER: CPT

## 2019-01-01 PROCEDURE — 93005 ELECTROCARDIOGRAM TRACING: CPT | Performed by: EMERGENCY MEDICINE

## 2019-01-01 PROCEDURE — 36415 COLL VENOUS BLD VENIPUNCTURE: CPT

## 2019-01-01 PROCEDURE — 85610 PROTHROMBIN TIME: CPT

## 2019-01-01 PROCEDURE — 96375 TX/PRO/DX INJ NEW DRUG ADDON: CPT

## 2019-01-01 PROCEDURE — 82553 CREATINE MB FRACTION: CPT

## 2019-01-01 PROCEDURE — 94640 AIRWAY INHALATION TREATMENT: CPT

## 2019-01-01 PROCEDURE — 80048 BASIC METABOLIC PNL TOTAL CA: CPT

## 2019-01-01 RX ORDER — FERROUS SULFATE 325(65) MG
TABLET ORAL
Qty: 90 TABLET | Refills: 3 | Status: SHIPPED | OUTPATIENT
Start: 2019-01-01 | End: 2020-01-01

## 2019-01-01 RX ORDER — METHYLPREDNISOLONE SODIUM SUCCINATE 125 MG/2ML
125 INJECTION, POWDER, LYOPHILIZED, FOR SOLUTION INTRAMUSCULAR; INTRAVENOUS ONCE
Status: COMPLETED | OUTPATIENT
Start: 2019-01-01 | End: 2019-01-01

## 2019-01-01 RX ORDER — SIMVASTATIN 20 MG
TABLET ORAL
Qty: 90 TABLET | Refills: 0 | Status: SHIPPED | OUTPATIENT
Start: 2019-01-01 | End: 2020-01-01

## 2019-01-01 RX ORDER — ASPIRIN 81 MG/1
324 TABLET, CHEWABLE ORAL ONCE
Status: COMPLETED | OUTPATIENT
Start: 2019-01-01 | End: 2019-01-01

## 2019-01-01 RX ORDER — PREDNISONE 10 MG/1
TABLET ORAL
Qty: 20 TABLET | Refills: 0 | Status: SHIPPED | OUTPATIENT
Start: 2019-01-01 | End: 2019-01-01

## 2019-01-01 RX ORDER — IPRATROPIUM BROMIDE AND ALBUTEROL SULFATE 2.5; .5 MG/3ML; MG/3ML
1 SOLUTION RESPIRATORY (INHALATION) ONCE
Status: COMPLETED | OUTPATIENT
Start: 2019-01-01 | End: 2019-01-01

## 2019-01-01 RX ORDER — PREDNISONE 50 MG/1
TABLET ORAL
Qty: 5 TABLET | Refills: 0 | Status: SHIPPED | OUTPATIENT
Start: 2019-01-01 | End: 2020-01-01 | Stop reason: ALTCHOICE

## 2019-01-01 RX ORDER — BENZONATATE 100 MG/1
100 CAPSULE ORAL 3 TIMES DAILY PRN
Status: ON HOLD | COMMUNITY
End: 2020-01-01 | Stop reason: HOSPADM

## 2019-01-01 RX ORDER — ONDANSETRON 2 MG/ML
4 INJECTION INTRAMUSCULAR; INTRAVENOUS ONCE
Status: COMPLETED | OUTPATIENT
Start: 2019-01-01 | End: 2019-01-01

## 2019-01-01 RX ADMIN — IPRATROPIUM BROMIDE AND ALBUTEROL SULFATE 1 AMPULE: .5; 3 SOLUTION RESPIRATORY (INHALATION) at 23:09

## 2019-01-01 RX ADMIN — ONDANSETRON 4 MG: 2 INJECTION INTRAMUSCULAR; INTRAVENOUS at 22:59

## 2019-01-01 RX ADMIN — IPRATROPIUM BROMIDE AND ALBUTEROL SULFATE 1 AMPULE: .5; 3 SOLUTION RESPIRATORY (INHALATION) at 22:59

## 2019-01-01 RX ADMIN — METHYLPREDNISOLONE SODIUM SUCCINATE 125 MG: 125 INJECTION, POWDER, FOR SOLUTION INTRAMUSCULAR; INTRAVENOUS at 22:41

## 2019-01-01 RX ADMIN — METHYLPREDNISOLONE SODIUM SUCCINATE 125 MG: 125 INJECTION, POWDER, FOR SOLUTION INTRAMUSCULAR; INTRAVENOUS at 22:59

## 2019-01-01 RX ADMIN — IPRATROPIUM BROMIDE AND ALBUTEROL SULFATE 1 AMPULE: .5; 3 SOLUTION RESPIRATORY (INHALATION) at 23:13

## 2019-01-01 RX ADMIN — ASPIRIN 81 MG 324 MG: 81 TABLET ORAL at 22:59

## 2019-01-01 RX ADMIN — IPRATROPIUM BROMIDE AND ALBUTEROL SULFATE 1 AMPULE: .5; 3 SOLUTION RESPIRATORY (INHALATION) at 22:50

## 2019-01-01 ASSESSMENT — ENCOUNTER SYMPTOMS
COLOR CHANGE: 0
NAUSEA: 0
ABDOMINAL PAIN: 0
WHEEZING: 1
RHINORRHEA: 0
VOMITING: 0
BACK PAIN: 0
SHORTNESS OF BREATH: 1

## 2019-01-22 ENCOUNTER — CARE COORDINATION (OUTPATIENT)
Dept: CARE COORDINATION | Age: 84
End: 2019-01-22

## 2019-02-20 RX ORDER — SIMVASTATIN 20 MG
TABLET ORAL
Qty: 90 TABLET | Refills: 0 | Status: SHIPPED | OUTPATIENT
Start: 2019-02-20 | End: 2019-06-17 | Stop reason: SDUPTHER

## 2019-02-21 ENCOUNTER — OFFICE VISIT (OUTPATIENT)
Dept: PULMONOLOGY | Age: 84
End: 2019-02-21
Payer: MEDICARE

## 2019-02-21 VITALS
RESPIRATION RATE: 20 BRPM | SYSTOLIC BLOOD PRESSURE: 134 MMHG | BODY MASS INDEX: 23.37 KG/M2 | WEIGHT: 127 LBS | TEMPERATURE: 98.5 F | HEART RATE: 57 BPM | HEIGHT: 62 IN | OXYGEN SATURATION: 96 % | DIASTOLIC BLOOD PRESSURE: 76 MMHG

## 2019-02-21 DIAGNOSIS — J41.0 SIMPLE CHRONIC BRONCHITIS (HCC): ICD-10-CM

## 2019-02-21 DIAGNOSIS — J45.30 MILD PERSISTENT ASTHMA WITHOUT COMPLICATION: Primary | ICD-10-CM

## 2019-02-21 DIAGNOSIS — R91.8 LUNG MASS: ICD-10-CM

## 2019-02-21 PROCEDURE — G8926 SPIRO NO PERF OR DOC: HCPCS | Performed by: INTERNAL MEDICINE

## 2019-02-21 PROCEDURE — G8427 DOCREV CUR MEDS BY ELIG CLIN: HCPCS | Performed by: INTERNAL MEDICINE

## 2019-02-21 PROCEDURE — 3023F SPIROM DOC REV: CPT | Performed by: INTERNAL MEDICINE

## 2019-02-21 PROCEDURE — G8484 FLU IMMUNIZE NO ADMIN: HCPCS | Performed by: INTERNAL MEDICINE

## 2019-02-21 PROCEDURE — 1036F TOBACCO NON-USER: CPT | Performed by: INTERNAL MEDICINE

## 2019-02-21 PROCEDURE — 99214 OFFICE O/P EST MOD 30 MIN: CPT | Performed by: INTERNAL MEDICINE

## 2019-02-21 PROCEDURE — 1123F ACP DISCUSS/DSCN MKR DOCD: CPT | Performed by: INTERNAL MEDICINE

## 2019-02-21 PROCEDURE — 4040F PNEUMOC VAC/ADMIN/RCVD: CPT | Performed by: INTERNAL MEDICINE

## 2019-02-21 PROCEDURE — 1090F PRES/ABSN URINE INCON ASSESS: CPT | Performed by: INTERNAL MEDICINE

## 2019-02-21 PROCEDURE — 1101F PT FALLS ASSESS-DOCD LE1/YR: CPT | Performed by: INTERNAL MEDICINE

## 2019-02-21 PROCEDURE — G8598 ASA/ANTIPLAT THER USED: HCPCS | Performed by: INTERNAL MEDICINE

## 2019-02-21 PROCEDURE — G8420 CALC BMI NORM PARAMETERS: HCPCS | Performed by: INTERNAL MEDICINE

## 2019-02-21 RX ORDER — FUROSEMIDE 20 MG/1
20 TABLET ORAL
COMMUNITY
End: 2019-04-08

## 2019-04-09 ENCOUNTER — APPOINTMENT (OUTPATIENT)
Dept: GENERAL RADIOLOGY | Age: 84
DRG: 191 | End: 2019-04-09
Payer: MEDICARE

## 2019-04-09 ENCOUNTER — HOSPITAL ENCOUNTER (INPATIENT)
Age: 84
LOS: 2 days | Discharge: HOME | DRG: 191 | End: 2019-04-12
Attending: EMERGENCY MEDICINE | Admitting: INTERNAL MEDICINE
Payer: MEDICARE

## 2019-04-09 DIAGNOSIS — E86.0 DEHYDRATION: ICD-10-CM

## 2019-04-09 DIAGNOSIS — J44.1 COPD EXACERBATION (HCC): Primary | ICD-10-CM

## 2019-04-09 DIAGNOSIS — N30.00 ACUTE CYSTITIS WITHOUT HEMATURIA: ICD-10-CM

## 2019-04-09 LAB
-: ABNORMAL
ALBUMIN SERPL-MCNC: 3.6 G/DL (ref 3.5–5.2)
ALBUMIN/GLOBULIN RATIO: 1 (ref 1–2.5)
ALP BLD-CCNC: 95 U/L (ref 35–104)
ALT SERPL-CCNC: 16 U/L (ref 5–33)
AMORPHOUS: ABNORMAL
ANION GAP SERPL CALCULATED.3IONS-SCNC: 16 MMOL/L (ref 9–17)
AST SERPL-CCNC: 24 U/L
BACTERIA: ABNORMAL
BILIRUB SERPL-MCNC: 0.27 MG/DL (ref 0.3–1.2)
BILIRUBIN URINE: ABNORMAL
BNP INTERPRETATION: ABNORMAL
BUN BLDV-MCNC: 62 MG/DL (ref 8–23)
BUN/CREAT BLD: 24 (ref 9–20)
CALCIUM SERPL-MCNC: 9.3 MG/DL (ref 8.6–10.4)
CASTS UA: ABNORMAL /LPF
CHLORIDE BLD-SCNC: 101 MMOL/L (ref 98–107)
CO2: 24 MMOL/L (ref 20–31)
COLOR: YELLOW
COMMENT UA: ABNORMAL
CREAT SERPL-MCNC: 2.6 MG/DL (ref 0.5–0.9)
CRYSTALS, UA: ABNORMAL /HPF
EPITHELIAL CELLS UA: ABNORMAL /HPF (ref 0–25)
GFR AFRICAN AMERICAN: 21 ML/MIN
GFR NON-AFRICAN AMERICAN: 17 ML/MIN
GFR SERPL CREATININE-BSD FRML MDRD: ABNORMAL ML/MIN/{1.73_M2}
GFR SERPL CREATININE-BSD FRML MDRD: ABNORMAL ML/MIN/{1.73_M2}
GLUCOSE BLD-MCNC: 163 MG/DL (ref 70–99)
GLUCOSE URINE: NEGATIVE
HCT VFR BLD CALC: 45.4 % (ref 36.3–47.1)
HEMOGLOBIN: 14.2 G/DL (ref 11.9–15.1)
KETONES, URINE: NEGATIVE
LEUKOCYTE ESTERASE, URINE: ABNORMAL
MAGNESIUM: 2.2 MG/DL (ref 1.6–2.6)
MCH RBC QN AUTO: 29 PG (ref 25.2–33.5)
MCHC RBC AUTO-ENTMCNC: 31.3 G/DL (ref 28.4–34.8)
MCV RBC AUTO: 92.7 FL (ref 82.6–102.9)
MUCUS: ABNORMAL
NITRITE, URINE: NEGATIVE
NRBC AUTOMATED: 0 PER 100 WBC
OTHER OBSERVATIONS UA: ABNORMAL
PDW BLD-RTO: 13.6 % (ref 11.8–14.4)
PH UA: 5.5 (ref 5–9)
PLATELET # BLD: 177 K/UL (ref 138–453)
PMV BLD AUTO: 11.2 FL (ref 8.1–13.5)
POTASSIUM SERPL-SCNC: 4 MMOL/L (ref 3.7–5.3)
PRO-BNP: 979 PG/ML
PROTEIN UA: ABNORMAL
RBC # BLD: 4.9 M/UL (ref 3.95–5.11)
RBC UA: ABNORMAL /HPF (ref 0–2)
RENAL EPITHELIAL, UA: ABNORMAL /HPF
SODIUM BLD-SCNC: 141 MMOL/L (ref 135–144)
SPECIFIC GRAVITY UA: >1.03 (ref 1.01–1.02)
TOTAL PROTEIN: 7.3 G/DL (ref 6.4–8.3)
TRICHOMONAS: ABNORMAL
TROPONIN INTERP: ABNORMAL
TROPONIN T: 0.04 NG/ML
TROPONIN, HIGH SENSITIVITY: ABNORMAL NG/L (ref 0–14)
TURBIDITY: CLEAR
URINE HGB: ABNORMAL
UROBILINOGEN, URINE: NORMAL
WBC # BLD: 8 K/UL (ref 3.5–11.3)
WBC UA: ABNORMAL /HPF (ref 0–5)
YEAST: ABNORMAL

## 2019-04-09 PROCEDURE — 84484 ASSAY OF TROPONIN QUANT: CPT

## 2019-04-09 PROCEDURE — 85027 COMPLETE CBC AUTOMATED: CPT

## 2019-04-09 PROCEDURE — 87040 BLOOD CULTURE FOR BACTERIA: CPT

## 2019-04-09 PROCEDURE — 96360 HYDRATION IV INFUSION INIT: CPT

## 2019-04-09 PROCEDURE — 83735 ASSAY OF MAGNESIUM: CPT

## 2019-04-09 PROCEDURE — 71045 X-RAY EXAM CHEST 1 VIEW: CPT

## 2019-04-09 PROCEDURE — 94664 DEMO&/EVAL PT USE INHALER: CPT

## 2019-04-09 PROCEDURE — 81001 URINALYSIS AUTO W/SCOPE: CPT

## 2019-04-09 PROCEDURE — 6360000002 HC RX W HCPCS: Performed by: PHYSICIAN ASSISTANT

## 2019-04-09 PROCEDURE — 87086 URINE CULTURE/COLONY COUNT: CPT

## 2019-04-09 PROCEDURE — 94640 AIRWAY INHALATION TREATMENT: CPT

## 2019-04-09 PROCEDURE — 6370000000 HC RX 637 (ALT 250 FOR IP): Performed by: PHYSICIAN ASSISTANT

## 2019-04-09 PROCEDURE — 83880 ASSAY OF NATRIURETIC PEPTIDE: CPT

## 2019-04-09 PROCEDURE — 87186 SC STD MICRODIL/AGAR DIL: CPT

## 2019-04-09 PROCEDURE — 80053 COMPREHEN METABOLIC PANEL: CPT

## 2019-04-09 PROCEDURE — 93005 ELECTROCARDIOGRAM TRACING: CPT

## 2019-04-09 PROCEDURE — 99285 EMERGENCY DEPT VISIT HI MDM: CPT

## 2019-04-09 PROCEDURE — 87077 CULTURE AEROBIC IDENTIFY: CPT

## 2019-04-09 PROCEDURE — 2580000003 HC RX 258: Performed by: PHYSICIAN ASSISTANT

## 2019-04-09 PROCEDURE — 96361 HYDRATE IV INFUSION ADD-ON: CPT

## 2019-04-09 PROCEDURE — 6370000000 HC RX 637 (ALT 250 FOR IP): Performed by: EMERGENCY MEDICINE

## 2019-04-09 RX ORDER — BUDESONIDE 0.5 MG/2ML
0.5 INHALANT ORAL ONCE
Status: COMPLETED | OUTPATIENT
Start: 2019-04-09 | End: 2019-04-09

## 2019-04-09 RX ORDER — SODIUM CHLORIDE 9 MG/ML
INJECTION, SOLUTION INTRAVENOUS CONTINUOUS
Status: DISCONTINUED | OUTPATIENT
Start: 2019-04-09 | End: 2019-04-10

## 2019-04-09 RX ORDER — IPRATROPIUM BROMIDE AND ALBUTEROL SULFATE 2.5; .5 MG/3ML; MG/3ML
1 SOLUTION RESPIRATORY (INHALATION) ONCE
Status: COMPLETED | OUTPATIENT
Start: 2019-04-09 | End: 2019-04-09

## 2019-04-09 RX ADMIN — IPRATROPIUM BROMIDE AND ALBUTEROL SULFATE 1 AMPULE: .5; 3 SOLUTION RESPIRATORY (INHALATION) at 21:57

## 2019-04-09 RX ADMIN — CEFTRIAXONE 1 G: 1 INJECTION, POWDER, FOR SOLUTION INTRAMUSCULAR; INTRAVENOUS at 23:07

## 2019-04-09 RX ADMIN — IPRATROPIUM BROMIDE AND ALBUTEROL SULFATE 1 AMPULE: .5; 3 SOLUTION RESPIRATORY (INHALATION) at 23:58

## 2019-04-09 RX ADMIN — SODIUM CHLORIDE: 9 INJECTION, SOLUTION INTRAVENOUS at 22:21

## 2019-04-09 RX ADMIN — BUDESONIDE 500 MCG: 0.5 SUSPENSION RESPIRATORY (INHALATION) at 21:57

## 2019-04-09 ASSESSMENT — ENCOUNTER SYMPTOMS
EYE DISCHARGE: 0
WHEEZING: 1
VOMITING: 0
BLOOD IN STOOL: 0
RHINORRHEA: 0
COUGH: 0
BACK PAIN: 0
SHORTNESS OF BREATH: 0
SORE THROAT: 0
CHEST TIGHTNESS: 0
NAUSEA: 0
ABDOMINAL PAIN: 0
EYE REDNESS: 0
CONSTIPATION: 0
DIARRHEA: 0

## 2019-04-09 ASSESSMENT — PAIN SCALES - GENERAL: PAINLEVEL_OUTOF10: 4

## 2019-04-09 ASSESSMENT — PAIN DESCRIPTION - LOCATION: LOCATION: GENERALIZED

## 2019-04-09 ASSESSMENT — PAIN DESCRIPTION - PAIN TYPE: TYPE: ACUTE PAIN

## 2019-04-09 ASSESSMENT — PAIN DESCRIPTION - DESCRIPTORS: DESCRIPTORS: ACHING

## 2019-04-10 PROBLEM — I50.32 CHRONIC DIASTOLIC CHF (CONGESTIVE HEART FAILURE) (HCC): Chronic | Status: ACTIVE | Noted: 2018-02-22

## 2019-04-10 PROBLEM — E86.0 DEHYDRATION: Status: ACTIVE | Noted: 2019-04-10

## 2019-04-10 LAB
ALBUMIN SERPL-MCNC: 3.3 G/DL (ref 3.5–5.2)
ALBUMIN/GLOBULIN RATIO: 1 (ref 1–2.5)
ALP BLD-CCNC: 80 U/L (ref 35–104)
ALT SERPL-CCNC: 13 U/L (ref 5–33)
ANION GAP SERPL CALCULATED.3IONS-SCNC: 16 MMOL/L (ref 9–17)
AST SERPL-CCNC: 18 U/L
BILIRUB SERPL-MCNC: 0.15 MG/DL (ref 0.3–1.2)
BUN BLDV-MCNC: 61 MG/DL (ref 8–23)
BUN/CREAT BLD: 25 (ref 9–20)
CALCIUM SERPL-MCNC: 9.1 MG/DL (ref 8.6–10.4)
CHLORIDE BLD-SCNC: 101 MMOL/L (ref 98–107)
CO2: 21 MMOL/L (ref 20–31)
CREAT SERPL-MCNC: 2.41 MG/DL (ref 0.5–0.9)
EKG ATRIAL RATE: 78 BPM
EKG ATRIAL RATE: 92 BPM
EKG P AXIS: 53 DEGREES
EKG P AXIS: 80 DEGREES
EKG P-R INTERVAL: 144 MS
EKG P-R INTERVAL: 178 MS
EKG Q-T INTERVAL: 378 MS
EKG Q-T INTERVAL: 388 MS
EKG QRS DURATION: 72 MS
EKG QRS DURATION: 72 MS
EKG QTC CALCULATION (BAZETT): 442 MS
EKG QTC CALCULATION (BAZETT): 467 MS
EKG R AXIS: -35 DEGREES
EKG R AXIS: -42 DEGREES
EKG T AXIS: 76 DEGREES
EKG T AXIS: 83 DEGREES
EKG VENTRICULAR RATE: 78 BPM
EKG VENTRICULAR RATE: 92 BPM
GFR AFRICAN AMERICAN: 23 ML/MIN
GFR NON-AFRICAN AMERICAN: 19 ML/MIN
GFR SERPL CREATININE-BSD FRML MDRD: ABNORMAL ML/MIN/{1.73_M2}
GFR SERPL CREATININE-BSD FRML MDRD: ABNORMAL ML/MIN/{1.73_M2}
GLUCOSE BLD-MCNC: 178 MG/DL (ref 70–99)
GLUCOSE BLD-MCNC: 221 MG/DL (ref 74–100)
GLUCOSE BLD-MCNC: 221 MG/DL (ref 74–100)
GLUCOSE BLD-MCNC: 234 MG/DL (ref 74–100)
GLUCOSE BLD-MCNC: 325 MG/DL (ref 74–100)
HCT VFR BLD CALC: 39.8 % (ref 36.3–47.1)
HEMOGLOBIN: 12.6 G/DL (ref 11.9–15.1)
LACTIC ACID, SEPSIS WHOLE BLOOD: ABNORMAL MMOL/L (ref 0.5–1.9)
LACTIC ACID, SEPSIS WHOLE BLOOD: NORMAL MMOL/L (ref 0.5–1.9)
LACTIC ACID, SEPSIS WHOLE BLOOD: NORMAL MMOL/L (ref 0.5–1.9)
LACTIC ACID, SEPSIS: 1.8 MMOL/L (ref 0.5–1.9)
LACTIC ACID, SEPSIS: 1.8 MMOL/L (ref 0.5–1.9)
LACTIC ACID, SEPSIS: 2.3 MMOL/L (ref 0.5–1.9)
MCH RBC QN AUTO: 29.4 PG (ref 25.2–33.5)
MCHC RBC AUTO-ENTMCNC: 31.7 G/DL (ref 28.4–34.8)
MCV RBC AUTO: 93 FL (ref 82.6–102.9)
NRBC AUTOMATED: 0 PER 100 WBC
PDW BLD-RTO: 13.8 % (ref 11.8–14.4)
PLATELET # BLD: 169 K/UL (ref 138–453)
PMV BLD AUTO: 12.5 FL (ref 8.1–13.5)
POTASSIUM SERPL-SCNC: 4.2 MMOL/L (ref 3.7–5.3)
RBC # BLD: 4.28 M/UL (ref 3.95–5.11)
SODIUM BLD-SCNC: 138 MMOL/L (ref 135–144)
TOTAL PROTEIN: 6.5 G/DL (ref 6.4–8.3)
TROPONIN INTERP: ABNORMAL
TROPONIN T: 0.03 NG/ML
TROPONIN, HIGH SENSITIVITY: ABNORMAL NG/L (ref 0–14)
WBC # BLD: 7 K/UL (ref 3.5–11.3)

## 2019-04-10 PROCEDURE — 94667 MNPJ CHEST WALL 1ST: CPT

## 2019-04-10 PROCEDURE — 82947 ASSAY GLUCOSE BLOOD QUANT: CPT

## 2019-04-10 PROCEDURE — 6370000000 HC RX 637 (ALT 250 FOR IP): Performed by: PHYSICIAN ASSISTANT

## 2019-04-10 PROCEDURE — 1200000000 HC SEMI PRIVATE

## 2019-04-10 PROCEDURE — 6360000002 HC RX W HCPCS: Performed by: INTERNAL MEDICINE

## 2019-04-10 PROCEDURE — 84484 ASSAY OF TROPONIN QUANT: CPT

## 2019-04-10 PROCEDURE — 6370000000 HC RX 637 (ALT 250 FOR IP): Performed by: FAMILY MEDICINE

## 2019-04-10 PROCEDURE — 97162 PT EVAL MOD COMPLEX 30 MIN: CPT

## 2019-04-10 PROCEDURE — 94664 DEMO&/EVAL PT USE INHALER: CPT

## 2019-04-10 PROCEDURE — 36415 COLL VENOUS BLD VENIPUNCTURE: CPT

## 2019-04-10 PROCEDURE — 97116 GAIT TRAINING THERAPY: CPT

## 2019-04-10 PROCEDURE — 83605 ASSAY OF LACTIC ACID: CPT

## 2019-04-10 PROCEDURE — 97110 THERAPEUTIC EXERCISES: CPT

## 2019-04-10 PROCEDURE — G8988 SELF CARE GOAL STATUS: HCPCS

## 2019-04-10 PROCEDURE — 80053 COMPREHEN METABOLIC PANEL: CPT

## 2019-04-10 PROCEDURE — 6370000000 HC RX 637 (ALT 250 FOR IP): Performed by: INTERNAL MEDICINE

## 2019-04-10 PROCEDURE — G8987 SELF CARE CURRENT STATUS: HCPCS

## 2019-04-10 PROCEDURE — 96361 HYDRATE IV INFUSION ADD-ON: CPT

## 2019-04-10 PROCEDURE — 85027 COMPLETE CBC AUTOMATED: CPT

## 2019-04-10 PROCEDURE — 94668 MNPJ CHEST WALL SBSQ: CPT

## 2019-04-10 PROCEDURE — 93005 ELECTROCARDIOGRAM TRACING: CPT

## 2019-04-10 PROCEDURE — 2580000003 HC RX 258: Performed by: INTERNAL MEDICINE

## 2019-04-10 PROCEDURE — 94640 AIRWAY INHALATION TREATMENT: CPT

## 2019-04-10 RX ORDER — SODIUM CHLORIDE 0.9 % (FLUSH) 0.9 %
10 SYRINGE (ML) INJECTION PRN
Status: DISCONTINUED | OUTPATIENT
Start: 2019-04-10 | End: 2019-04-12 | Stop reason: HOSPADM

## 2019-04-10 RX ORDER — GUAIFENESIN 600 MG/1
600 TABLET, EXTENDED RELEASE ORAL 2 TIMES DAILY
Status: DISCONTINUED | OUTPATIENT
Start: 2019-04-10 | End: 2019-04-12 | Stop reason: HOSPADM

## 2019-04-10 RX ORDER — DILTIAZEM HYDROCHLORIDE 120 MG/1
120 TABLET, FILM COATED ORAL DAILY
Status: ON HOLD | COMMUNITY
End: 2020-01-01 | Stop reason: HOSPADM

## 2019-04-10 RX ORDER — METHYLPREDNISOLONE SODIUM SUCCINATE 125 MG/2ML
60 INJECTION, POWDER, LYOPHILIZED, FOR SOLUTION INTRAMUSCULAR; INTRAVENOUS EVERY 12 HOURS
Status: DISCONTINUED | OUTPATIENT
Start: 2019-04-10 | End: 2019-04-12

## 2019-04-10 RX ORDER — NICOTINE POLACRILEX 4 MG
15 LOZENGE BUCCAL PRN
Status: DISCONTINUED | OUTPATIENT
Start: 2019-04-10 | End: 2019-04-12 | Stop reason: HOSPADM

## 2019-04-10 RX ORDER — BENZONATATE 100 MG/1
100 CAPSULE ORAL 3 TIMES DAILY PRN
Status: DISCONTINUED | OUTPATIENT
Start: 2019-04-10 | End: 2019-04-12 | Stop reason: HOSPADM

## 2019-04-10 RX ORDER — DILTIAZEM HYDROCHLORIDE 120 MG/1
60 CAPSULE, COATED, EXTENDED RELEASE ORAL DAILY
Status: DISCONTINUED | OUTPATIENT
Start: 2019-04-10 | End: 2019-04-10 | Stop reason: ALTCHOICE

## 2019-04-10 RX ORDER — DEXTROSE MONOHYDRATE 50 MG/ML
100 INJECTION, SOLUTION INTRAVENOUS PRN
Status: DISCONTINUED | OUTPATIENT
Start: 2019-04-10 | End: 2019-04-12 | Stop reason: HOSPADM

## 2019-04-10 RX ORDER — ALBUTEROL SULFATE 2.5 MG/3ML
2.5 SOLUTION RESPIRATORY (INHALATION) 4 TIMES DAILY
Status: DISCONTINUED | OUTPATIENT
Start: 2019-04-10 | End: 2019-04-11

## 2019-04-10 RX ORDER — DILTIAZEM HYDROCHLORIDE 60 MG/1
120 TABLET, FILM COATED ORAL DAILY
Status: DISCONTINUED | OUTPATIENT
Start: 2019-04-10 | End: 2019-04-12 | Stop reason: HOSPADM

## 2019-04-10 RX ORDER — SODIUM CHLORIDE 0.9 % (FLUSH) 0.9 %
10 SYRINGE (ML) INJECTION EVERY 12 HOURS SCHEDULED
Status: DISCONTINUED | OUTPATIENT
Start: 2019-04-10 | End: 2019-04-12 | Stop reason: HOSPADM

## 2019-04-10 RX ORDER — FLUTICASONE PROPIONATE 110 UG/1
2 AEROSOL, METERED RESPIRATORY (INHALATION) 2 TIMES DAILY
Status: DISCONTINUED | OUTPATIENT
Start: 2019-04-10 | End: 2019-04-12 | Stop reason: HOSPADM

## 2019-04-10 RX ORDER — SODIUM CHLORIDE 9 MG/ML
INJECTION, SOLUTION INTRAVENOUS CONTINUOUS
Status: DISCONTINUED | OUTPATIENT
Start: 2019-04-10 | End: 2019-04-10

## 2019-04-10 RX ORDER — ALBUTEROL SULFATE 2.5 MG/3ML
2.5 SOLUTION RESPIRATORY (INHALATION) EVERY 4 HOURS PRN
Status: DISCONTINUED | OUTPATIENT
Start: 2019-04-10 | End: 2019-04-12 | Stop reason: HOSPADM

## 2019-04-10 RX ORDER — DEXTROSE MONOHYDRATE 25 G/50ML
12.5 INJECTION, SOLUTION INTRAVENOUS PRN
Status: DISCONTINUED | OUTPATIENT
Start: 2019-04-10 | End: 2019-04-12 | Stop reason: HOSPADM

## 2019-04-10 RX ORDER — ACETAMINOPHEN 325 MG/1
650 TABLET ORAL EVERY 6 HOURS PRN
Status: DISCONTINUED | OUTPATIENT
Start: 2019-04-10 | End: 2019-04-12 | Stop reason: HOSPADM

## 2019-04-10 RX ORDER — SIMVASTATIN 10 MG
10 TABLET ORAL NIGHTLY
Status: DISCONTINUED | OUTPATIENT
Start: 2019-04-10 | End: 2019-04-12 | Stop reason: HOSPADM

## 2019-04-10 RX ADMIN — GUAIFENESIN 600 MG: 600 TABLET, EXTENDED RELEASE ORAL at 20:31

## 2019-04-10 RX ADMIN — AZITHROMYCIN MONOHYDRATE 500 MG: 500 INJECTION, POWDER, LYOPHILIZED, FOR SOLUTION INTRAVENOUS at 02:26

## 2019-04-10 RX ADMIN — SIMVASTATIN 10 MG: 10 TABLET, FILM COATED ORAL at 20:31

## 2019-04-10 RX ADMIN — Medication 10 ML: at 20:41

## 2019-04-10 RX ADMIN — ALBUTEROL SULFATE 2.5 MG: 2.5 SOLUTION RESPIRATORY (INHALATION) at 19:19

## 2019-04-10 RX ADMIN — APIXABAN 2.5 MG: 2.5 TABLET, FILM COATED ORAL at 20:31

## 2019-04-10 RX ADMIN — ALBUTEROL SULFATE 2.5 MG: 2.5 SOLUTION RESPIRATORY (INHALATION) at 16:00

## 2019-04-10 RX ADMIN — INSULIN LISPRO 4 UNITS: 100 INJECTION, SOLUTION INTRAVENOUS; SUBCUTANEOUS at 11:56

## 2019-04-10 RX ADMIN — DILTIAZEM HYDROCHLORIDE 120 MG: 60 TABLET, FILM COATED ORAL at 09:23

## 2019-04-10 RX ADMIN — CEFTRIAXONE 1 G: 1 INJECTION, POWDER, FOR SOLUTION INTRAMUSCULAR; INTRAVENOUS at 22:08

## 2019-04-10 RX ADMIN — METHYLPREDNISOLONE SODIUM SUCCINATE 60 MG: 125 INJECTION, POWDER, FOR SOLUTION INTRAMUSCULAR; INTRAVENOUS at 02:29

## 2019-04-10 RX ADMIN — INSULIN LISPRO 1 UNITS: 100 INJECTION, SOLUTION INTRAVENOUS; SUBCUTANEOUS at 20:33

## 2019-04-10 RX ADMIN — SODIUM CHLORIDE: 9 INJECTION, SOLUTION INTRAVENOUS at 02:25

## 2019-04-10 RX ADMIN — INSULIN LISPRO 2 UNITS: 100 INJECTION, SOLUTION INTRAVENOUS; SUBCUTANEOUS at 17:47

## 2019-04-10 RX ADMIN — ALBUTEROL SULFATE 2.5 MG: 2.5 SOLUTION RESPIRATORY (INHALATION) at 05:47

## 2019-04-10 RX ADMIN — ALBUTEROL SULFATE 2.5 MG: 2.5 SOLUTION RESPIRATORY (INHALATION) at 11:33

## 2019-04-10 RX ADMIN — ACETAMINOPHEN 650 MG: 325 TABLET, FILM COATED ORAL at 20:31

## 2019-04-10 RX ADMIN — APIXABAN 2.5 MG: 2.5 TABLET, FILM COATED ORAL at 08:09

## 2019-04-10 RX ADMIN — GUAIFENESIN 600 MG: 600 TABLET, EXTENDED RELEASE ORAL at 09:23

## 2019-04-10 RX ADMIN — METHYLPREDNISOLONE SODIUM SUCCINATE 60 MG: 125 INJECTION, POWDER, FOR SOLUTION INTRAMUSCULAR; INTRAVENOUS at 17:46

## 2019-04-10 RX ADMIN — APIXABAN 2.5 MG: 2.5 TABLET, FILM COATED ORAL at 02:32

## 2019-04-10 ASSESSMENT — ENCOUNTER SYMPTOMS
ABDOMINAL DISTENTION: 0
COLOR CHANGE: 0
DIARRHEA: 0
NAUSEA: 0
CONSTIPATION: 0
BACK PAIN: 0
VOMITING: 0
COUGH: 1
SHORTNESS OF BREATH: 1
WHEEZING: 1
ABDOMINAL PAIN: 0

## 2019-04-10 ASSESSMENT — PAIN SCALES - GENERAL
PAINLEVEL_OUTOF10: 0
PAINLEVEL_OUTOF10: 0
PAINLEVEL_OUTOF10: 5
PAINLEVEL_OUTOF10: 6
PAINLEVEL_OUTOF10: 0

## 2019-04-10 NOTE — PROGRESS NOTES
Occupational Therapy   Occupational Therapy Initial Assessment  Date: 4/10/2019   Patient Name: Shayla Madrigal  MRN: [id number]     : 1928    Date of Service: 4/10/2019    Discharge Recommendations:  Home with assist PRN  OT Equipment Recommendations  Equipment Needed: No    Assessment   Performance deficits / Impairments: Decreased functional mobility ; Decreased ADL status; Decreased safe awareness;Decreased endurance  Prognosis: Good  Decision Making: Low Complexity  Assistance / Modification: SBA with ww. REQUIRES OT FOLLOW UP: Yes  Activity Tolerance  Activity Tolerance: Patient Tolerated treatment well  Safety Devices  Safety Devices in place: Yes  Type of devices: Call light within reach           Patient Diagnosis(es): The primary encounter diagnosis was COPD exacerbation ([facility] [address] Diagnoses of Acute cystitis without hematuria and Dehydration were also pertinent to this visit. has a past medical history of Arthritis, Asthma, Atrial fibrillation with rapid ventricular response (HCC), CAD (coronary artery disease), CHF (congestive heart failure) ([address] Chronic kidney disease, COPD (chronic obstructive pulmonary disease) ([facility] [address] Examination of participant in clinical trial, Gout, Gout, H/O cardiovascular stress test, H/O echocardiogram, Hx of transesophageal echocardiography ([facility]) for monitoring, Hyperlipidemia, Hypertension, MI (myocardial infarction) ([facility] [address] and Shingles. has a past surgical history that includes Cholecystectomy; Coronary angioplasty with stent; Spine surgery; and Cardiac catheterization (2017).            Restrictions  Restrictions/Precautions  Restrictions/Precautions: General Precautions, Fall Risk    Subjective   General  Chart Reviewed: Yes  Patient assessed for rehabilitation services?: Yes  Response to previous treatment: Patient with no complaints from previous session  Family / Caregiver Present: No  Pain Assessment  Pain Assessment: 0-10  Pain Level: 0  Social/Functional restricted  OutComes Score                                                  AM-PAC Score             Goals  Short term goals  Time Frame for Short term goals: 1 week  Short term goal 1: Pt. will engage in 15-30 min self care to improve strength for ADL's. Short term goal 2:  Pt. will engage in self care to increase Ind. Short term goal 3: Pt. will state understanding to safety as relates to ADL's. Long term goals  Time Frame for Long term goals : 3 weeks  Long term goal 1: Pt. will return to PTA ADL status. Patient Goals   Patient goals : Get strength back and don't feel sick.        Therapy Time   Individual Concurrent Group Co-treatment   Time In 0631         Time Out 1100         Minutes 20                 Milagro Rock, OT

## 2019-04-10 NOTE — PROGRESS NOTES
x10  Heelslides: x10  Hip Abduction: x10  Knee Long Arc Quad: x10  Knee Short Arc Quad: x10  Ankle Pumps: 10x2  Comments: seated marching  and hip add with pillow x10, B LE ther ex completed reclined and seated. Assessment   Body structures, Functions, Activity limitations: Decreased functional mobility ; Decreased endurance;Decreased strength  Assessment: Pt demonstrated faor tolerance to tx, becomes SOB and fatigues quickly  Treatment Diagnosis: generalized weakness  Prognosis: Good  Patient Education: Issued and educated pt on discharge folder, pt with appropriate understanding  REQUIRES PT FOLLOW UP: Yes     G-Code     OutComes Score                                                  AM-PAC Score             Goals  Short term goals  Time Frame for Short term goals: 10 days  Short term goal 1: Pt will be independent with bed mobility and transfers for improved functional independence. Short term goal 2: Pt will ambulate 250ft with Tony and no LOB for safety with household and community ambulation. Short term goal 3: Pt will tolerate 20-30mins ther ex/act to improve endurance for ADLs and functional tasks. Plan    Plan  Times per week: 7 days per week  Times per day: Twice a day(Daily on weekends)  Current Treatment Recommendations: Strengthening, ADL/Self-care Training, Gait Training, Patient/Caregiver Education & Training, IADL Training, Stair training, Balance Training, Neuromuscular Re-education, Functional Mobility Training, Endurance Training, Home Exercise Program, Transfer Training, Safety Education & Training  Safety Devices  Type of devices:  All fall risk precautions in place, Call light within reach, Gait belt, Left in chair, Nurse notified     Therapy Time   Individual Concurrent Group Co-treatment   Time In [facility] 4621         Time Out [address] [facility]         Minutes [address] [place] [address]/[facility]

## 2019-04-10 NOTE — PLAN OF CARE
Problem: Falls - Risk of:  Goal: Will remain free from falls  Description  Will remain free from falls  Outcome: Ongoing  Note:   Pt bed in lowest position with wheels locked. Call light within reach. Pt ID band with fall risk in place. Bed alarm in place. Room remains free of obstacles. Pt reminded to use call light as needed, verbalizes understanding. Will continue to monitor. Problem: Risk for Impaired Skin Integrity  Goal: Tissue integrity - skin and mucous membranes  Description  Structural intactness and normal physiological function of skin and  mucous membranes. Outcome: Ongoing  Note:   Pt turns and repositions self. Skin assessment done, see flow sheet. Problem: Activity Intolerance:  Goal: Ability to tolerate increased activity will improve  Description  Ability to tolerate increased activity will improve  Outcome: Ongoing  Note:   Pt has continued fatigue and weakness. Rest provided. Problem: Airway Clearance - Ineffective:  Goal: Ability to maintain a clear airway will improve  Description  Ability to maintain a clear airway will improve  Outcome: Ongoing  Note:   Pt has maintained a patent airway thus far this shift. Problem: Gas Exchange - Impaired:  Goal: Levels of oxygenation will improve  Description  Levels of oxygenation will improve  Outcome: Ongoing  Note:   Pt is maintaining SPO2 above 90% on room air. Problem: OXYGENATION/RESPIRATORY FUNCTION  Goal: Patient will achieve/maintain normal respiratory rate/effort  Description  Respiratory rate and effort will be within normal limits for the patient  Outcome: Ongoing  Note:   Pt's respiratory rate and effort monitored. Problem: FLUID AND ELECTROLYTE IMBALANCE  Goal: Fluid and electrolyte balance are achieved/maintained  Outcome: Ongoing  Note:   I&O monitored. IVF infusing.

## 2019-04-10 NOTE — PROGRESS NOTES
Discussed discharge plans with the daughter . Patient is a 80year old female here with  UTI, COPD exacerbation, acute on chronic kidney injury. She is alert and oriented. Patient is a  and lives with her daughter/family. She has a walker if needed. The daughter does the cooking and the cleaning. The daughter also manages her medication and does the driving. Her PCP is Jay Craven CNP. She has medical insurance that helps with medication costs. The discharge plan is home with no services. Patient does not have advance directives. LSW to monitor and assist with any needs or concerns as they arise.   ROSEMARY Green

## 2019-04-10 NOTE — PROGRESS NOTES
FLOW  Predicted:     Personal Best:        VITAL CAPACITY  Predicted value:  ml  Actual Value:  ml  30% of Predicted:  Ml    Patient Acuity 0 1 2 3 4 Score   Level of Concious (LOC) [x]  Alert & Oriented or Pt normal LOC []  Confused;follows directions []  Confused & uncooper-ative []  Obtunded []  Comatose 0   Respiratory Rate  (RR) [x]  Reg. rate & pattern. 12 - 20 bpm  []  Increased RR. Greater than 20 bpm   []  SOB w/ exertion or RR greater than 24 bpm []  Access- ory muscle use at rest. Abn.  resp. []  SOB at rest.   0   Bilateral Breath Sounds (BBS) []  Clear []  Diminish-ed bases  []  Diminish-ed t/o, or rales   [x]  Sporadic, scattered wheezes or rhonchi []  Persistentwheezes and, or absent BBS 3   Cough [x]  Strong, effective, & non-prod. []  Effective & prod. Less than 25 ml (2 TBSP) over past 24 hrs []  Ineffective & non-prod to less than 25 ML over past 24 hrs []  Ineffective and, or greater than 25 ml sputum prod. past 24 hrs. []  Nonspon- taneous; Requires suctioning 0   Pulmonary History  (PULM HX) []  No smoking and no chronic pulmonaryhistory []  Former smoker. Quit over 12 mos. ago []  Current smoker or quit w/ in 12 mos []  Pulm. History and, or 20 pk/yr smoking hx [x]  Admitted w/ acute pulm. dx and, or has been admitted w/ pulm. dx 2 or more times over past 12 mos 4   Surgical History this Admit  (SURG HX) [x]  No surgery []  General surgery []  Lower abdominal []  Thoracic or upper abdominal   []  Thoracic w/ pulm. disease 0   Chest X-Ray (CXR)/CT Scan []  Clear or not applicable []  Not available []  Atelect- asis or pleural effusions [x]  Localized infiltrate or pulm. edema []  Con-solidated Infiltrates, bilateral, or in more than 1 lobe 3   Slow or Forced VC, FEV1 OR PEFR (PULM FXN)  [x]  80% or greater, or not indicated []  Pt. unable to perform []  FEV1 or PEFR or VC 51-79%.   []  FEV1 or PEFR or VC  30-49%   []  FEV1 or PEFR or VC less than 30%   0   TOTAL ACUITY: 10       CARE PLAN    If Acuity Level is 2, 3, or 4 in any of the following:    [x] BILATERAL BREATH SOUNDS (BBS)     [x] PULMONARY HISTORY (PULM HX)  [] PULMONARY FUNCTION (PULM FX)    Goal: Improve respiratory functions in patients with airway disease and decrease WOB    [x] AEROSOL PROTOCOL    Total Acuity:   16-32  []  Secondary Assessment in 24 hrs Total Acuity:  9-15  [x]  Secondary Assessment in 24 hrs Total Acuity:  4-8  []  Secondary Assessment in 48 hrs Total Acuity:  0-3  []  Secondary Assessment in 72 hrs   HHN AEROSOL THERAPY with  [physician-ordered bronchodilator(s)] q 4 & Albuterol PRN q2 hrs. Breath-Actuated Neb if BBS Acuity = 4, and pt. can use MP. Notify physician if condition deteriorates. HHN AEROSOL THERAPY with  [physician-ordered bronchodilator(s)]  QID and Albuterol PRN q4 hrs. Breath-Actuated Neb if BBS Acuity = 4, and pt. can use MP. Notify physician if condition deteriorates. MDI THERAPY with  2 actuations of [physician-ordered bronchodilator(s)] via spacer TID Albuterol and PRNq4 hrs. If unable to utilize MDI: HHN [physician-ordered bronchodilator(s)] TID and Albuterol PRN q4 hrs. Notify physician if condition deteriorates. MDI THERAPY with  [physician-ordered bronchodilator(s)] via spacer TID PRN. If unable to utilize MDI: HHN [physician-ordered bronchodilator(s)] TID PRN. Notify physician if condition deteriorates. If Acuity Level is 2, 3, or 4 in any of the following:    [] COUGH     [] SURGICAL HISTORY (SURG HX)  [x] CHEST XRAY (CXR)    Goal: Improvement in sputum mobilization in patients with ineffective airway clearance. Reverse atelectasis.     [x] Bronchopulmonary Hygiene Protocol    Total Acuity:   16-32  []  Secondary Assessment in 24 hrs Total Acuity:  9-15  [x]  Secondary Assessment in 24 hrs Total Acuity:  4-8  []  Secondary Assessment in 48 hrs Total Acuity:  0-3  []  Secondary Assessment in 72 hrs   METANEB QID with [physician-ordered bronchodilator(s)] if CXR Acuity = 4; otherwise:  PD&P, PEP, or Vest QID & PRN  NT Sxn PRN for ineffective cough  METANEB QID with [physician-ordered bronchodilator(s)] if CXR Acuity = 4; otherwise:  PD&P, PEP, or Vest TID & PRN  NT Sxn PRN for ineffective cough  Instruct patient to self-perform IS q1hr WA   Directed Cough self-performed q1hr WA     If Acuity Level is 2 or above in the following:    [] PULMONARY HISTORY (PULM HX)    Goal: Assist patient in quitting smoking to slow or stop the progression of lung disease.     [] Smoking Cessation Protocol    SMOKING CESSATION EDUCATION provided according to policy DG_573: (sandra with an X)  ____Yes    ____ No     ____ NA    Smoking Cessation Booklet given:  ____Yes  ____No ____Patient Cris Khan

## 2019-04-10 NOTE — ED NOTES
Kimberly is a 15 yo F with Type I DM with hypertension, and depression here for hyperglycemia, currently stable. Blood glucose levels improved after first dose of metformin, likely a degree of insulin resistance superimposed on known Type I DM. She has independently calculated carb ratio and correction factor and administrating her own insulin.  Diet and exercise have improved. Plan to discuss food journaling and see if Kimberly is amenable. Patient's left foot wound is well maintained and wrapped. Would not let me look today. Kimberly had blood sugars in the 200's since . Appears well on exam. Has been accepted for placement in a medical psychiatry unit.     Will have to start getting materials ready for discharge. Will discuss with social work. Talked to Suzi dukes) today, and she will review diabetes medications to prepare for Kimberly's discharge.       #Diabetes Type I with signs of Type II  - Ped Endocrine following and providing recommendations regarding glucose control   - encourage sugar-free snacks and avoiding high-glucose foods at bedtime. No snacks after 12am  - Set carb limits to 60g/meal and 15g/snack, with two snacks allowed daily.   - Daily calorie limit 2000 kcal.  - vitals Qshift   - POCT qACHS  - Metformin 1000mg BID.   - Insulin Levemir 32 units BID.   - Insulin Aspart:    Correction Factor 1:25 for BS > 120 with meals,  1:25 for BS > 150 at Bedtime   Carb Ratio adjusted to 1 unit to 4g for breakfast & lunch, 1 unit to 6g dinner         #Dispo: SW coordinating placement at residential treatment [place]. [facility] called with report that there is an opening for a  female bed. This is thought to be the best possible placement at this time. Home will be able to take Kimberly on Thursday 7/26/18 with admission between 1- 2 pm. DCFS worker, Citlaly will plan to  early Th morning to drive pt to [place]. Request that medical team have everything ready for discharge by 5am  Very cloudy urine obtained per fem julia Dempsey RN  04/09/19 1214 Thursday.

## 2019-04-10 NOTE — ED PROVIDER NOTES
HPI  the patient was turned over to me for the final test and disposition. She is noted to have greater than 100 white cells per high-power field in her urine. The urine was also very concentrated with a specific gravity greater than 1.030. She had presented with shortness of breath and fatigue. Fatigue is been ongoing for the last week. She has a generalized body pain which is aching and at a level IV. Activity makes it worse and rest seems to help somewhat. The chest x-ray shows some congestive failure but the proBNP is 979. Troponin T is 0.04. Creatinine is 2.60 and BUN is 62. Review of Systems   Constitutional: Positive for activity change and fatigue. Negative for appetite change, chills, diaphoresis and fever. HENT: Negative for congestion and drooling. Eyes: Negative for visual disturbance. Respiratory: Positive for cough, shortness of breath and wheezing. Cardiovascular: Negative for chest pain, palpitations and leg swelling. Gastrointestinal: Negative for abdominal distention, abdominal pain, constipation, diarrhea, nausea and vomiting. Endocrine: Negative for cold intolerance and heat intolerance. Genitourinary: Negative for difficulty urinating, dysuria, flank pain and frequency. Musculoskeletal: Negative for back pain, gait problem, neck pain and neck stiffness. Skin: Negative for color change and pallor. Neurological: Negative for dizziness, light-headedness and headaches. Psychiatric/Behavioral: Negative for confusion, decreased concentration and dysphoric mood. Physical Exam   Constitutional: She is oriented to person, place, and time. She appears well-developed and well-nourished. No distress. HENT:   Head: Normocephalic and atraumatic. Right Ear: External ear normal.   Left Ear: External ear normal.   Mouth/Throat: Oropharynx is clear and moist.   Eyes: Pupils are equal, round, and reactive to light.  Conjunctivae and EOM are normal.   Neck: Normal range of motion. Neck supple. Cardiovascular: Normal rate, normal heart sounds and intact distal pulses. Pulmonary/Chest: Effort normal. She has wheezes. She has no rales. She exhibits no tenderness. Abdominal: Soft. Bowel sounds are normal. She exhibits no distension. There is no tenderness. Musculoskeletal: Normal range of motion. She exhibits no edema or tenderness. Neurological: She is alert and oriented to person, place, and time. No cranial nerve deficit or sensory deficit. She exhibits normal muscle tone. Coordination normal.   Patient is hard of hearing. Skin: Skin is warm and dry. She is not diaphoretic. Psychiatric: She has a normal mood and affect. Her behavior is normal. Judgment and thought content normal.     Nursing Notes were reviewed. Past Medical History:   Diagnosis Date    Arthritis     Asthma     Atrial fibrillation with rapid ventricular response ([address] 2/17/2017    CAD (coronary artery disease)     CHF (congestive heart failure) (HCC)     Chronic kidney disease     COPD (chronic obstructive pulmonary disease) ([address]     Examination of participant in clinical trial 5/20/13    6 year follow up, estimated completion JUN 2019    Gout 12/28/2017    Gout     H/O cardiovascular stress test 02/14/2017    H/O echocardiogram 02/14/2017    EF 55%. Mildly increased wall thickness of the LV. Evidence of diastolic dysfunction. Normal right ventricular size and function. Normal tricuspid valve structure and function. Mild tricuspid regurg    Hx of transesophageal echocardiography ([facility]) for monitoring 02/20/2017    No clots  were visulaized in the left atrial appendage EF 55%.     Hyperlipidemia     Hypertension     MI (myocardial infarction) ([address]     2001    Shingles     15 years ago       Family History   Problem Relation Age of Onset    Heart Disease Mother     Cancer Father        Past Surgical History:   Procedure Laterality Date    CARDIAC CATHETERIZATION  02/2017 LMCA: Normal 0% stenosis. LAD: Mid area 50-60% stenosis. LCx: Patent proximal stent 30% stenosis distal to the stent in OM proximal area 50% stenosis. RCA: Minimal 30% mid area PDA stent  is patent <20% stenosis. PL branch diffuse disease.  CHOLECYSTECTOMY      CORONARY ANGIOPLASTY WITH STENT PLACEMENT      SPINE SURGERY         Social History     Socioeconomic History    Marital status:      Spouse name: None    Number of children: None    Years of education: None    Highest education level: None   Occupational History    None   Social Needs    Financial resource strain: None    Food insecurity:     Worry: None     Inability: None    Transportation needs:     Medical: None     Non-medical: None   Tobacco Use    Smoking status: Former Smoker     Packs/day: 3.00     Years: 30.00     Pack years: 90.00     Types: Cigarettes    Smokeless tobacco: Never Used    Tobacco comment: Quit 40 years ago   Substance and Sexual Activity    Alcohol use: No    Drug use: No    Sexual activity: None   Lifestyle    Physical activity:     Days per week: None     Minutes per session: None    Stress: None   Relationships    Social connections:     Talks on phone: None     Gets together: None     Attends [employer] service: None     Active member of club or organization: None     Attends meetings of clubs or organizations: None     Relationship status: None    Intimate partner violence:     Fear of current or ex partner: None     Emotionally abused: None     Physically abused: None     Forced sexual activity: None   Other Topics Concern    None   Social History Narrative    None       Procedures    MDM  the patient will need admitted as well as her troponin does not go any higher. Antibiotic is already been started and cultures done. She will need some careful hydration. ED Course as of Apr 09 2318   Tue Apr 09, 2019   2152 Troponin is noted to be minimally elevated at 0.04.   Patient's without any chest pain. Personally denies any type of shortness of breath. I think this is all likely due to renal function. No acute ST elevation on EKG. [HH]   2580 I have updated the family on current results and that we are waiting further workup. [PY]   8137 Transferring care of this patient to my attending physician, Dr. Grace Reeves. [HH]      ED Course User Index  [HH] Ziggy Zimmerman PA-C       Labs repeat troponin T is 0.03. The earlier troponin was 0.04. Radiology chest x-ray shows mild CHF per radiologist.  My reading of the chest x-ray would be COPD changes. I do not see congestive heart failure. EKG Interpretation. Repeat EKG is very similar to the EKG that was done earlier. It shows normal sinus rhythm with a mature atrial complexes. Intervals are normal.  Pulmonary disease pattern with no acute ischemic changes. This EKG was very similar to old EKG also         Summation      Patient Course:  Patient's wheezing has improved but she still has wheezes. Her family informs me that the patient always has wheezes. She is being admitted for further treatment of her COPD, dehydration and renal tract infection. Cultures are pending. ED Medications administered this visit:    Medications   0.9 % sodium chloride infusion ( Intravenous New Bag 4/9/19 2221)   ipratropium-albuterol (DUONEB) nebulizer solution 1 ampule (1 ampule Inhalation Given 4/9/19 2157)   budesonide (PULMICORT) nebulizer suspension 500 mcg (500 mcg Nebulization Given 4/9/19 2157)   cefTRIAXone (ROCEPHIN) 1 g in sterile water 10 mL IV syringe (1 g Intravenous New Bag 4/9/19 2307)   ipratropium-albuterol (DUONEB) nebulizer solution 1 ampule (1 ampule Inhalation Given 4/9/19 2358)       New Prescriptions from this visit:    New Prescriptions    No medications on file       Follow-up:  No follow-up provider specified. Final Impression:   1. COPD exacerbation ([address]    2. Acute cystitis without hematuria    3.  Dehydration (Please note that portions of this note were completed with a voice recognition program.  Efforts were made to edit the dictations but occasionally words are mis-transcribed. )       Sander Godinez MD  04/10/19 4372

## 2019-04-10 NOTE — ED PROVIDER NOTES
[facility] ED  eMERGENCY dEPARTMENT eNCOUnter      Pt Name: Zoila Fermin  MRN: [id number]  [place] 5/16/1928  Date of evaluation: 4/9/2019  Provider: Maribell Vegas Dr     Chief Complaint   Patient presents with    Shortness of Breath    Fatigue     ongoing x 1 week         HISTORY OF PRESENT ILLNESS   (Location/Symptom, Timing/Onset, Context/Setting,Quality, Duration, Modifying Factors, Severity)  Note limiting factors. oZila Fermin is a80 y.o. female who presents to the emergency department with daughter secondary to generalized fatigue for the past week. Associated complaints include sob and wheezing. Daughter at the bedside reports that since last week, when she had two episodes of diarrhea, she has not felt back to herself. Patient and daughter deny any further diarrhea since last week. They denies fever, chills. Deny nausea, vomiting. They deny any chest pain or palpitations. They report she's been eating but much less than previous. Daughter reports that the patient does not complain of shortness of breath but she can hear her wheezing more. She reports the patient decided on a Walk around and she normally would so she brought her to the ER. No other complaints at this time. HPI    Nursing Notes werereviewed. REVIEW OF SYSTEMS    (2-9 systems for level 4, 10 or more for level 5)     Review of Systems   Constitutional: Positive for fatigue. Negative for chills, diaphoresis and fever. HENT: Negative for congestion, ear pain, rhinorrhea and sore throat. Eyes: Negative for discharge, redness and visual disturbance. Respiratory: Positive for wheezing. Negative for cough, chest tightness and shortness of breath. Cardiovascular: Negative for chest pain and palpitations. Gastrointestinal: Negative for abdominal pain, blood in stool, constipation, diarrhea, nausea and vomiting. Endocrine: Negative for polydipsia, polyphagia and polyuria. Genitourinary: Negative for decreased urine volume, difficulty urinating, dysuria, frequency and hematuria. Musculoskeletal: Negative for arthralgias, back pain and myalgias. Skin: Negative for pallor and rash. Allergic/Immunologic: Negative for food allergies and immunocompromised state. Neurological: Negative for dizziness, syncope, weakness and light-headedness. Hematological: Negative for adenopathy. Does not bruise/bleed easily. Psychiatric/Behavioral: Negative for behavioral problems and suicidal ideas. The patient is not nervous/anxious. Except as noted above the remainder of the review of systems was reviewed and negative. PAST MEDICAL HISTORY     Past Medical History:   Diagnosis Date    Arthritis     Asthma     Atrial fibrillation with rapid ventricular response ([facility] 2/17/2017    CAD (coronary artery disease)     [facility] failure[facility]     Chronic kidney disease     COPD (chronic obstructive pulmonary disease) ([facility] [address] [facility]     Examination of participant in clinical trial 5/20/13    6 year follow up, estimated completion JUN 2019    Gout 12/28/2017    Gout     H/O cardiovascular stress test 02/14/2017    H/O echocardiogram 02/14/2017    EF 55%. Mildly increased wall thickness of the LV. Evidence of diastolic dysfunction. Normal right ventricular size and function. Normal tricuspid valve structure and function. Mild tricuspid regurg    Hx of transesophageal echocardiography ([facility]) for monitoring 02/20/2017    No clots  were visulaized in the left atrial appendage EF 55%.  Hyperlipidemia     Hypertension     MI (myocardial infarction) ([facility] [address]     2001    Shingles     15 years ago         SURGICALHISTORY       Past Surgical History:   Procedure Laterality Date    CARDIAC CATHETERIZATION  02/2017    LMCA: Normal 0% stenosis. LAD: Mid area 50-60% stenosis. LCx: Patent proximal stent 30% stenosis distal to the stent in OM proximal area 50% stenosis.  RCA: Minimal 30% mid area PDA stent  is patent <20% stenosis. PL branch diffuse disease.  CHOLECYSTECTOMY      CORONARY ANGIOPLASTY WITH STENT PLACEMENT      SPINE SURGERY           CURRENT MEDICATIONS       Previous Medications    ALBUTEROL (PROVENTIL) (2.5 MG/3ML) 0.083% NEBULIZER SOLUTION    Take 3 mLs by nebulization every 4 hours as needed for Wheezing    BECLOMETHASONE (QVAR) 80 MCG/ACT INHALER    Inhale 1 puff into the lungs 2 times daily    DILTIAZEM (CARDIZEM CD) 120 MG EXTENDED RELEASE CAPSULE    Take 1 capsule by mouth daily    ELIQUIS 2.5 MG TABS TABLET    2 times daily Pt and daughter unsure of dose at this time. FERROUS SULFATE 325 (65 FE) MG TABLET    Take 1 tablet by mouth daily (with breakfast)    FUROSEMIDE (LASIX) 20 MG TABLET    take 1 tablet by mouth every 48 HOURS    NITROGLYCERIN (NITROSTAT) 0.4 MG SL TABLET    up to max of 3 total doses. If no relief after 1 dose, call 911. PROAIR  (90 BASE) MCG/ACT INHALER    INHALE 2 PUFFS EVERY 6 HOURS AS NEEDED FOR WHEEZING    SIMVASTATIN (ZOCOR) 20 MG TABLET    take 1 tablet by mouth every evening       ALLERGIES     [place]    FAMILY HISTORY       Family History   Problem Relation Age of Onset    Heart Disease Mother     Cancer Father           SOCIAL HISTORY       Social History     Socioeconomic History    Marital status:       Spouse name: None    Number of children: None    Years of education: None    Highest education level: None   Occupational History    None   Social Needs    Financial resource strain: None    Food insecurity:     Worry: None     Inability: None    Transportation needs:     Medical: None     Non-medical: None   Tobacco Use    Smoking status: Former Smoker     Packs/day: 3.00     Years: 30.00     Pack years: 90.00     Types: Cigarettes    Smokeless tobacco: Never Used    Tobacco comment: Quit 40 years ago   Substance and Sexual Activity    Alcohol use: No    Drug use: No    Sexual activity: CVA tenderness, no tenderness at McBurney's point and negative Jara's sign. Musculoskeletal: Normal range of motion. She exhibits no edema, tenderness or deformity. Neurological: She is alert and oriented to person, place, and time. She has normal reflexes. She displays normal reflexes. No cranial nerve deficit. She exhibits normal muscle tone. Skin: Skin is warm and dry. No rash noted. She is not diaphoretic. No erythema. Psychiatric: She has a normal mood and affect. Her behavior is normal.   Nursing note and vitals reviewed. DIAGNOSTIC RESULTS     EKG: All EKG's are interpreted by the Emergency Department Physician who either signs orCo-signs this chart in the absence of a cardiologist.      RADIOLOGY:   Non-plainfilm images such as CT, Ultrasound and MRI are read by the radiologist. Plain radiographic images are visualized and preliminarily interpreted by the emergency physician with the below findings:      Interpretationper the Radiologist below, if available at the time of this note:    XR CHEST PORTABLE   Final Result   Mild CHF.                ED BEDSIDE ULTRASOUND:   Performed by ED Physician - none    LABS:  Labs Reviewed   COMPREHENSIVE METABOLIC PANEL - Abnormal; Notable for the following components:       Result Value    Glucose 163 (*)     BUN 62 (*)     CREATININE 2.60 (*)     Bun/Cre Ratio 24 (*)     Total Bilirubin 0.27 (*)     GFR Non- 17 (*)     GFR  21 (*)     All other components within normal limits   URINE RT REFLEX TO CULTURE - Abnormal; Notable for the following components:    Bilirubin Urine SMALL (*)     Specific Gravity, UA >1.030 (*)     Urine Hgb 2+ (*)     Protein, UA 2+ (*)     Leukocyte Esterase, Urine MODERATE (*)     All other components within normal limits   BRAIN NATRIURETIC PEPTIDE - Abnormal; Notable for the following components:    Pro- (*)     All other components within normal limits   TROPONIN - Abnormal; Notable for the following components:    Troponin T 0.04 (*)     All other components within normal limits   MICROSCOPIC URINALYSIS - Abnormal; Notable for the following components:    Bacteria, UA 3+ (*)     All other components within normal limits   URINE CULTURE   CULTURE BLOOD #1   CULTURE BLOOD #2   CBC   MAGNESIUM   TROPONIN       All other labs were within normal range or not returned as of this dictation. EMERGENCY DEPARTMENT COURSE and DIFFERENTIAL DIAGNOSIS/MDM:   Vitals:    Vitals:    04/09/19 2137 04/09/19 2152 04/09/19 2159 04/09/19 2207   BP: (!) 146/100 (!) 153/90  (!) 154/88   Pulse: 70 74  76   Resp: 22 19  15   Temp:       TempSrc:       SpO2: 93% 96% 94% 100%         MDM  Elderly appearing 80-year-old female who presents with daughter secondary to generalized fatigue. Per patient she is not short of breath but per her daughter she continue her wheezing more therefore she brought her to the ER. On exam she has diffuse wheezing noted. Heart rate sounds of atrial fibrillation. At this point we'll get workup to include rule out of acute infection dehydration or O imbalance Q pneumonia bronchitis think this is less likely an ACS. ED Course as of Apr 09 2304   Tue Apr 09, 2019 2152 Troponin is noted to be minimally elevated at 0.04. Patient's without any chest pain. Personally denies any type of shortness of breath. I think this is all likely due to renal function. No acute ST elevation on EKG. [HH]   2214 I have updated the family on current results and that we are waiting further workup. [OW]   3039 Transferring care of this patient to my attending physician, Dr. Ghassan Parsons.      [HH]      ED Course User Index  [HH] Sabas Cranker, PA-C          Procedures      (Please note that portions of this note were completed with a voice recognition program.  Efforts were made to edit the dictations but occasionally words are mis-transcribed.)           Sabas Cranker, PA-C  04/09/19 2304

## 2019-04-10 NOTE — PLAN OF CARE
Problem: Falls - Risk of:  Goal: Will remain free from falls  Description  Will remain free from falls  4/10/2019 1022 by Kaye Cutler RN  Outcome: Ongoing  Note:   Call light in reach at all times, frequent checks, bed in lowest position, wheels of bed and chair locked, non skid footwear on, appropriate transfer techniques, personal items within reach, walkways free of obstructions, fall risk armband and sign displayed, Chahal fall risk score per protocol. No falls this shift, will continue to monitor. Problem: Risk for Impaired Skin Integrity  Goal: Tissue integrity - skin and mucous membranes  Description  Structural intactness and normal physiological function of skin and  mucous membranes. 4/10/2019 1022 by Kaye Cutler RN  Outcome: Ongoing  Note:   George scale monitoring per protocol. Inspect skin for breakdown frequently. Encourage pt to make frequent large adjustments in position or assist patient with turning. Document all areas of breakdown. Problem: Activity Intolerance:  Goal: Ability to tolerate increased activity will improve  Description  Ability to tolerate increased activity will improve  4/10/2019 1022 by Kaye Cutler RN  Outcome: Ongoing  Note:   Patient encouraged to increase activity as much as tolerated without sacrificing safety. Problem: Airway Clearance - Ineffective:  Goal: Ability to maintain a clear airway will improve  Description  Ability to maintain a clear airway will improve  4/10/2019 1022 by Kaye Cutler RN  Outcome: Ongoing  Note:   Patient is alert an oriented and is able to maintain a patent airway by raising the HOB and coughing. Problem: Gas Exchange - Impaired:  Goal: Levels of oxygenation will improve  Description  Levels of oxygenation will improve  4/10/2019 1022 by Kaye Cutler RN  Outcome: Ongoing  Note:   Patient remains on room air whit SPO2 running in the high 90's. Will continue to monitor.       Problem: FLUID AND ELECTROLYTE IMBALANCE  Goal: Fluid and electrolyte balance are achieved/maintained  4/10/2019 1022 by Tamiko Rivers RN  Outcome: Ongoing  Note:   Accurate I/O.  Monitor lab work

## 2019-04-10 NOTE — PROGRESS NOTES
Pt admitted to floor from ER at 0130 for COPD and CHF exacerbation by Dr. Katlyn Naranjo as hospitalist. Pt transferred to bed. Pt is A&O, able to answer questions appropriately. Pt is from home with daughter Laura Hollingsworth, who is also patient's healthcare POA. Laura Hollingsworth is with patient upon admission. VS and assessment performed. Pt had lactic drawn just before coming up to floor, resulted at 2.3 and had another order for repeat in two hours. Navigator completed. IVF started and medications administered as ordered. PHI form completed, no restrictions. Bedside tablet activated. Telemetry in place. Pt given warm blanket for comfort. Opportunity for questions provided. Bed alarm in place. Will continue to monitor patient.

## 2019-04-10 NOTE — PROGRESS NOTES
Physical Therapy    Facility/Department: [facility] AT THE [facility] MED SURG  Initial Assessment    NAME: Enriqueta Mcclain  : 1928  MRN: [id number]    Date of Service: 4/10/2019    Discharge Recommendations:  Continue to assess pending progress        Assessment   Assessment: Pt is a 79 y/o female admitted for COPD exacerbation. Pt currently requires SBA with bed mobility and CGA with transfers. Pt ambulates 5ft with RW and CGA, which pt fatigues quickly and becomes easily SOB. Pt demonstrates fair standing static/dynamic balance. Pt will benefit from skilled PT services to address impairments. Treatment Diagnosis: generalized weakness  Prognosis: Good  Decision Making: Medium Complexity  Patient Education: Purpose of PT eval and POC. REQUIRES PT FOLLOW UP: Yes  Activity Tolerance  Activity Tolerance: Patient Tolerated treatment well       Patient Diagnosis(es): The primary encounter diagnosis was COPD exacerbation ([address] Diagnoses of Acute cystitis without hematuria and Dehydration were also pertinent to this visit. has a past medical history of Arthritis, Asthma, Atrial fibrillation with rapid ventricular response (HCC), CAD (coronary artery disease), CHF (congestive heart failure) ([address] Chronic kidney disease, COPD (chronic obstructive pulmonary disease) ([address] Examination of participant in clinical trial, Gout, Gout, H/O cardiovascular stress test, H/O echocardiogram, Hx of transesophageal echocardiography ([facility]) for monitoring, Hyperlipidemia, Hypertension, MI (myocardial infarction) ([address] and Shingles. has a past surgical history that includes Cholecystectomy; Coronary angioplasty with stent; Spine surgery; and Cardiac catheterization (2017).     Restrictions  Restrictions/Precautions  Restrictions/Precautions: General Precautions, Fall Risk  Vision/Hearing  Vision: Impaired  Vision Exceptions: Wears glasses for reading  Hearing: Exceptions to [facility]  Hearing Exceptions: Hard of hearing/hearing concerns Subjective  General  Patient assessed for rehabilitation services?: Yes  Pain Screening  Patient Currently in Pain: Denies          Orientation  Orientation  Overall Orientation Status: Within Functional Limits  Social/Functional History  Social/Functional History  Lives With: Daughter  Type of Home: House  Home Layout: One level  Home Access: Stairs to enter with rails  Entrance Stairs - Number of Steps: 1  Home Equipment: 4 wheeled walker  ADL Assistance: Independent  Homemaking Assistance: Needs assistance  IADL Comments: Pt states she only uses walker when she is walking long distances so that she can sit down. Cognition   Cognition  Overall Cognitive Status: WFL    Objective          AROM RLE (degrees)  RLE AROM: WFL  AROM LLE (degrees)  LLE AROM : WFL  Strength RLE  Comment: Grossly 4/5  Strength LLE  Comment: Grossly 4/5        Bed mobility  Rolling to Right: Stand by assistance  Supine to Sit: Stand by assistance  Scooting: Stand by assistance  Transfers  Sit to Stand: Contact guard assistance  Stand to sit: Contact guard assistance  Ambulation  Ambulation?: Yes  Ambulation 1  Surface: level tile  Device: Rolling Walker  Assistance: Contact guard assistance  Quality of Gait: Pt fatigues quickly and easily becomes SOB. Distance: 5ft  Stairs/Curb  Stairs?: No     Balance  Sitting - Static: Good  Sitting - Dynamic: Good  Standing - Static: Fair  Standing - Dynamic: [address]  Times per week: 7 days per week  Times per day: Twice a day(Daily on weekends)  Current Treatment Recommendations: Strengthening, ADL/Self-care Training, Gait Training, Patient/Caregiver Education & Training, IADL Training, Stair training, Balance Training, Neuromuscular Re-education, Functional Mobility Training, Endurance Training, Home Exercise Program, Transfer Training, Safety Education & Training  Safety Devices  Type of devices:  All fall risk precautions in place, Nurse notified          Goals  Short term goals  Time Frame for Short term goals: 10 days  Short term goal 1: Pt will be independent with bed mobility and transfers for improved functional independence. Short term goal 2: Pt will ambulate 250ft with Tony and no LOB for safety with household and community ambulation. Short term goal 3: Pt will tolerate 20-30mins ther ex/act to improve endurance for ADLs and functional tasks.         Therapy Time   Individual Concurrent Group Co-treatment   Time In 0740         Time Out 0752         Minutes 12                 Lucero Raines, [facility]

## 2019-04-10 NOTE — H&P
Hospital Medicine  History and Physical    Patient: Red Barron  MRN: [id number]    Chief Complaint:  Cough, fatigue, wheezing    History Obtained From:  patient, electronic medical record    PCP: JOANIE Whiting CNP    History of Present Illness: The patient is a 80 y.o. female who presents with 2+ weeks of cough and progressive fatigue. She lives with her daughter and family. She has COPD and was using breathing treatments at home. She has had a steady decline and was reluctant to seek medical attention at the urging of her family. She had a decreased oral intake as well. Had low UOP and malodorous urine at home She finally decided to go to the ER. H/o CAD, COPD, afib, CKD, HTN. Quit smoking over 40 yrs ago. In the ER found to have UTI, elevated LA, WBC normal, elevated troponin. She had good SaO2 and was afebrile. CXR showed mild CHF. She was admitted for COPD exacerbation, acute on chronic kidney injury, and UTI. Past Medical History:        Diagnosis Date    Arthritis     Asthma     Atrial fibrillation with rapid ventricular response ([address] 2/17/2017    CAD (coronary artery disease)     CHF (congestive heart failure) (HCC)     Chronic kidney disease     COPD (chronic obstructive pulmonary disease) ([address]     Examination of participant in clinical trial 5/20/13    6 year follow up, estimated completion JUN 2019    Gout 12/28/2017    Gout     H/O cardiovascular stress test 02/14/2017    H/O echocardiogram 02/14/2017    EF 55%. Mildly increased wall thickness of the LV. Evidence of diastolic dysfunction. Normal right ventricular size and function. Normal tricuspid valve structure and function. Mild tricuspid regurg    Hx of transesophageal echocardiography ([facility]) for monitoring 02/20/2017    No clots  were visulaized in the left atrial appendage EF 55%.     Hyperlipidemia     Hypertension     MI (myocardial infarction) ([address]     2001    Shingles     15 years ago       Past Surgical History:        Procedure Laterality Date    CARDIAC CATHETERIZATION  02/2017    LMCA: Normal 0% stenosis. LAD: Mid area 50-60% stenosis. LCx: Patent proximal stent 30% stenosis distal to the stent in OM proximal area 50% stenosis. RCA: Minimal 30% mid area PDA stent  is patent <20% stenosis. PL branch diffuse disease.  CHOLECYSTECTOMY      CORONARY ANGIOPLASTY WITH STENT PLACEMENT      SPINE SURGERY         Family History:       Problem Relation Age of Onset    Heart Disease Mother     Cancer Father        Social History:   TOBACCO:   reports that she has quit smoking. Her smoking use included cigarettes. She has a 90.00 pack-year smoking history. She has never used smokeless tobacco.  ETOH:   reports that she does not drink alcohol. Allergies:  Belladonna    Medications Prior to Admission:    Prior to Admission medications    Medication Sig Start Date End Date Taking? Authorizing Provider   diltiazem (CARDIZEM) 120 MG tablet Take 120 mg by mouth daily Prescribed per Dr Zachary Agudelo - last filled 3/19 per Rhonda Hartmann 5  Verified as immediate release tablet daily   Yes Historical Provider, MD   furosemide (LASIX) 20 MG tablet take 1 tablet by mouth every 48 HOURS 4/8/19 5/8/19 Yes Sherita Chamorro MD   beclomethasone (QVAR) 80 MCG/ACT inhaler Inhale 1 puff into the lungs 2 times daily 2/21/19  Yes Nery Rico MD   simvastatin (ZOCOR) 20 MG tablet take 1 tablet by mouth every evening 2/20/19  Yes JOANIE Sheridan CNP   ferrous sulfate 325 (65 Fe) MG tablet Take 1 tablet by mouth daily (with breakfast) 11/1/18  Yes JOANIE Sheridan - CNP   ELIQUIS 2.5 MG TABS tablet 2 times daily Pt and daughter unsure of dose at this time.  7/13/18  Yes Historical Provider, MD   albuterol (PROVENTIL) (2.5 MG/3ML) 0.083% nebulizer solution Take 3 mLs by nebulization every 4 hours as needed for Wheezing 4/23/18  Yes Nery Rico MD   PROAIR  (90 Base) MCG/ACT inhaler INHALE 2 PUFFS EVERY 6 HOURS AS NEEDED FOR WHEEZING 2/20/18  Yes Fermin Bruner MD   nitroGLYCERIN (NITROSTAT) 0.4 MG SL tablet up to max of 3 total doses. If no relief after 1 dose, call 911. Patient taking differently: Place 0.4 mg under the tongue every 5 minutes as needed for Chest pain up to max of 3 total doses. If no relief after 1 dose, call 911. 2/6/18   Carlie Hall MD       Review of Systems:  Constitutional:negative  for fevers, and negative for chills. Eyes: negative for visual disturbance   ENT: negative for sore throat, negative nasal congestion, and negative for earache  Respiratory: negative for shortness of breath, positive for cough, and positive for wheezing  Cardiovascular: negative for chest pain, negative for palpitations, and negative for syncope  Gastrointestinal: negative for abdominal pain, negative for nausea,negative for vomiting, negative for diarrhea, negative for constipation, and negative for hematochezia or melena  Genitourinary: negative for dysuria, negative for urinary urgency, negative for urinary frequency, and negative for hematuria  Skin: negative for skin rash, and negative for skin lesions  Neurological: negative for unilateral weakness, numbness or tingling.     Physical Exam:    Vitals:   Temp: 97.2 °F (36.2 °C)  BP: (!) 160/75  Resp: 16  Pulse: 80  SpO2: 97 %  24HR INTAKE/OUTPUT:      Intake/Output Summary (Last 24 hours) at 4/10/2019 0510  Last data filed at 4/10/2019 5652  Gross per 24 hour   Intake 1143.88 ml   Output --   Net 1143.88 ml       Exam:  GEN:  alert and oriented to person, place and time, well-developed and well-nourished, in no acute distress  EYES: PERRL  NECK: normal, supple  PULM: diminished bilaterally - bilateral wheezes, bilateral rales, bilateral honchi, no respiratory distress  COR: regular rate & rhythm and no murmurs  ABD:  soft, non-tender, non-distended, normal bowel sounds, no masses or organomegaly  EXT:   no cyanosis, clubbing or edema present    NEURO: follows commands, PATEL, no deficits  SKIN:  no rashes or significant lesions  -----------------------------------------------------------------  Diagnostic Data:   Lab Results   Component Value Date    WBC 8.0 04/09/2019    HGB 14.2 04/09/2019     04/09/2019       Lab Results   Component Value Date    BUN 62 (H) 04/09/2019    CREATININE 2.60 (H) 04/09/2019     04/09/2019    K 4.0 04/09/2019    CALCIUM 9.3 04/09/2019     04/09/2019    CO2 24 04/09/2019    LABGLOM 17 (L) 04/09/2019       Lab Results   Component Value Date    WBCUA GREATER THAN 100 04/09/2019    RBCUA 2 TO 5 04/09/2019    EPITHUA 0 TO 2 04/09/2019    LEUKOCYTESUR MODERATE (A) 04/09/2019    SPECGRAV >1.030 (H) 04/09/2019    GLUCOSEU NEGATIVE 04/09/2019    KETUA NEGATIVE 04/09/2019    PROTEINU 2+ (A) 04/09/2019    HGBUR 2+ (A) 04/09/2019    CASTUA NOT REPORTED 04/09/2019    CRYSTUA NOT REPORTED 04/09/2019    BACTERIA 3+ (A) 04/09/2019    YEAST NOT REPORTED 04/09/2019       Lab Results   Component Value Date    MYOGLOBIN 93 (H) 11/28/2015    TROPONINT 0.03 (H) 04/10/2019    CKTOTAL 55 03/24/2017    CKMB 3.6 03/24/2017    PROBNP 979 (H) 04/09/2019       Xr Chest Portable    Result Date: 4/9/2019  EXAMINATION: SINGLE XRAY VIEW OF THE CHEST 4/9/2019 9:16 pm COMPARISON: Chest radiograph dated 07/27/2018 HISTORY: ORDERING SYSTEM PROVIDED HISTORY: wheezing TECHNOLOGIST PROVIDED HISTORY: wheezing FINDINGS: Cardiac and mediastinal shadows are normal.  Mild thickening of the interstitial lung markings suggestive of mild CHF. No infiltrates. No effusions. No pneumothorax. No free air below the diaphragm. Mild CHF.          Assessment:    Principal Problem:    Acute renal failure superimposed on stage 4 chronic kidney disease (HCC)  Active Problems:    Status post coronary artery stent placement    CKD (chronic kidney disease) stage 4, GFR 15-29 ml/min (HCC)    Renal artery stenosis (HCC)    Acute cystitis without hematuria    COPD (chronic include  CAD, COPD, afib, CKD, HTN  · Estimated length of stay is 3 days  · Steroids  · Nebs  · F/u cultures  · SSI  · PT/OT  · mucinex  · Tessalon  · Rocephin  · azithromycin  · High risk medication monitoring: none    CORE MEASURES  DVT prophylaxis: already on chronic anticoagulation  Decubitus ulcer present on admission: No  CODE STATUS: FULL CODE  Nutrition Status: fair   Physical therapy: Yes   Old Charts reviewed: Yes  EKG Reviewed: Yes  Advance Directive Addressed:  Yes    Vamshi Fernandez PA-C  4/10/2019, 9:09 AM

## 2019-04-10 NOTE — PLAN OF CARE
Problem: Falls - Risk of:  Goal: Will remain free from falls  Description  Will remain free from falls  4/10/2019 1945 by Shila Rubin RN  Outcome: Ongoing     Problem: Risk for Impaired Skin Integrity  Goal: Tissue integrity - skin and mucous membranes  Description  Structural intactness and normal physiological function of skin and  mucous membranes. 4/10/2019 1945 by Shila Rubin RN  Outcome: Ongoing     Problem:  Activity Intolerance:  Goal: Ability to tolerate increased activity will improve  Description  Ability to tolerate increased activity will improve  4/10/2019 1945 by Shila Rubin RN  Outcome: Ongoing     Problem: Airway Clearance - Ineffective:  Goal: Ability to maintain a clear airway will improve  Description  Ability to maintain a clear airway will improve  4/10/2019 1945 by Shila Rubin RN  Outcome: Ongoing     Problem: Gas Exchange - Impaired:  Goal: Levels of oxygenation will improve  Description  Levels of oxygenation will improve  4/10/2019 1945 by Shila Rubin RN  Outcome: Ongoing     Problem: OXYGENATION/RESPIRATORY FUNCTION  Goal: Patient will achieve/maintain normal respiratory rate/effort  Description  Respiratory rate and effort will be within normal limits for the patient  4/10/2019 1945 by Shila Rubin RN  Outcome: Ongoing

## 2019-04-10 NOTE — PROGRESS NOTES
Nutrition Assessment    Type and Reason for Visit: Initial, Positive Nutrition Screen, Consult(MST-2)    Nutrition Recommendations: Continue current diet. Add oral supplement with meals. Nutrition Assessment: Inadequate energy intake RT acute injury/trauma AEB mild muscle loss and pt report of lowered appetite and intake due to illness. Pt admitted with COPD exacerbations. Pt has some coughing, wheezing, and fatigue, however pt does not feel like it affects her eating. Pt does admit to having a lower appetite and not eating well for a day as the illness worsened. At home she typically eats 4 smaller meals and eats until satisfied. Pt has some missing teeth and stated it is getting hard for her to chew tough/hard food but most other foods are fine. Pt was eating breakfast at time of assessment and had oatmeal, toast, and eggs on tray. Pt has had 4.8% wt loss in 1 mo (from her stated UBW) and pt has mild muscle mass loss, which puts her at risk for malnutrition. Would reccomend adding an oral supplement to prevent further wt loss and prevent malnutrition. Pt has had elevated glucose levels, but is currently on steriods which may be the cause for the elevation, however pt did have a high A1C on record. Will monitor PO intake and wt. Malnutrition Assessment:  · Malnutrition Status: At risk for malnutrition  · Context: Acute illness or injury  · Findings of the 6 clinical characteristics of malnutrition (Minimum of 2 out of 6 clinical characteristics is required to make the diagnosis of moderate or severe Protein Calorie Malnutrition based on AND/ASPEN Guidelines):  1. Energy Intake-Less than or equal to 75% of estimated energy requirement, Unable to assess(x 1 day)    2. Weight Loss-2% loss or greater(4.8%), in 1 month  3. Fat Loss-No significant subcutaneous fat loss,    4. Muscle Loss-Mild muscle mass loss, Clavicles (pectoralis and deltoids)  5. Fluid Accumulation-No significant fluid accumulation,    6.   Strength-Not measured    Nutrition Risk Level: Moderate    Nutrient Needs:  · Estimated Daily Total Kcal: 8458-9101([id number]/TZ)  · Estimated Daily Protein (g): 71-82(1.3-1.5g/kg CBW)  · Estimated Daily Total Fluid (ml/day): 9381-9600(1 mL/kcal)    Nutrition Diagnosis:   · Problem: Inadequate energy intake  · Etiology: related to Acute injury/trauma     Signs and symptoms:  as evidenced by Mild muscle loss, Patient report of(lowered appetite and intake due to illness.)    Objective Information:  · Nutrition-Focused Physical Findings: Has some missing teeth and mild muscle mass depletion- clavicles.   · Wound Type: None  · Current Nutrition Therapies:  · Oral Diet Orders: General   · Oral Diet intake: Unable to assess  · Oral Nutrition Supplement (ONS) Orders: None     · Anthropometric Measures:  · Ht: 5' 2\" (157.5 cm)   · Current Body Wt: 120 lb 3.2 oz (54.5 kg)  · Admission Body Wt: 119 lb 11.2 oz (54.3 kg)  · Usual Body Wt: 126 lb (57.2 kg)(stated, 1 mo ago)  · % Weight Change:  ,  4.8% wt loss from UBW x 1 mo  · Ideal Body Wt: 110 lb (49.9 kg), % Ideal Body 109%  · BMI Classification: BMI 18.5 - 24.9 Normal Weight    Nutrition Interventions:   Continue current diet, Start ONS  Continued Inpatient Monitoring, Education not appropriate at this time, Coordination of Care    Nutrition Evaluation:   · Evaluation: Goals set   · Goals: PO >75%    · Monitoring: Meal Intake, Supplement Intake, Weight     Wt Readings from Last 10 Encounters:   04/10/19 120 lb 3.2 oz (54.5 kg)   02/21/19 127 lb (57.6 kg)   08/16/18 120 lb 8 oz (54.7 kg)   08/03/18 118 lb 14.4 oz (53.9 kg)   07/30/18 120 lb 3.2 oz (54.5 kg)   04/19/18 114 lb (51.7 kg)   04/09/18 113 lb (51.3 kg)   03/29/18 122 lb (55.3 kg)   02/26/18 133 lb (60.3 kg)   02/15/18 126 lb (57.2 kg)     Recent Labs     04/09/19  2109      K 4.0      CO2 24   BUN 62*   CREATININE 2.60*   GLUCOSE 163*   ALT 16   ALKPHOS 95   GFR                 Lab Results   Component

## 2019-04-11 LAB
ABSOLUTE EOS #: 0 K/UL (ref 0–0.44)
ABSOLUTE IMMATURE GRANULOCYTE: 0 K/UL (ref 0–0.3)
ABSOLUTE LYMPH #: 0.39 K/UL (ref 1.1–3.7)
ABSOLUTE MONO #: 0.39 K/UL (ref 0.1–1.2)
ANION GAP SERPL CALCULATED.3IONS-SCNC: 12 MMOL/L (ref 9–17)
BASOPHILS # BLD: 0 % (ref 0–2)
BASOPHILS ABSOLUTE: 0 K/UL (ref 0–0.2)
BUN BLDV-MCNC: 56 MG/DL (ref 8–23)
BUN/CREAT BLD: 27 (ref 9–20)
CALCIUM SERPL-MCNC: 9.1 MG/DL (ref 8.6–10.4)
CHLORIDE BLD-SCNC: 105 MMOL/L (ref 98–107)
CO2: 20 MMOL/L (ref 20–31)
CREAT SERPL-MCNC: 2.05 MG/DL (ref 0.5–0.9)
CULTURE: ABNORMAL
DIFFERENTIAL TYPE: ABNORMAL
EOSINOPHILS RELATIVE PERCENT: 0 % (ref 1–4)
ESTIMATED AVERAGE GLUCOSE: 134 MG/DL
GFR AFRICAN AMERICAN: 28 ML/MIN
GFR NON-AFRICAN AMERICAN: 23 ML/MIN
GFR SERPL CREATININE-BSD FRML MDRD: ABNORMAL ML/MIN/{1.73_M2}
GFR SERPL CREATININE-BSD FRML MDRD: ABNORMAL ML/MIN/{1.73_M2}
GLUCOSE BLD-MCNC: 170 MG/DL (ref 74–100)
GLUCOSE BLD-MCNC: 181 MG/DL (ref 74–100)
GLUCOSE BLD-MCNC: 215 MG/DL (ref 74–100)
GLUCOSE BLD-MCNC: 228 MG/DL (ref 70–99)
GLUCOSE BLD-MCNC: 231 MG/DL (ref 74–100)
GLUCOSE BLD-MCNC: 240 MG/DL (ref 74–100)
HBA1C MFR BLD: 6.3 % (ref 4.8–5.9)
HCT VFR BLD CALC: 38.5 % (ref 36.3–47.1)
HEMOGLOBIN: 12.4 G/DL (ref 11.9–15.1)
IMMATURE GRANULOCYTES: 0 %
LYMPHOCYTES # BLD: 3 % (ref 24–43)
Lab: ABNORMAL
MCH RBC QN AUTO: 29.1 PG (ref 25.2–33.5)
MCHC RBC AUTO-ENTMCNC: 32.2 G/DL (ref 28.4–34.8)
MCV RBC AUTO: 90.4 FL (ref 82.6–102.9)
MONOCYTES # BLD: 3 % (ref 3–12)
MORPHOLOGY: NORMAL
NRBC AUTOMATED: 0 PER 100 WBC
PDW BLD-RTO: 13.8 % (ref 11.8–14.4)
PLATELET # BLD: 177 K/UL (ref 138–453)
PLATELET ESTIMATE: ABNORMAL
PMV BLD AUTO: 11.5 FL (ref 8.1–13.5)
POTASSIUM SERPL-SCNC: 3.9 MMOL/L (ref 3.7–5.3)
RBC # BLD: 4.26 M/UL (ref 3.95–5.11)
RBC # BLD: ABNORMAL 10*6/UL
SEG NEUTROPHILS: 94 % (ref 36–65)
SEGMENTED NEUTROPHILS ABSOLUTE COUNT: 12.12 K/UL (ref 1.5–8.1)
SODIUM BLD-SCNC: 137 MMOL/L (ref 135–144)
SPECIMEN DESCRIPTION: ABNORMAL
WBC # BLD: 12.9 K/UL (ref 3.5–11.3)
WBC # BLD: ABNORMAL 10*3/UL

## 2019-04-11 PROCEDURE — 83036 HEMOGLOBIN GLYCOSYLATED A1C: CPT

## 2019-04-11 PROCEDURE — 82947 ASSAY GLUCOSE BLOOD QUANT: CPT

## 2019-04-11 PROCEDURE — 97110 THERAPEUTIC EXERCISES: CPT

## 2019-04-11 PROCEDURE — 97116 GAIT TRAINING THERAPY: CPT

## 2019-04-11 PROCEDURE — 94668 MNPJ CHEST WALL SBSQ: CPT

## 2019-04-11 PROCEDURE — 94640 AIRWAY INHALATION TREATMENT: CPT

## 2019-04-11 PROCEDURE — 85025 COMPLETE CBC W/AUTO DIFF WBC: CPT

## 2019-04-11 PROCEDURE — 80048 BASIC METABOLIC PNL TOTAL CA: CPT

## 2019-04-11 PROCEDURE — 36415 COLL VENOUS BLD VENIPUNCTURE: CPT

## 2019-04-11 PROCEDURE — 6360000002 HC RX W HCPCS: Performed by: INTERNAL MEDICINE

## 2019-04-11 PROCEDURE — 6370000000 HC RX 637 (ALT 250 FOR IP): Performed by: INTERNAL MEDICINE

## 2019-04-11 PROCEDURE — 6370000000 HC RX 637 (ALT 250 FOR IP): Performed by: PHYSICIAN ASSISTANT

## 2019-04-11 PROCEDURE — 94664 DEMO&/EVAL PT USE INHALER: CPT

## 2019-04-11 PROCEDURE — 97535 SELF CARE MNGMENT TRAINING: CPT

## 2019-04-11 PROCEDURE — 1200000000 HC SEMI PRIVATE

## 2019-04-11 PROCEDURE — 2580000003 HC RX 258: Performed by: INTERNAL MEDICINE

## 2019-04-11 RX ORDER — ALBUTEROL SULFATE 2.5 MG/3ML
2.5 SOLUTION RESPIRATORY (INHALATION) 3 TIMES DAILY
Status: DISCONTINUED | OUTPATIENT
Start: 2019-04-11 | End: 2019-04-12 | Stop reason: HOSPADM

## 2019-04-11 RX ADMIN — Medication 10 ML: at 20:46

## 2019-04-11 RX ADMIN — ALBUTEROL SULFATE 2.5 MG: 2.5 SOLUTION RESPIRATORY (INHALATION) at 11:14

## 2019-04-11 RX ADMIN — CEFTRIAXONE 1 G: 1 INJECTION, POWDER, FOR SOLUTION INTRAMUSCULAR; INTRAVENOUS at 23:06

## 2019-04-11 RX ADMIN — APIXABAN 2.5 MG: 2.5 TABLET, FILM COATED ORAL at 20:45

## 2019-04-11 RX ADMIN — GUAIFENESIN 600 MG: 600 TABLET, EXTENDED RELEASE ORAL at 20:46

## 2019-04-11 RX ADMIN — METHYLPREDNISOLONE SODIUM SUCCINATE 60 MG: 125 INJECTION, POWDER, FOR SOLUTION INTRAMUSCULAR; INTRAVENOUS at 01:53

## 2019-04-11 RX ADMIN — INSULIN LISPRO 2 UNITS: 100 INJECTION, SOLUTION INTRAVENOUS; SUBCUTANEOUS at 17:31

## 2019-04-11 RX ADMIN — AZITHROMYCIN MONOHYDRATE 500 MG: 500 INJECTION, POWDER, LYOPHILIZED, FOR SOLUTION INTRAVENOUS at 01:57

## 2019-04-11 RX ADMIN — ALBUTEROL SULFATE 2.5 MG: 2.5 SOLUTION RESPIRATORY (INHALATION) at 16:12

## 2019-04-11 RX ADMIN — INSULIN LISPRO 1 UNITS: 100 INJECTION, SOLUTION INTRAVENOUS; SUBCUTANEOUS at 09:00

## 2019-04-11 RX ADMIN — Medication 10 ML: at 08:59

## 2019-04-11 RX ADMIN — GUAIFENESIN 600 MG: 600 TABLET, EXTENDED RELEASE ORAL at 08:59

## 2019-04-11 RX ADMIN — ALBUTEROL SULFATE 2.5 MG: 2.5 SOLUTION RESPIRATORY (INHALATION) at 19:41

## 2019-04-11 RX ADMIN — SIMVASTATIN 10 MG: 10 TABLET, FILM COATED ORAL at 20:45

## 2019-04-11 RX ADMIN — INSULIN LISPRO 4 UNITS: 100 INJECTION, SOLUTION INTRAVENOUS; SUBCUTANEOUS at 12:09

## 2019-04-11 RX ADMIN — INSULIN LISPRO 2 UNITS: 100 INJECTION, SOLUTION INTRAVENOUS; SUBCUTANEOUS at 20:46

## 2019-04-11 RX ADMIN — DILTIAZEM HYDROCHLORIDE 120 MG: 60 TABLET, FILM COATED ORAL at 08:59

## 2019-04-11 RX ADMIN — Medication 10 ML: at 23:06

## 2019-04-11 RX ADMIN — ALBUTEROL SULFATE 2.5 MG: 2.5 SOLUTION RESPIRATORY (INHALATION) at 05:09

## 2019-04-11 RX ADMIN — APIXABAN 2.5 MG: 2.5 TABLET, FILM COATED ORAL at 08:59

## 2019-04-11 RX ADMIN — METHYLPREDNISOLONE SODIUM SUCCINATE 60 MG: 125 INJECTION, POWDER, FOR SOLUTION INTRAMUSCULAR; INTRAVENOUS at 15:49

## 2019-04-11 ASSESSMENT — PAIN SCALES - GENERAL
PAINLEVEL_OUTOF10: 0

## 2019-04-11 NOTE — PROGRESS NOTES
Occupational Therapy  Facility/Department: [facility] AT THE [facility] MED SURG  Daily Treatment Note  NAME: Zoila Fermin  : 1928  MRN: [id number]    Date of Service: 2019    Discharge Recommendations:  Home with assist PRN       Assessment      Safety Devices  Safety Devices in place: Yes  Type of devices: Call light within reach; Left in chair         Patient Diagnosis(es): The primary encounter diagnosis was COPD exacerbation ([facility] Diagnoses of Acute cystitis without hematuria and Dehydration were also pertinent to this visit. has a past medical history of Arthritis, Asthma, Atrial fibrillation with rapid ventricular response (HCC), CAD (coronary artery disease), CHF (congestive heart failure) ([facility] Chronic kidney disease, COPD (chronic obstructive pulmonary disease) ([facility] Examination of participant in clinical trial, Gout, Gout, H/O cardiovascular stress test, H/O echocardiogram, Hx of transesophageal echocardiography ([facility]) for monitoring, Hyperlipidemia, Hypertension, MI (myocardial infarction) ([facility] and Shingles. has a past surgical history that includes Cholecystectomy; Coronary angioplasty with stent; Spine surgery; and Cardiac catheterization (2017). Restrictions  Restrictions/Precautions  Restrictions/Precautions: General Precautions, Fall Risk  Subjective   General  Chart Reviewed: Yes  Patient assessed for rehabilitation services?: Yes  Response to previous treatment: Patient with no complaints from previous session  Family / Caregiver Present: No  Subjective  Subjective: Pt denies pain this date      Orientation     Objective    ADL  UE Bathing: Stand by assistance(while standing sinkside)  LE Bathing: Stand by assistance(while standing sinkside)  UE Dressing: Stand by assistance  LE Dressing: Setup  Toileting: Supervision  Additional Comments: Pt completed ADLs sinkside. Pt completed toileting, hygiene and clothing management with s/v (A) and use of grab bar and FWW.   Pt stood sinkside for 4 minutes and 3 minutes with seated rest break between sessions. Pt educated on discharge folder and verbalized understanding. Balance  Standing Balance: Stand by assistance  Standing Balance  Time: 7 minutes with one seated rest break  Activity: ADLs sinkside  Comment: occasional verbal cues for EC and deep breathing  Functional Mobility  Functional - Mobility Device: Rolling Walker  Activity: To/from bathroom  Assist Level: Stand by assistance                                                                       Plan   Plan  Times per day: Daily  Current Treatment Recommendations: Strengthening, Endurance Training, ROM, Self-Care / ADL, Safety Education & Training, Functional Mobility Training  G-Code     OutComes Score                                                  AM-PAC Score             Goals  Short term goals  Time Frame for Short term goals: 1 week  Short term goal 1: Pt. will engage in 15-30 min self care to improve strength for ADL's. Short term goal 2:  Pt. will engage in self care to increase Ind. Short term goal 3: Pt. will state understanding to safety as relates to ADL's. Long term goals  Time Frame for Long term goals : 3 weeks  Long term goal 1: Pt. will return to PTA ADL status. Patient Goals   Patient goals : Get strength back and don't feel sick.        Therapy Time   Individual Concurrent Group Co-treatment   Time In 1324         Time Out 1335         Minutes [address] [place] [address]

## 2019-04-11 NOTE — PROGRESS NOTES
Physical Therapy  Facility[facility]Department: [facility] AT THE [facility] MED SURG  Daily Treatment Note  NAME: Aliya Calles  : 1928  MRN: [id number]    Date of Service: 2019    Discharge Recommendations:  Continue to assess pending progress        Patient Diagnosis(es): The primary encounter diagnosis was COPD exacerbation ([facility] Diagnoses of Acute cystitis without hematuria and Dehydration were also pertinent to this visit. has a past medical history of Arthritis, Asthma, Atrial fibrillation with rapid ventricular response (HCC), CAD (coronary artery disease), CHF (congestive heart failure) ([facility] Chronic kidney disease, COPD (chronic obstructive pulmonary disease) ([facility] Examination of participant in clinical trial, Gout, Gout, H/O cardiovascular stress test, H/O echocardiogram, Hx of transesophageal echocardiography ([facility]) for monitoring, Hyperlipidemia, Hypertension, MI (myocardial infarction) ([facility] and Shingles. has a past surgical history that includes Cholecystectomy; Coronary angioplasty with stent; Spine surgery; and Cardiac catheterization (2017). Restrictions  Restrictions/Precautions  Restrictions/Precautions: General Precautions, Fall Risk  Subjective   General  Chart Reviewed: Yes  Response To Previous Treatment: Patient with no complaints from previous session. Subjective  Subjective: No c/o pain upon arrival.  Pt pleasent and agreeable to participate. Orientation  Orientation  Overall Orientation Status: Within Functional Limits  Cognition      Objective      Transfers  Sit to Stand: Stand by assistance  Stand to sit: Stand by assistance  Ambulation  Ambulation?: Yes  Ambulation 1  Surface: level tile  Device: Rolling Walker  Assistance: Stand by assistance  Quality of Gait: Reciprocal gait pattern with forward flexed posture. Distance: 50ft  Comments: 1 standing rest break d/t fatigue and SOB--sp02 95% following gait training.       Balance  Sitting - Static: Good  Sitting - Dynamic: Good  Standing - Static: Fair;+  Standing - Dynamic: +;Fair  Exercises  Straight Leg Raise: 15x  Quad Sets: 15x  Heelslides: 15x  Hip Flexion: 15x2  Hip Abduction: 15x2  Knee Long Arc Quad: 15x  Knee Short Arc Quad: 15x  Knee Active Range of Motion: 15x2  Ankle Pumps: 15x2  Comments: B LE therex completed seated and reclined--2 rest breaks ~1.5 minutes long d/t SOB. Assessment   Body structures, Functions, Activity limitations: Decreased functional mobility ; Decreased endurance;Decreased strength  Assessment: Pt limited by SOB however fair tolerance to tx. sp02 remained 95% and above during treatment. Will continue to progress as pt tolerates. Treatment Diagnosis: generalized weakness  Prognosis: Good  Patient Education: Educated pt on importance of upright posture and AD proximity with ambulation--pt verbalized understanding. REQUIRES PT FOLLOW UP: Yes     Goals  Short term goals  Time Frame for Short term goals: 10 days  Short term goal 1: Pt will be independent with bed mobility and transfers for improved functional independence. Short term goal 2: Pt will ambulate 250ft with Tony and no LOB for safety with household and community ambulation. Short term goal 3: Pt will tolerate 20-30mins ther ex/act to improve endurance for ADLs and functional tasks. Plan    Plan  Times per week: 7 days per week  Times per day: Twice a day(Daily on weekends)  Current Treatment Recommendations: Strengthening, ADL/Self-care Training, Gait Training, Patient/Caregiver Education & Training, IADL Training, Stair training, Balance Training, Neuromuscular Re-education, Functional Mobility Training, Endurance Training, Home Exercise Program, Transfer Training, Safety Education & Training  Safety Devices  Type of devices: All fall risk precautions in place, Call light within reach, Gait belt, Left in chair(No alarm upon entering pt room.  Nursing in room at completion of therapy. )     Therapy Time   Individual Concurrent Group Co-treatment   Time In 1147         Time Out 1211         Minutes [address] [place], [place] [address]

## 2019-04-11 NOTE — PROGRESS NOTES
Physical Therapy  Facility/Department: [facility] AT THE [facility] MED SURG  Daily Treatment Note  NAME: Cheri Robb  : 1928  MRN: [id number]    Date of Service: 2019    Discharge Recommendations:  Continue to assess pending progress        Patient Diagnosis(es): The primary encounter diagnosis was COPD exacerbation ([facility] Diagnoses of Acute cystitis without hematuria and Dehydration were also pertinent to this visit. has a past medical history of Arthritis, Asthma, Atrial fibrillation with rapid ventricular response (HCC), CAD (coronary artery disease), CHF (congestive heart failure) ([facility] Chronic kidney disease, COPD (chronic obstructive pulmonary disease) ([facility] Examination of participant in clinical trial, Gout, Gout, H/O cardiovascular stress test, H/O echocardiogram, Hx of transesophageal echocardiography ([facility]HER) for monitoring, Hyperlipidemia, Hypertension, MI (myocardial infarction) ([facility] and Shingles. has a past surgical history that includes Cholecystectomy; Coronary angioplasty with stent; Spine surgery; and Cardiac catheterization (2017). Restrictions  Restrictions/Precautions  Restrictions/Precautions: General Precautions, Fall Risk  Subjective   General  Chart Reviewed: Yes  Response To Previous Treatment: Patient with no complaints from previous session. Subjective  Subjective: No c/o pain upon arrival.  Pt pleasent and agreeable to participate. Pain Screening  Patient Currently in Pain: Denies  Vital Signs  Patient Currently in Pain: Denies       Orientation  Orientation  Overall Orientation Status: Within Functional Limits  Cognition      Objective      Transfers  Sit to Stand: Stand by assistance;Contact guard assistance  Stand to sit: Stand by assistance;Contact guard assistance  Comment: Cues for hand placement with pt verbalizing understanding.   Ambulation  Ambulation?: Yes  Ambulation 1  Surface: level tile  Device: Rolling Walker  Assistance: Stand by assistance  Quality of Gait: Continuous, step through pattern. Step lengths and mark become reduced as fatigue sets in after ~30 ft. Distance: 40 ft      Balance  Sitting - Static: Good  Sitting - Dynamic: Good  Standing - Static: Fair  Standing - Dynamic: Fair  Exercises  Straight Leg Raise: x15  Quad Sets: x15  Heelslides: x15  Hip Abduction: x15  Knee Long Arc Quad: x15  Knee Short Arc Quad: x15  Ankle Pumps: x15  Comments: Above exercises completed while seated and reclined. Assessment   Assessment: Pt demo'd fair tolerance to tx on this date. Pt completed seated/reclined ther ex and ambulation x40 ft. No LOB or instability noted with amb or transfers. Treatment Diagnosis: generalized weakness  Prognosis: Good  Patient Education: Educated pt on safe hand placement with sit<>stand transfer with pt verbalizing understanding. REQUIRES PT FOLLOW UP: Yes  Activity Tolerance  Activity Tolerance: Patient Tolerated treatment well       Goals  Short term goals  Time Frame for Short term goals: 10 days  Short term goal 1: Pt will be independent with bed mobility and transfers for improved functional independence. Short term goal 2: Pt will ambulate 250ft with Tony and no LOB for safety with household and community ambulation. Short term goal 3: Pt will tolerate 20-30mins ther ex/act to improve endurance for ADLs and functional tasks. Plan    Plan  Times per week: 7 days per week  Times per day: Twice a day(Daily on weekends)  Current Treatment Recommendations: Strengthening, ADL/Self-care Training, Gait Training, Patient/Caregiver Education & Training, IADL Training, Stair training, Balance Training, Neuromuscular Re-education, Functional Mobility Training, Endurance Training, Home Exercise Program, Transfer Training, Safety Education & Training  Safety Devices  Type of devices:  All fall risk precautions in place, Call light within reach, Gait belt, Left in chair, Nurse notified     Therapy Time   Individual Concurrent Group

## 2019-04-11 NOTE — PROGRESS NOTES
RESPIRATORY ASSESSMENT PROTOCOL                                                                                              Patient Name: Cheir Robb Room#: 8315/[id number] : 1928     Admitting diagnosis: COPD exacerbation ([facility] [J44.1]       Medical History:   Past Medical History:   Diagnosis Date    Arthritis     Asthma     Atrial fibrillation with rapid ventricular response ([facility] 2017    CAD (coronary artery disease)     [facility]     Chronic kidney disease     COPD (chronic obstructive pulmonary disease) ([facility]     Examination of participant in clinical trial 13    6 year follow up, estimated completion 2019    Gout 2017    Gout     H/O cardiovascular stress test 2017    H/O echocardiogram 2017    EF 55%. Mildly increased wall thickness of the LV. Evidence of diastolic dysfunction. Normal right ventricular size and function. Normal tricuspid valve structure and function. Mild tricuspid regurg    Hx of transesophageal echocardiography ([facility]) for monitoring 2017    No clots  were visulaized in the left atrial appendage EF 55%.     Hyperlipidemia     Hypertension     MI (myocardial infarction) ([facility]         Shingles     15 years ago       PATIENT ASSESSMENT    LABORATORY DATA  Hematology:   Lab Results   Component Value Date    WBC 12.9 2019    RBC 4.26 2019    RBC 4.12 2012    HGB 12.4 2019    HCT 38.5 2019     2019     2012     Chemistry:  No results found for: PHART, BSL1HZN, PO2ART, P5KDSRWZ, HYI6KWV, PBEA    Blood Culture:   Sputum Culture:     VITALS  Pulse: 77   Resp: 18  BP: (!) 185/88  SpO2: 99 % O2 Device: None (Room air)  Temp: 97.7 °F (36.5 °C)  Comment:   SKIN COLOR  [x] Normal  [] Pale  [] Dusky  [] Cyanotic      RESPIRATORY PATTERN  [x] Normal  [] Dyspnea  [] Cheyne-Joe  [] Kussmaul  [] Biots  AMBULATORY  [] Yes  [] No  [x] With Assistance    PEAK FLOW  Predicted:     Personal Best:        VITAL CAPACITY  Predicted value:  ml  Actual Value:  ml  30% of Predicted:  ml  Patient Acuity 0 1 2 3 4 Score   Level of Concious (LOC) [x]  Alert & Oriented or Pt normal LOC []  Confused;follows directions []  Confused & uncooper-ative []  Obtunded []  Comatose 0   Respiratory Rate  (RR) [x]  Reg. rate & pattern. 12 - 20 bpm  []  Increased RR. Greater than 20 bpm   []  SOB w/ exertion or RR greater than 24 bpm []  Access- ory muscle use at rest. Abn.  resp. []  SOB at rest.   0   Bilateral Breath Sounds (BBS) []  Clear []  Diminish-ed bases  []  Diminish-ed t/o, or rales   [x]  Sporadic, scattered wheezes or rhonchi []  Persistentwheezes and, or absent BBS 3   Cough [x]  Strong, effective, & non-prod. []  Effective & prod. Less than 25 ml (2 TBSP) over past 24 hrs []  Ineffective & non-prod to less than 25 ML over past 24 hrs []  Ineffective and, or greater than 25 ml sputum prod. past 24 hrs. []  Nonspon- taneous; Requires suctioning 0   Pulmonary History  (PULM HX) []  No smoking and no chronic pulmonaryhistory []  Former smoker. Quit over 12 mos. ago []  Current smoker or quit w/ in 12 mos []  Pulm. History and, or 20 pk/yr smoking hx [x]  Admitted w/ acute pulm. dx and, or has been admitted w/ pulm. dx 2 or more times over past 12 mos 4   Surgical History this Admit  (SURG HX) [x]  No surgery []  General surgery []  Lower abdominal []  Thoracic or upper abdominal   []  Thoracic w/ pulm. disease 0   Chest X-Ray (CXR)/CT Scan [x]  Clear or not applicable []  Not available []  Atelect- asis or pleural effusions []  Localized infiltrate or pulm. edema []  Con-solidated Infiltrates, bilateral, or in more than 1 lobe 0   Slow or Forced VC, FEV1 OR PEFR (PULM FXN)  [x]  80% or greater, or not indicated []  Pt. unable to perform []  FEV1 or PEFR or VC 51-79%.   []  FEV1 or PEFR or VC  30-49%   []  FEV1 or PEFR or VC less than 30%   0   TOTAL ACUITY: 7 CARE PLAN    If Acuity Level is 2, 3, or 4 in any of the following:    [x] BILATERAL BREATH SOUNDS (BBS)     [x] PULMONARY HISTORY (PULM HX)  [] PULMONARY FUNCTION (PULM FX)    Goal: Improve respiratory functions in patients with airway disease and decrease WOB    [x] AEROSOL PROTOCOL    Total Acuity:   16-32  []  Secondary Assessment in 24 hrs Total Acuity:  9-15  []  Secondary Assessment in 24 hrs Total Acuity:  4-8  [x]  Secondary Assessment in 48 hrs Total Acuity:  0-3  []  Secondary Assessment in 72 hrs   HHN AEROSOL THERAPY with  [physician-ordered bronchodilator(s)] q 4 & Albuterol PRN q2 hrs. Breath-Actuated Neb if BBS Acuity = 4, and pt. can use MP. Notify physician if condition deteriorates. HHN AEROSOL THERAPY with  [physician-ordered bronchodilator(s)]  QID and Albuterol PRN q4 hrs. Breath-Actuated Neb if BBS Acuity = 4, and pt. can use MP. Notify physician if condition deteriorates. MDI THERAPY with  2 actuations of [physician-ordered bronchodilator(s)] via spacer TID Albuterol and PRNq4 hrs. If unable to utilize MDI: HHN [physician-ordered bronchodilator(s)] TID and Albuterol PRN q4 hrs. Notify physician if condition deteriorates. MDI THERAPY with  [physician-ordered bronchodilator(s)] via spacer TID PRN. If unable to utilize MDI: HHN [physician-ordered bronchodilator(s)] TID PRN. Notify physician if condition deteriorates. If Acuity Level is 2, 3, or 4 in any of the following:    [] COUGH     [] SURGICAL HISTORY (SURG HX)  [] CHEST XRAY (CXR)    Goal: Improvement in sputum mobilization in patients with ineffective airway clearance. Reverse atelectasis.     [] Bronchopulmonary Hygiene Protocol    Total Acuity:   16-32  []  Secondary Assessment in 24 hrs Total Acuity:  9-15  []  Secondary Assessment in 24 hrs Total Acuity:  4-8  []  Secondary Assessment in 48 hrs Total Acuity:  0-3  []  Secondary Assessment in 72 hrs   METANEB QID with [physician-ordered bronchodilator(s)] if CXR Acuity = 4; otherwise:  PD&P, PEP, or Vest QID & PRN  NT Sxn PRN for ineffective cough  METANEB QID with [physician-ordered bronchodilator(s)] if CXR Acuity = 4; otherwise:  PD&P, PEP, or Vest TID & PRN  NT Sxn PRN for ineffective cough  Instruct patient to self-perform IS q1hr WA   Directed Cough self-performed q1hr WA     If Acuity Level is 2 or above in the following:    [] PULMONARY HISTORY (PULM HX)    Goal: Assist patient in quitting smoking to slow or stop the progression of lung disease.     [] Smoking Cessation Protocol    SMOKING CESSATION EDUCATION provided according to policy [id number]_796: (sandra with an X)  ____Yes    ____ No     ____ NA    Smoking Cessation Booklet given:  ____Yes  ____No ____Patient Vanessa Sanchez

## 2019-04-11 NOTE — PROGRESS NOTES
Hospitalist Progress Note    SUBJECTIVE/INTERVAL HISTORY:    Patient seen in follow up for acute on chronic kidney injury, LUISITO, COPD exacerbation UTI, afib. Cr 2.6->2.41->2.05. WBC 12.9 on steroids. Breathing improved. Glucose running high. Urine Culture [[id number]] Collected: 04/09/19 2120   Updated: 04/10/19 2038    Specimen Source: Catheter     Specimen Description . CATHETER    Special Requests NOT REPORTED    Culture --    GRAM NEGATIVE RODS   >[id number] CFU/ML        OBJECTIVE:    Vitals:   Temp: 97.7 °F (36.5 °C)  BP: (!) 185/88  Resp: 18  Pulse: 77  SpO2: 99 %  24HR INTAKE/OUTPUT:      Intake/Output Summary (Last 24 hours) at 4/11/2019 1139  Last data filed at 4/11/2019 0859  Gross per 24 hour   Intake 750 ml   Output 575 ml   Net 175 ml       -----------------------------------------------------------------  Review of Systems:  Constitutional:negative  for fevers, and negative for chills. Eyes: negative for visual disturbance   ENT: negative for sore throat, negative nasal congestion, and negative for earache  Respiratory: negative for shortness of breath, positive for cough, and positive for wheezing  Cardiovascular: negative for chest pain, negative for palpitations, and negative for syncope  Gastrointestinal: negative for abdominal pain, negative for nausea,negative for vomiting, negative for diarrhea, negative for constipation, and negative for hematochezia or melena  Genitourinary: negative for dysuria, negative for urinary urgency, negative for urinary frequency, and negative for hematuria  Skin: negative for skin rash, and negative for skin lesions  Neurological: negative for unilateral weakness, numbness or tingling.     Exam:  GEN:  alert and oriented to person, place and time, well-developed and well-nourished, in no acute distress  EYES:  PERRL  NECK: normal, supple  PULM: diminished bilaterally - bilateral wheezes, no rales, bilateral rhonchi, no respiratory distress - aeration improved  COR: shoulder region, right 07/30/2018    Acute pain of right shoulder 07/27/2018    Shoulder pain 07/27/2018    Acute gout involving toe of right foot 04/19/2018    [facility] 02/22/2018    COPD exacerbation ([facility] 02/21/2018    Acute on chronic diastolic congestive heart failure (HCC)     Elevated brain natriuretic peptide (BNP) level 02/03/2018    Coronary artery disease involving native coronary artery of native heart without angina pectoris     Panlobular emphysema (HCC)     Atrial fibrillation ([facility] 02/17/2017    Ex-smoker 02/14/2017    Acute cystitis without hematuria 09/29/2016    Acute renal failure superimposed on stage 4 chronic kidney disease ([facility] 09/29/2016    [facility]) [facility]     CKD (chronic kidney disease) stage 4, GFR 15-29 ml/min ([facility]) 06/22/2016    Renal artery stenosis ([facility] 06/22/2016    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    Status post coronary artery stent placement 05/20/2013       PLAN:    COPD exacerbation    Nebs   steroids    Status post coronary artery stent placement    CKD stage 4, GFR 15-29 ml/min    Hyperglycemia    Check Hba1C     Increase SSI    Acute cystitis without hematuria   Continue abx   F/u cultures    Dehydration/Acute renal failure superimposed on stage 4 chronic kidney disease/Renal artery stenosis    Atrial fibrillation     Coronary artery disease involving native coronary artery of native heart without angina pectoris    Chronic diastolic CHF    PT/OT        · High risk medication: none    SHERIDAN Roberto PA-C  4/11/2019, 11:39 AM

## 2019-04-12 VITALS
HEART RATE: 64 BPM | BODY MASS INDEX: 23.3 KG/M2 | RESPIRATION RATE: 18 BRPM | HEIGHT: 62 IN | SYSTOLIC BLOOD PRESSURE: 161 MMHG | OXYGEN SATURATION: 98 % | DIASTOLIC BLOOD PRESSURE: 72 MMHG | TEMPERATURE: 98 F | WEIGHT: 126.6 LBS

## 2019-04-12 LAB
ABSOLUTE EOS #: 0 K/UL (ref 0–0.44)
ABSOLUTE IMMATURE GRANULOCYTE: 0 K/UL (ref 0–0.3)
ABSOLUTE LYMPH #: 0.21 K/UL (ref 1.1–3.7)
ABSOLUTE MONO #: 0 K/UL (ref 0.1–1.2)
ANION GAP SERPL CALCULATED.3IONS-SCNC: 13 MMOL/L (ref 9–17)
BASOPHILS # BLD: 0 % (ref 0–2)
BASOPHILS ABSOLUTE: 0 K/UL (ref 0–0.2)
BUN BLDV-MCNC: 57 MG/DL (ref 8–23)
BUN/CREAT BLD: 29 (ref 9–20)
CALCIUM SERPL-MCNC: 9.3 MG/DL (ref 8.6–10.4)
CHLORIDE BLD-SCNC: 105 MMOL/L (ref 98–107)
CO2: 21 MMOL/L (ref 20–31)
CREAT SERPL-MCNC: 1.98 MG/DL (ref 0.5–0.9)
DIFFERENTIAL TYPE: ABNORMAL
EOSINOPHILS RELATIVE PERCENT: 0 % (ref 1–4)
GFR AFRICAN AMERICAN: 29 ML/MIN
GFR NON-AFRICAN AMERICAN: 24 ML/MIN
GFR SERPL CREATININE-BSD FRML MDRD: ABNORMAL ML/MIN/{1.73_M2}
GFR SERPL CREATININE-BSD FRML MDRD: ABNORMAL ML/MIN/{1.73_M2}
GLUCOSE BLD-MCNC: 151 MG/DL (ref 74–100)
GLUCOSE BLD-MCNC: 183 MG/DL (ref 74–100)
GLUCOSE BLD-MCNC: 221 MG/DL (ref 70–99)
HCT VFR BLD CALC: 37.6 % (ref 36.3–47.1)
HEMOGLOBIN: 12.1 G/DL (ref 11.9–15.1)
IMMATURE GRANULOCYTES: 0 %
LYMPHOCYTES # BLD: 1 % (ref 24–43)
MCH RBC QN AUTO: 29.2 PG (ref 25.2–33.5)
MCHC RBC AUTO-ENTMCNC: 32.2 G/DL (ref 28.4–34.8)
MCV RBC AUTO: 90.8 FL (ref 82.6–102.9)
MONOCYTES # BLD: 0 % (ref 3–12)
MORPHOLOGY: NORMAL
NRBC AUTOMATED: 0 PER 100 WBC
PDW BLD-RTO: 14 % (ref 11.8–14.4)
PLATELET # BLD: 201 K/UL (ref 138–453)
PLATELET ESTIMATE: ABNORMAL
PMV BLD AUTO: 11.8 FL (ref 8.1–13.5)
POTASSIUM SERPL-SCNC: 4.3 MMOL/L (ref 3.7–5.3)
RBC # BLD: 4.14 M/UL (ref 3.95–5.11)
RBC # BLD: ABNORMAL 10*6/UL
SEG NEUTROPHILS: 99 % (ref 36–65)
SEGMENTED NEUTROPHILS ABSOLUTE COUNT: 20.99 K/UL (ref 1.5–8.1)
SODIUM BLD-SCNC: 139 MMOL/L (ref 135–144)
WBC # BLD: 21.2 K/UL (ref 3.5–11.3)
WBC # BLD: ABNORMAL 10*3/UL

## 2019-04-12 PROCEDURE — 6360000002 HC RX W HCPCS: Performed by: INTERNAL MEDICINE

## 2019-04-12 PROCEDURE — 97110 THERAPEUTIC EXERCISES: CPT

## 2019-04-12 PROCEDURE — 80048 BASIC METABOLIC PNL TOTAL CA: CPT

## 2019-04-12 PROCEDURE — 36415 COLL VENOUS BLD VENIPUNCTURE: CPT

## 2019-04-12 PROCEDURE — 82947 ASSAY GLUCOSE BLOOD QUANT: CPT

## 2019-04-12 PROCEDURE — 85025 COMPLETE CBC W/AUTO DIFF WBC: CPT

## 2019-04-12 PROCEDURE — 2580000003 HC RX 258: Performed by: INTERNAL MEDICINE

## 2019-04-12 PROCEDURE — 6370000000 HC RX 637 (ALT 250 FOR IP): Performed by: INTERNAL MEDICINE

## 2019-04-12 PROCEDURE — 6370000000 HC RX 637 (ALT 250 FOR IP): Performed by: PHYSICIAN ASSISTANT

## 2019-04-12 RX ORDER — PREDNISONE 20 MG/1
20 TABLET ORAL 2 TIMES DAILY
Status: DISCONTINUED | OUTPATIENT
Start: 2019-04-12 | End: 2019-04-12 | Stop reason: HOSPADM

## 2019-04-12 RX ORDER — GUAIFENESIN 600 MG/1
600 TABLET, EXTENDED RELEASE ORAL 2 TIMES DAILY
COMMUNITY
Start: 2019-04-12 | End: 2019-04-17

## 2019-04-12 RX ORDER — PREDNISONE 10 MG/1
TABLET ORAL
Qty: 40 TABLET | Refills: 0 | Status: SHIPPED | OUTPATIENT
Start: 2019-04-12 | End: 2019-06-02 | Stop reason: ALTCHOICE

## 2019-04-12 RX ORDER — CEFDINIR 300 MG/1
300 CAPSULE ORAL DAILY
Status: DISCONTINUED | OUTPATIENT
Start: 2019-04-12 | End: 2019-04-12 | Stop reason: HOSPADM

## 2019-04-12 RX ORDER — CEFDINIR 300 MG/1
300 CAPSULE ORAL DAILY
Qty: 7 CAPSULE | Refills: 0 | Status: SHIPPED | OUTPATIENT
Start: 2019-04-13 | End: 2019-04-20

## 2019-04-12 RX ORDER — BENZONATATE 100 MG/1
100 CAPSULE ORAL 3 TIMES DAILY PRN
Qty: 30 CAPSULE | Refills: 0 | Status: SHIPPED | OUTPATIENT
Start: 2019-04-12 | End: 2019-04-19

## 2019-04-12 RX ADMIN — GUAIFENESIN 600 MG: 600 TABLET, EXTENDED RELEASE ORAL at 08:14

## 2019-04-12 RX ADMIN — INSULIN LISPRO 2 UNITS: 100 INJECTION, SOLUTION INTRAVENOUS; SUBCUTANEOUS at 08:15

## 2019-04-12 RX ADMIN — PREDNISONE 20 MG: 20 TABLET ORAL at 08:28

## 2019-04-12 RX ADMIN — CEFDINIR 300 MG: 300 CAPSULE ORAL at 08:28

## 2019-04-12 RX ADMIN — INSULIN LISPRO 2 UNITS: 100 INJECTION, SOLUTION INTRAVENOUS; SUBCUTANEOUS at 13:23

## 2019-04-12 RX ADMIN — Medication 10 ML: at 08:28

## 2019-04-12 RX ADMIN — Medication 10 ML: at 02:17

## 2019-04-12 RX ADMIN — DILTIAZEM HYDROCHLORIDE 120 MG: 60 TABLET, FILM COATED ORAL at 08:14

## 2019-04-12 RX ADMIN — APIXABAN 2.5 MG: 2.5 TABLET, FILM COATED ORAL at 08:14

## 2019-04-12 RX ADMIN — METHYLPREDNISOLONE SODIUM SUCCINATE 60 MG: 125 INJECTION, POWDER, FOR SOLUTION INTRAMUSCULAR; INTRAVENOUS at 02:17

## 2019-04-12 RX ADMIN — AZITHROMYCIN MONOHYDRATE 500 MG: 500 INJECTION, POWDER, LYOPHILIZED, FOR SOLUTION INTRAVENOUS at 02:17

## 2019-04-12 NOTE — PROGRESS NOTES
Discharge instructions reviewed with patient and daughter, both voice understanding of all. Denies questions or concerns at this time.

## 2019-04-12 NOTE — PROGRESS NOTES
mobility  Supine to Sit: Independent  Sit to Supine: Independent  Transfers  Sit to stand: Independent  Stand to sit: Independent         Type of ROM/Therapeutic Exercise  Type of ROM/Therapeutic Exercise: Free weights  Comment: Pt completed BUE to increase UE strength for increased independence with ADL. Exercises completed in standing to increase standing balance for safety and independence with ADL. Completed in 4 planes x 2 sets x 10 reps. Seated rest breaks between sets d/t SOB. Plan   Plan  Times per day: Daily  Current Treatment Recommendations: Strengthening, Endurance Training, ROM, Self-Care / ADL, Safety Education & Training, Functional Mobility Training    Goals  Short term goals  Time Frame for Short term goals: 1 week  Short term goal 1: Pt. will engage in 15-30 min self care to improve strength for ADL's. Short term goal 2:  Pt. will engage in self care to increase Ind. Short term goal 3: Pt. will state understanding to safety as relates to ADL's. Long term goals  Time Frame for Long term goals : 3 weeks  Long term goal 1: Pt. will return to PTA ADL status. Patient Goals   Patient goals : Get strength back and don't feel sick.        Therapy Time   Individual Concurrent Group Co-treatment   Time In 4107         Time Out 1057         Minutes [place], [place], OTR/L

## 2019-04-12 NOTE — DISCHARGE SUMMARY
Discharge Summary    Red Barron  :  1928  MRN:  [id number]    Admit date:  2019      Discharge date: 2019     Admitting Physician:  Mehul Lugo MD    Consultants:  none    Procedures: none    Complications: none    Discharge Condition: stable    Hospital Course: Red Barron is a 80 y.o. female admitted with 2+ weeks of cough and progressive fatigue. She lives with her daughter and family. She has COPD and was using breathing treatments at home. She has had a steady decline and was reluctant to seek medical attention at the urging of her family. She had a decreased oral intake as well. Had low UOP and malodorous urine at home She finally decided to go to the ER. H/o CAD, COPD, afib, CKD, HTN. Quit smoking over 40 yrs ago. In the ER found to have UTI, elevated LA, WBC normal, elevated troponin. She had good SaO2 and was afebrile. CXR showed mild CHF. She was admitted for COPD exacerbation, acute on chronic kidney injury, and UTI. She was given IVF, abx, nebs and steroids. She weaned off of O2. WBC elevated, but likely due to steroids as she did improve clinically. She grew e coli from her urine. She will be discharged on omnicef and steroid taper. She has home nebs. Cr 2.6->2.41->2.05-> 1.98. Acute on chronic kidney injury resolving. Discharge to home. 2019 11:56 AM - Nick, Camille Incoming Lab Results From "[employer]"            Specimen Information: Catheter         Component Collected Lab   Specimen Description 2019  9:20 PM Critical access hospital[facility]   . CATHETER    Special Requests 2019  9:20 PM Critical access hospital9 [facility]   NOT REPORTED    Culture Abnormal  2019  9:20 PM [address]   >[id number] CFU/ML    Testing Performed By      Lab - Abbreviation Name Director Address Valid Date Range   [id number] Kayla Gonzalez MD [address] [place] [address].  [facility] [address] 17 0802-Present [facility] [place] [address] [facility] [address] 08/30/17 0801-Present   Susceptibility      Escherichia coli (1)              Antibiotic Interpretation ADRIENNE Status     amikacin     Final       NOT REPORTED   ampicillin Sensitive   Final       <=2  SUSCEPTIBLE   ampicillin-sulbactam     Final       NOT REPORTED   aztreonam Sensitive   Final       <=1  SUSCEPTIBLE   ceFAZolin Sensitive   Final       <=4  SUSCEPTIBLE   ceFAZolin Sensitive Cefazolin sensitivity results can be used to predict the effectiveness of oral cephalosporins (eg.  Cephalexin) in uncomplicated Urinary Tract Infections due to E. coli, K. pneumoniae, and P. mirabilis Final     cefepime     Final       NOT REPORTED   cefTRIAXone Sensitive   Final       <=1  SUSCEPTIBLE   ciprofloxacin Sensitive   Final       <=0.25  SUSCEPTIBLE   ertapenem     Final       NOT REPORTED   Confirmatory Extended Spectrum Beta-Lactamase Negative NEGATIVE Final     gentamicin Sensitive   Final       <=1  SUSCEPTIBLE   meropenem     Final       NOT REPORTED   nitrofurantoin Sensitive   Final       32  SUSCEPTIBLE   tigecycline     Final       NOT REPORTED   tobramycin Sensitive   Final       <=1  SUSCEPTIBLE   trimethoprim-sulfamethoxazole Sensitive   Final       <=20  SUSCEPTIBLE   piperacillin-tazobactam Sensitive   Final       <=4  SUSCEPTIBLE   Lab and Collection      Urine Culture - 4/9/2019   Result History      Urine Culture on 4/11/2019           Exam:  GEN:  alert and oriented to person, place and time, well-developed and well-nourished, in no acute distress  EYES:  PERRL  NECK: normal, supple  PULM: diminished bilaterally - end wheezes, no rales, no rhonchi, no respiratory distress  COR:   regular rate & rhythm and no murmurs  ABD:    soft, non-tender, non-distended, normal bowel sounds, no masses or organomegaly  EXT:    no cyanosis, clubbing or edema present    NEURO: follows commands, PATEL, no deficits  SKIN:   no rashes or significant lesions        Significant Diagnostic Studies:   Lab Results   Component Value Date    WBC 21.2 (H) 04/12/2019    HGB 12.1 04/12/2019     04/12/2019       Lab Results   Component Value Date    BUN 57 (H) 04/12/2019    CREATININE 1.98 (H) 04/12/2019     04/12/2019    K 4.3 04/12/2019    CALCIUM 9.3 04/12/2019     04/12/2019    CO2 21 04/12/2019    LABGLOM 24 (L) 04/12/2019       Lab Results   Component Value Date    WBCUA GREATER THAN 100 04/09/2019    RBCUA 2 TO 5 04/09/2019    EPITHUA 0 TO 2 04/09/2019    LEUKOCYTESUR MODERATE (A) 04/09/2019    SPECGRAV >1.030 (H) 04/09/2019    GLUCOSEU NEGATIVE 04/09/2019    KETUA NEGATIVE 04/09/2019    PROTEINU 2+ (A) 04/09/2019    HGBUR 2+ (A) 04/09/2019    CASTUA NOT REPORTED 04/09/2019    CRYSTUA NOT REPORTED 04/09/2019    BACTERIA 3+ (A) 04/09/2019    YEAST NOT REPORTED 04/09/2019       Xr Chest Portable    Result Date: 4/9/2019  EXAMINATION: SINGLE XRAY VIEW OF THE CHEST 4/9/2019 9:16 pm COMPARISON: Chest radiograph dated 07/27/2018 HISTORY: ORDERING SYSTEM PROVIDED HISTORY: wheezing TECHNOLOGIST PROVIDED HISTORY: wheezing FINDINGS: Cardiac and mediastinal shadows are normal.  Mild thickening of the interstitial lung markings suggestive of mild CHF. No infiltrates. No effusions. No pneumothorax. No free air below the diaphragm. Mild CHF.        Discharge Diagnoses:    Principal Problem:    COPD exacerbation ([address]  Active Problems:    Status post coronary artery stent placement    CKD (chronic kidney disease) stage 4, GFR 15-29 ml/min (HCC)    Renal artery stenosis (HCC)    Acute cystitis without hematuria    COPD (chronic obstructive pulmonary disease) (HCC)    Acute renal failure superimposed on stage 4 chronic kidney disease (HCC)    Atrial fibrillation (HCC)    Coronary artery disease involving native coronary artery of native heart without angina pectoris    Chronic diastolic CHF (congestive heart failure) ([facility]    Dehydration  Resolved Problems:    * No resolved hospital problems. *      Active Hospital Problems    Diagnosis Date Noted    Dehydration [E86.0] 04/10/2019    Chronic diastolic CHF (congestive heart failure) ([facility]) [I50.32] 02/22/2018    COPD exacerbation ([facility]) [J44.1] 02/21/2018    Coronary artery disease involving native coronary artery of native heart without angina pectoris [I25.10]     Atrial fibrillation ([facility] [I48.91] 02/17/2017    Acute cystitis without hematuria [N30.00] 09/29/2016    Acute renal failure superimposed on stage 4 chronic kidney disease ([facility]) [N17.9, N18.4] 09/29/2016    COPD (chronic obstructive pulmonary disease) ([facility]) [J44.9]     CKD (chronic kidney disease) stage 4, GFR 15-29 ml/min ([facility] [N18.4] 06/22/2016    Renal artery stenosis ([facility]) [I70.1] 06/22/2016    Status post coronary artery stent placement [Z95.5] 05/20/2013       Discharge Medications:       Mi Grant   Home Medication Instructions DBK:[id number]    Printed on:04/12/19 1113   Medication Information                      albuterol (PROVENTIL) (2.5 MG/3ML) 0.083% nebulizer solution  Take 3 mLs by nebulization every 4 hours as needed for Wheezing             beclomethasone (QVAR) 80 MCG/ACT inhaler  Inhale 1 puff into the lungs 2 times daily             benzonatate (TESSALON) 100 MG capsule  Take 1 capsule by mouth 3 times daily as needed for Cough             cefdinir (OMNICEF) 300 MG capsule  Take 1 capsule by mouth daily for 7 days             diltiazem (CARDIZEM) 120 MG tablet  Take 120 mg by mouth daily Prescribed per Dr Jacquelyn Castano - last filled 3/19 per [address] [facility]  Verified as immediate release tablet daily             ELIQUIS 2.5 MG TABS tablet  2 times daily Pt and daughter unsure of dose at this time.              ferrous sulfate 325 (65 Fe) MG tablet  Take 1 tablet by mouth daily (with breakfast)             furosemide (LASIX) 20 MG tablet  take 1 tablet by mouth every 48 HOURS             guaiFENesin (MUCINEX) 600 MG extended release tablet  Take 1 tablet by mouth 2 times daily for 5 days             nitroGLYCERIN (NITROSTAT) 0.4 MG SL tablet  up to max of 3 total doses. If no relief after 1 dose, call 911. predniSONE (DELTASONE) 10 MG tablet  Take 3 tabs po bid X 3 days, then 2 tabs po bid X 3 days, then 1 tab po bid X 3 days, then 1 tab po daily until script complete             PROAIR  (90 Base) MCG/ACT inhaler  INHALE 2 PUFFS EVERY 6 HOURS AS NEEDED FOR WHEEZING             simvastatin (ZOCOR) 20 MG tablet  take 1 tablet by mouth every evening                 Patient Instructions:    Activity: activity as tolerated  Diet: regular diet  Wound Care: none needed  Other: none     Disposition:   Discharge to Home    Follow up:  Patient will be followed by JOANIE Fonseca CNP in 1 week    CORE MEASURES on Discharge (if applicable)  ACE/ARB in CHF: NA  Statin in [place]: NA  ASA in [place]: NA  Statin in CVA: NA  Antiplatelet in CVA: NA    Total time spent on discharge services: 30 minutes    Including the following activities:  Evaluation and Management of patient  Discussion with patient and/or surrogate about current care plan  Coordination with Case Management and/or   Coordination of care with Consultants (if applicable)   Coordination of care with Receiving Facility Physician (if applicable)  Completion of DME forms (if applicable)  Preparation of Discharge Summary  Preparation of Medication Reconciliation  Preparation of Discharge Prescriptions    Signed:  Rell Rosenthal PA-C  4/12/2019, 11:13 AM

## 2019-04-12 NOTE — PLAN OF CARE
Problem: Falls - Risk of:  Goal: Will remain free from falls  Description  Will remain free from falls  Outcome: Ongoing  Note:   Fall assessment completed daily. Bed in lowest position. Call light within reach. Falling star posted to outside of door. Fall risk sticker in place on ID band. Side rails up 2/4. Bed alarm on for safety. Will continue to monitor. Problem: Risk for Impaired Skin Integrity  Goal: Tissue integrity - skin and mucous membranes  Description  Structural intactness and normal physiological function of skin and  mucous membranes. Outcome: Ongoing  Note:   Skin assessment performed, no breakdown noted at this time. Patient skin is dry and intact. Will continue to monitor for redness or breakdown. Problem: Activity Intolerance:  Goal: Ability to tolerate increased activity will improve  Description  Ability to tolerate increased activity will improve  Outcome: Ongoing  Note:   SpO2 WNL on room air. Dyspnea with exertion. Will continue to monitor. Problem: FLUID AND ELECTROLYTE IMBALANCE  Goal: Fluid and electrolyte balance are achieved/maintained  Outcome: Ongoing  Note:   Intake and output recorded along with daily weights. IV locked at this time.

## 2019-04-12 NOTE — PLAN OF CARE
Problem: Falls - Risk of:  Goal: Will remain free from falls  Description  Will remain free from falls  4/12/2019 0926 by Alicia Mccoy RN  Outcome: Ongoing  Note:   Patient is alert and oriented and has demonstrated the use of the call light for assistance before getting up. Education has been provided to defer the use of the bed alarm and/or restraint free alarm as the patient has shown competency in calling for assistance and verbalizing the risk. Problem: Risk for Impaired Skin Integrity  Goal: Tissue integrity - skin and mucous membranes  Description  Structural intactness and normal physiological function of skin and  mucous membranes. 4/12/2019 0926 by Alicia Mccoy RN  Outcome: Ongoing  Note:   Skin assessment performed at regular intervals. No redness or open areas noted. Pt able to turn self, assistance provided when needed. Will continue to monitor patient closely for breakdown. Problem: Airway Clearance - Ineffective:  Goal: Ability to maintain a clear airway will improve  Description  Ability to maintain a clear airway will improve  Outcome: Ongoing  Note:   Patient maintains clear airway. No cough noted this am.      Problem: Breathing Pattern - Ineffective:  Goal: Ability to achieve and maintain a regular respiratory rate will improve  Description  Ability to achieve and maintain a regular respiratory rate will improve  Outcome: Ongoing  Note:   Respirations are even, unlabored. Some expiratory wheeze noted to upper anterior left lobe, otherwise lungs are clear. Problem: Gas Exchange - Impaired:  Goal: Levels of oxygenation will improve  Description  Levels of oxygenation will improve  Outcome: Ongoing  Note:   Spo2 98% on room air. No sob or dyspnea noted. Problem: FLUID AND ELECTROLYTE IMBALANCE  Goal: Fluid and electrolyte balance are achieved/maintained  4/12/2019 0926 by Alicia Mccoy RN  Outcome: Ongoing  Note:   Electrolytes being monitored routinely. Patient takes fluids well, voids without difficulty. I&O monitored routinely .

## 2019-04-12 NOTE — PROGRESS NOTES
Hospitalist Progress Note    SUBJECTIVE/INTERVAL HISTORY:    Patient seen in follow up for acute on chronic kidney injury, LUISITO, COPD exacerbation, e coli UTI, afib. Cr 2.6->2.41->2.05-> 1.98. WBC up to 21.2, she is on steroids. NO SOB. Has cough and some wheezing. Off O2. NO N/V/D.     4/11/2019 11:56 AM - Nick, Camille Incoming Lab Results From [facility]     Specimen Information: Catheter        Component Collected Lab   Specimen Description 04/09/2019  9:20 PM [facility]   . CATHETER    Special Requests 04/09/2019  9:20 PM [facility]   NOT REPORTED    Culture Abnormal  04/09/2019  9:20 PM [address]   >[id number] CFU/ML    Testing Performed By     Lab - Abbreviation Name Director Address Valid Date Range   [address] [facility] Jacinta Albert  [place].  [facility] [address] 08/30/17 0802-Present   [address] [address] [facility] [address] 08/30/17 0801-Present   Susceptibility     Escherichia coli (1)     Antibiotic Interpretation ADRIENNE Status    amikacin   Final     NOT REPORTED   ampicillin Sensitive  Final     <=2  SUSCEPTIBLE   ampicillin-sulbactam   Final     NOT REPORTED   aztreonam Sensitive  Final     <=1  SUSCEPTIBLE   ceFAZolin Sensitive  Final     <=4  SUSCEPTIBLE   ceFAZolin Sensitive Cefazolin sensitivity results can be used to predict the effectiveness of oral cephalosporins (eg.  Cephalexin) in uncomplicated Urinary Tract Infections due to E. coli, K. pneumoniae, and P. mirabilis Final    cefepime   Final     NOT REPORTED   cefTRIAXone Sensitive  Final     <=1  SUSCEPTIBLE   ciprofloxacin Sensitive  Final     <=0.25  SUSCEPTIBLE   ertapenem   Final     NOT REPORTED   Confirmatory Extended Spectrum Beta-Lactamase Negative NEGATIVE Final    gentamicin Sensitive  Final     <=1  SUSCEPTIBLE   meropenem   Final     NOT REPORTED   nitrofurantoin Sensitive  Final     32  SUSCEPTIBLE   tigecycline   Final     NOT REPORTED   tobramycin Sensitive  Final     <=1  SUSCEPTIBLE   trimethoprim-sulfamethoxazole Sensitive  Final     <=20  SUSCEPTIBLE   piperacillin-tazobactam Sensitive  Final     <=4  SUSCEPTIBLE   Lab and Collection     Urine Culture - 4/9/2019   Result History     Urine Culture on 4/11/2019         OBJECTIVE:    Vitals:   Temp: 98 °F (36.7 °C)  BP: (!) 161/72  Resp: 18  Pulse: 64  SpO2: 98 %  24HR INTAKE/OUTPUT:      Intake/Output Summary (Last 24 hours) at 4/12/2019 0807  Last data filed at 4/11/2019 2306  Gross per 24 hour   Intake 534 ml   Output 250 ml   Net 284 ml       -----------------------------------------------------------------  Review of Systems:  Constitutional:negative  for fevers, and negative for chills. Eyes: negative for visual disturbance   ENT: negative for sore throat, negative nasal congestion, and negative for earache  Respiratory: negative for shortness of breath, positive for cough, and positive for wheezing  Cardiovascular: negative for chest pain, negative for palpitations, and negative for syncope  Gastrointestinal: negative for abdominal pain, negative for nausea,negative for vomiting, negative for diarrhea, negative for constipation, and negative for hematochezia or melena  Genitourinary: negative for dysuria, negative for urinary urgency, negative for urinary frequency, and negative for hematuria  Skin: negative for skin rash, and negative for skin lesions  Neurological: negative for unilateral weakness, numbness or tingling.     Exam:  GEN:  alert and oriented to person, place and time, well-developed and well-nourished, in no acute distress  EYES:  PERRL  NECK: normal, supple  PULM: diminished bilaterally - end wheezes, no rales, no rhonchi, no respiratory distress  COR:   regular rate & rhythm and no murmurs  ABD:    soft, non-tender, non-distended, normal bowel sounds, no masses or organomegaly  EXT:    no cyanosis, clubbing or edema present    NEURO: follows commands, PATEL, no deficits  SKIN:   no rashes or significant lesions    -----------------------------------------------------------------  Diagnostic Data:  Lab Results   Component Value Date    WBC 21.2 (H) 04/12/2019    HGB 12.1 04/12/2019    HCT 37.6 04/12/2019     04/12/2019    CHOL 170 10/24/2016    TRIG 134 10/24/2016    HDL 70 10/24/2016    ALT 13 04/10/2019    AST 18 04/10/2019     04/12/2019    K 4.3 04/12/2019     04/12/2019    CREATININE 1.98 (H) 04/12/2019    BUN 57 (H) 04/12/2019    CO2 21 04/12/2019    TSH 1.31 10/24/2016    INR 1.0 07/28/2018    LABA1C 6.3 (H) 04/11/2019    LABMICR 53 (H) 01/23/2018    BNP NOT REPORTED 04/06/2014       ASSESSMENT:    Principal Problem:    COPD exacerbation (HCC)  Active Problems:    Status post coronary artery stent placement    CKD (chronic kidney disease) stage 4, GFR 15-29 ml/min (HCC)    Renal artery stenosis (HCC)    Acute cystitis without hematuria    COPD (chronic obstructive pulmonary disease) (HCC)    Acute renal failure superimposed on stage 4 chronic kidney disease (HCC)    Atrial fibrillation (HCC)    Coronary artery disease involving native coronary artery of native heart without angina pectoris    Chronic diastolic CHF (congestive heart failure) ([facility]    Dehydration  Resolved Problems:    * No resolved hospital problems.  *      Patient Active Problem List    Diagnosis Date Noted    Acute respiratory failure with hypoxemia ([facility] 03/24/2017     Priority: High     Class: Acute    [facility] 03/24/2017     Priority: High     Class: Acute    Asthma 01/25/2013     Priority: High    Diastolic CHF ([facility] 53/75/9516     Priority: High    Dehydration 04/10/2019    Pseudogout of right shoulder 07/30/2018    Hemarthrosis involving shoulder region, right 07/30/2018    Acute pain of right shoulder 07/27/2018    Shoulder pain 07/27/2018    Acute gout involving toe of right foot 04/19/2018    Chronic diastolic CHF (congestive heart failure) ([facility] 02/22/2018    COPD exacerbation ([facility] 02/21/2018    Acute on chronic diastolic congestive heart failure (HCC)     Elevated brain natriuretic peptide (BNP) level 02/03/2018    Coronary artery disease involving native coronary artery of native heart without angina pectoris     Panlobular emphysema (HCC)     Atrial fibrillation ([facility] 02/17/2017    Ex-smoker 02/14/2017    Acute cystitis without hematuria 09/29/2016    Acute renal failure superimposed on stage 4 chronic kidney disease ([facility] 09/29/2016    [facility]ch[facility]) [facility]     CKD (chronic kidney disease) stage 4, GFR 15-29 ml/min ([facility]) 06/22/2016    Renal artery stenosis ([facility] 06/22/2016    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    Status post coronary artery stent placement 05/20/2013       PLAN:    COPD exacerbation    Nebs   Oral steroids   Changed to omnicef    Status post coronary artery stent placement    CKD stage 4, GFR 15-29 ml/min    Hyperglycemia - likely due to steroids - hb A1c was 6.3    SSI    E COLI, Acute cystitis without hematuria   omnicef    Dehydration/Acute renal failure superimposed on stage 4 chronic kidney disease/Renal artery stenosis   Cr improved    Atrial fibrillation     Coronary artery disease involving native coronary artery of native heart without angina pectoris    Chronic diastolic CHF    PT/OT      Discharge likely today    · High risk medication: none    SHERIDAN Castanon PA-C  4/12/2019, 8:07 AM

## 2019-04-12 NOTE — PROGRESS NOTES
[facility]  Physical Therapy    Date: 2019  Patient Name: Elizabeth Mccloud        : 1928       Pt not seen this date d/t pt discharged per PA-C's note/orders.        [place], [place] Date: 2019

## 2019-04-14 LAB
CULTURE: NORMAL
CULTURE: NORMAL
Lab: NORMAL
Lab: NORMAL
SPECIMEN DESCRIPTION: NORMAL
SPECIMEN DESCRIPTION: NORMAL

## 2019-04-15 ENCOUNTER — CARE COORDINATION (OUTPATIENT)
Dept: CASE MANAGEMENT | Age: 84
End: 2019-04-15

## 2019-04-15 NOTE — CARE COORDINATION
[facility] Transitions Initial Follow Up Call    Call within 2 business days of discharge: Yes    Patient: Merline Young Patient : 1928   MRN: [id number]  Reason for Admission: COPD exacerbation  Discharge Date: 19 RARS: Readmission Risk Score: 18      Last Discharge [facility]       Complaint Diagnosis Description Type Department Provider    19 Shortness of Breath; Fatigue COPD exacerbation ([facility] . .. ED to Hosp-Admission (Discharged) (ADMITTED) [facility] Patrick Plata MD; Letts Manifold . .. Facility: [facility] Transitions 24 Hour Call    Do you have all of your prescriptions and are they filled?:  Yes  Patient DME:  [employer]  Patient Home Equipment:  Nebulizer  Do you have support at home?:  Child, Grandchild  Are you an active caregiver in your home?:  No  Care Transitions Interventions       Attempted to contact Pt for initial transitions call. Contact information left to  requesting call back at the earliest convenience.     Oscar Woodward RN BSN   Care Transitions Coordinator  [phone]       Follow Up  Future Appointments   Date Time Provider Olivia Deng   2019  9:30 AM JOANIE Gonzalez - CNP [facility]   2019 11:30 AM MD Carol Moncada [facility]       Oscar Woodward RN

## 2019-04-16 NOTE — CARE COORDINATION
[facility] Transitions Initial Follow Up Call    Call within 2 business days of discharge: Yes    Patient: Shy Pina Patient : 1928   MRN: [id number]  Reason for Admission: COPD  Discharge Date: 19 RARS: Readmission Risk Score: 18      Last Discharge [facility]       Complaint Diagnosis Description Type Department Provider    19 Shortness of Breath; Fatigue COPD exacerbation ([facility] . .. ED to Hosp-Admission (Discharged) (ADMITTED) [facility] Lianne Sierra MD; Penny Delarosa . .. Facility: [facility] Transitions 24 Hour Call    Do you have all of your prescriptions and are they filled?:  Yes  Patient DME:  [employer]  Patient Home Equipment:  Nebulizer  Do you have support at home?:  Child, Grandchild  Are you an active caregiver in your home?:  No  Care Transitions Interventions       Attempted to contact Pt for transitions call. Contact information left to daughter Nereida's VM requesting call back at the earliest convenience.     Diego Henson RN BSN   Care Transitions Coordinator  [phone]       Follow Up  Future Appointments   Date Time Provider Olivia Deng   2019  9:30 AM [facility]   2019 11:30 AM MD Carol Covarrubias [facility]       Diego Henson RN

## 2019-04-18 ENCOUNTER — OFFICE VISIT (OUTPATIENT)
Dept: PRIMARY CARE CLINIC | Age: 84
End: 2019-04-18
Payer: MEDICARE

## 2019-04-18 VITALS
OXYGEN SATURATION: 97 % | TEMPERATURE: 97.7 F | HEART RATE: 78 BPM | DIASTOLIC BLOOD PRESSURE: 71 MMHG | RESPIRATION RATE: 16 BRPM | HEIGHT: 62 IN | BODY MASS INDEX: 22.63 KG/M2 | SYSTOLIC BLOOD PRESSURE: 141 MMHG | WEIGHT: 123 LBS

## 2019-04-18 DIAGNOSIS — B37.0 ORAL THRUSH: ICD-10-CM

## 2019-04-18 DIAGNOSIS — J44.1 CHRONIC OBSTRUCTIVE PULMONARY DISEASE WITH ACUTE EXACERBATION (HCC): Primary | ICD-10-CM

## 2019-04-18 PROCEDURE — 1123F ACP DISCUSS/DSCN MKR DOCD: CPT | Performed by: NURSE PRACTITIONER

## 2019-04-18 PROCEDURE — G8926 SPIRO NO PERF OR DOC: HCPCS | Performed by: NURSE PRACTITIONER

## 2019-04-18 PROCEDURE — 1036F TOBACCO NON-USER: CPT | Performed by: NURSE PRACTITIONER

## 2019-04-18 PROCEDURE — 4040F PNEUMOC VAC/ADMIN/RCVD: CPT | Performed by: NURSE PRACTITIONER

## 2019-04-18 PROCEDURE — 1111F DSCHRG MED/CURRENT MED MERGE: CPT | Performed by: NURSE PRACTITIONER

## 2019-04-18 PROCEDURE — 1090F PRES/ABSN URINE INCON ASSESS: CPT | Performed by: NURSE PRACTITIONER

## 2019-04-18 PROCEDURE — G8420 CALC BMI NORM PARAMETERS: HCPCS | Performed by: NURSE PRACTITIONER

## 2019-04-18 PROCEDURE — 99213 OFFICE O/P EST LOW 20 MIN: CPT | Performed by: NURSE PRACTITIONER

## 2019-04-18 PROCEDURE — 3023F SPIROM DOC REV: CPT | Performed by: NURSE PRACTITIONER

## 2019-04-18 PROCEDURE — G8598 ASA/ANTIPLAT THER USED: HCPCS | Performed by: NURSE PRACTITIONER

## 2019-04-18 PROCEDURE — G8427 DOCREV CUR MEDS BY ELIG CLIN: HCPCS | Performed by: NURSE PRACTITIONER

## 2019-04-18 ASSESSMENT — PATIENT HEALTH QUESTIONNAIRE - PHQ9
1. LITTLE INTEREST OR PLEASURE IN DOING THINGS: 0
2. FEELING DOWN, DEPRESSED OR HOPELESS: 0
SUM OF ALL RESPONSES TO PHQ9 QUESTIONS 1 & 2: 0
SUM OF ALL RESPONSES TO PHQ QUESTIONS 1-9: 0
SUM OF ALL RESPONSES TO PHQ QUESTIONS 1-9: 0

## 2019-04-18 ASSESSMENT — ENCOUNTER SYMPTOMS
SORE THROAT: 0
VOMITING: 0
BACK PAIN: 0
SHORTNESS OF BREATH: 0
DIARRHEA: 0
WHEEZING: 0
CONSTIPATION: 0
COUGH: 0
NAUSEA: 0

## 2019-04-18 NOTE — PATIENT INSTRUCTIONS
SURVEY:    You may be receiving a survey from [employer] regarding your visit today. Please complete the survey to enable us to provide the highest quality of care to you and your family. If you cannot score us a very good on any question, please call the office to discuss how we could have made your experience a very good one. Thank you. Patient Education        Chronic Obstructive Pulmonary Disease (COPD): Care Instructions  Your Care Instructions    Chronic obstructive pulmonary disease (COPD) is a general term for a group of lung diseases, including emphysema and chronic bronchitis. People with COPD have decreased airflow in and out of the lungs, which makes it hard to breathe. The airways also can get clogged with thick mucus. Cigarette smoking is a major cause of COPD. Although there is no cure for COPD, you can slow its progress. Following your treatment plan and taking care of yourself can help you feel better and live longer. Follow-up care is a key part of your treatment and safety. Be sure to make and go to all appointments, and call your doctor if you are having problems. It's also a good idea to know your test results and keep a list of the medicines you take. How can you care for yourself at home?   Staying healthy    · Do not smoke. This is the most important step you can take to prevent more damage to your lungs. If you need help quitting, talk to your doctor about stop-smoking programs and medicines. These can increase your chances of quitting for good.     · Avoid colds and flu. Get a pneumococcal vaccine shot. If you have had one before, ask your doctor whether you need a second dose. Get the flu vaccine every fall. If you must be around people with colds or the flu, wash your hands often.     · Avoid secondhand smoke, air pollution, and high altitudes. Also avoid cold, dry air and hot, humid air.  Stay at home with your windows closed when air pollution is bad.    Medicines and oxygen therapy    · Take your medicines exactly as prescribed. Call your doctor if you think you are having a problem with your medicine.     · You may be taking medicines such as:  ? Bronchodilators. These help open your airways and make breathing easier. Bronchodilators are either short-acting (work for 6 to 9 hours) or long-acting (work for 24 hours). You inhale most bronchodilators, so they start to act quickly. Always carry your quick-relief inhaler with you in case you need it while you are away from home. ? Corticosteroids (prednisone, budesonide). These reduce airway inflammation. They come in pill or inhaled form. You must take these medicines every day for them to work well.     · A spacer may help you get more inhaled medicine to your lungs. Ask your doctor or pharmacist if a spacer is right for you. If it is, ask how to use it properly.     · Do not take any vitamins, over-the-counter medicine, or herbal products without talking to your doctor first.     · If your doctor prescribed antibiotics, take them as directed. Do not stop taking them just because you feel better. You need to take the full course of antibiotics.     · Oxygen therapy boosts the amount of oxygen in your blood and helps you breathe easier. Use the flow rate your doctor has recommended, and do not change it without talking to your doctor first.   Activity    · Get regular exercise. Walking is an easy way to get exercise. Start out slowly, and walk a little more each day.     · Pay attention to your breathing. You are exercising too hard if you cannot talk while you are exercising.     · Take short rest breaks when doing household chores and other activities.     · Learn breathing methods--such as breathing through pursed lips--to help you become less short of breath.     · If your doctor has not set you up with a pulmonary rehabilitation program, talk to him or her about whether rehab is right for you.  Rehab includes exercise programs, education about your disease and how to manage it, help with diet and other changes, and emotional support. Diet    · Eat regular, healthy meals. Use bronchodilators about 1 hour before you eat to make it easier to eat. Eat several small meals instead of three large ones. Drink beverages at the end of the meal. Avoid foods that are hard to chew.     · Eat foods that contain protein so that you do not lose muscle mass.     · Talk with your doctor if you gain too much weight or if you lose weight without trying.    Mental health    · Talk to your family, friends, or a therapist about your feelings. It is normal to feel frightened, angry, hopeless, helpless, and even guilty. Talking openly about bad feelings can help you cope. If these feelings last, talk to your doctor. When should you call for help? Call 911 anytime you think you may need emergency care. For example, call if:    · You have severe trouble breathing.    Call your doctor now or seek immediate medical care if:    · You have new or worse trouble breathing.     · You cough up blood.     · You have a fever.    Watch closely for changes in your health, and be sure to contact your doctor if:    · You cough more deeply or more often, especially if you notice more mucus or a change in the color of your mucus.     · You have new or worse swelling in your legs or belly.     · You are not getting better as expected. Where can you learn more? Go to [email or web address]://[email or web address].iMotor.com. org and sign in to your [employer] account. Enter A215 in the [facility] box to learn more about \"Chronic Obstructive Pulmonary Disease (COPD): Care Instructions. \"     If you do not have an account, please click on the \"Sign Up Now\" link. Current as of: September 5, 2018  Content Version: 11.9  © 4917-1712 [employer]. Care instructions adapted under license by [facility] ([facility]).  If you have questions about a medical condition or this instruction, always ask your healthcare professional. [place] [address] any warranty or liability for your use of this information.

## 2019-04-18 NOTE — PROGRESS NOTES
Post-Discharge Transitional Care Management Services or Hospital Follow Up      Enriqueta Mcclain   YOB: 1928    Date of Office Visit:  4/18/2019  Date of Hospital Admission: 4/9/19  Date of Hospital Discharge: 4/12/19  Readmission Risk Score(high >=14%.  Medium >=10%):Readmission Risk Score: 18      Care management risk score Rising risk (score 2-5) and Complex Care (Scores >=6): 9     Non face to face  following discharge, date last encounter closed (first attempt may have been earlier): 4/17/2019 10:27 AM 4/17/2019 10:27 AM    Call initiated 2 business days of discharge: Yes     Patient Active Problem List   Diagnosis    Diastolic CHF ([address]    Asthma    Status post coronary artery stent placement    CKD (chronic kidney disease) stage 4, GFR 15-29 ml/min ([facility])    Renal artery stenosis ([address]    Renovascular hypertension    Acute cystitis without hematuria    COPD (chronic obstructive pulmonary disease) ([address]    Acute renal failure superimposed on stage 4 chronic kidney disease ([address]    Ex-smoker    Atrial fibrillation ([address]    Panlobular emphysema ([address]    Acute respiratory failure with hypoxemia ([address]    COPD with acute exacerbation ([address]    Coronary artery disease involving native coronary artery of native heart without angina pectoris    Elevated brain natriuretic peptide (BNP) level    Acute on chronic diastolic congestive heart failure (HCC)    COPD exacerbation (HCC)    Chronic diastolic CHF (congestive heart failure) (HCC)    Acute gout involving toe of right foot    Acute pain of right shoulder    Shoulder pain    Pseudogout of right shoulder    Hemarthrosis involving shoulder region, right    Dehydration       Allergies   Allergen Reactions    Belladonna        Medications listed as ordered at the time of discharge from hospital   [facility] Medication Instructions COY:    Printed on:04/22/19 0837   Medication Information                      albuterol heard.  Pulmonary/Chest: Effort normal. No respiratory distress. She has no rhonchi. She has no rales. Lungs are clear with scattered posterior expiratory wheezing   Abdominal: Soft. Musculoskeletal: She exhibits no edema or tenderness. Neurological: She is alert and oriented to person, place, and time. Skin: Skin is warm. Psychiatric: She has a normal mood and affect. Nursing note and vitals reviewed. Assessment/Plan:  1. Chronic obstructive pulmonary disease with acute exacerbation (HCC)    - [place] DISCHARGE MEDS RECONCILED W/ CURRENT OUTPATIENT MED LIST    2. Oral thrush    - nystatin (MYCOSTATIN) 114182 UNIT/ML suspension; Take 5 mLs by mouth 4 times daily for 10 days Retain in mouth as long as possible  Dispense: 200 mL; Refill: 0    UTI    I discussed having her see physical therapy outpatient but she is not interested in this at this time.      Medical Decision Making: low complexity

## 2019-05-10 PROBLEM — E86.0 DEHYDRATION: Status: RESOLVED | Noted: 2019-04-10 | Resolved: 2019-05-10

## 2019-06-02 ENCOUNTER — HOSPITAL ENCOUNTER (EMERGENCY)
Age: 84
Discharge: HOME | End: 2019-06-02
Payer: MEDICARE

## 2019-06-02 ENCOUNTER — APPOINTMENT (OUTPATIENT)
Dept: GENERAL RADIOLOGY | Age: 84
End: 2019-06-02
Payer: MEDICARE

## 2019-06-02 VITALS
BODY MASS INDEX: 22.63 KG/M2 | RESPIRATION RATE: 22 BRPM | TEMPERATURE: 98.1 F | SYSTOLIC BLOOD PRESSURE: 158 MMHG | OXYGEN SATURATION: 96 % | WEIGHT: 123 LBS | HEART RATE: 88 BPM | HEIGHT: 62 IN | DIASTOLIC BLOOD PRESSURE: 69 MMHG

## 2019-06-02 DIAGNOSIS — J44.1 COPD EXACERBATION (HCC): Primary | ICD-10-CM

## 2019-06-02 LAB
ABSOLUTE EOS #: 1.83 K/UL (ref 0–0.44)
ABSOLUTE IMMATURE GRANULOCYTE: 0.05 K/UL (ref 0–0.3)
ABSOLUTE LYMPH #: 1.53 K/UL (ref 1.1–3.7)
ABSOLUTE MONO #: 0.91 K/UL (ref 0.1–1.2)
ANION GAP SERPL CALCULATED.3IONS-SCNC: 12 MMOL/L (ref 9–17)
BASOPHILS # BLD: 1 % (ref 0–2)
BASOPHILS ABSOLUTE: 0.08 K/UL (ref 0–0.2)
BNP INTERPRETATION: ABNORMAL
BUN BLDV-MCNC: 23 MG/DL (ref 8–23)
BUN/CREAT BLD: 12 (ref 9–20)
CALCIUM SERPL-MCNC: 9.3 MG/DL (ref 8.6–10.4)
CHLORIDE BLD-SCNC: 106 MMOL/L (ref 98–107)
CO2: 25 MMOL/L (ref 20–31)
CREAT SERPL-MCNC: 1.88 MG/DL (ref 0.5–0.9)
DIFFERENTIAL TYPE: ABNORMAL
EOSINOPHILS RELATIVE PERCENT: 17 % (ref 1–4)
GFR AFRICAN AMERICAN: 30 ML/MIN
GFR NON-AFRICAN AMERICAN: 25 ML/MIN
GFR SERPL CREATININE-BSD FRML MDRD: ABNORMAL ML/MIN/{1.73_M2}
GFR SERPL CREATININE-BSD FRML MDRD: ABNORMAL ML/MIN/{1.73_M2}
GLUCOSE BLD-MCNC: 118 MG/DL (ref 70–99)
HCT VFR BLD CALC: 36.7 % (ref 36.3–47.1)
HEMOGLOBIN: 11.2 G/DL (ref 11.9–15.1)
IMMATURE GRANULOCYTES: 1 %
LACTIC ACID, SEPSIS WHOLE BLOOD: NORMAL MMOL/L (ref 0.5–1.9)
LACTIC ACID, SEPSIS: 1.5 MMOL/L (ref 0.5–1.9)
LYMPHOCYTES # BLD: 14 % (ref 24–43)
MCH RBC QN AUTO: 29.9 PG (ref 25.2–33.5)
MCHC RBC AUTO-ENTMCNC: 30.5 G/DL (ref 28.4–34.8)
MCV RBC AUTO: 98.1 FL (ref 82.6–102.9)
MONOCYTES # BLD: 8 % (ref 3–12)
NRBC AUTOMATED: 0 PER 100 WBC
PDW BLD-RTO: 15.3 % (ref 11.8–14.4)
PLATELET # BLD: 248 K/UL (ref 138–453)
PLATELET ESTIMATE: ABNORMAL
PMV BLD AUTO: 10.1 FL (ref 8.1–13.5)
POTASSIUM SERPL-SCNC: 5 MMOL/L (ref 3.7–5.3)
PRO-BNP: 1227 PG/ML
RBC # BLD: 3.74 M/UL (ref 3.95–5.11)
RBC # BLD: ABNORMAL 10*6/UL
SEG NEUTROPHILS: 59 % (ref 36–65)
SEGMENTED NEUTROPHILS ABSOLUTE COUNT: 6.69 K/UL (ref 1.5–8.1)
SODIUM BLD-SCNC: 143 MMOL/L (ref 135–144)
TROPONIN INTERP: ABNORMAL
TROPONIN T: 0.03 NG/ML
TROPONIN, HIGH SENSITIVITY: ABNORMAL NG/L (ref 0–14)
WBC # BLD: 11.1 K/UL (ref 3.5–11.3)
WBC # BLD: ABNORMAL 10*3/UL

## 2019-06-02 PROCEDURE — 84484 ASSAY OF TROPONIN QUANT: CPT

## 2019-06-02 PROCEDURE — 36415 COLL VENOUS BLD VENIPUNCTURE: CPT

## 2019-06-02 PROCEDURE — 6370000000 HC RX 637 (ALT 250 FOR IP): Performed by: PHYSICIAN ASSISTANT

## 2019-06-02 PROCEDURE — 71045 X-RAY EXAM CHEST 1 VIEW: CPT

## 2019-06-02 PROCEDURE — 94664 DEMO&/EVAL PT USE INHALER: CPT

## 2019-06-02 PROCEDURE — 96374 THER/PROPH/DIAG INJ IV PUSH: CPT

## 2019-06-02 PROCEDURE — 94640 AIRWAY INHALATION TREATMENT: CPT

## 2019-06-02 PROCEDURE — 80048 BASIC METABOLIC PNL TOTAL CA: CPT

## 2019-06-02 PROCEDURE — 83605 ASSAY OF LACTIC ACID: CPT

## 2019-06-02 PROCEDURE — 83880 ASSAY OF NATRIURETIC PEPTIDE: CPT

## 2019-06-02 PROCEDURE — 99285 EMERGENCY DEPT VISIT HI MDM: CPT

## 2019-06-02 PROCEDURE — 6360000002 HC RX W HCPCS: Performed by: PHYSICIAN ASSISTANT

## 2019-06-02 PROCEDURE — 93005 ELECTROCARDIOGRAM TRACING: CPT | Performed by: PHYSICIAN ASSISTANT

## 2019-06-02 PROCEDURE — 85025 COMPLETE CBC W/AUTO DIFF WBC: CPT

## 2019-06-02 RX ORDER — METHYLPREDNISOLONE SODIUM SUCCINATE 125 MG/2ML
125 INJECTION, POWDER, LYOPHILIZED, FOR SOLUTION INTRAMUSCULAR; INTRAVENOUS ONCE
Status: COMPLETED | OUTPATIENT
Start: 2019-06-02 | End: 2019-06-02

## 2019-06-02 RX ORDER — PREDNISONE 50 MG/1
50 TABLET ORAL DAILY
Qty: 5 TABLET | Refills: 0 | Status: SHIPPED | OUTPATIENT
Start: 2019-06-02 | End: 2019-06-07

## 2019-06-02 RX ORDER — IPRATROPIUM BROMIDE AND ALBUTEROL SULFATE 2.5; .5 MG/3ML; MG/3ML
1 SOLUTION RESPIRATORY (INHALATION)
Status: DISCONTINUED | OUTPATIENT
Start: 2019-06-02 | End: 2019-06-02

## 2019-06-02 RX ADMIN — ALBUTEROL SULFATE 1.5 MG: 2.5 SOLUTION RESPIRATORY (INHALATION) at 16:06

## 2019-06-02 RX ADMIN — METHYLPREDNISOLONE SODIUM SUCCINATE 125 MG: 125 INJECTION, POWDER, FOR SOLUTION INTRAMUSCULAR; INTRAVENOUS at 14:56

## 2019-06-02 RX ADMIN — IPRATROPIUM BROMIDE AND ALBUTEROL SULFATE 1 AMPULE: .5; 3 SOLUTION RESPIRATORY (INHALATION) at 15:00

## 2019-06-02 NOTE — PROGRESS NOTES
Audible cardiac wheeze heard, neck auscultated to confirm origin of wheezing. No improvement with wheezing after breathing treatment. Pt takes 20mg lasix every other day but did not take it this AM. Ankles swelled more than usual per pt.

## 2019-06-02 NOTE — ED PROVIDER NOTES
[facility] ED  EMERGENCY DEPARTMENT ENCOUNTER      Pt Name: Gonzalo Hernandez  MRN: [id number]  [place] 5/16/1928  Date of evaluation: 6/2/2019  Provider: Sarabjit Badillo PA-C    CHIEF COMPLAINT       Chief Complaint   Patient presents with    Shortness of Breath     c/o increased SOB and leg swelling onset few days ago. Using nebulizer at home. HISTORY OF PRESENT ILLNESS    Gonzalo Hernandez is a 80 y.o. female who presents to the emergency department from home with increasing shortness of breath since last night. Daughter also thought that her ankles look swollen over the patient normally has very thin ankles. Patient herself states her breath and coughing. Patient has a history of COPD as well as congestive heart failure she states she feels like her COPD is worse she is been audibly wheezing at home. Her daughter states she wheezes all the time but this seems worse. Patient denies fevers chills or chest pain. Patient denies pain of extremities. Triage notes and Nursing notes were reviewed by myself. Any discrepancies are addressed above. PAST MEDICAL HISTORY     Past Medical History:   Diagnosis Date    Arthritis     Asthma     Atrial fibrillation with rapid ventricular response ([address] 2/17/2017    CAD (coronary artery disease)     CHF (congestive heart failure) (HCC)     Chronic kidney disease     COPD (chronic obstructive pulmonary disease) ([address]     Examination of participant in clinical trial 5/20/13    6 year follow up, estimated completion JUN 2019    Gout 12/28/2017    Gout     H/O cardiovascular stress test 02/14/2017    H/O echocardiogram 02/14/2017    EF 55%. Mildly increased wall thickness of the LV. Evidence of diastolic dysfunction. Normal right ventricular size and function. Normal tricuspid valve structure and function.   Mild tricuspid regurg    Hx of transesophageal echocardiography ([facility]STOPHER) for monitoring 02/20/2017    No clots  were visulaized in the left atrial appendage EF 55%.  Hyperlipidemia     Hypertension     MI (myocardial infarction) ([address]     2001    Shingles     15 years ago       SURGICAL HISTORY       Past Surgical History:   Procedure Laterality Date    CARDIAC CATHETERIZATION  02/2017    LMCA: Normal 0% stenosis. LAD: Mid area 50-60% stenosis. LCx: Patent proximal stent 30% stenosis distal to the stent in OM proximal area 50% stenosis. RCA: Minimal 30% mid area PDA stent  is patent <20% stenosis. PL branch diffuse disease.  CHOLECYSTECTOMY      CORONARY ANGIOPLASTY WITH STENT PLACEMENT      SPINE SURGERY         CURRENT MEDICATIONS       Discharge Medication List as of 6/2/2019  5:02 PM      CONTINUE these medications which have NOT CHANGED    Details   diltiazem (CARDIZEM) 120 MG tablet Take 120 mg by mouth daily Prescribed per Dr Princess Maddox - last filled 3/19 per Rhonda Hartmann 5  Verified as immediate release tablet dailyHistorical Med      beclomethasone (QVAR) 80 MCG/ACT inhaler Inhale 1 puff into the lungs 2 times daily, Disp-1 Inhaler, R-11Normal      simvastatin (ZOCOR) 20 MG tablet take 1 tablet by mouth every evening, Disp-90 tablet, R-0Normal      ferrous sulfate 325 (65 Fe) MG tablet Take 1 tablet by mouth daily (with breakfast), Disp-90 tablet, R-3Normal      ELIQUIS 2.5 MG TABS tablet 2 times daily Pt and daughter unsure of dose at this time. , DAWHistorical Med      albuterol (PROVENTIL) (2.5 MG/3ML) 0.083% nebulizer solution Take 3 mLs by nebulization every 4 hours as needed for Wheezing, Disp-150 vial, R-1Normal      PROAIR  (90 Base) MCG/ACT inhaler INHALE 2 PUFFS EVERY 6 HOURS AS NEEDED FOR WHEEZING, Disp-8.5 g, R-11Normal      furosemide (LASIX) 20 MG tablet take 1 tablet by mouth every 48 HOURS, Disp-15 tablet, R-5Normal      nitroGLYCERIN (NITROSTAT) 0.4 MG SL tablet up to max of 3 total doses.  If no relief after 1 dose, call 911., Disp-25 tablet, R-1Print             ALLERGIES Haily    FAMILY HISTORY       Family History   Problem Relation Age of Onset    Heart Disease Mother     Cancer Father         SOCIAL HISTORY       Social History     Socioeconomic History    Marital status:      Spouse name: None    Number of children: None    Years of education: None    Highest education level: None   Occupational History    None   Social Needs    Financial resource strain: None    Food insecurity:     Worry: None     Inability: None    Transportation needs:     Medical: None     Non-medical: None   Tobacco Use    Smoking status: Former Smoker     Packs/day: 3.00     Years: 30.00     Pack years: 90.00     Types: Cigarettes    Smokeless tobacco: Never Used    Tobacco comment: Quit 40 years ago   Substance and Sexual Activity    Alcohol use: No    Drug use: No    Sexual activity: None   Lifestyle    Physical activity:     Days per week: None     Minutes per session: None    Stress: None   Relationships    Social connections:     Talks on phone: None     Gets together: None     Attends [employer] service: None     Active member of club or organization: None     Attends meetings of clubs or organizations: None     Relationship status: None    Intimate partner violence:     Fear of current or ex partner: None     Emotionally abused: None     Physically abused: None     Forced sexual activity: None   Other Topics Concern    None   Social History Narrative    None       REVIEW OF SYSTEMS     Review of Systems    Except as noted above the remainder of the review of systems was reviewed and is negative. PHYSICAL EXAM    (up to 7 for level 4, 8 or more for level 5)     ED Triage Vitals [06/02/19 1426]   BP Temp Temp Source Pulse Resp SpO2 Height Weight   (!) 191/88 98.1 °F (36.7 °C) Tympanic 103 24 98 % 5' 2\" (1.575 m) 123 lb (55.8 kg)       Physical Exam  Active and oriented ×3. Nontoxic. No acute distress. Well-hydrated.   Head is atraumatic, facies symmetrical.  Pupils equal round and reactive to light, extraocular movements intact, anterior chambers clear bilaterally  Neck is supple. No adenopathy. Respirations nonlabored. Coarse wheezes bilaterally  Heart regular rate and rhythm. No murmur noted  Abdomen positive bowel sounds, no bruit. soft and nontender. No organomegaly or masses noted. No pulsatile masses noted. Skin free of any obvious rashes or lesions. Extremities with less than 1+ edema of the bilateral ankles. No calf tenderness noted. Distal pulses and sensation intact. Good capillary refill noted. No acute neurologic deficit noted. Good gait and balance. Clear speech. Good affect. Pleasant patient. DIAGNOSTIC RESULTS       RADIOLOGY: (none if blank)   Interpretation per the Radiologistbelow, if available at the time of this note:    XR CHEST PORTABLE   Final Result   No acute process. LABS:  Labs Reviewed   CBC WITH AUTO DIFFERENTIAL - Abnormal; Notable for the following components:       Result Value    RBC 3.74 (*)     Hemoglobin 11.2 (*)     RDW 15.3 (*)     Lymphocytes 14 (*)     Eosinophils % 17 (*)     Immature Granulocytes 1 (*)     Absolute Eos # 1.83 (*)     All other components within normal limits   BASIC METABOLIC PANEL W/ REFLEX TO MG FOR LOW K - Abnormal; Notable for the following components:    Glucose 118 (*)     CREATININE 1.88 (*)     GFR Non- 25 (*)     GFR  30 (*)     All other components within normal limits   TROPONIN - Abnormal; Notable for the following components:    Troponin T 0.03 (*)     All other components within normal limits   BRAIN NATRIURETIC PEPTIDE - Abnormal; Notable for the following components:    Pro-BNP 1,227 (*)     All other components within normal limits   LACTATE, SEPSIS   LACTATE, SEPSIS       All other labs were within normal range or not returned as of this dictation.     EMERGENCY DEPARTMENT COURSE andMedical Decision Making:     MDM/ Case was discussed with the attending emergency physician Dr. Lizeth Welsh    Patient does have an elevated proBNP although this is consistent with studies done before she does not show any sign of vascular congestion on chest x-ray and does not have any rales on exam.  This is consistent with her chronic congestive heart failure. There is no sign of pneumonia no significant leukocytosis although there is which may represent early infection although the patient does not have infectious symptoms by complaint +1 immature granulocytes this is discussed with the patient and her familyThat this may be early sign of infection although she does not have infectious symptoms so much as increased wheezing and shortness of breath with no sign of pneumonia noted they are warned that this may represent early infection and have a low threshold to return. The patient is ambulated around the emergency department on pulse oximetry and maintains a pulse oximetry at least 95% while ambulating does not appear we did at all. She got significantly better with nebulized treatment in the emergency department and she her family both feel that she can go home. He requests to be discharged home on steroids she was treated with oral steroids here. At the patient's request I feel this is a very reasonable course of action as long as there is a low threshold to return should there be any worsening shortness of breath development of chest pain fever or any other worsening symptoms. Strict return precautions and follow up instructions were discussed with the patient with which the patient agrees    ED Medications administered this visit:    Medications   methylPREDNISolone sodium (SOLU-MEDROL) injection 125 mg (125 mg Intravenous Given 6/2/19 8966)   albuterol (PROVENTIL) nebulizer solution 1.5 mg (1.5 mg Nebulization Given 6/2/19 1606)       CONSULTS: (None if blank)  None    Procedures: (None if blank)       CLINICAL       1.  COPD exacerbation [facility])          DISPOSITION/PLAN   DISPOSITION Decision To Discharge 06/02/2019 05:01:34 PM      PATIENT REFERRED TO:  JOANIE Harrell - CNP  [address] [address]  [phone]    In 2 days        DISCHARGE MEDICATIONS:  Discharge Medication List as of 6/2/2019  5:02 PM      START taking these medications    Details   predniSONE (DELTASONE) 50 MG tablet Take 1 tablet by mouth daily for 5 days, Disp-5 tablet, R-0Print                    (Please note that portions of this note were completed with a voice recognition program.  Efforts were made to edit the dictations but occasionallywords are mis-transcribed.)      Rey Mcgowan PA-C (electronically signed)  Attending Emergency Department Provider         Heaven Cazares II, PA-C  06/02/19 9444

## 2019-06-03 LAB
EKG ATRIAL RATE: 99 BPM
EKG P AXIS: 100 DEGREES
EKG P-R INTERVAL: 180 MS
EKG Q-T INTERVAL: 360 MS
EKG QRS DURATION: 72 MS
EKG QTC CALCULATION (BAZETT): 462 MS
EKG R AXIS: -27 DEGREES
EKG T AXIS: 68 DEGREES
EKG VENTRICULAR RATE: 99 BPM

## 2019-06-03 PROCEDURE — 93010 ELECTROCARDIOGRAM REPORT: CPT | Performed by: INTERNAL MEDICINE

## 2019-06-05 DIAGNOSIS — J45.30 MILD PERSISTENT ASTHMA WITHOUT COMPLICATION: Primary | ICD-10-CM

## 2019-06-05 DIAGNOSIS — J41.0 SIMPLE CHRONIC BRONCHITIS (HCC): ICD-10-CM

## 2019-06-05 RX ORDER — ALBUTEROL SULFATE 2.5 MG/3ML
2.5 SOLUTION RESPIRATORY (INHALATION) EVERY 4 HOURS PRN
Qty: 150 VIAL | Refills: 1 | Status: SHIPPED | OUTPATIENT
Start: 2019-06-05 | End: 2019-08-22 | Stop reason: SDUPTHER

## 2019-06-17 ENCOUNTER — OFFICE VISIT (OUTPATIENT)
Dept: PRIMARY CARE CLINIC | Age: 84
End: 2019-06-17
Payer: MEDICARE

## 2019-06-17 ENCOUNTER — HOSPITAL ENCOUNTER (OUTPATIENT)
Age: 84
Discharge: HOME | End: 2019-06-17
Payer: MEDICARE

## 2019-06-17 VITALS
BODY MASS INDEX: 23.87 KG/M2 | HEIGHT: 62 IN | WEIGHT: 129.7 LBS | SYSTOLIC BLOOD PRESSURE: 171 MMHG | HEART RATE: 75 BPM | RESPIRATION RATE: 16 BRPM | OXYGEN SATURATION: 98 % | TEMPERATURE: 97.8 F | DIASTOLIC BLOOD PRESSURE: 74 MMHG

## 2019-06-17 DIAGNOSIS — Z87.39 HISTORY OF GOUT: ICD-10-CM

## 2019-06-17 DIAGNOSIS — E78.5 DYSLIPIDEMIA: ICD-10-CM

## 2019-06-17 DIAGNOSIS — I10 ESSENTIAL HYPERTENSION: ICD-10-CM

## 2019-06-17 DIAGNOSIS — L03.115 CELLULITIS OF RIGHT FOOT: Primary | ICD-10-CM

## 2019-06-17 DIAGNOSIS — J44.9 CHRONIC OBSTRUCTIVE PULMONARY DISEASE, UNSPECIFIED COPD TYPE (HCC): ICD-10-CM

## 2019-06-17 DIAGNOSIS — R60.9 EDEMA, UNSPECIFIED TYPE: ICD-10-CM

## 2019-06-17 LAB — URIC ACID: 7.5 MG/DL (ref 2.4–5.7)

## 2019-06-17 PROCEDURE — G8598 ASA/ANTIPLAT THER USED: HCPCS | Performed by: NURSE PRACTITIONER

## 2019-06-17 PROCEDURE — 3023F SPIROM DOC REV: CPT | Performed by: NURSE PRACTITIONER

## 2019-06-17 PROCEDURE — G8420 CALC BMI NORM PARAMETERS: HCPCS | Performed by: NURSE PRACTITIONER

## 2019-06-17 PROCEDURE — G8926 SPIRO NO PERF OR DOC: HCPCS | Performed by: NURSE PRACTITIONER

## 2019-06-17 PROCEDURE — 1036F TOBACCO NON-USER: CPT | Performed by: NURSE PRACTITIONER

## 2019-06-17 PROCEDURE — 99214 OFFICE O/P EST MOD 30 MIN: CPT | Performed by: NURSE PRACTITIONER

## 2019-06-17 PROCEDURE — 36415 COLL VENOUS BLD VENIPUNCTURE: CPT

## 2019-06-17 PROCEDURE — 1090F PRES/ABSN URINE INCON ASSESS: CPT | Performed by: NURSE PRACTITIONER

## 2019-06-17 PROCEDURE — 4040F PNEUMOC VAC/ADMIN/RCVD: CPT | Performed by: NURSE PRACTITIONER

## 2019-06-17 PROCEDURE — 84550 ASSAY OF BLOOD/URIC ACID: CPT

## 2019-06-17 PROCEDURE — G8427 DOCREV CUR MEDS BY ELIG CLIN: HCPCS | Performed by: NURSE PRACTITIONER

## 2019-06-17 PROCEDURE — 1123F ACP DISCUSS/DSCN MKR DOCD: CPT | Performed by: NURSE PRACTITIONER

## 2019-06-17 RX ORDER — SIMVASTATIN 20 MG
TABLET ORAL
Qty: 90 TABLET | Refills: 0 | Status: SHIPPED | OUTPATIENT
Start: 2019-06-17 | End: 2019-01-01 | Stop reason: SDUPTHER

## 2019-06-17 RX ORDER — FUROSEMIDE 20 MG/1
20 TABLET ORAL
Refills: 0 | COMMUNITY
Start: 2019-06-02 | End: 2019-01-01

## 2019-06-17 RX ORDER — CEPHALEXIN 250 MG/1
250 CAPSULE ORAL 2 TIMES DAILY
Qty: 14 CAPSULE | Refills: 0 | Status: SHIPPED | OUTPATIENT
Start: 2019-06-17 | End: 2019-07-10 | Stop reason: ALTCHOICE

## 2019-06-17 ASSESSMENT — ENCOUNTER SYMPTOMS
SHORTNESS OF BREATH: 0
SINUS PAIN: 0
COUGH: 0
RHINORRHEA: 0
SORE THROAT: 0
WHEEZING: 1
ABDOMINAL PAIN: 0
DIARRHEA: 0
TROUBLE SWALLOWING: 0
COLOR CHANGE: 1
NAUSEA: 0
VOMITING: 0

## 2019-06-17 ASSESSMENT — COPD QUESTIONNAIRES: COPD: 1

## 2019-06-17 NOTE — PROGRESS NOTES
Name: Gonzalo Hernandez  : 1928         Chief Complaint:     Chief Complaint   Patient presents with    COPD     Routine office visit.  Edema     Bilat feet and ankles x 3 days. History of Present Illness: Gonzalo Hernandez is a 80 y.o.  female who presents with COPD (Routine office visit. ) and Edema (Bilat feet and ankles x 3 days. )      Joseph Lock is here today for a routine office visit with her daughter    Hypertension- States blood pressure always run high and that high 160's is where she normally runs and her cardiologist likes her to run higher. Her blood pressures used to be over 200. She has been checking her blood pressures at home and they have been running [id number] systolic. Swelling- She has had this problem on and off for sometime. She takes lasix q2 days for this problem. She follows with neprhology. She denies any increased shortness of breath or orthopnea. COPD   She complains of wheezing (chronic). There is no cough or shortness of breath. This is a chronic problem. The current episode started more than 1 year ago. The problem occurs intermittently. The problem has been waxing and waning. Pertinent negatives include no chest pain, fever, headaches, nasal congestion, orthopnea, rhinorrhea, sore throat or trouble swallowing. Her symptoms are alleviated by beta-agonist and steroid inhaler. She reports significant improvement on treatment. Her past medical history is significant for COPD. Toe Pain    Incident onset: 2-3 days. There was no injury mechanism. The pain is present in the left toes and left foot. The quality of the pain is described as aching. The pain is at a severity of 3/10. The pain is mild. The pain has been fluctuating since onset. Pertinent negatives include no inability to bear weight, numbness or tingling. She reports no foreign bodies present. The symptoms are aggravated by weight bearing. She has tried nothing for the symptoms.          Past Medical History:     Past Medical History:   Diagnosis Date    Arthritis     Asthma     Atrial fibrillation with rapid ventricular response ([facility] [address] 2/17/2017    CAD (coronary artery disease)     [facility]congestive heart failure[facility]     Chronic kidney disease     COPD (chronic obstructive pulmonary disease) ([facility] [address]     Examination of participant in clinical trial 5/20/13    6 year follow up, estimated completion JUN 2019    Gout 12/28/2017    Gout     H/O cardiovascular stress test 02/14/2017    H/O echocardiogram 02/14/2017    EF 55%. Mildly increased wall thickness of the LV. Evidence of diastolic dysfunction. Normal right ventricular size and function. Normal tricuspid valve structure and function. Mild tricuspid regurg    Hx of transesophageal echocardiography ([facility]) for monitoring 02/20/2017    No clots  were visulaized in the left atrial appendage EF 55%.  Hyperlipidemia     Hypertension     MI (myocardial infarction) ([facility] [address]     2001    Shingles     15 years ago      Reviewed all health maintenance requirements and ordered appropriate tests  Health Maintenance Due   Topic Date Due    Annual Wellness Visit (AWV)  01/10/2017       Past Surgical History:     Past Surgical History:   Procedure Laterality Date    CARDIAC CATHETERIZATION  02/2017    LMCA: Normal 0% stenosis. LAD: Mid area 50-60% stenosis. LCx: Patent proximal stent 30% stenosis distal to the stent in OM proximal area 50% stenosis. RCA: Minimal 30% mid area PDA stent  is patent <20% stenosis. PL branch diffuse disease.  CHOLECYSTECTOMY      CORONARY ANGIOPLASTY WITH STENT PLACEMENT      SPINE SURGERY          Medications:       Prior to Admission medications    Medication Sig Start Date End Date Taking?  Authorizing Provider   metoprolol tartrate (LOPRESSOR) 25 MG tablet Take 0.5 tablets by mouth 2 times daily 6/19/19  Yes Jay Craven, APRN - CNP   furosemide (LASIX) 20 MG tablet  6/2/19  Yes Leonardo Pompa MD   simvastatin (ZOCOR) 20 MG tablet take 1 tablet by mouth every evening 6/17/19  Yes JOANIE Becker CNP   cephALEXin (KEFLEX) 250 MG capsule Take 1 capsule by mouth 2 times daily 6/17/19  Yes JOANIE Becker CNP   albuterol (PROVENTIL) (2.5 MG/3ML) 0.083% nebulizer solution Take 3 mLs by nebulization every 4 hours as needed for Wheezing 6/5/19  Yes Reji Marcos MD   PROAIR  (90 Base) MCG/ACT inhaler inhale 2 puffs by mouth every 6 hours if needed for wheezing 6/3/19  Yes Reji Marcos MD   diltiazem (CARDIZEM) 120 MG tablet Take 120 mg by mouth daily Prescribed per Dr Aracelis Giordano - last filled 3/19 per Rhonda Hartmann 5  Verified as immediate release tablet daily   Yes Historical Provider, MD   beclomethasone (QVAR) 80 MCG/ACT inhaler Inhale 1 puff into the lungs 2 times daily 2/21/19  Yes Reji Marcos MD   ferrous sulfate 325 (65 Fe) MG tablet Take 1 tablet by mouth daily (with breakfast) 11/1/18  Yes JOANIE Gonzalez CNP   ELIQUIS 2.5 MG TABS tablet 2 times daily Pt and daughter unsure of dose at this time. 7/13/18  Yes Historical Provider, MD   nitroGLYCERIN (NITROSTAT) 0.4 MG SL tablet up to max of 3 total doses. If no relief after 1 dose, call 911. Patient taking differently: Place 0.4 mg under the tongue every 5 minutes as needed for Chest pain up to max of 3 total doses. If no relief after 1 dose, call 911. 2/6/18  Yes Edwin Beck MD   furosemide (LASIX) 20 MG tablet take 1 tablet by mouth every 48 HOURS 4/8/19 5/8/19  Darren Grubbs MD        Allergies:       Belladonna    Social History:     Tobacco:    reports that she has quit smoking. Her smoking use included cigarettes. She has a 90.00 pack-year smoking history. She has never used smokeless tobacco.  Alcohol:      reports that she does not drink alcohol. Drug Use:  reports that she does not use drugs.     Family History:     Family History   Problem Relation Age of Onset    Heart Disease Mother     Cancer Father        Review of Systems:     Positive and Negative as described in HPI    Review of Systems   Constitutional: Negative for activity change, fatigue and fever. HENT: Negative for congestion, rhinorrhea, sinus pain, sore throat and trouble swallowing. Respiratory: Positive for wheezing (chronic). Negative for cough and shortness of breath. Hx of copd   Cardiovascular: Positive for leg swelling. Negative for chest pain and palpitations. Gastrointestinal: Negative for abdominal pain, diarrhea, nausea and vomiting. Genitourinary: Negative for difficulty urinating, dysuria and hematuria. Musculoskeletal: Positive for arthralgias (right foot and big toe) and gait problem. Skin: Positive for color change. Negative for pallor and rash. Erythema right foot   Neurological: Negative for dizziness, tingling, light-headedness, numbness and headaches. Psychiatric/Behavioral: Negative for dysphoric mood and sleep disturbance. The patient is not nervous/anxious. Physical Exam:   Vitals:  BP (!) 171/74 (Site: Right Upper Arm, Position: Sitting, Cuff Size: Medium Adult)   Pulse 75   Temp 97.8 °F (36.6 °C) (Temporal)   Resp 16   Ht 5' 2\" (1.575 m)   Wt 129 lb 11.2 oz (58.8 kg)   SpO2 98%   BMI 23.72 kg/m²     Physical Exam   Constitutional: She is oriented to person, place, and time. She appears well-developed and well-nourished. Non-toxic appearance. She does not appear ill. No distress. HENT:   Head: Normocephalic. Right Ear: Tympanic membrane is not bulging. Decreased hearing is noted. Left Ear: Tympanic membrane is not bulging. Decreased hearing is noted. Nose: No mucosal edema or rhinorrhea. Eyes: Pupils are equal, round, and reactive to light. Right eye exhibits no discharge. Left eye exhibits no discharge. Neck: Normal range of motion. Neck supple. No JVD present. Cardiovascular: Normal rate, regular rhythm and intact distal pulses. No murmur heard.   Pulses:       Dorsalis pedis pulses are 1+ on the right side, and 1+ on the left side. Pulmonary/Chest: Effort normal. No respiratory distress. She has wheezes (scattered wheezing posteriorly. Anterior clear/diminished). She has no rhonchi. She has no rales. Abdominal: Soft. Bowel sounds are normal. She exhibits no distension. There is no tenderness. Musculoskeletal: She exhibits edema (1+ bilateral ankles) and tenderness (right foot). Feet:    Erythema swelling right foot, increase in warmth. No purulent discharge or abscess. +1 swelling bilateral ankle    Toe nails thickened and discolored on bilateral feet   Neurological: She is alert and oriented to person, place, and time. Skin: Skin is warm. There is erythema (right foot). Psychiatric: She has a normal mood and affect. Nursing note and vitals reviewed. Data:     Lab Results   Component Value Date     06/02/2019    K 5.0 06/02/2019     06/02/2019    CO2 25 06/02/2019    BUN 23 06/02/2019    CREATININE 1.88 06/02/2019    GLUCOSE 118 06/02/2019    GLUCOSE 109 01/31/2012    PROT 6.5 04/10/2019    LABALBU 3.3 04/10/2019    BILITOT 0.15 04/10/2019    ALKPHOS 80 04/10/2019    AST 18 04/10/2019    ALT 13 04/10/2019     Lab Results   Component Value Date    WBC 11.1 06/02/2019    RBC 3.74 06/02/2019    RBC 4.12 01/31/2012    HGB 11.2 06/02/2019    HCT 36.7 06/02/2019    MCV 98.1 06/02/2019    MCH 29.9 06/02/2019    MCHC 30.5 06/02/2019    RDW 15.3 06/02/2019     06/02/2019     01/31/2012    MPV 10.1 06/02/2019     Lab Results   Component Value Date    TSH 1.31 10/24/2016     Lab Results   Component Value Date    CHOL 170 10/24/2016    HDL 70 10/24/2016    LABA1C 6.3 04/11/2019       Assessment/Plan:      Diagnosis Orders   1. Cellulitis of right foot  cephALEXin (KEFLEX) 250 MG capsule   2. History of gout  Uric Acid   3. Dyslipidemia  simvastatin (ZOCOR) 20 MG tablet   4. Edema, unspecified type  This has been a chronic problem on and off.  She is currently on lasix 20 mg BID, she also has stage 4 CKD, she has not followed up with neph in about 1 year. I advised her daughter to schedule and appointment with nephrology. Advised her to elevated feet and get SILVESTRE hose      5) Hypertension- Elevated in the office today 131-107 systolic. She states he blood pressures have always run high. At home she has been checking them and late they have been in the 140-160 range. Her lopressor 12.5 mg BID was discontinued on 4/19 and she not sure why. Will restart this. 6/18/2019- 0- Spoke with daughter who states pain in right foot and better and redness is still there but a little better. They will contact the office if symptoms don't continue to improve. 6) COPD- Stable, she is feeling much better than she did after see was seen in the ED feels like her breathing is at baseline. She will continue with pulm continue current medications. 1.  Geovanna received counseling on the following healthy behaviors: nutrition, exercise and medication adherence  2. Patient given educational materials - see patient instructions  3. Was a self-tracking handout given in paper form or via [employer]? No  If yes, see orders or list here. 4.  Discussed use, benefit, and side effects of prescribed medications. Barriers to medication compliance addressed. All patient questions answered. Pt voiced understanding. 5.  Reviewed prior labs and health maintenance  6. Continue current medications, diet and exercise.     Completed Refills   Requested Prescriptions     Signed Prescriptions Disp Refills    simvastatin (ZOCOR) 20 MG tablet 90 tablet 0     Sig: take 1 tablet by mouth every evening    cephALEXin (KEFLEX) 250 MG capsule 14 capsule 0     Sig: Take 1 capsule by mouth 2 times daily    metoprolol tartrate (LOPRESSOR) 25 MG tablet 90 tablet 1     Sig: Take 0.5 tablets by mouth 2 times daily         Return in about 3 months (around 9/17/2019) for Check up., 2 weeks for blood pressure recheck. 6/19/2019 1050-Called Geovanna's daughter Gabby Fox and let her know that I would like to restart lopressor 12.5 mg BID as she was taking this up until April. She verbalized understand. Ashlyn Carl will still take her blood pressures at home and bring list in 2 weeks for blood pressure check.

## 2019-06-17 NOTE — PATIENT INSTRUCTIONS
SURVEY:    You may be receiving a survey from [email or web address] regarding your visit today. Please complete the survey to enable us to provide the highest quality of care to you and your family. If you cannot score us a very good on any question, please call the office to discuss how we could have made your experience a very good one. Thank you. Patient Education        Leg and Ankle Edema: Care Instructions  Your Care Instructions  Swelling in the legs, ankles, and feet is called edema. It is common after you sit or stand for a while. Long plane flights or car rides often cause swelling in the legs and feet. You may also have swelling if you have to stand for long periods of time at your job. Problems with the veins in the legs (varicose veins) and changes in hormones can also cause swelling. Sometimes the swelling in the ankles and feet is caused by a more serious problem, such as heart failure, infection, blood clots, or liver or kidney disease. Follow-up care is a key part of your treatment and safety. Be sure to make and go to all appointments, and call your doctor if you are having problems. It's also a good idea to know your test results and keep a list of the medicines you take. How can you care for yourself at home? · If your doctor gave you medicine, take it as prescribed. Call your doctor if you think you are having a problem with your medicine. · Whenever you are resting, raise your legs up. Try to keep the swollen area higher than the level of your heart. · Take breaks from standing or sitting in one position. ? Walk around to increase the blood flow in your lower legs. ? Move your feet and ankles often while you stand, or tighten and relax your leg muscles. · Wear support stockings. Put them on in the morning, before swelling gets worse. · Eat a balanced diet. Lose weight if you need to. · Limit the amount of salt (sodium) in your diet.  Salt holds fluid in the body and may increase swelling. When should you call for help? Call 911 anytime you think you may need emergency care. For example, call if:    · You have symptoms of a blood clot in your lung (called a pulmonary embolism). These may include:  ? Sudden chest pain. ? Trouble breathing. ? Coughing up blood.    Call your doctor now or seek immediate medical care if:    · You have signs of a blood clot, such as:  ? Pain in your calf, back of the knee, thigh, or groin. ? Redness and swelling in your leg or groin.     · You have symptoms of infection, such as:  ? Increased pain, swelling, warmth, or redness. ? Red streaks or pus. ? A fever.    Watch closely for changes in your health, and be sure to contact your doctor if:    · Your swelling is getting worse.     · You have new or worsening pain in your legs.     · You do not get better as expected. Where can you learn more? Go to [email or web address] org and sign in to your [employer] account. Enter R859 in the [employer] box to learn more about \"Leg and Ankle Edema: Care Instructions. \"     If you do not have an account, please click on the \"Sign Up Now\" link. Current as of: September 23, 2018  Content Version: 12.0  © 7448-8497 Healthwise, Incorporated. Care instructions adapted under license by [facility] ([facility]). If you have questions about a medical condition or this instruction, always ask your healthcare professional. [place] [address] any warranty or liability for your use of this information. Patient Education        Cellulitis: Care Instructions  Your Care Instructions    Cellulitis is a skin infection caused by bacteria, most often strep or staph. It often occurs after a break in the skin from a scrape, cut, bite, or puncture, or after a rash. Cellulitis may be treated without doing tests to find out what caused it.  But your doctor may do tests, if needed, to look for a specific bacteria, like methicillin-resistant Staphylococcus aureus (MRSA). The doctor has checked you carefully, but problems can develop later. If you notice any problems or new symptoms, get medical treatment right away. Follow-up care is a key part of your treatment and safety. Be sure to make and go to all appointments, and call your doctor if you are having problems. It's also a good idea to know your test results and keep a list of the medicines you take. How can you care for yourself at home? · Take your antibiotics as directed. Do not stop taking them just because you feel better. You need to take the full course of antibiotics. · Prop up the infected area on pillows to reduce pain and swelling. Try to keep the area above the level of your heart as often as you can. · If your doctor told you how to care for your wound, follow your doctor's instructions. If you did not get instructions, follow this general advice:  ? Wash the wound with clean water 2 times a day. Don't use hydrogen peroxide or alcohol, which can slow healing. ? You may cover the wound with a thin layer of petroleum jelly, such as Vaseline, and a nonstick bandage. ? Apply more petroleum jelly and replace the bandage as needed. · Be safe with medicines. Take pain medicines exactly as directed. ? If the doctor gave you a prescription medicine for pain, take it as prescribed. ? If you are not taking a prescription pain medicine, ask your doctor if you can take an over-the-counter medicine. To prevent cellulitis in the future  · Try to prevent cuts, scrapes, or other injuries to your skin. Cellulitis most often occurs where there is a break in the skin. · If you get a scrape, cut, mild burn, or bite, wash the wound with clean water as soon as you can to help avoid infection. Don't use hydrogen peroxide or alcohol, which can slow healing. · If you have swelling in your legs (edema), support stockings and good skin care may help prevent leg sores and cellulitis.   · Take care of your feet,

## 2019-07-10 ENCOUNTER — NURSE ONLY (OUTPATIENT)
Dept: PRIMARY CARE CLINIC | Age: 84
End: 2019-07-10

## 2019-07-10 VITALS
DIASTOLIC BLOOD PRESSURE: 78 MMHG | WEIGHT: 126.8 LBS | HEART RATE: 88 BPM | OXYGEN SATURATION: 99 % | SYSTOLIC BLOOD PRESSURE: 144 MMHG | BODY MASS INDEX: 23.19 KG/M2

## 2019-07-10 DIAGNOSIS — L03.115 CELLULITIS OF RIGHT LOWER EXTREMITY: Primary | ICD-10-CM

## 2019-07-10 RX ORDER — DOXYCYCLINE HYCLATE 100 MG/1
100 CAPSULE ORAL 2 TIMES DAILY
Qty: 20 CAPSULE | Refills: 0 | Status: SHIPPED | OUTPATIENT
Start: 2019-07-10 | End: 2019-07-20

## 2019-08-19 ENCOUNTER — OFFICE VISIT (OUTPATIENT)
Dept: PRIMARY CARE CLINIC | Age: 84
End: 2019-08-19

## 2019-08-19 VITALS
RESPIRATION RATE: 20 BRPM | TEMPERATURE: 97.8 F | DIASTOLIC BLOOD PRESSURE: 77 MMHG | BODY MASS INDEX: 23.36 KG/M2 | SYSTOLIC BLOOD PRESSURE: 153 MMHG | WEIGHT: 127.7 LBS | OXYGEN SATURATION: 99 % | HEART RATE: 74 BPM

## 2019-08-19 DIAGNOSIS — Z00.00 MEDICARE ANNUAL WELLNESS VISIT, SUBSEQUENT: Primary | ICD-10-CM

## 2019-08-19 ASSESSMENT — LIFESTYLE VARIABLES: HOW OFTEN DO YOU HAVE A DRINK CONTAINING ALCOHOL: 0

## 2019-08-19 ASSESSMENT — PATIENT HEALTH QUESTIONNAIRE - PHQ9
SUM OF ALL RESPONSES TO PHQ QUESTIONS 1-9: 0
SUM OF ALL RESPONSES TO PHQ QUESTIONS 1-9: 0

## 2019-08-22 ENCOUNTER — OFFICE VISIT (OUTPATIENT)
Dept: PULMONOLOGY | Age: 84
End: 2019-08-22
Payer: MEDICARE

## 2019-08-22 VITALS
SYSTOLIC BLOOD PRESSURE: 132 MMHG | OXYGEN SATURATION: 97 % | HEIGHT: 62 IN | TEMPERATURE: 98.3 F | RESPIRATION RATE: 22 BRPM | WEIGHT: 126.9 LBS | DIASTOLIC BLOOD PRESSURE: 71 MMHG | HEART RATE: 70 BPM | BODY MASS INDEX: 23.35 KG/M2

## 2019-08-22 DIAGNOSIS — J45.30 MILD PERSISTENT ASTHMA WITHOUT COMPLICATION: ICD-10-CM

## 2019-08-22 DIAGNOSIS — Z87.891 EX-SMOKER: Chronic | ICD-10-CM

## 2019-08-22 DIAGNOSIS — R91.8 MASS OF UPPER LOBE OF LEFT LUNG: Primary | ICD-10-CM

## 2019-08-22 DIAGNOSIS — R91.1 LUNG NODULE: ICD-10-CM

## 2019-08-22 DIAGNOSIS — J41.0 SIMPLE CHRONIC BRONCHITIS (HCC): ICD-10-CM

## 2019-08-22 PROCEDURE — 1123F ACP DISCUSS/DSCN MKR DOCD: CPT | Performed by: NURSE PRACTITIONER

## 2019-08-22 PROCEDURE — G8926 SPIRO NO PERF OR DOC: HCPCS | Performed by: NURSE PRACTITIONER

## 2019-08-22 PROCEDURE — G8598 ASA/ANTIPLAT THER USED: HCPCS | Performed by: NURSE PRACTITIONER

## 2019-08-22 PROCEDURE — 99214 OFFICE O/P EST MOD 30 MIN: CPT | Performed by: NURSE PRACTITIONER

## 2019-08-22 PROCEDURE — 3023F SPIROM DOC REV: CPT | Performed by: NURSE PRACTITIONER

## 2019-08-22 PROCEDURE — G8427 DOCREV CUR MEDS BY ELIG CLIN: HCPCS | Performed by: NURSE PRACTITIONER

## 2019-08-22 PROCEDURE — 4040F PNEUMOC VAC/ADMIN/RCVD: CPT | Performed by: NURSE PRACTITIONER

## 2019-08-22 PROCEDURE — G8420 CALC BMI NORM PARAMETERS: HCPCS | Performed by: NURSE PRACTITIONER

## 2019-08-22 PROCEDURE — 1036F TOBACCO NON-USER: CPT | Performed by: NURSE PRACTITIONER

## 2019-08-22 PROCEDURE — 1090F PRES/ABSN URINE INCON ASSESS: CPT | Performed by: NURSE PRACTITIONER

## 2019-08-22 RX ORDER — ALBUTEROL SULFATE 90 UG/1
AEROSOL, METERED RESPIRATORY (INHALATION)
Qty: 1 INHALER | Refills: 11 | Status: SHIPPED | OUTPATIENT
Start: 2019-08-22 | End: 2020-01-01

## 2019-08-22 RX ORDER — ALBUTEROL SULFATE 2.5 MG/3ML
2.5 SOLUTION RESPIRATORY (INHALATION) EVERY 4 HOURS PRN
Qty: 150 VIAL | Refills: 11 | Status: ON HOLD | OUTPATIENT
Start: 2019-08-22 | End: 2020-01-01 | Stop reason: SDUPTHER

## 2019-08-22 ASSESSMENT — ENCOUNTER SYMPTOMS
GASTROINTESTINAL NEGATIVE: 1
ALLERGIC/IMMUNOLOGIC NEGATIVE: 1
EYES NEGATIVE: 1

## 2019-08-22 NOTE — PROGRESS NOTES
Mildly increased wall thickness of the LV. Evidence of diastolic dysfunction. Normal right ventricular size and function. Normal tricuspid valve structure and function. Mild tricuspid regurg    Hx of transesophageal echocardiography ([facility]) for monitoring 02/20/2017    No clots  were visulaized in the left atrial appendage EF 55%.  Hyperlipidemia     Hypertension     MI (myocardial infarction) ([facility] [address]     2001    Shingles     15 years ago     Past Surgical History:   Procedure Laterality Date    CARDIAC CATHETERIZATION  02/2017    LMCA: Normal 0% stenosis. LAD: Mid area 50-60% stenosis. LCx: Patent proximal stent 30% stenosis distal to the stent in OM proximal area 50% stenosis. RCA: Minimal 30% mid area PDA stent  is patent <20% stenosis. PL branch diffuse disease.  CHOLECYSTECTOMY      CORONARY ANGIOPLASTY WITH STENT PLACEMENT      SPINE SURGERY          Family History   Problem Relation Age of Onset    Heart Disease Mother     Cancer Father        Social History     Socioeconomic History    Marital status:       Spouse name: Not on file    Number of children: Not on file    Years of education: Not on file    Highest education level: Not on file   Occupational History    Not on file   Social Needs    Financial resource strain: Not on file    Food insecurity:     Worry: Not on file     Inability: Not on file    Transportation needs:     Medical: Not on file     Non-medical: Not on file   Tobacco Use    Smoking status: Former Smoker     Packs/day: 3.00     Years: 30.00     Pack years: 90.00     Types: Cigarettes    Smokeless tobacco: Never Used    Tobacco comment: Quit 40 years ago   Substance and Sexual Activity    Alcohol use: No    Drug use: No    Sexual activity: Not on file   Lifestyle    Physical activity:     Days per week: Not on file     Minutes per session: Not on file    Stress: Not on file   Relationships    Social connections:     Talks on phone: Not on file     Gets normal speech, no focal findings or movement disorder noted}  Extremities - peripheral pulses normal, no pedal edema, no clubbing or cyanosis  Skin - normal coloration and turgor, no rashes, no suspicious skin lesions noted     Wt Readings from Last 3 Encounters:   08/22/19 126 lb 14.4 oz (57.6 kg)   08/19/19 127 lb 11.2 oz (57.9 kg)   07/10/19 126 lb 12.8 oz (57.5 kg)       Results for orders placed or performed during the hospital encounter of 06/17/19   Uric Acid   Result Value Ref Range    Uric Acid 7.5 (H) 2.4 - 5.7 mg/dL       No results found. Assessment:      1. Mass of upper lobe of left lung    2. Mild persistent asthma without complication    3. Simple chronic bronchitis ([address]    4. Ex-smoker    5. Lung nodule          Plan:      1. Medications reviewed, continue as ordered. 2. Refills were provided - no  3. Educated and clarified the medication use. 4. Recommend flu vaccination in the fall annually. Refuses  5. Recommendations given regarding pneumococcal vaccinations. Refuses  6. Patient is up-to-date withvaccinations from pulmonary perspective. 7. Maintain an active lifestyle. 8. Patient's questions were answered to her satisfaction. 5. Former smoker quit 40 years ago  8. Pulmonary function tests were reviewed   11.  We'll see the patient back in6 months        Electronically signed by JOANIE Olivares CNP on 8/22/2019 at 12:53 PM

## 2019-08-26 ENCOUNTER — CARE COORDINATION (OUTPATIENT)
Dept: CARE COORDINATION | Age: 84
End: 2019-08-26

## 2019-09-05 ENCOUNTER — CARE COORDINATION (OUTPATIENT)
Dept: CARE COORDINATION | Age: 84
End: 2019-09-05

## 2019-09-05 NOTE — CARE COORDINATION
Reason For Call Today:  -Attempted to reach [facility]) today to assess for Care Coordination needs for Keith Livingston to reach [place]   -Left a voicemail message requesting a return phone call back to this [facility] when able     Patient Active Problem List   Diagnosis    Diastolic CHF ([address]    Asthma    Status post coronary artery stent placement    CKD (chronic kidney disease) stage 4, GFR 15-29 ml/min (HCC)    Renal artery stenosis ([address]    Renovascular hypertension    Acute cystitis without hematuria    COPD (chronic obstructive pulmonary disease) ([address]    Acute renal failure superimposed on stage 4 chronic kidney disease ([address]    Ex-smoker    Atrial fibrillation ([address]    Panlobular emphysema ([address]    Acute respiratory failure with hypoxemia ([address]    COPD with acute exacerbation ([address]    Coronary artery disease involving native coronary artery of native heart without angina pectoris    Elevated brain natriuretic peptide (BNP) level    Acute on chronic diastolic congestive heart failure (HCC)    COPD exacerbation (HCC)    Chronic diastolic CHF (congestive heart failure) ([address]    Acute gout involving toe of right foot    Acute pain of right shoulder    Shoulder pain    Pseudogout of right shoulder    Hemarthrosis involving shoulder region, right       Lab Results   Component Value Date    LABA1C 6.3 (H) 04/11/2019     Lab Results   Component Value Date     04/11/2019       Future Appointments   Date Time Provider Olivia Deng   9/18/2019  4:30 PM JOANIE Soler - CNP [facility]   10/28/2019 10:00 AM MD Katie Senph [facility] None   2/27/2020  3:00 PM [facility]       Care Coordination Plan of Care: This nurse Care Coordinator will await call back from [place], and if no return call; will attempt to reach [place] back again next week.

## 2019-09-11 NOTE — CARE COORDINATION
Reason For Call Today:  -Attempted to reach patient/Nereida today to assess for Care Coordination   -Unable to reach today  -Left a voicemail message requesting a return phone call back to this AC when patient is able       Patient Active Problem List   Diagnosis    Diastolic CHF ([facility]    Asthma    Status post coronary artery stent placement    CKD (chronic kidney disease) stage 4, GFR 15-29 ml/min (HCC)    Renal artery stenosis ([address]    Renovascular hypertension    Acute cystitis without hematuria    COPD (chronic obstructive pulmonary disease) ([address]    Acute renal failure superimposed on stage 4 chronic kidney disease ([address]    Ex-smoker    Atrial fibrillation ([address]    Panlobular emphysema ([address]    Acute respiratory failure with hypoxemia ([facility]    COPD with acute exacerbation ([facility]    Coronary artery disease involving native coronary artery of native heart without angina pectoris    Elevated brain natriuretic peptide (BNP) level    Acute on chronic diastolic congestive heart failure (HCC)    COPD exacerbation (HCC)    Chronic diastolic CHF (congestive heart failure) ([address]    Acute gout involving toe of right foot    Acute pain of right shoulder    Shoulder pain    Pseudogout of right shoulder    Hemarthrosis involving shoulder region, right       Lab Results   Component Value Date    LABA1C 6.3 (H) 04/11/2019     Lab Results   Component Value Date     04/11/2019       Future Appointments   Date Time Provider Olivia Deng   9/18/2019  4:30 PM JOANIE Alfaro - CNP [facility]   10/28/2019 10:00 AM MD Katie Spear [facility] None   2/27/2020  3:00 PM MD Carol Carlton [facility]       Care Coordination Plan of Care: This nurse Care Coordinator will await call back from patient, and if no return call; will attempt to reach patient back again next week.

## 2019-09-27 NOTE — TELEPHONE ENCOUNTER
fibrillation ([facility]     Panlobular emphysema (HCC)     Acute respiratory failure with hypoxemia (HCC)     COPD with acute exacerbation (HCC)     Coronary artery disease involving native coronary artery of native heart without angina pectoris     Elevated brain natriuretic peptide (BNP) level     Acute on chronic diastolic congestive heart failure (HCC)     COPD exacerbation (HCC)     Chronic diastolic CHF (congestive heart failure) (HCC)     Acute gout involving toe of right foot     Acute pain of right shoulder     Shoulder pain     Pseudogout of right shoulder     Hemarthrosis involving shoulder region, right

## 2019-12-11 PROBLEM — M10.9 ACUTE GOUT INVOLVING TOE OF RIGHT FOOT: Status: RESOLVED | Noted: 2018-04-19 | Resolved: 2019-01-01

## 2019-12-11 PROBLEM — M11.211: Status: RESOLVED | Noted: 2018-07-30 | Resolved: 2019-01-01

## 2019-12-11 PROBLEM — M25.011: Status: RESOLVED | Noted: 2018-07-30 | Resolved: 2019-01-01

## 2019-12-11 PROBLEM — J44.1 COPD WITH ACUTE EXACERBATION (HCC): Status: RESOLVED | Noted: 2017-03-24 | Resolved: 2019-01-01

## 2020-01-01 ENCOUNTER — HOSPITAL ENCOUNTER (INPATIENT)
Age: 85
LOS: 2 days | Discharge: HOME | DRG: 190 | End: 2020-02-28
Attending: EMERGENCY MEDICINE | Admitting: FAMILY MEDICINE
Payer: MEDICARE

## 2020-01-01 ENCOUNTER — CARE COORDINATION (OUTPATIENT)
Dept: CASE MANAGEMENT | Age: 85
End: 2020-01-01

## 2020-01-01 ENCOUNTER — APPOINTMENT (OUTPATIENT)
Dept: GENERAL RADIOLOGY | Age: 85
DRG: 280 | End: 2020-01-01
Attending: INTERNAL MEDICINE
Payer: MEDICARE

## 2020-01-01 ENCOUNTER — HOSPITAL ENCOUNTER (INPATIENT)
Age: 85
LOS: 2 days | Discharge: HOME | DRG: 291 | End: 2020-07-28
Attending: FAMILY MEDICINE | Admitting: FAMILY MEDICINE
Payer: MEDICARE

## 2020-01-01 ENCOUNTER — APPOINTMENT (OUTPATIENT)
Dept: GENERAL RADIOLOGY | Age: 85
DRG: 190 | End: 2020-01-01
Payer: MEDICARE

## 2020-01-01 ENCOUNTER — HOSPITAL ENCOUNTER (INPATIENT)
Age: 85
LOS: 4 days | Discharge: HOME | DRG: 280 | End: 2020-03-11
Attending: INTERNAL MEDICINE | Admitting: FAMILY MEDICINE
Payer: MEDICARE

## 2020-01-01 ENCOUNTER — APPOINTMENT (OUTPATIENT)
Dept: GENERAL RADIOLOGY | Age: 85
End: 2020-01-01
Payer: MEDICARE

## 2020-01-01 ENCOUNTER — TELEPHONE (OUTPATIENT)
Dept: OTHER | Facility: CLINIC | Age: 85
End: 2020-01-01

## 2020-01-01 ENCOUNTER — HOSPITAL ENCOUNTER (EMERGENCY)
Age: 85
Discharge: HOME | End: 2020-01-26
Attending: EMERGENCY MEDICINE
Payer: MEDICARE

## 2020-01-01 ENCOUNTER — TELEPHONE (OUTPATIENT)
Dept: PULMONOLOGY | Age: 85
End: 2020-01-01

## 2020-01-01 ENCOUNTER — OFFICE VISIT (OUTPATIENT)
Dept: PRIMARY CARE CLINIC | Age: 85
End: 2020-01-01
Payer: MEDICARE

## 2020-01-01 ENCOUNTER — HOSPITAL ENCOUNTER (EMERGENCY)
Age: 85
Discharge: ANOTHER HOSPITAL | End: 2020-07-25
Attending: EMERGENCY MEDICINE
Payer: MEDICARE

## 2020-01-01 ENCOUNTER — HOSPITAL ENCOUNTER (EMERGENCY)
Age: 85
Discharge: HOME | End: 2020-07-25
Attending: EMERGENCY MEDICINE
Payer: MEDICARE

## 2020-01-01 ENCOUNTER — HOSPITAL ENCOUNTER (EMERGENCY)
Age: 85
Discharge: HOME | End: 2020-03-13
Attending: EMERGENCY MEDICINE
Payer: MEDICARE

## 2020-01-01 ENCOUNTER — HOSPITAL ENCOUNTER (EMERGENCY)
Age: 85
Discharge: HOME | End: 2020-03-02
Attending: EMERGENCY MEDICINE
Payer: MEDICARE

## 2020-01-01 ENCOUNTER — HOSPITAL ENCOUNTER (EMERGENCY)
Age: 85
Discharge: HOME | End: 2020-07-19
Attending: EMERGENCY MEDICINE
Payer: MEDICARE

## 2020-01-01 ENCOUNTER — HOSPITAL ENCOUNTER (EMERGENCY)
Age: 85
Discharge: ANOTHER HOSPITAL | End: 2020-03-07
Attending: EMERGENCY MEDICINE
Payer: MEDICARE

## 2020-01-01 VITALS
RESPIRATION RATE: 26 BRPM | TEMPERATURE: 98.9 F | OXYGEN SATURATION: 94 % | DIASTOLIC BLOOD PRESSURE: 61 MMHG | WEIGHT: 122 LBS | HEIGHT: 62 IN | HEART RATE: 76 BPM | SYSTOLIC BLOOD PRESSURE: 131 MMHG | BODY MASS INDEX: 22.45 KG/M2

## 2020-01-01 VITALS
BODY MASS INDEX: 22.39 KG/M2 | RESPIRATION RATE: 20 BRPM | DIASTOLIC BLOOD PRESSURE: 80 MMHG | HEART RATE: 76 BPM | OXYGEN SATURATION: 98 % | SYSTOLIC BLOOD PRESSURE: 139 MMHG | TEMPERATURE: 97.8 F | WEIGHT: 122.4 LBS

## 2020-01-01 VITALS
DIASTOLIC BLOOD PRESSURE: 78 MMHG | RESPIRATION RATE: 18 BRPM | WEIGHT: 122 LBS | TEMPERATURE: 98.1 F | HEIGHT: 62 IN | OXYGEN SATURATION: 93 % | HEART RATE: 70 BPM | BODY MASS INDEX: 22.45 KG/M2 | SYSTOLIC BLOOD PRESSURE: 163 MMHG

## 2020-01-01 VITALS
DIASTOLIC BLOOD PRESSURE: 71 MMHG | HEIGHT: 62 IN | WEIGHT: 122 LBS | RESPIRATION RATE: 18 BRPM | OXYGEN SATURATION: 97 % | BODY MASS INDEX: 22.45 KG/M2 | SYSTOLIC BLOOD PRESSURE: 123 MMHG | HEART RATE: 97 BPM | TEMPERATURE: 98.1 F

## 2020-01-01 VITALS
HEART RATE: 76 BPM | TEMPERATURE: 97.1 F | HEIGHT: 62 IN | WEIGHT: 120 LBS | RESPIRATION RATE: 22 BRPM | SYSTOLIC BLOOD PRESSURE: 119 MMHG | DIASTOLIC BLOOD PRESSURE: 73 MMHG | BODY MASS INDEX: 22.08 KG/M2

## 2020-01-01 VITALS
WEIGHT: 122 LBS | TEMPERATURE: 97.6 F | OXYGEN SATURATION: 97 % | DIASTOLIC BLOOD PRESSURE: 88 MMHG | HEART RATE: 91 BPM | RESPIRATION RATE: 24 BRPM | BODY MASS INDEX: 22.31 KG/M2 | SYSTOLIC BLOOD PRESSURE: 139 MMHG

## 2020-01-01 VITALS
RESPIRATION RATE: 20 BRPM | DIASTOLIC BLOOD PRESSURE: 57 MMHG | BODY MASS INDEX: 22.67 KG/M2 | OXYGEN SATURATION: 94 % | HEIGHT: 62 IN | TEMPERATURE: 97.4 F | WEIGHT: 123.2 LBS | SYSTOLIC BLOOD PRESSURE: 124 MMHG | HEART RATE: 78 BPM

## 2020-01-01 VITALS
BODY MASS INDEX: 22.31 KG/M2 | DIASTOLIC BLOOD PRESSURE: 71 MMHG | TEMPERATURE: 98.9 F | OXYGEN SATURATION: 94 % | WEIGHT: 122 LBS | RESPIRATION RATE: 16 BRPM | HEART RATE: 68 BPM | SYSTOLIC BLOOD PRESSURE: 138 MMHG

## 2020-01-01 VITALS
HEART RATE: 117 BPM | TEMPERATURE: 100.1 F | SYSTOLIC BLOOD PRESSURE: 185 MMHG | OXYGEN SATURATION: 94 % | RESPIRATION RATE: 24 BRPM | DIASTOLIC BLOOD PRESSURE: 62 MMHG

## 2020-01-01 VITALS
BODY MASS INDEX: 22.12 KG/M2 | OXYGEN SATURATION: 98 % | TEMPERATURE: 97 F | RESPIRATION RATE: 18 BRPM | WEIGHT: 120.2 LBS | SYSTOLIC BLOOD PRESSURE: 132 MMHG | HEIGHT: 62 IN | HEART RATE: 66 BPM | DIASTOLIC BLOOD PRESSURE: 53 MMHG

## 2020-01-01 VITALS
HEIGHT: 62 IN | HEART RATE: 77 BPM | SYSTOLIC BLOOD PRESSURE: 145 MMHG | RESPIRATION RATE: 16 BRPM | DIASTOLIC BLOOD PRESSURE: 63 MMHG | TEMPERATURE: 97.7 F | OXYGEN SATURATION: 95 % | BODY MASS INDEX: 21.79 KG/M2 | WEIGHT: 118.4 LBS

## 2020-01-01 VITALS
WEIGHT: 120 LBS | HEIGHT: 62 IN | SYSTOLIC BLOOD PRESSURE: 119 MMHG | OXYGEN SATURATION: 98 % | HEART RATE: 77 BPM | DIASTOLIC BLOOD PRESSURE: 56 MMHG | TEMPERATURE: 98 F | BODY MASS INDEX: 22.08 KG/M2 | RESPIRATION RATE: 13 BRPM

## 2020-01-01 LAB
-: NORMAL
ABSOLUTE EOS #: 0 K/UL (ref 0–0.44)
ABSOLUTE EOS #: 0.11 K/UL (ref 0–0.44)
ABSOLUTE EOS #: 0.62 K/UL (ref 0–0.44)
ABSOLUTE EOS #: 2.07 K/UL (ref 0–0.44)
ABSOLUTE EOS #: 2.23 K/UL (ref 0–0.44)
ABSOLUTE EOS #: <0.03 K/UL (ref 0–0.44)
ABSOLUTE IMMATURE GRANULOCYTE: 0 K/UL (ref 0–0.3)
ABSOLUTE IMMATURE GRANULOCYTE: 0.28 K/UL (ref 0–0.3)
ABSOLUTE IMMATURE GRANULOCYTE: 0.28 K/UL (ref 0–0.3)
ABSOLUTE IMMATURE GRANULOCYTE: 0.41 K/UL (ref 0–0.3)
ABSOLUTE IMMATURE GRANULOCYTE: 2.3 K/UL (ref 0–0.3)
ABSOLUTE LYMPH #: 0.46 K/UL (ref 1.1–3.7)
ABSOLUTE LYMPH #: 0.85 K/UL (ref 1.1–3.7)
ABSOLUTE LYMPH #: 1.03 K/UL (ref 1.1–3.7)
ABSOLUTE LYMPH #: 1.14 K/UL (ref 1.1–3.7)
ABSOLUTE LYMPH #: 1.57 K/UL (ref 1.1–3.7)
ABSOLUTE LYMPH #: 1.88 K/UL (ref 1.1–3.7)
ABSOLUTE LYMPH #: 2.01 K/UL (ref 1.1–3.7)
ABSOLUTE LYMPH #: 2.69 K/UL (ref 1.1–3.7)
ABSOLUTE MONO #: 0.92 K/UL (ref 0.1–1.2)
ABSOLUTE MONO #: 0.94 K/UL (ref 0.1–1.2)
ABSOLUTE MONO #: 1.15 K/UL (ref 0.1–1.2)
ABSOLUTE MONO #: 1.33 K/UL (ref 0.1–1.2)
ABSOLUTE MONO #: 1.47 K/UL (ref 0.1–1.2)
ABSOLUTE MONO #: 1.65 K/UL (ref 0.1–1.2)
ABSOLUTE MONO #: 1.98 K/UL (ref 0.1–1.2)
ABSOLUTE MONO #: 3.59 K/UL (ref 0.1–1.2)
ALBUMIN SERPL-MCNC: 2.4 G/DL (ref 3.5–5.2)
ALBUMIN SERPL-MCNC: 3.4 G/DL (ref 3.5–5.2)
ALBUMIN SERPL-MCNC: 3.6 G/DL (ref 3.5–5.2)
ALBUMIN SERPL-MCNC: 3.7 G/DL (ref 3.5–5.2)
ALBUMIN SERPL-MCNC: 4 G/DL (ref 3.5–5.2)
ALBUMIN/GLOBULIN RATIO: 0.9 (ref 1–2.5)
ALBUMIN/GLOBULIN RATIO: 1.1 (ref 1–2.5)
ALBUMIN/GLOBULIN RATIO: 1.3 (ref 1–2.5)
ALBUMIN/GLOBULIN RATIO: 1.4 (ref 1–2.5)
ALBUMIN/GLOBULIN RATIO: 1.5 (ref 1–2.5)
ALLEN TEST: ABNORMAL
ALP BLD-CCNC: 100 U/L (ref 35–104)
ALP BLD-CCNC: 66 U/L (ref 35–104)
ALP BLD-CCNC: 73 U/L (ref 35–104)
ALP BLD-CCNC: 78 U/L (ref 35–104)
ALP BLD-CCNC: 88 U/L (ref 35–104)
ALT SERPL-CCNC: 11 U/L (ref 5–33)
ALT SERPL-CCNC: 12 U/L (ref 5–33)
ALT SERPL-CCNC: 18 U/L (ref 5–33)
ALT SERPL-CCNC: 18 U/L (ref 5–33)
ALT SERPL-CCNC: 8 U/L (ref 5–33)
AMORPHOUS: NORMAL
ANION GAP SERPL CALCULATED.3IONS-SCNC: 10 MMOL/L (ref 9–17)
ANION GAP SERPL CALCULATED.3IONS-SCNC: 12 MMOL/L (ref 9–17)
ANION GAP SERPL CALCULATED.3IONS-SCNC: 12 MMOL/L (ref 9–17)
ANION GAP SERPL CALCULATED.3IONS-SCNC: 13 MEQ/L (ref 8–16)
ANION GAP SERPL CALCULATED.3IONS-SCNC: 13 MMOL/L (ref 9–17)
ANION GAP SERPL CALCULATED.3IONS-SCNC: 14 MEQ/L (ref 8–16)
ANION GAP SERPL CALCULATED.3IONS-SCNC: 14 MMOL/L (ref 9–17)
ANION GAP SERPL CALCULATED.3IONS-SCNC: 15 MEQ/L (ref 8–16)
ANION GAP SERPL CALCULATED.3IONS-SCNC: 16 MMOL/L (ref 9–17)
ANION GAP SERPL CALCULATED.3IONS-SCNC: 6 MMOL/L (ref 9–17)
AST SERPL-CCNC: 10 U/L
AST SERPL-CCNC: 13 U/L
AST SERPL-CCNC: 14 U/L
AST SERPL-CCNC: 14 U/L
AST SERPL-CCNC: 29 U/L
BACTERIA: NORMAL
BACTERIA: NORMAL
BASOPHILS # BLD: 0 % (ref 0–2)
BASOPHILS # BLD: 2 % (ref 0–2)
BASOPHILS ABSOLUTE: 0 K/UL (ref 0–0.2)
BASOPHILS ABSOLUTE: 0.03 K/UL (ref 0–0.2)
BASOPHILS ABSOLUTE: 0.06 K/UL (ref 0–0.2)
BASOPHILS ABSOLUTE: 0.26 K/UL (ref 0–0.2)
BILIRUB SERPL-MCNC: 0.28 MG/DL (ref 0.3–1.2)
BILIRUB SERPL-MCNC: 0.35 MG/DL (ref 0.3–1.2)
BILIRUB SERPL-MCNC: 0.49 MG/DL (ref 0.3–1.2)
BILIRUB SERPL-MCNC: <0.1 MG/DL (ref 0.3–1.2)
BILIRUB SERPL-MCNC: <0.1 MG/DL (ref 0.3–1.2)
BILIRUBIN URINE: NEGATIVE
BLOOD, URINE: NEGATIVE
BNP INTERPRETATION: ABNORMAL
BUN BLDV-MCNC: 29 MG/DL (ref 8–23)
BUN BLDV-MCNC: 30 MG/DL (ref 8–23)
BUN BLDV-MCNC: 30 MG/DL (ref 8–23)
BUN BLDV-MCNC: 34 MG/DL (ref 8–23)
BUN BLDV-MCNC: 37 MG/DL (ref 8–23)
BUN BLDV-MCNC: 39 MG/DL (ref 8–23)
BUN BLDV-MCNC: 39 MG/DL (ref 8–23)
BUN BLDV-MCNC: 43 MG/DL (ref 8–23)
BUN BLDV-MCNC: 47 MG/DL (ref 8–23)
BUN BLDV-MCNC: 48 MG/DL (ref 7–22)
BUN BLDV-MCNC: 48 MG/DL (ref 8–23)
BUN BLDV-MCNC: 50 MG/DL (ref 8–23)
BUN BLDV-MCNC: 64 MG/DL (ref 7–22)
BUN BLDV-MCNC: 65 MG/DL (ref 7–22)
BUN/CREAT BLD: 14 (ref 9–20)
BUN/CREAT BLD: 16 (ref 9–20)
BUN/CREAT BLD: 17 (ref 9–20)
BUN/CREAT BLD: 17 (ref 9–20)
BUN/CREAT BLD: 20 (ref 9–20)
BUN/CREAT BLD: 22 (ref 9–20)
BUN/CREAT BLD: 23 (ref 9–20)
BUN/CREAT BLD: ABNORMAL (ref 9–20)
CALCIUM SERPL-MCNC: 8.3 MG/DL (ref 8.5–10.5)
CALCIUM SERPL-MCNC: 8.3 MG/DL (ref 8.6–10.4)
CALCIUM SERPL-MCNC: 8.4 MG/DL (ref 8.6–10.4)
CALCIUM SERPL-MCNC: 8.6 MG/DL (ref 8.5–10.5)
CALCIUM SERPL-MCNC: 8.7 MG/DL (ref 8.5–10.5)
CALCIUM SERPL-MCNC: 8.7 MG/DL (ref 8.6–10.4)
CALCIUM SERPL-MCNC: 8.8 MG/DL (ref 8.6–10.4)
CALCIUM SERPL-MCNC: 9 MG/DL (ref 8.6–10.4)
CALCIUM SERPL-MCNC: 9 MG/DL (ref 8.6–10.4)
CALCIUM SERPL-MCNC: 9.2 MG/DL (ref 8.6–10.4)
CALCIUM SERPL-MCNC: 9.3 MG/DL (ref 8.6–10.4)
CALCIUM SERPL-MCNC: 9.5 MG/DL (ref 8.6–10.4)
CALCIUM SERPL-MCNC: 9.6 MG/DL (ref 8.6–10.4)
CALCIUM SERPL-MCNC: 9.6 MG/DL (ref 8.6–10.4)
CARBOXYHEMOGLOBIN: ABNORMAL % (ref 0–5)
CASTS UA: NORMAL /LPF (ref 0–8)
CASTS: NORMAL /LPF
CASTS: NORMAL /LPF
CHARACTER, URINE: NORMAL
CHLORIDE BLD-SCNC: 100 MMOL/L (ref 98–107)
CHLORIDE BLD-SCNC: 101 MMOL/L (ref 98–107)
CHLORIDE BLD-SCNC: 103 MMOL/L (ref 98–107)
CHLORIDE BLD-SCNC: 103 MMOL/L (ref 98–107)
CHLORIDE BLD-SCNC: 104 MEQ/L (ref 98–111)
CHLORIDE BLD-SCNC: 104 MEQ/L (ref 98–111)
CHLORIDE BLD-SCNC: 104 MMOL/L (ref 98–107)
CHLORIDE BLD-SCNC: 106 MEQ/L (ref 98–111)
CHLORIDE BLD-SCNC: 107 MMOL/L (ref 98–107)
CHLORIDE BLD-SCNC: 108 MMOL/L (ref 98–107)
CHLORIDE BLD-SCNC: 99 MMOL/L (ref 98–107)
CHLORIDE BLD-SCNC: 99 MMOL/L (ref 98–107)
CHOLESTEROL, TOTAL: 141 MG/DL (ref 100–199)
CHOLESTEROL/HDL RATIO: 1.9
CHOLESTEROL: 122 MG/DL
CO2: 21 MEQ/L (ref 23–33)
CO2: 21 MEQ/L (ref 23–33)
CO2: 21 MMOL/L (ref 20–31)
CO2: 22 MMOL/L (ref 20–31)
CO2: 22 MMOL/L (ref 20–31)
CO2: 23 MEQ/L (ref 23–33)
CO2: 23 MMOL/L (ref 20–31)
CO2: 24 MMOL/L (ref 20–31)
CO2: 25 MMOL/L (ref 20–31)
CO2: 26 MMOL/L (ref 20–31)
COLOR: YELLOW
COMMENT UA: ABNORMAL
COMMENT UA: NORMAL
CREAT SERPL-MCNC: 1.69 MG/DL (ref 0.5–0.9)
CREAT SERPL-MCNC: 1.7 MG/DL (ref 0.5–0.9)
CREAT SERPL-MCNC: 1.74 MG/DL (ref 0.5–0.9)
CREAT SERPL-MCNC: 1.8 MG/DL (ref 0.5–0.9)
CREAT SERPL-MCNC: 1.8 MG/DL (ref 0.5–0.9)
CREAT SERPL-MCNC: 1.9 MG/DL (ref 0.5–0.9)
CREAT SERPL-MCNC: 2.02 MG/DL (ref 0.5–0.9)
CREAT SERPL-MCNC: 2.03 MG/DL (ref 0.5–0.9)
CREAT SERPL-MCNC: 2.05 MG/DL (ref 0.5–0.9)
CREAT SERPL-MCNC: 2.06 MG/DL (ref 0.5–0.9)
CREAT SERPL-MCNC: 2.1 MG/DL (ref 0.4–1.2)
CREAT SERPL-MCNC: 2.32 MG/DL (ref 0.5–0.9)
CREAT SERPL-MCNC: 2.6 MG/DL (ref 0.4–1.2)
CREAT SERPL-MCNC: 2.9 MG/DL (ref 0.4–1.2)
CRYSTALS, UA: NORMAL /HPF
CRYSTALS: NORMAL
CULTURE: NO GROWTH
CULTURE: NORMAL
DIFFERENTIAL TYPE: ABNORMAL
DIRECT EXAM: NORMAL
EKG ATRIAL RATE: 102 BPM
EKG ATRIAL RATE: 49 BPM
EKG ATRIAL RATE: 76 BPM
EKG ATRIAL RATE: 82 BPM
EKG ATRIAL RATE: 84 BPM
EKG ATRIAL RATE: 94 BPM
EKG ATRIAL RATE: 97 BPM
EKG P AXIS: 65 DEGREES
EKG P AXIS: 78 DEGREES
EKG P AXIS: 83 DEGREES
EKG P AXIS: 87 DEGREES
EKG P-R INTERVAL: 174 MS
EKG P-R INTERVAL: 178 MS
EKG P-R INTERVAL: 182 MS
EKG P-R INTERVAL: 204 MS
EKG Q-T INTERVAL: 292 MS
EKG Q-T INTERVAL: 350 MS
EKG Q-T INTERVAL: 358 MS
EKG Q-T INTERVAL: 362 MS
EKG Q-T INTERVAL: 374 MS
EKG Q-T INTERVAL: 388 MS
EKG Q-T INTERVAL: 446 MS
EKG QRS DURATION: 68 MS
EKG QRS DURATION: 70 MS
EKG QRS DURATION: 74 MS
EKG QRS DURATION: 74 MS
EKG QRS DURATION: 84 MS
EKG QRS DURATION: 86 MS
EKG QRS DURATION: 86 MS
EKG QTC CALCULATION (BAZETT): 376 MS
EKG QTC CALCULATION (BAZETT): 427 MS
EKG QTC CALCULATION (BAZETT): 436 MS
EKG QTC CALCULATION (BAZETT): 436 MS
EKG QTC CALCULATION (BAZETT): 444 MS
EKG QTC CALCULATION (BAZETT): 466 MS
EKG QTC CALCULATION (BAZETT): 469 MS
EKG R AXIS: -30 DEGREES
EKG R AXIS: -40 DEGREES
EKG R AXIS: -43 DEGREES
EKG R AXIS: -50 DEGREES
EKG R AXIS: -53 DEGREES
EKG R AXIS: -62 DEGREES
EKG R AXIS: -81 DEGREES
EKG T AXIS: 101 DEGREES
EKG T AXIS: 76 DEGREES
EKG T AXIS: 78 DEGREES
EKG T AXIS: 79 DEGREES
EKG T AXIS: 84 DEGREES
EKG T AXIS: 85 DEGREES
EKG T AXIS: 96 DEGREES
EKG VENTRICULAR RATE: 102 BPM
EKG VENTRICULAR RATE: 155 BPM
EKG VENTRICULAR RATE: 43 BPM
EKG VENTRICULAR RATE: 76 BPM
EKG VENTRICULAR RATE: 82 BPM
EKG VENTRICULAR RATE: 84 BPM
EKG VENTRICULAR RATE: 97 BPM
EOSINOPHILS RELATIVE PERCENT: 0 % (ref 1–4)
EOSINOPHILS RELATIVE PERCENT: 1 % (ref 1–4)
EOSINOPHILS RELATIVE PERCENT: 17 % (ref 1–4)
EOSINOPHILS RELATIVE PERCENT: 18 % (ref 1–4)
EOSINOPHILS RELATIVE PERCENT: 6 % (ref 1–4)
EPITHELIAL CELLS UA: NORMAL /HPF (ref 0–5)
EPITHELIAL CELLS, UA: NORMAL /HPF
ERYTHROCYTE [DISTWIDTH] IN BLOOD BY AUTOMATED COUNT: 14 % (ref 11.5–14.5)
ERYTHROCYTE [DISTWIDTH] IN BLOOD BY AUTOMATED COUNT: 14 % (ref 11.5–14.5)
ERYTHROCYTE [DISTWIDTH] IN BLOOD BY AUTOMATED COUNT: 14.1 % (ref 11.5–14.5)
ERYTHROCYTE [DISTWIDTH] IN BLOOD BY AUTOMATED COUNT: 47.9 FL (ref 35–45)
ERYTHROCYTE [DISTWIDTH] IN BLOOD BY AUTOMATED COUNT: 48 FL (ref 35–45)
ERYTHROCYTE [DISTWIDTH] IN BLOOD BY AUTOMATED COUNT: 50.3 FL (ref 35–45)
FIO2: 32
FIO2: ABNORMAL
FIO2: ABNORMAL
GFR AFRICAN AMERICAN: 24 ML/MIN
GFR AFRICAN AMERICAN: 27 ML/MIN
GFR AFRICAN AMERICAN: 28 ML/MIN
GFR AFRICAN AMERICAN: 30 ML/MIN
GFR AFRICAN AMERICAN: 32 ML/MIN
GFR AFRICAN AMERICAN: 32 ML/MIN
GFR AFRICAN AMERICAN: 33 ML/MIN
GFR AFRICAN AMERICAN: 34 ML/MIN
GFR AFRICAN AMERICAN: 34 ML/MIN
GFR NON-AFRICAN AMERICAN: 20 ML/MIN
GFR NON-AFRICAN AMERICAN: 23 ML/MIN
GFR NON-AFRICAN AMERICAN: 25 ML/MIN
GFR NON-AFRICAN AMERICAN: 26 ML/MIN
GFR NON-AFRICAN AMERICAN: 26 ML/MIN
GFR NON-AFRICAN AMERICAN: 27 ML/MIN
GFR NON-AFRICAN AMERICAN: 28 ML/MIN
GFR NON-AFRICAN AMERICAN: 28 ML/MIN
GFR SERPL CREATININE-BSD FRML MDRD: 15 ML/MIN/1.73M2
GFR SERPL CREATININE-BSD FRML MDRD: 17 ML/MIN/1.73M2
GFR SERPL CREATININE-BSD FRML MDRD: 22 ML/MIN/1.73M2
GFR SERPL CREATININE-BSD FRML MDRD: ABNORMAL ML/MIN/{1.73_M2}
GLUCOSE BLD-MCNC: 108 MG/DL (ref 70–99)
GLUCOSE BLD-MCNC: 112 MG/DL (ref 70–99)
GLUCOSE BLD-MCNC: 115 MG/DL (ref 70–99)
GLUCOSE BLD-MCNC: 119 MG/DL (ref 70–99)
GLUCOSE BLD-MCNC: 131 MG/DL (ref 70–99)
GLUCOSE BLD-MCNC: 140 MG/DL (ref 70–99)
GLUCOSE BLD-MCNC: 142 MG/DL (ref 70–108)
GLUCOSE BLD-MCNC: 177 MG/DL (ref 70–99)
GLUCOSE BLD-MCNC: 182 MG/DL (ref 70–108)
GLUCOSE BLD-MCNC: 206 MG/DL (ref 70–99)
GLUCOSE BLD-MCNC: 207 MG/DL (ref 70–99)
GLUCOSE BLD-MCNC: 209 MG/DL (ref 70–99)
GLUCOSE BLD-MCNC: 223 MG/DL (ref 70–108)
GLUCOSE BLD-MCNC: 280 MG/DL (ref 65–105)
GLUCOSE BLD-MCNC: 95 MG/DL (ref 70–99)
GLUCOSE URINE: NEGATIVE
GLUCOSE URINE: NEGATIVE
GLUCOSE, URINE: NEGATIVE MG/DL
HCO3 ARTERIAL: 18.5 MMOL/L (ref 22–26)
HCO3 VENOUS: 25.6 MMOL/L (ref 24–30)
HCO3 VENOUS: 28.1 MMOL/L (ref 24–30)
HCT VFR BLD CALC: 35.7 % (ref 36.3–47.1)
HCT VFR BLD CALC: 36.1 % (ref 37–47)
HCT VFR BLD CALC: 36.3 % (ref 36.3–47.1)
HCT VFR BLD CALC: 36.5 % (ref 37–47)
HCT VFR BLD CALC: 36.8 % (ref 36.3–47.1)
HCT VFR BLD CALC: 37.6 % (ref 37–47)
HCT VFR BLD CALC: 38 % (ref 36.3–47.1)
HCT VFR BLD CALC: 38.2 % (ref 36.3–47.1)
HCT VFR BLD CALC: 39.2 % (ref 36.3–47.1)
HCT VFR BLD CALC: 41.3 % (ref 36.3–47.1)
HCT VFR BLD CALC: 42.3 % (ref 36.3–47.1)
HCT VFR BLD CALC: 42.8 % (ref 36.3–47.1)
HCT VFR BLD CALC: 42.8 % (ref 36.3–47.1)
HCT VFR BLD CALC: 44.1 % (ref 36.3–47.1)
HDLC SERPL-MCNC: 62 MG/DL
HDLC SERPL-MCNC: 63 MG/DL
HEMOGLOBIN: 11 GM/DL (ref 12–16)
HEMOGLOBIN: 11.2 G/DL (ref 11.9–15.1)
HEMOGLOBIN: 11.3 G/DL (ref 11.9–15.1)
HEMOGLOBIN: 11.4 GM/DL (ref 12–16)
HEMOGLOBIN: 11.6 G/DL (ref 11.9–15.1)
HEMOGLOBIN: 11.7 G/DL (ref 11.9–15.1)
HEMOGLOBIN: 11.9 G/DL (ref 11.9–15.1)
HEMOGLOBIN: 12.1 G/DL (ref 11.9–15.1)
HEMOGLOBIN: 12.3 GM/DL (ref 12–16)
HEMOGLOBIN: 12.9 G/DL (ref 11.9–15.1)
HEMOGLOBIN: 13.1 G/DL (ref 11.9–15.1)
HEMOGLOBIN: 13.2 G/DL (ref 11.9–15.1)
HEMOGLOBIN: 13.3 G/DL (ref 11.9–15.1)
HEMOGLOBIN: 13.6 G/DL (ref 11.9–15.1)
IMMATURE GRANULOCYTES: 0 %
IMMATURE GRANULOCYTES: 1 %
IMMATURE GRANULOCYTES: 2 %
INR BLD: 0.9 (ref 0.9–1.2)
INR BLD: 0.9 (ref 0.9–1.2)
INR BLD: 1
KETONES, URINE: NEGATIVE
LACTIC ACID, SEPSIS WHOLE BLOOD: NORMAL MMOL/L (ref 0.5–1.9)
LACTIC ACID, SEPSIS: 0.9 MMOL/L (ref 0.5–1.9)
LACTIC ACID, WHOLE BLOOD: NORMAL MMOL/L (ref 0.7–2.1)
LACTIC ACID: 1.2 MMOL/L (ref 0.5–2.2)
LDL CHOLESTEROL CALCULATED: 61 MG/DL
LDL CHOLESTEROL: 42 MG/DL (ref 0–130)
LEUKOCYTE EST, POC: NEGATIVE
LEUKOCYTE ESTERASE, URINE: ABNORMAL
LEUKOCYTE ESTERASE, URINE: NEGATIVE
LIPASE: 23 U/L (ref 13–60)
LIPASE: 53 U/L (ref 13–60)
LV EF: 53 %
LVEF MODALITY: NORMAL
LYMPHOCYTES # BLD: 11 % (ref 24–43)
LYMPHOCYTES # BLD: 11 % (ref 24–43)
LYMPHOCYTES # BLD: 12 % (ref 24–43)
LYMPHOCYTES # BLD: 12 % (ref 24–43)
LYMPHOCYTES # BLD: 3 % (ref 24–43)
LYMPHOCYTES # BLD: 4 % (ref 24–43)
LYMPHOCYTES # BLD: 5 % (ref 24–43)
LYMPHOCYTES # BLD: 9 % (ref 24–43)
Lab: NORMAL
MAGNESIUM: 1.7 MG/DL (ref 1.6–2.4)
MAGNESIUM: 1.9 MG/DL (ref 1.6–2.4)
MAGNESIUM: 2 MG/DL (ref 1.6–2.6)
MAGNESIUM: 2.1 MG/DL (ref 1.6–2.4)
MCH RBC QN AUTO: 29.2 PG (ref 25.2–33.5)
MCH RBC QN AUTO: 29.2 PG (ref 26–33)
MCH RBC QN AUTO: 29.3 PG (ref 25.2–33.5)
MCH RBC QN AUTO: 29.5 PG (ref 25.2–33.5)
MCH RBC QN AUTO: 29.6 PG (ref 26–33)
MCH RBC QN AUTO: 29.7 PG (ref 25.2–33.5)
MCH RBC QN AUTO: 29.7 PG (ref 25.2–33.5)
MCH RBC QN AUTO: 29.8 PG (ref 25.2–33.5)
MCH RBC QN AUTO: 29.8 PG (ref 25.2–33.5)
MCH RBC QN AUTO: 29.9 PG (ref 25.2–33.5)
MCH RBC QN AUTO: 30 PG (ref 25.2–33.5)
MCH RBC QN AUTO: 30.4 PG (ref 26–33)
MCH RBC QN AUTO: 30.6 PG (ref 25.2–33.5)
MCH RBC QN AUTO: 30.7 PG (ref 25.2–33.5)
MCHC RBC AUTO-ENTMCNC: 30.5 GM/DL (ref 32.2–35.5)
MCHC RBC AUTO-ENTMCNC: 30.6 G/DL (ref 28.4–34.8)
MCHC RBC AUTO-ENTMCNC: 30.8 G/DL (ref 28.4–34.8)
MCHC RBC AUTO-ENTMCNC: 30.8 G/DL (ref 28.4–34.8)
MCHC RBC AUTO-ENTMCNC: 30.9 G/DL (ref 28.4–34.8)
MCHC RBC AUTO-ENTMCNC: 30.9 G/DL (ref 28.4–34.8)
MCHC RBC AUTO-ENTMCNC: 31 G/DL (ref 28.4–34.8)
MCHC RBC AUTO-ENTMCNC: 31.1 G/DL (ref 28.4–34.8)
MCHC RBC AUTO-ENTMCNC: 31.2 G/DL (ref 28.4–34.8)
MCHC RBC AUTO-ENTMCNC: 31.2 GM/DL (ref 32.2–35.5)
MCHC RBC AUTO-ENTMCNC: 31.3 G/DL (ref 28.4–34.8)
MCHC RBC AUTO-ENTMCNC: 31.5 G/DL (ref 28.4–34.8)
MCHC RBC AUTO-ENTMCNC: 31.7 G/DL (ref 28.4–34.8)
MCHC RBC AUTO-ENTMCNC: 32.7 GM/DL (ref 32.2–35.5)
MCV RBC AUTO: 93.1 FL (ref 81–99)
MCV RBC AUTO: 93.1 FL (ref 82.6–102.9)
MCV RBC AUTO: 93.6 FL (ref 81–99)
MCV RBC AUTO: 95 FL (ref 82.6–102.9)
MCV RBC AUTO: 95.4 FL (ref 82.6–102.9)
MCV RBC AUTO: 95.5 FL (ref 82.6–102.9)
MCV RBC AUTO: 95.9 FL (ref 82.6–102.9)
MCV RBC AUTO: 96.2 FL (ref 82.6–102.9)
MCV RBC AUTO: 96.3 FL (ref 82.6–102.9)
MCV RBC AUTO: 97 FL (ref 81–99)
MCV RBC AUTO: 97 FL (ref 82.6–102.9)
MCV RBC AUTO: 97.1 FL (ref 82.6–102.9)
MCV RBC AUTO: 97.2 FL (ref 82.6–102.9)
MCV RBC AUTO: 97.3 FL (ref 82.6–102.9)
METHEMOGLOBIN: ABNORMAL % (ref 0–1.9)
MISCELLANEOUS LAB TEST RESULT: NORMAL
MODE: ABNORMAL
MONOCYTES # BLD: 10 % (ref 3–12)
MONOCYTES # BLD: 12 % (ref 3–12)
MONOCYTES # BLD: 7 % (ref 3–12)
MONOCYTES # BLD: 8 % (ref 3–12)
MONOCYTES # BLD: 9 % (ref 3–12)
MONOCYTES # BLD: 9 % (ref 3–12)
MORPHOLOGY: NORMAL
MRSA SCREEN RT-PCR: NEGATIVE
MRSA SCREEN: NORMAL
MUCUS: NORMAL
NEGATIVE BASE EXCESS, ART: 3.6 MMOL/L (ref 0–2)
NEGATIVE BASE EXCESS, VEN: 2.1 MMOL/L (ref 0–2)
NEGATIVE BASE EXCESS, VEN: ABNORMAL MMOL/L (ref 0–2)
NITRITE, URINE: NEGATIVE
NOTIFICATION TIME: ABNORMAL
NOTIFICATION: ABNORMAL
NRBC AUTOMATED: 0 PER 100 WBC
O2 DEVICE/FLOW/%: ABNORMAL
O2 SAT, ARTERIAL: 98.4 % (ref 95–98)
O2 SAT, VEN: 63.3 % (ref 60–85)
O2 SAT, VEN: 81.4 % (ref 60–85)
OTHER OBSERVATIONS UA: NORMAL
OXYHEMOGLOBIN: ABNORMAL % (ref 95–98)
PARTIAL THROMBOPLASTIN TIME: 23.6 SEC (ref 23.2–34.4)
PARTIAL THROMBOPLASTIN TIME: 25 SEC (ref 23.2–34.4)
PARTIAL THROMBOPLASTIN TIME: 26.8 SEC (ref 23.2–34.4)
PARTIAL THROMBOPLASTIN TIME: 38.4 SEC (ref 20.5–30.5)
PARTIAL THROMBOPLASTIN TIME: 46.8 SEC (ref 20.5–30.5)
PARTIAL THROMBOPLASTIN TIME: 47.7 SEC (ref 20.5–30.5)
PARTIAL THROMBOPLASTIN TIME: 79.3 SEC (ref 20.5–30.5)
PATIENT TEMP: 37
PATIENT TEMP: 37
PATIENT TEMP: ABNORMAL
PCO2 ARTERIAL: 26.6 MMHG (ref 35–45)
PCO2, ART, TEMP ADJ: ABNORMAL (ref 35–45)
PCO2, VEN, TEMP ADJ: ABNORMAL MMHG (ref 39–55)
PCO2, VEN, TEMP ADJ: ABNORMAL MMHG (ref 39–55)
PCO2, VEN: 45.4 (ref 39–55)
PCO2, VEN: 55.7 (ref 39–55)
PDW BLD-RTO: 13.7 % (ref 11.8–14.4)
PDW BLD-RTO: 13.7 % (ref 11.8–14.4)
PDW BLD-RTO: 13.8 % (ref 11.8–14.4)
PDW BLD-RTO: 13.8 % (ref 11.8–14.4)
PDW BLD-RTO: 14 % (ref 11.8–14.4)
PDW BLD-RTO: 14.1 % (ref 11.8–14.4)
PDW BLD-RTO: 14.3 % (ref 11.8–14.4)
PDW BLD-RTO: 14.4 % (ref 11.8–14.4)
PDW BLD-RTO: 14.5 % (ref 11.8–14.4)
PEEP/CPAP: ABNORMAL
PH ARTERIAL: 7.46 (ref 7.35–7.45)
PH UA: 5 (ref 5–8)
PH UA: 5 (ref 5–9)
PH UA: 5.5 (ref 5–9)
PH VENOUS: 7.28 (ref 7.32–7.42)
PH VENOUS: 7.41 (ref 7.32–7.42)
PH, ART, TEMP ADJ: ABNORMAL (ref 7.35–7.45)
PH, VEN, TEMP ADJ: ABNORMAL (ref 7.32–7.42)
PH, VEN, TEMP ADJ: ABNORMAL (ref 7.32–7.42)
PLATELET # BLD: 191 K/UL (ref 138–453)
PLATELET # BLD: 193 K/UL (ref 138–453)
PLATELET # BLD: 219 K/UL (ref 138–453)
PLATELET # BLD: 219 K/UL (ref 138–453)
PLATELET # BLD: 228 K/UL (ref 138–453)
PLATELET # BLD: 229 THOU/MM3 (ref 130–400)
PLATELET # BLD: 240 K/UL (ref 138–453)
PLATELET # BLD: 243 K/UL (ref 138–453)
PLATELET # BLD: 246 K/UL (ref 138–453)
PLATELET # BLD: 251 K/UL (ref 138–453)
PLATELET # BLD: 252 K/UL (ref 138–453)
PLATELET # BLD: 259 THOU/MM3 (ref 130–400)
PLATELET # BLD: 262 K/UL (ref 138–453)
PLATELET # BLD: 266 THOU/MM3 (ref 130–400)
PLATELET # BLD: 277 K/UL (ref 138–453)
PLATELET ESTIMATE: ABNORMAL
PMV BLD AUTO: 10.2 FL (ref 8.1–13.5)
PMV BLD AUTO: 10.2 FL (ref 8.1–13.5)
PMV BLD AUTO: 10.3 FL (ref 8.1–13.5)
PMV BLD AUTO: 10.4 FL (ref 9.4–12.4)
PMV BLD AUTO: 10.5 FL (ref 8.1–13.5)
PMV BLD AUTO: 10.7 FL (ref 9.4–12.4)
PMV BLD AUTO: 10.8 FL (ref 8.1–13.5)
PMV BLD AUTO: 10.9 FL (ref 8.1–13.5)
PMV BLD AUTO: 10.9 FL (ref 9.4–12.4)
PMV BLD AUTO: 11 FL (ref 8.1–13.5)
PMV BLD AUTO: 11.2 FL (ref 8.1–13.5)
PMV BLD AUTO: 11.4 FL (ref 8.1–13.5)
PO2 ARTERIAL: 111.3 MMHG (ref 80–100)
PO2, ART, TEMP ADJ: ABNORMAL MMHG (ref 80–100)
PO2, VEN, TEMP ADJ: ABNORMAL MMHG (ref 30–50)
PO2, VEN, TEMP ADJ: ABNORMAL MMHG (ref 30–50)
PO2, VEN: 37.3 (ref 30–50)
PO2, VEN: 45.2 (ref 30–50)
POSITIVE BASE EXCESS, ART: ABNORMAL MMOL/L (ref 0–2)
POSITIVE BASE EXCESS, VEN: 2.9 MMOL/L (ref 0–2)
POSITIVE BASE EXCESS, VEN: ABNORMAL MMOL/L (ref 0–2)
POTASSIUM SERPL-SCNC: 3.7 MEQ/L (ref 3.5–5.2)
POTASSIUM SERPL-SCNC: 3.9 MEQ/L (ref 3.5–5.2)
POTASSIUM SERPL-SCNC: 4.1 MMOL/L (ref 3.7–5.3)
POTASSIUM SERPL-SCNC: 4.1 MMOL/L (ref 3.7–5.3)
POTASSIUM SERPL-SCNC: 4.3 MEQ/L (ref 3.5–5.2)
POTASSIUM SERPL-SCNC: 4.3 MMOL/L (ref 3.7–5.3)
POTASSIUM SERPL-SCNC: 4.3 MMOL/L (ref 3.7–5.3)
POTASSIUM SERPL-SCNC: 4.5 MMOL/L (ref 3.7–5.3)
POTASSIUM SERPL-SCNC: 4.5 MMOL/L (ref 3.7–5.3)
POTASSIUM SERPL-SCNC: 4.6 MMOL/L (ref 3.7–5.3)
POTASSIUM SERPL-SCNC: 4.6 MMOL/L (ref 3.7–5.3)
POTASSIUM SERPL-SCNC: 4.7 MMOL/L (ref 3.7–5.3)
POTASSIUM SERPL-SCNC: 5.1 MMOL/L (ref 3.7–5.3)
POTASSIUM SERPL-SCNC: 5.4 MMOL/L (ref 3.7–5.3)
PRO-BNP: 2296 PG/ML
PRO-BNP: 3609 PG/ML
PRO-BNP: 3978 PG/ML
PRO-BNP: 5499 PG/ML
PRO-BNP: 8556 PG/ML
PRO-BNP: 870 PG/ML
PRO-BNP: 9068 PG/ML
PRO-BNP: ABNORMAL PG/ML
PROCALCITONIN: 0.46 NG/ML (ref 0.01–0.09)
PROTEIN UA: NEGATIVE
PROTEIN UA: NEGATIVE
PROTEIN UA: NEGATIVE MG/DL
PROTHROMBIN TIME: 13 SEC (ref 11.8–14.6)
PROTHROMBIN TIME: 9.4 SEC (ref 9.7–12.2)
PROTHROMBIN TIME: 9.5 SEC (ref 9.7–12.2)
PSV: ABNORMAL
PT. POSITION: ABNORMAL
RBC # BLD: 3.68 M/UL (ref 3.95–5.11)
RBC # BLD: 3.72 MILL/MM3 (ref 4.2–5.4)
RBC # BLD: 3.73 M/UL (ref 3.95–5.11)
RBC # BLD: 3.79 M/UL (ref 3.95–5.11)
RBC # BLD: 3.9 MILL/MM3 (ref 4.2–5.4)
RBC # BLD: 3.93 M/UL (ref 3.95–5.11)
RBC # BLD: 4.04 MILL/MM3 (ref 4.2–5.4)
RBC # BLD: 4.07 M/UL (ref 3.95–5.11)
RBC # BLD: 4.08 M/UL (ref 3.95–5.11)
RBC # BLD: 4.33 M/UL (ref 3.95–5.11)
RBC # BLD: 4.41 M/UL (ref 3.95–5.11)
RBC # BLD: 4.45 M/UL (ref 3.95–5.11)
RBC # BLD: 4.48 M/UL (ref 3.95–5.11)
RBC # BLD: 4.64 M/UL (ref 3.95–5.11)
RBC # BLD: ABNORMAL 10*6/UL
RBC UA: NORMAL /HPF (ref 0–4)
RBC URINE: NORMAL /HPF
REASON FOR REJECTION: NORMAL
REJECTED TEST: NORMAL
RENAL EPITHELIAL, UA: NORMAL
RENAL EPITHELIAL, UA: NORMAL /HPF
RESPIRATORY RATE: 16
RESPIRATORY RATE: 24
RESPIRATORY RATE: ABNORMAL
SAMPLE SITE: ABNORMAL
SARS-COV-2, PCR: NORMAL
SARS-COV-2, RAPID: NOT DETECTED
SARS-COV-2: NORMAL
SEG NEUTROPHILS: 62 % (ref 36–65)
SEG NEUTROPHILS: 68 % (ref 36–65)
SEG NEUTROPHILS: 74 % (ref 36–65)
SEG NEUTROPHILS: 77 % (ref 36–65)
SEG NEUTROPHILS: 79 % (ref 36–65)
SEG NEUTROPHILS: 79 % (ref 36–65)
SEG NEUTROPHILS: 85 % (ref 36–65)
SEG NEUTROPHILS: 89 % (ref 36–65)
SEGMENTED NEUTROPHILS ABSOLUTE COUNT: 12.39 K/UL (ref 1.5–8.1)
SEGMENTED NEUTROPHILS ABSOLUTE COUNT: 14.33 K/UL (ref 1.5–8.1)
SEGMENTED NEUTROPHILS ABSOLUTE COUNT: 17.51 K/UL (ref 1.5–8.1)
SEGMENTED NEUTROPHILS ABSOLUTE COUNT: 23.62 K/UL (ref 1.5–8.1)
SEGMENTED NEUTROPHILS ABSOLUTE COUNT: 25.19 K/UL (ref 1.5–8.1)
SEGMENTED NEUTROPHILS ABSOLUTE COUNT: 7.7 K/UL (ref 1.5–8.1)
SEGMENTED NEUTROPHILS ABSOLUTE COUNT: 7.82 K/UL (ref 1.5–8.1)
SEGMENTED NEUTROPHILS ABSOLUTE COUNT: 8.12 K/UL (ref 1.5–8.1)
SET RATE: ABNORMAL
SODIUM BLD-SCNC: 133 MMOL/L (ref 135–144)
SODIUM BLD-SCNC: 134 MMOL/L (ref 135–144)
SODIUM BLD-SCNC: 135 MMOL/L (ref 135–144)
SODIUM BLD-SCNC: 137 MMOL/L (ref 135–144)
SODIUM BLD-SCNC: 138 MEQ/L (ref 135–145)
SODIUM BLD-SCNC: 138 MMOL/L (ref 135–144)
SODIUM BLD-SCNC: 139 MMOL/L (ref 135–144)
SODIUM BLD-SCNC: 141 MEQ/L (ref 135–145)
SODIUM BLD-SCNC: 142 MEQ/L (ref 135–145)
SODIUM BLD-SCNC: 142 MMOL/L (ref 135–144)
SOURCE: NORMAL
SPECIFIC GRAVITY UA: 1.01 (ref 1.01–1.02)
SPECIFIC GRAVITY UA: 1.01 (ref 1–1.03)
SPECIFIC GRAVITY UA: 1.01 (ref 1–1.03)
SPECIMEN DESCRIPTION: NORMAL
TEXT FOR RESPIRATORY: ABNORMAL
TOTAL HB: ABNORMAL G/DL (ref 12–16)
TOTAL PROTEIN: 5.1 G/DL (ref 6.4–8.3)
TOTAL PROTEIN: 6 G/DL (ref 6.4–8.3)
TOTAL PROTEIN: 6.1 G/DL (ref 6.4–8.3)
TOTAL PROTEIN: 6.8 G/DL (ref 6.4–8.3)
TOTAL PROTEIN: 6.9 G/DL (ref 6.4–8.3)
TOTAL RATE: ABNORMAL
TRICHOMONAS: NORMAL
TRIGL SERPL-MCNC: 87 MG/DL
TRIGL SERPL-MCNC: 90 MG/DL (ref 0–199)
TROPONIN INTERP: ABNORMAL
TROPONIN T: 0.04 NG/ML
TROPONIN T: 0.04 NG/ML
TROPONIN T: 0.06 NG/ML
TROPONIN T: 0.06 NG/ML
TROPONIN T: ABNORMAL NG/ML
TROPONIN, HIGH SENSITIVITY: 1701 NG/L (ref 0–14)
TROPONIN, HIGH SENSITIVITY: 277 NG/L (ref 0–14)
TROPONIN, HIGH SENSITIVITY: 302 NG/L (ref 0–14)
TROPONIN, HIGH SENSITIVITY: 303 NG/L (ref 0–14)
TROPONIN, HIGH SENSITIVITY: 321 NG/L (ref 0–14)
TROPONIN, HIGH SENSITIVITY: 49 NG/L (ref 0–14)
TROPONIN, HIGH SENSITIVITY: 49 NG/L (ref 0–14)
TROPONIN, HIGH SENSITIVITY: 87 NG/L (ref 0–14)
TROPONIN, HIGH SENSITIVITY: 88 NG/L (ref 0–14)
TROPONIN, HIGH SENSITIVITY: 95 NG/L (ref 0–14)
TROPONIN, HIGH SENSITIVITY: 96 NG/L (ref 0–14)
TROPONIN, HIGH SENSITIVITY: ABNORMAL NG/L (ref 0–14)
TROPONIN, HIGH SENSITIVITY: ABNORMAL NG/L (ref 0–14)
TURBIDITY: CLEAR
TURBIDITY: CLEAR
URINE CULTURE, ROUTINE: NORMAL
URINE HGB: NEGATIVE
URINE HGB: NEGATIVE
UROBILINOGEN, URINE: 0.2 EU/DL (ref 0–1)
UROBILINOGEN, URINE: NORMAL
UROBILINOGEN, URINE: NORMAL
VANCOMYCIN RESISTANT ENTEROCOCCUS: NEGATIVE
VLDLC SERPL CALC-MCNC: NORMAL MG/DL (ref 1–30)
VT: ABNORMAL
WBC # BLD: 10.4 K/UL (ref 3.5–11.3)
WBC # BLD: 11.5 K/UL (ref 3.5–11.3)
WBC # BLD: 13.1 K/UL (ref 3.5–11.3)
WBC # BLD: 15.5 THOU/MM3 (ref 4.8–10.8)
WBC # BLD: 16.1 K/UL (ref 3.5–11.3)
WBC # BLD: 18.3 K/UL (ref 3.5–11.3)
WBC # BLD: 18.4 K/UL (ref 3.5–11.3)
WBC # BLD: 19.8 THOU/MM3 (ref 4.8–10.8)
WBC # BLD: 20.6 K/UL (ref 3.5–11.3)
WBC # BLD: 21.1 THOU/MM3 (ref 4.8–10.8)
WBC # BLD: 23.8 K/UL (ref 3.5–11.3)
WBC # BLD: 25.6 K/UL (ref 3.5–11.3)
WBC # BLD: 28.3 K/UL (ref 3.5–11.3)
WBC # BLD: 29.9 K/UL (ref 3.5–11.3)
WBC # BLD: ABNORMAL 10*3/UL
WBC UA: NORMAL /HPF
WBC UA: NORMAL /HPF (ref 0–5)
YEAST: NORMAL
YEAST: NORMAL

## 2020-01-01 PROCEDURE — 85027 COMPLETE CBC AUTOMATED: CPT

## 2020-01-01 PROCEDURE — 96374 THER/PROPH/DIAG INJ IV PUSH: CPT

## 2020-01-01 PROCEDURE — 84484 ASSAY OF TROPONIN QUANT: CPT

## 2020-01-01 PROCEDURE — 2580000003 HC RX 258: Performed by: NURSE PRACTITIONER

## 2020-01-01 PROCEDURE — 6370000000 HC RX 637 (ALT 250 FOR IP): Performed by: EMERGENCY MEDICINE

## 2020-01-01 PROCEDURE — 6370000000 HC RX 637 (ALT 250 FOR IP): Performed by: NURSE PRACTITIONER

## 2020-01-01 PROCEDURE — 83690 ASSAY OF LIPASE: CPT

## 2020-01-01 PROCEDURE — G0438 PPPS, INITIAL VISIT: HCPCS | Performed by: NURSE PRACTITIONER

## 2020-01-01 PROCEDURE — 2700000000 HC OXYGEN THERAPY PER DAY

## 2020-01-01 PROCEDURE — 6360000002 HC RX W HCPCS: Performed by: INTERNAL MEDICINE

## 2020-01-01 PROCEDURE — 94664 DEMO&/EVAL PT USE INHALER: CPT

## 2020-01-01 PROCEDURE — 4040F PNEUMOC VAC/ADMIN/RCVD: CPT | Performed by: NURSE PRACTITIONER

## 2020-01-01 PROCEDURE — 71045 X-RAY EXAM CHEST 1 VIEW: CPT

## 2020-01-01 PROCEDURE — 94760 N-INVAS EAR/PLS OXIMETRY 1: CPT

## 2020-01-01 PROCEDURE — 6370000000 HC RX 637 (ALT 250 FOR IP): Performed by: INTERNAL MEDICINE

## 2020-01-01 PROCEDURE — 99233 SBSQ HOSP IP/OBS HIGH 50: CPT | Performed by: FAMILY MEDICINE

## 2020-01-01 PROCEDURE — 99213 OFFICE O/P EST LOW 20 MIN: CPT | Performed by: NURSE PRACTITIONER

## 2020-01-01 PROCEDURE — 83880 ASSAY OF NATRIURETIC PEPTIDE: CPT

## 2020-01-01 PROCEDURE — 6370000000 HC RX 637 (ALT 250 FOR IP): Performed by: STUDENT IN AN ORGANIZED HEALTH CARE EDUCATION/TRAINING PROGRAM

## 2020-01-01 PROCEDURE — 6360000002 HC RX W HCPCS: Performed by: EMERGENCY MEDICINE

## 2020-01-01 PROCEDURE — 36415 COLL VENOUS BLD VENIPUNCTURE: CPT

## 2020-01-01 PROCEDURE — 97530 THERAPEUTIC ACTIVITIES: CPT

## 2020-01-01 PROCEDURE — 82805 BLOOD GASES W/O2 SATURATION: CPT

## 2020-01-01 PROCEDURE — 80048 BASIC METABOLIC PNL TOTAL CA: CPT

## 2020-01-01 PROCEDURE — 80053 COMPREHEN METABOLIC PANEL: CPT

## 2020-01-01 PROCEDURE — 87804 INFLUENZA ASSAY W/OPTIC: CPT

## 2020-01-01 PROCEDURE — 87081 CULTURE SCREEN ONLY: CPT

## 2020-01-01 PROCEDURE — 6360000002 HC RX W HCPCS: Performed by: NURSE PRACTITIONER

## 2020-01-01 PROCEDURE — 83735 ASSAY OF MAGNESIUM: CPT

## 2020-01-01 PROCEDURE — 81003 URINALYSIS AUTO W/O SCOPE: CPT

## 2020-01-01 PROCEDURE — 96375 TX/PRO/DX INJ NEW DRUG ADDON: CPT

## 2020-01-01 PROCEDURE — 99285 EMERGENCY DEPT VISIT HI MDM: CPT

## 2020-01-01 PROCEDURE — 2500000003 HC RX 250 WO HCPCS: Performed by: EMERGENCY MEDICINE

## 2020-01-01 PROCEDURE — 81001 URINALYSIS AUTO W/SCOPE: CPT

## 2020-01-01 PROCEDURE — 85730 THROMBOPLASTIN TIME PARTIAL: CPT

## 2020-01-01 PROCEDURE — 96365 THER/PROPH/DIAG IV INF INIT: CPT

## 2020-01-01 PROCEDURE — 2580000003 HC RX 258: Performed by: FAMILY MEDICINE

## 2020-01-01 PROCEDURE — 99238 HOSP IP/OBS DSCHRG MGMT 30/<: CPT | Performed by: PHYSICIAN ASSISTANT

## 2020-01-01 PROCEDURE — 99233 SBSQ HOSP IP/OBS HIGH 50: CPT | Performed by: PHYSICIAN ASSISTANT

## 2020-01-01 PROCEDURE — 80061 LIPID PANEL: CPT

## 2020-01-01 PROCEDURE — 6370000000 HC RX 637 (ALT 250 FOR IP): Performed by: FAMILY MEDICINE

## 2020-01-01 PROCEDURE — 87086 URINE CULTURE/COLONY COUNT: CPT

## 2020-01-01 PROCEDURE — 94640 AIRWAY INHALATION TREATMENT: CPT

## 2020-01-01 PROCEDURE — 97535 SELF CARE MNGMENT TRAINING: CPT

## 2020-01-01 PROCEDURE — 2060000000 HC ICU INTERMEDIATE R&B

## 2020-01-01 PROCEDURE — 51798 US URINE CAPACITY MEASURE: CPT

## 2020-01-01 PROCEDURE — 85610 PROTHROMBIN TIME: CPT

## 2020-01-01 PROCEDURE — 1111F DSCHRG MED/CURRENT MED MERGE: CPT | Performed by: NURSE PRACTITIONER

## 2020-01-01 PROCEDURE — 2580000003 HC RX 258: Performed by: STUDENT IN AN ORGANIZED HEALTH CARE EDUCATION/TRAINING PROGRAM

## 2020-01-01 PROCEDURE — 1036F TOBACCO NON-USER: CPT | Performed by: NURSE PRACTITIONER

## 2020-01-01 PROCEDURE — 93005 ELECTROCARDIOGRAM TRACING: CPT | Performed by: EMERGENCY MEDICINE

## 2020-01-01 PROCEDURE — 97162 PT EVAL MOD COMPLEX 30 MIN: CPT

## 2020-01-01 PROCEDURE — 6360000002 HC RX W HCPCS: Performed by: FAMILY MEDICINE

## 2020-01-01 PROCEDURE — 6360000002 HC RX W HCPCS: Performed by: STUDENT IN AN ORGANIZED HEALTH CARE EDUCATION/TRAINING PROGRAM

## 2020-01-01 PROCEDURE — 97116 GAIT TRAINING THERAPY: CPT

## 2020-01-01 PROCEDURE — 97166 OT EVAL MOD COMPLEX 45 MIN: CPT

## 2020-01-01 PROCEDURE — 85025 COMPLETE CBC W/AUTO DIFF WBC: CPT

## 2020-01-01 PROCEDURE — 94660 CPAP INITIATION&MGMT: CPT

## 2020-01-01 PROCEDURE — 99223 1ST HOSP IP/OBS HIGH 75: CPT | Performed by: INTERNAL MEDICINE

## 2020-01-01 PROCEDURE — 93010 ELECTROCARDIOGRAM REPORT: CPT | Performed by: INTERNAL MEDICINE

## 2020-01-01 PROCEDURE — G8427 DOCREV CUR MEDS BY ELIG CLIN: HCPCS | Performed by: NURSE PRACTITIONER

## 2020-01-01 PROCEDURE — 94761 N-INVAS EAR/PLS OXIMETRY MLT: CPT

## 2020-01-01 PROCEDURE — 99232 SBSQ HOSP IP/OBS MODERATE 35: CPT | Performed by: INTERNAL MEDICINE

## 2020-01-01 PROCEDURE — 1123F ACP DISCUSS/DSCN MKR DOCD: CPT | Performed by: NURSE PRACTITIONER

## 2020-01-01 PROCEDURE — G8420 CALC BMI NORM PARAMETERS: HCPCS | Performed by: NURSE PRACTITIONER

## 2020-01-01 PROCEDURE — U0002 COVID-19 LAB TEST NON-CDC: HCPCS

## 2020-01-01 PROCEDURE — 96366 THER/PROPH/DIAG IV INF ADDON: CPT

## 2020-01-01 PROCEDURE — 87641 MR-STAPH DNA AMP PROBE: CPT

## 2020-01-01 PROCEDURE — 92610 EVALUATE SWALLOWING FUNCTION: CPT

## 2020-01-01 PROCEDURE — 2500000003 HC RX 250 WO HCPCS: Performed by: FAMILY MEDICINE

## 2020-01-01 PROCEDURE — 71046 X-RAY EXAM CHEST 2 VIEWS: CPT

## 2020-01-01 PROCEDURE — 93306 TTE W/DOPPLER COMPLETE: CPT

## 2020-01-01 PROCEDURE — 83605 ASSAY OF LACTIC ACID: CPT

## 2020-01-01 PROCEDURE — 1200000000 HC SEMI PRIVATE

## 2020-01-01 PROCEDURE — 73130 X-RAY EXAM OF HAND: CPT

## 2020-01-01 PROCEDURE — 2580000003 HC RX 258: Performed by: PHYSICIAN ASSISTANT

## 2020-01-01 PROCEDURE — 99232 SBSQ HOSP IP/OBS MODERATE 35: CPT | Performed by: FAMILY MEDICINE

## 2020-01-01 PROCEDURE — 97110 THERAPEUTIC EXERCISES: CPT

## 2020-01-01 PROCEDURE — 2580000003 HC RX 258: Performed by: EMERGENCY MEDICINE

## 2020-01-01 PROCEDURE — 6370000000 HC RX 637 (ALT 250 FOR IP): Performed by: PHYSICIAN ASSISTANT

## 2020-01-01 PROCEDURE — 1090F PRES/ABSN URINE INCON ASSESS: CPT | Performed by: NURSE PRACTITIONER

## 2020-01-01 PROCEDURE — 99284 EMERGENCY DEPT VISIT MOD MDM: CPT

## 2020-01-01 PROCEDURE — 85049 AUTOMATED PLATELET COUNT: CPT

## 2020-01-01 PROCEDURE — 84145 PROCALCITONIN (PCT): CPT

## 2020-01-01 PROCEDURE — 99223 1ST HOSP IP/OBS HIGH 75: CPT | Performed by: FAMILY MEDICINE

## 2020-01-01 PROCEDURE — 87040 BLOOD CULTURE FOR BACTERIA: CPT

## 2020-01-01 PROCEDURE — 93005 ELECTROCARDIOGRAM TRACING: CPT | Performed by: FAMILY MEDICINE

## 2020-01-01 PROCEDURE — 96368 THER/DIAG CONCURRENT INF: CPT

## 2020-01-01 PROCEDURE — 2500000003 HC RX 250 WO HCPCS: Performed by: STUDENT IN AN ORGANIZED HEALTH CARE EDUCATION/TRAINING PROGRAM

## 2020-01-01 PROCEDURE — 2500000003 HC RX 250 WO HCPCS: Performed by: NURSE PRACTITIONER

## 2020-01-01 PROCEDURE — 97161 PT EVAL LOW COMPLEX 20 MIN: CPT

## 2020-01-01 PROCEDURE — 92526 ORAL FUNCTION THERAPY: CPT

## 2020-01-01 PROCEDURE — 87500 VANOMYCIN DNA AMP PROBE: CPT

## 2020-01-01 PROCEDURE — 82947 ASSAY GLUCOSE BLOOD QUANT: CPT

## 2020-01-01 RX ORDER — FAMOTIDINE 10 MG
20 TABLET ORAL DAILY
Status: DISCONTINUED | OUTPATIENT
Start: 2020-01-01 | End: 2020-01-01 | Stop reason: HOSPADM

## 2020-01-01 RX ORDER — ACETAMINOPHEN 650 MG/1
650 SUPPOSITORY RECTAL EVERY 6 HOURS PRN
Status: DISCONTINUED | OUTPATIENT
Start: 2020-01-01 | End: 2020-01-01 | Stop reason: HOSPADM

## 2020-01-01 RX ORDER — IPRATROPIUM BROMIDE AND ALBUTEROL SULFATE 2.5; .5 MG/3ML; MG/3ML
1 SOLUTION RESPIRATORY (INHALATION) ONCE
Status: COMPLETED | OUTPATIENT
Start: 2020-01-01 | End: 2020-01-01

## 2020-01-01 RX ORDER — IPRATROPIUM BROMIDE AND ALBUTEROL SULFATE 2.5; .5 MG/3ML; MG/3ML
1 SOLUTION RESPIRATORY (INHALATION)
Status: DISCONTINUED | OUTPATIENT
Start: 2020-01-01 | End: 2020-01-01

## 2020-01-01 RX ORDER — SODIUM CHLORIDE 0.9 % (FLUSH) 0.9 %
10 SYRINGE (ML) INJECTION PRN
Status: DISCONTINUED | OUTPATIENT
Start: 2020-01-01 | End: 2020-01-01 | Stop reason: HOSPADM

## 2020-01-01 RX ORDER — FAMOTIDINE 20 MG/1
20 TABLET, FILM COATED ORAL 2 TIMES DAILY
Status: DISCONTINUED | OUTPATIENT
Start: 2020-01-01 | End: 2020-01-01

## 2020-01-01 RX ORDER — LANOLIN ALCOHOL/MO/W.PET/CERES
325 CREAM (GRAM) TOPICAL
Status: DISCONTINUED | OUTPATIENT
Start: 2020-01-01 | End: 2020-01-01 | Stop reason: HOSPADM

## 2020-01-01 RX ORDER — DILTIAZEM HYDROCHLORIDE 120 MG/1
120 CAPSULE, COATED, EXTENDED RELEASE ORAL DAILY
COMMUNITY
End: 2020-01-01 | Stop reason: ALTCHOICE

## 2020-01-01 RX ORDER — DILTIAZEM HYDROCHLORIDE 60 MG/1
120 TABLET, FILM COATED ORAL DAILY
Status: DISCONTINUED | OUTPATIENT
Start: 2020-01-01 | End: 2020-01-01 | Stop reason: HOSPADM

## 2020-01-01 RX ORDER — MORPHINE SULFATE 100 MG/5ML
5 SOLUTION ORAL EVERY 4 HOURS PRN
Qty: 30 ML | Refills: 0 | Status: SHIPPED | OUTPATIENT
Start: 2020-01-01 | End: 2020-01-01

## 2020-01-01 RX ORDER — METHYLPREDNISOLONE SODIUM SUCCINATE 125 MG/2ML
125 INJECTION, POWDER, LYOPHILIZED, FOR SOLUTION INTRAMUSCULAR; INTRAVENOUS ONCE
Status: COMPLETED | OUTPATIENT
Start: 2020-01-01 | End: 2020-01-01

## 2020-01-01 RX ORDER — IPRATROPIUM BROMIDE AND ALBUTEROL SULFATE 2.5; .5 MG/3ML; MG/3ML
SOLUTION RESPIRATORY (INHALATION)
Status: DISCONTINUED
Start: 2020-01-01 | End: 2020-01-01 | Stop reason: HOSPADM

## 2020-01-01 RX ORDER — HEPARIN SODIUM 1000 [USP'U]/ML
60 INJECTION, SOLUTION INTRAVENOUS; SUBCUTANEOUS ONCE
Status: COMPLETED | OUTPATIENT
Start: 2020-01-01 | End: 2020-01-01

## 2020-01-01 RX ORDER — ALBUTEROL SULFATE 2.5 MG/3ML
2.5 SOLUTION RESPIRATORY (INHALATION)
Status: DISCONTINUED | OUTPATIENT
Start: 2020-01-01 | End: 2020-01-01

## 2020-01-01 RX ORDER — ALBUTEROL SULFATE 2.5 MG/3ML
2.5 SOLUTION RESPIRATORY (INHALATION) EVERY 4 HOURS PRN
Status: DISCONTINUED | OUTPATIENT
Start: 2020-01-01 | End: 2020-01-01 | Stop reason: HOSPADM

## 2020-01-01 RX ORDER — FUROSEMIDE 20 MG/1
20 TABLET ORAL EVERY OTHER DAY
Status: DISCONTINUED | OUTPATIENT
Start: 2020-01-01 | End: 2020-01-01 | Stop reason: HOSPADM

## 2020-01-01 RX ORDER — HEPARIN SODIUM 1000 [USP'U]/ML
60 INJECTION, SOLUTION INTRAVENOUS; SUBCUTANEOUS PRN
Status: DISCONTINUED | OUTPATIENT
Start: 2020-01-01 | End: 2020-01-01

## 2020-01-01 RX ORDER — BUDESONIDE AND FORMOTEROL FUMARATE DIHYDRATE 160; 4.5 UG/1; UG/1
2 AEROSOL RESPIRATORY (INHALATION) 2 TIMES DAILY
Status: DISCONTINUED | OUTPATIENT
Start: 2020-01-01 | End: 2020-01-01 | Stop reason: HOSPADM

## 2020-01-01 RX ORDER — AZITHROMYCIN 250 MG/1
250 TABLET, FILM COATED ORAL SEE ADMIN INSTRUCTIONS
Qty: 6 TABLET | Refills: 0 | Status: SHIPPED | OUTPATIENT
Start: 2020-01-01 | End: 2020-01-01

## 2020-01-01 RX ORDER — ALBUTEROL SULFATE 2.5 MG/3ML
2.5 SOLUTION RESPIRATORY (INHALATION) EVERY 4 HOURS PRN
Qty: 150 VIAL | Refills: 11 | Status: SHIPPED | OUTPATIENT
Start: 2020-01-01

## 2020-01-01 RX ORDER — ATORVASTATIN CALCIUM 10 MG/1
10 TABLET, FILM COATED ORAL NIGHTLY
Status: DISCONTINUED | OUTPATIENT
Start: 2020-01-01 | End: 2020-01-01 | Stop reason: HOSPADM

## 2020-01-01 RX ORDER — SODIUM CHLORIDE 0.9 % (FLUSH) 0.9 %
10 SYRINGE (ML) INJECTION EVERY 12 HOURS SCHEDULED
Status: DISCONTINUED | OUTPATIENT
Start: 2020-01-01 | End: 2020-01-01 | Stop reason: HOSPADM

## 2020-01-01 RX ORDER — FUROSEMIDE 10 MG/ML
20 INJECTION INTRAMUSCULAR; INTRAVENOUS ONCE
Status: COMPLETED | OUTPATIENT
Start: 2020-01-01 | End: 2020-01-01

## 2020-01-01 RX ORDER — PROMETHAZINE HYDROCHLORIDE 25 MG/1
12.5 TABLET ORAL EVERY 6 HOURS PRN
Status: DISCONTINUED | OUTPATIENT
Start: 2020-01-01 | End: 2020-01-01 | Stop reason: HOSPADM

## 2020-01-01 RX ORDER — IPRATROPIUM BROMIDE AND ALBUTEROL SULFATE 2.5; .5 MG/3ML; MG/3ML
1 SOLUTION RESPIRATORY (INHALATION) EVERY 4 HOURS
Status: DISCONTINUED | OUTPATIENT
Start: 2020-01-01 | End: 2020-01-01 | Stop reason: HOSPADM

## 2020-01-01 RX ORDER — ASPIRIN 81 MG/1
324 TABLET, CHEWABLE ORAL ONCE
Status: DISCONTINUED | OUTPATIENT
Start: 2020-01-01 | End: 2020-01-01 | Stop reason: HOSPADM

## 2020-01-01 RX ORDER — FUROSEMIDE 20 MG/1
20 TABLET ORAL EVERY OTHER DAY
Qty: 15 TABLET | Refills: 3 | Status: SHIPPED | OUTPATIENT
Start: 2020-01-01 | End: 2020-01-01

## 2020-01-01 RX ORDER — DILTIAZEM HYDROCHLORIDE 60 MG/1
60 TABLET, FILM COATED ORAL DAILY
Status: DISCONTINUED | OUTPATIENT
Start: 2020-01-01 | End: 2020-01-01 | Stop reason: HOSPADM

## 2020-01-01 RX ORDER — POLYETHYLENE GLYCOL 3350 17 G/17G
17 POWDER, FOR SOLUTION ORAL DAILY PRN
Status: DISCONTINUED | OUTPATIENT
Start: 2020-01-01 | End: 2020-01-01 | Stop reason: HOSPADM

## 2020-01-01 RX ORDER — FERROUS SULFATE 325(65) MG
325 TABLET ORAL
Status: DISCONTINUED | OUTPATIENT
Start: 2020-01-01 | End: 2020-01-01 | Stop reason: RX

## 2020-01-01 RX ORDER — CEPHALEXIN 250 MG/1
250 CAPSULE ORAL 3 TIMES DAILY
Qty: 21 CAPSULE | Refills: 0 | Status: SHIPPED | OUTPATIENT
Start: 2020-01-01 | End: 2020-01-01 | Stop reason: ALTCHOICE

## 2020-01-01 RX ORDER — FUROSEMIDE 20 MG/1
20 TABLET ORAL EVERY OTHER DAY
Qty: 15 TABLET | Refills: 3 | Status: ON HOLD | OUTPATIENT
Start: 2020-01-01 | End: 2020-01-01 | Stop reason: SDUPTHER

## 2020-01-01 RX ORDER — FUROSEMIDE 10 MG/ML
20 INJECTION INTRAMUSCULAR; INTRAVENOUS 2 TIMES DAILY
Status: DISCONTINUED | OUTPATIENT
Start: 2020-01-01 | End: 2020-01-01

## 2020-01-01 RX ORDER — HYDRALAZINE HYDROCHLORIDE 50 MG/1
50 TABLET, FILM COATED ORAL EVERY 8 HOURS SCHEDULED
Status: DISCONTINUED | OUTPATIENT
Start: 2020-01-01 | End: 2020-01-01 | Stop reason: HOSPADM

## 2020-01-01 RX ORDER — LANOLIN ALCOHOL/MO/W.PET/CERES
325 CREAM (GRAM) TOPICAL
Qty: 90 TABLET | Refills: 3 | Status: SHIPPED | OUTPATIENT
Start: 2020-01-01

## 2020-01-01 RX ORDER — SODIUM CHLORIDE 9 MG/ML
INJECTION, SOLUTION INTRAVENOUS CONTINUOUS
Status: ACTIVE | OUTPATIENT
Start: 2020-01-01 | End: 2020-01-01

## 2020-01-01 RX ORDER — FERROUS SULFATE 325(65) MG
325 TABLET ORAL
Status: DISCONTINUED | OUTPATIENT
Start: 2020-01-01 | End: 2020-01-01 | Stop reason: HOSPADM

## 2020-01-01 RX ORDER — FUROSEMIDE 20 MG/1
20 TABLET ORAL EVERY OTHER DAY
Qty: 15 TABLET | Refills: 0 | Status: SHIPPED | OUTPATIENT
Start: 2020-01-01 | End: 2020-01-01 | Stop reason: SDUPTHER

## 2020-01-01 RX ORDER — POTASSIUM CHLORIDE 20 MEQ/1
40 TABLET, EXTENDED RELEASE ORAL PRN
Status: DISCONTINUED | OUTPATIENT
Start: 2020-01-01 | End: 2020-01-01

## 2020-01-01 RX ORDER — UREA 10 %
3 LOTION (ML) TOPICAL NIGHTLY PRN
Status: DISCONTINUED | OUTPATIENT
Start: 2020-01-01 | End: 2020-01-01 | Stop reason: HOSPADM

## 2020-01-01 RX ORDER — ONDANSETRON 2 MG/ML
4 INJECTION INTRAMUSCULAR; INTRAVENOUS ONCE
Status: COMPLETED | OUTPATIENT
Start: 2020-01-01 | End: 2020-01-01

## 2020-01-01 RX ORDER — FUROSEMIDE 10 MG/ML
40 INJECTION INTRAMUSCULAR; INTRAVENOUS ONCE
Status: COMPLETED | OUTPATIENT
Start: 2020-01-01 | End: 2020-01-01

## 2020-01-01 RX ORDER — NITROGLYCERIN 0.4 MG/1
0.4 TABLET SUBLINGUAL EVERY 5 MIN PRN
Status: DISCONTINUED | OUTPATIENT
Start: 2020-01-01 | End: 2020-01-01 | Stop reason: HOSPADM

## 2020-01-01 RX ORDER — FAMOTIDINE 20 MG/1
20 TABLET, FILM COATED ORAL DAILY
Status: DISCONTINUED | OUTPATIENT
Start: 2020-01-01 | End: 2020-01-01

## 2020-01-01 RX ORDER — METOPROLOL TARTRATE 5 MG/5ML
5 INJECTION INTRAVENOUS EVERY 6 HOURS PRN
Status: DISCONTINUED | OUTPATIENT
Start: 2020-01-01 | End: 2020-01-01 | Stop reason: HOSPADM

## 2020-01-01 RX ORDER — HEPARIN SODIUM 10000 [USP'U]/100ML
12 INJECTION, SOLUTION INTRAVENOUS CONTINUOUS
Status: DISCONTINUED | OUTPATIENT
Start: 2020-01-01 | End: 2020-01-01

## 2020-01-01 RX ORDER — LANOLIN ALCOHOL/MO/W.PET/CERES
325 CREAM (GRAM) TOPICAL
Qty: 90 TABLET | Refills: 3 | Status: SHIPPED | OUTPATIENT
Start: 2020-01-01 | End: 2020-01-01 | Stop reason: SDUPTHER

## 2020-01-01 RX ORDER — MAGNESIUM SULFATE 1 G/100ML
1 INJECTION INTRAVENOUS PRN
Status: DISCONTINUED | OUTPATIENT
Start: 2020-01-01 | End: 2020-01-01

## 2020-01-01 RX ORDER — BENZONATATE 100 MG/1
100 CAPSULE ORAL 3 TIMES DAILY PRN
Status: DISCONTINUED | OUTPATIENT
Start: 2020-01-01 | End: 2020-01-01 | Stop reason: HOSPADM

## 2020-01-01 RX ORDER — MORPHINE SULFATE 2 MG/ML
1 INJECTION, SOLUTION INTRAMUSCULAR; INTRAVENOUS ONCE
Status: COMPLETED | OUTPATIENT
Start: 2020-01-01 | End: 2020-01-01

## 2020-01-01 RX ORDER — PREDNISONE 20 MG/1
20 TABLET ORAL DAILY
Status: DISCONTINUED | OUTPATIENT
Start: 2020-01-01 | End: 2020-01-01 | Stop reason: HOSPADM

## 2020-01-01 RX ORDER — CALCIUM CARBONATE 200(500)MG
500 TABLET,CHEWABLE ORAL 3 TIMES DAILY PRN
Status: DISCONTINUED | OUTPATIENT
Start: 2020-01-01 | End: 2020-01-01 | Stop reason: HOSPADM

## 2020-01-01 RX ORDER — NITROGLYCERIN 0.4 MG/1
TABLET SUBLINGUAL
Qty: 25 TABLET | Refills: 1 | Status: SHIPPED | OUTPATIENT
Start: 2020-01-01

## 2020-01-01 RX ORDER — PREDNISONE 20 MG/1
20 TABLET ORAL DAILY
Qty: 10 TABLET | Refills: 0 | Status: SHIPPED | OUTPATIENT
Start: 2020-01-01 | End: 2020-01-01

## 2020-01-01 RX ORDER — ONDANSETRON 2 MG/ML
4 INJECTION INTRAMUSCULAR; INTRAVENOUS EVERY 6 HOURS PRN
Status: DISCONTINUED | OUTPATIENT
Start: 2020-01-01 | End: 2020-01-01 | Stop reason: HOSPADM

## 2020-01-01 RX ORDER — DILTIAZEM HYDROCHLORIDE 120 MG/1
120 CAPSULE, COATED, EXTENDED RELEASE ORAL DAILY
Qty: 30 CAPSULE | Refills: 3 | Status: SHIPPED | OUTPATIENT
Start: 2020-01-01

## 2020-01-01 RX ORDER — HYDRALAZINE HYDROCHLORIDE 20 MG/ML
10 INJECTION INTRAMUSCULAR; INTRAVENOUS EVERY 6 HOURS PRN
Status: DISCONTINUED | OUTPATIENT
Start: 2020-01-01 | End: 2020-01-01 | Stop reason: HOSPADM

## 2020-01-01 RX ORDER — PREDNISONE 20 MG/1
20 TABLET ORAL 2 TIMES DAILY
Qty: 10 TABLET | Refills: 0 | Status: SHIPPED | OUTPATIENT
Start: 2020-01-01 | End: 2020-01-01

## 2020-01-01 RX ORDER — DIGOXIN 0.25 MG/ML
250 INJECTION INTRAMUSCULAR; INTRAVENOUS ONCE
Status: COMPLETED | OUTPATIENT
Start: 2020-01-01 | End: 2020-01-01

## 2020-01-01 RX ORDER — ATORVASTATIN CALCIUM 10 MG/1
10 TABLET, FILM COATED ORAL DAILY
Status: DISCONTINUED | OUTPATIENT
Start: 2020-01-01 | End: 2020-01-01 | Stop reason: HOSPADM

## 2020-01-01 RX ORDER — BENZONATATE 100 MG/1
100 CAPSULE ORAL 3 TIMES DAILY PRN
COMMUNITY

## 2020-01-01 RX ORDER — HEPARIN SODIUM 1000 [USP'U]/ML
60 INJECTION, SOLUTION INTRAVENOUS; SUBCUTANEOUS PRN
Status: DISCONTINUED | OUTPATIENT
Start: 2020-01-01 | End: 2020-01-01 | Stop reason: HOSPADM

## 2020-01-01 RX ORDER — ATORVASTATIN CALCIUM 40 MG/1
40 TABLET, FILM COATED ORAL NIGHTLY
Status: DISCONTINUED | OUTPATIENT
Start: 2020-01-01 | End: 2020-01-01 | Stop reason: HOSPADM

## 2020-01-01 RX ORDER — HYDRALAZINE HYDROCHLORIDE 25 MG/1
25 TABLET, FILM COATED ORAL EVERY 12 HOURS SCHEDULED
Status: DISCONTINUED | OUTPATIENT
Start: 2020-01-01 | End: 2020-01-01

## 2020-01-01 RX ORDER — CALCIUM CARBONATE 200(500)MG
500 TABLET,CHEWABLE ORAL 3 TIMES DAILY PRN
Refills: 1 | COMMUNITY
Start: 2020-01-01 | End: 2020-01-01

## 2020-01-01 RX ORDER — ASPIRIN 81 MG/1
81 TABLET ORAL DAILY
Qty: 30 TABLET | Refills: 3 | Status: SHIPPED | OUTPATIENT
Start: 2020-01-01

## 2020-01-01 RX ORDER — HEPARIN SODIUM 1000 [USP'U]/ML
30 INJECTION, SOLUTION INTRAVENOUS; SUBCUTANEOUS PRN
Status: DISCONTINUED | OUTPATIENT
Start: 2020-01-01 | End: 2020-01-01

## 2020-01-01 RX ORDER — IBUPROFEN 200 MG
200 TABLET ORAL EVERY 6 HOURS PRN
Status: ON HOLD | COMMUNITY
End: 2020-01-01 | Stop reason: HOSPADM

## 2020-01-01 RX ORDER — ASPIRIN 81 MG/1
324 TABLET, CHEWABLE ORAL ONCE
Status: COMPLETED | OUTPATIENT
Start: 2020-01-01 | End: 2020-01-01

## 2020-01-01 RX ORDER — SODIUM CHLORIDE 9 MG/ML
INJECTION, SOLUTION INTRAVENOUS CONTINUOUS
Status: DISCONTINUED | OUTPATIENT
Start: 2020-01-01 | End: 2020-01-01 | Stop reason: HOSPADM

## 2020-01-01 RX ORDER — PREDNISONE 20 MG/1
20 TABLET ORAL DAILY
Qty: 2 TABLET | Refills: 0 | Status: SHIPPED | OUTPATIENT
Start: 2020-01-01 | End: 2020-01-01

## 2020-01-01 RX ORDER — ISOSORBIDE MONONITRATE 30 MG/1
30 TABLET, EXTENDED RELEASE ORAL DAILY
Qty: 30 TABLET | Refills: 3 | Status: SHIPPED | OUTPATIENT
Start: 2020-01-01 | End: 2020-01-01 | Stop reason: ALTCHOICE

## 2020-01-01 RX ORDER — IPRATROPIUM BROMIDE AND ALBUTEROL SULFATE 2.5; .5 MG/3ML; MG/3ML
1 SOLUTION RESPIRATORY (INHALATION)
Status: DISCONTINUED | OUTPATIENT
Start: 2020-01-01 | End: 2020-01-01 | Stop reason: HOSPADM

## 2020-01-01 RX ORDER — ALBUTEROL SULFATE 90 UG/1
2 AEROSOL, METERED RESPIRATORY (INHALATION) EVERY 4 HOURS PRN
Status: DISCONTINUED | OUTPATIENT
Start: 2020-01-01 | End: 2020-01-01

## 2020-01-01 RX ORDER — ENALAPRILAT 2.5 MG/2ML
1.25 INJECTION INTRAVENOUS EVERY 6 HOURS PRN
Status: DISCONTINUED | OUTPATIENT
Start: 2020-01-01 | End: 2020-01-01 | Stop reason: HOSPADM

## 2020-01-01 RX ORDER — SIMVASTATIN 20 MG
TABLET ORAL
Qty: 90 TABLET | Refills: 0 | Status: SHIPPED | OUTPATIENT
Start: 2020-01-01

## 2020-01-01 RX ORDER — DILTIAZEM HYDROCHLORIDE 120 MG/1
120 CAPSULE, COATED, EXTENDED RELEASE ORAL DAILY
Status: DISCONTINUED | OUTPATIENT
Start: 2020-01-01 | End: 2020-01-01 | Stop reason: HOSPADM

## 2020-01-01 RX ORDER — ISOSORBIDE MONONITRATE 30 MG/1
30 TABLET, EXTENDED RELEASE ORAL DAILY
Status: DISCONTINUED | OUTPATIENT
Start: 2020-01-01 | End: 2020-01-01

## 2020-01-01 RX ORDER — HYDRALAZINE HYDROCHLORIDE 50 MG/1
50 TABLET, FILM COATED ORAL EVERY 8 HOURS SCHEDULED
Qty: 90 TABLET | Refills: 3 | Status: SHIPPED | OUTPATIENT
Start: 2020-01-01

## 2020-01-01 RX ORDER — FUROSEMIDE 20 MG/1
20 TABLET ORAL EVERY OTHER DAY
OUTPATIENT
Start: 2020-01-01 | End: 2020-01-01

## 2020-01-01 RX ORDER — ACETAMINOPHEN 325 MG/1
650 TABLET ORAL EVERY 6 HOURS PRN
Status: DISCONTINUED | OUTPATIENT
Start: 2020-01-01 | End: 2020-01-01 | Stop reason: HOSPADM

## 2020-01-01 RX ORDER — BUDESONIDE AND FORMOTEROL FUMARATE DIHYDRATE 160; 4.5 UG/1; UG/1
2 AEROSOL RESPIRATORY (INHALATION) 2 TIMES DAILY
Qty: 1 INHALER | Refills: 3 | Status: SHIPPED | OUTPATIENT
Start: 2020-01-01

## 2020-01-01 RX ORDER — HEPARIN SODIUM 10000 [USP'U]/100ML
12 INJECTION, SOLUTION INTRAVENOUS CONTINUOUS
Status: DISCONTINUED | OUTPATIENT
Start: 2020-01-01 | End: 2020-01-01 | Stop reason: HOSPADM

## 2020-01-01 RX ORDER — ASPIRIN 81 MG/1
81 TABLET ORAL DAILY
Status: DISCONTINUED | OUTPATIENT
Start: 2020-01-01 | End: 2020-01-01 | Stop reason: HOSPADM

## 2020-01-01 RX ORDER — LORAZEPAM 1 MG/1
0.5 TABLET ORAL ONCE
Status: COMPLETED | OUTPATIENT
Start: 2020-01-01 | End: 2020-01-01

## 2020-01-01 RX ORDER — ISOSORBIDE MONONITRATE 60 MG/1
60 TABLET, EXTENDED RELEASE ORAL DAILY
Status: DISCONTINUED | OUTPATIENT
Start: 2020-01-01 | End: 2020-01-01

## 2020-01-01 RX ORDER — FLUTICASONE PROPIONATE 110 UG/1
2 AEROSOL, METERED RESPIRATORY (INHALATION) 2 TIMES DAILY
Status: DISCONTINUED | OUTPATIENT
Start: 2020-01-01 | End: 2020-01-01

## 2020-01-01 RX ORDER — ISOSORBIDE MONONITRATE 30 MG/1
30 TABLET, EXTENDED RELEASE ORAL DAILY
Status: DISCONTINUED | OUTPATIENT
Start: 2020-01-01 | End: 2020-01-01 | Stop reason: HOSPADM

## 2020-01-01 RX ORDER — DILTIAZEM HYDROCHLORIDE 60 MG/1
120 TABLET, FILM COATED ORAL DAILY
Status: DISCONTINUED | OUTPATIENT
Start: 2020-01-01 | End: 2020-01-01

## 2020-01-01 RX ORDER — HEPARIN SODIUM 1000 [USP'U]/ML
30 INJECTION, SOLUTION INTRAVENOUS; SUBCUTANEOUS PRN
Status: DISCONTINUED | OUTPATIENT
Start: 2020-01-01 | End: 2020-01-01 | Stop reason: HOSPADM

## 2020-01-01 RX ORDER — ALBUTEROL SULFATE 2.5 MG/3ML
2.5 SOLUTION RESPIRATORY (INHALATION) 4 TIMES DAILY
Status: DISCONTINUED | OUTPATIENT
Start: 2020-01-01 | End: 2020-01-01 | Stop reason: HOSPADM

## 2020-01-01 RX ORDER — POTASSIUM CHLORIDE 7.45 MG/ML
10 INJECTION INTRAVENOUS PRN
Status: DISCONTINUED | OUTPATIENT
Start: 2020-01-01 | End: 2020-01-01

## 2020-01-01 RX ORDER — DILTIAZEM HYDROCHLORIDE 5 MG/ML
5 INJECTION INTRAVENOUS ONCE
Status: COMPLETED | OUTPATIENT
Start: 2020-01-01 | End: 2020-01-01

## 2020-01-01 RX ORDER — HEPARIN SODIUM 1000 [USP'U]/ML
60 INJECTION, SOLUTION INTRAVENOUS; SUBCUTANEOUS ONCE
Status: DISCONTINUED | OUTPATIENT
Start: 2020-01-01 | End: 2020-01-01

## 2020-01-01 RX ORDER — IPRATROPIUM BROMIDE AND ALBUTEROL SULFATE 2.5; .5 MG/3ML; MG/3ML
SOLUTION RESPIRATORY (INHALATION)
Status: DISPENSED
Start: 2020-01-01 | End: 2020-01-01

## 2020-01-01 RX ORDER — ALBUTEROL SULFATE 90 UG/1
AEROSOL, METERED RESPIRATORY (INHALATION)
Qty: 8.5 G | Refills: 0 | Status: SHIPPED | OUTPATIENT
Start: 2020-01-01

## 2020-01-01 RX ORDER — NAPROXEN SODIUM 550 MG/1
550 TABLET ORAL ONCE
Status: COMPLETED | OUTPATIENT
Start: 2020-01-01 | End: 2020-01-01

## 2020-01-01 RX ORDER — METHYLPREDNISOLONE SODIUM SUCCINATE 125 MG/2ML
80 INJECTION, POWDER, LYOPHILIZED, FOR SOLUTION INTRAMUSCULAR; INTRAVENOUS 2 TIMES DAILY
Status: DISCONTINUED | OUTPATIENT
Start: 2020-01-01 | End: 2020-01-01 | Stop reason: HOSPADM

## 2020-01-01 RX ORDER — IPRATROPIUM BROMIDE AND ALBUTEROL SULFATE 2.5; .5 MG/3ML; MG/3ML
1 SOLUTION RESPIRATORY (INHALATION) 4 TIMES DAILY
Status: DISCONTINUED | OUTPATIENT
Start: 2020-01-01 | End: 2020-01-01 | Stop reason: HOSPADM

## 2020-01-01 RX ADMIN — HYDRALAZINE HYDROCHLORIDE 50 MG: 50 TABLET, FILM COATED ORAL at 05:23

## 2020-01-01 RX ADMIN — APIXABAN 2.5 MG: 2.5 TABLET, FILM COATED ORAL at 20:56

## 2020-01-01 RX ADMIN — METOPROLOL TARTRATE 5 MG: 5 INJECTION, SOLUTION INTRAVENOUS at 01:04

## 2020-01-01 RX ADMIN — DESMOPRESSIN ACETATE 40 MG: 0.2 TABLET ORAL at 20:00

## 2020-01-01 RX ADMIN — Medication 10 ML: at 10:51

## 2020-01-01 RX ADMIN — IPRATROPIUM BROMIDE AND ALBUTEROL SULFATE 1 AMPULE: .5; 3 SOLUTION RESPIRATORY (INHALATION) at 04:07

## 2020-01-01 RX ADMIN — ASPIRIN 81 MG: 81 TABLET ORAL at 08:35

## 2020-01-01 RX ADMIN — HYDRALAZINE HYDROCHLORIDE 50 MG: 50 TABLET, FILM COATED ORAL at 06:38

## 2020-01-01 RX ADMIN — IPRATROPIUM BROMIDE AND ALBUTEROL SULFATE 1 AMPULE: .5; 3 SOLUTION RESPIRATORY (INHALATION) at 15:32

## 2020-01-01 RX ADMIN — IPRATROPIUM BROMIDE AND ALBUTEROL SULFATE 1 AMPULE: .5; 3 SOLUTION RESPIRATORY (INHALATION) at 11:52

## 2020-01-01 RX ADMIN — ALBUTEROL SULFATE 2.5 MG: 2.5 SOLUTION RESPIRATORY (INHALATION) at 20:58

## 2020-01-01 RX ADMIN — SODIUM CHLORIDE: 9 INJECTION, SOLUTION INTRAVENOUS at 18:20

## 2020-01-01 RX ADMIN — ALBUTEROL SULFATE 2.5 MG: 2.5 SOLUTION RESPIRATORY (INHALATION) at 08:18

## 2020-01-01 RX ADMIN — Medication 10 ML: at 20:06

## 2020-01-01 RX ADMIN — FUROSEMIDE 20 MG: 10 INJECTION, SOLUTION INTRAMUSCULAR; INTRAVENOUS at 08:45

## 2020-01-01 RX ADMIN — METOPROLOL TARTRATE 12.5 MG: 25 TABLET ORAL at 08:14

## 2020-01-01 RX ADMIN — FERROUS SULFATE TAB EC 325 MG (65 MG FE EQUIVALENT) 325 MG: 325 (65 FE) TABLET DELAYED RESPONSE at 08:41

## 2020-01-01 RX ADMIN — AZITHROMYCIN MONOHYDRATE 500 MG: 500 INJECTION, POWDER, LYOPHILIZED, FOR SOLUTION INTRAVENOUS at 07:31

## 2020-01-01 RX ADMIN — SODIUM CHLORIDE: 9 INJECTION, SOLUTION INTRAVENOUS at 09:52

## 2020-01-01 RX ADMIN — IPRATROPIUM BROMIDE AND ALBUTEROL SULFATE 1 AMPULE: .5; 3 SOLUTION RESPIRATORY (INHALATION) at 04:34

## 2020-01-01 RX ADMIN — BUDESONIDE AND FORMOTEROL FUMARATE DIHYDRATE 2 PUFF: 160; 4.5 AEROSOL RESPIRATORY (INHALATION) at 08:12

## 2020-01-01 RX ADMIN — FLUTICASONE PROPIONATE 2 PUFF: 110 AEROSOL, METERED RESPIRATORY (INHALATION) at 21:12

## 2020-01-01 RX ADMIN — METHYLPREDNISOLONE SODIUM SUCCINATE 80 MG: 125 INJECTION, POWDER, FOR SOLUTION INTRAMUSCULAR; INTRAVENOUS at 01:04

## 2020-01-01 RX ADMIN — DESMOPRESSIN ACETATE 40 MG: 0.2 TABLET ORAL at 20:31

## 2020-01-01 RX ADMIN — METOPROLOL TARTRATE 25 MG: 25 TABLET ORAL at 20:06

## 2020-01-01 RX ADMIN — FERROUS SULFATE TAB EC 325 MG (65 MG FE EQUIVALENT) 325 MG: 325 (65 FE) TABLET DELAYED RESPONSE at 08:51

## 2020-01-01 RX ADMIN — IPRATROPIUM BROMIDE AND ALBUTEROL SULFATE 1 AMPULE: .5; 3 SOLUTION RESPIRATORY (INHALATION) at 11:34

## 2020-01-01 RX ADMIN — BUDESONIDE AND FORMOTEROL FUMARATE DIHYDRATE 2 PUFF: 160; 4.5 AEROSOL RESPIRATORY (INHALATION) at 19:53

## 2020-01-01 RX ADMIN — METOPROLOL TARTRATE 5 MG: 5 INJECTION, SOLUTION INTRAVENOUS at 09:41

## 2020-01-01 RX ADMIN — FUROSEMIDE 20 MG: 10 INJECTION, SOLUTION INTRAVENOUS at 02:35

## 2020-01-01 RX ADMIN — Medication 10 ML: at 20:53

## 2020-01-01 RX ADMIN — IPRATROPIUM BROMIDE AND ALBUTEROL SULFATE 1 AMPULE: .5; 3 SOLUTION RESPIRATORY (INHALATION) at 16:15

## 2020-01-01 RX ADMIN — HEPARIN SODIUM AND DEXTROSE 12 UNITS/KG/HR: 10000; 5 INJECTION INTRAVENOUS at 07:16

## 2020-01-01 RX ADMIN — DILTIAZEM HYDROCHLORIDE 120 MG: 120 CAPSULE, EXTENDED RELEASE ORAL at 08:14

## 2020-01-01 RX ADMIN — FUROSEMIDE 40 MG: 10 INJECTION, SOLUTION INTRAMUSCULAR; INTRAVENOUS at 03:34

## 2020-01-01 RX ADMIN — IPRATROPIUM BROMIDE AND ALBUTEROL SULFATE 1 AMPULE: .5; 3 SOLUTION RESPIRATORY (INHALATION) at 20:47

## 2020-01-01 RX ADMIN — HEPARIN SODIUM AND DEXTROSE 14 UNITS/KG/HR: 10000; 5 INJECTION INTRAVENOUS at 16:26

## 2020-01-01 RX ADMIN — AZITHROMYCIN MONOHYDRATE 500 MG: 500 INJECTION, POWDER, LYOPHILIZED, FOR SOLUTION INTRAVENOUS at 01:19

## 2020-01-01 RX ADMIN — AZITHROMYCIN MONOHYDRATE 500 MG: 500 INJECTION, POWDER, LYOPHILIZED, FOR SOLUTION INTRAVENOUS at 17:14

## 2020-01-01 RX ADMIN — Medication 10 ML: at 19:52

## 2020-01-01 RX ADMIN — Medication 10 ML: at 08:46

## 2020-01-01 RX ADMIN — ALBUTEROL SULFATE 2.5 MG: 2.5 SOLUTION RESPIRATORY (INHALATION) at 08:47

## 2020-01-01 RX ADMIN — ONDANSETRON 4 MG: 2 INJECTION INTRAMUSCULAR; INTRAVENOUS at 04:46

## 2020-01-01 RX ADMIN — METHYLPREDNISOLONE SODIUM SUCCINATE 125 MG: 125 INJECTION, POWDER, FOR SOLUTION INTRAMUSCULAR; INTRAVENOUS at 15:53

## 2020-01-01 RX ADMIN — IPRATROPIUM BROMIDE AND ALBUTEROL SULFATE 1 AMPULE: .5; 3 SOLUTION RESPIRATORY (INHALATION) at 10:21

## 2020-01-01 RX ADMIN — FLUTICASONE PROPIONATE 2 PUFF: 110 AEROSOL, METERED RESPIRATORY (INHALATION) at 11:03

## 2020-01-01 RX ADMIN — ATORVASTATIN CALCIUM 10 MG: 10 TABLET, FILM COATED ORAL at 08:28

## 2020-01-01 RX ADMIN — METOPROLOL TARTRATE 12.5 MG: 25 TABLET ORAL at 08:28

## 2020-01-01 RX ADMIN — HEPARIN SODIUM 1660 UNITS: 1000 INJECTION INTRAVENOUS; SUBCUTANEOUS at 22:33

## 2020-01-01 RX ADMIN — APIXABAN 2.5 MG: 2.5 TABLET, FILM COATED ORAL at 08:28

## 2020-01-01 RX ADMIN — HYDRALAZINE HYDROCHLORIDE 50 MG: 50 TABLET, FILM COATED ORAL at 00:04

## 2020-01-01 RX ADMIN — BENZONATATE 100 MG: 100 CAPSULE ORAL at 08:28

## 2020-01-01 RX ADMIN — IPRATROPIUM BROMIDE AND ALBUTEROL SULFATE 1 AMPULE: .5; 3 SOLUTION RESPIRATORY (INHALATION) at 17:40

## 2020-01-01 RX ADMIN — SODIUM CHLORIDE: 9 INJECTION, SOLUTION INTRAVENOUS at 20:35

## 2020-01-01 RX ADMIN — APIXABAN 2.5 MG: 2.5 TABLET, FILM COATED ORAL at 09:40

## 2020-01-01 RX ADMIN — ALBUTEROL SULFATE 2.5 MG: 2.5 SOLUTION RESPIRATORY (INHALATION) at 19:53

## 2020-01-01 RX ADMIN — HEPARIN SODIUM 1660 UNITS: 1000 INJECTION INTRAVENOUS; SUBCUTANEOUS at 16:25

## 2020-01-01 RX ADMIN — PREDNISONE 20 MG: 20 TABLET ORAL at 08:28

## 2020-01-01 RX ADMIN — ENOXAPARIN SODIUM 50 MG: 60 INJECTION SUBCUTANEOUS at 08:41

## 2020-01-01 RX ADMIN — AZITHROMYCIN MONOHYDRATE 500 MG: 500 INJECTION, POWDER, LYOPHILIZED, FOR SOLUTION INTRAVENOUS at 01:05

## 2020-01-01 RX ADMIN — APIXABAN 2.5 MG: 2.5 TABLET, FILM COATED ORAL at 08:35

## 2020-01-01 RX ADMIN — HYDRALAZINE HYDROCHLORIDE 50 MG: 50 TABLET, FILM COATED ORAL at 14:53

## 2020-01-01 RX ADMIN — BUDESONIDE AND FORMOTEROL FUMARATE DIHYDRATE 2 PUFF: 160; 4.5 AEROSOL RESPIRATORY (INHALATION) at 09:03

## 2020-01-01 RX ADMIN — BUDESONIDE AND FORMOTEROL FUMARATE DIHYDRATE 2 PUFF: 160; 4.5 AEROSOL RESPIRATORY (INHALATION) at 21:00

## 2020-01-01 RX ADMIN — ATORVASTATIN CALCIUM 10 MG: 10 TABLET, FILM COATED ORAL at 08:14

## 2020-01-01 RX ADMIN — METOPROLOL TARTRATE 25 MG: 25 TABLET ORAL at 19:51

## 2020-01-01 RX ADMIN — DESMOPRESSIN ACETATE 40 MG: 0.2 TABLET ORAL at 19:51

## 2020-01-01 RX ADMIN — IPRATROPIUM BROMIDE AND ALBUTEROL SULFATE 1 AMPULE: .5; 3 SOLUTION RESPIRATORY (INHALATION) at 00:45

## 2020-01-01 RX ADMIN — METOPROLOL TARTRATE 25 MG: 25 TABLET ORAL at 09:49

## 2020-01-01 RX ADMIN — FAMOTIDINE 20 MG: 20 TABLET, FILM COATED ORAL at 08:51

## 2020-01-01 RX ADMIN — HYDRALAZINE HYDROCHLORIDE 50 MG: 50 TABLET, FILM COATED ORAL at 16:29

## 2020-01-01 RX ADMIN — Medication 10 ML: at 09:50

## 2020-01-01 RX ADMIN — FAMOTIDINE 20 MG: 10 TABLET ORAL at 09:40

## 2020-01-01 RX ADMIN — SODIUM CHLORIDE: 9 INJECTION, SOLUTION INTRAVENOUS at 22:48

## 2020-01-01 RX ADMIN — APIXABAN 2.5 MG: 2.5 TABLET, FILM COATED ORAL at 09:13

## 2020-01-01 RX ADMIN — ALBUTEROL SULFATE 2.5 MG: 2.5 SOLUTION RESPIRATORY (INHALATION) at 12:08

## 2020-01-01 RX ADMIN — HYDRALAZINE HYDROCHLORIDE 50 MG: 50 TABLET, FILM COATED ORAL at 14:28

## 2020-01-01 RX ADMIN — PREDNISONE 20 MG: 20 TABLET ORAL at 08:39

## 2020-01-01 RX ADMIN — METOPROLOL TARTRATE 12.5 MG: 25 TABLET ORAL at 22:36

## 2020-01-01 RX ADMIN — Medication 10 ML: at 08:29

## 2020-01-01 RX ADMIN — ISOSORBIDE MONONITRATE 30 MG: 30 TABLET ORAL at 10:50

## 2020-01-01 RX ADMIN — FUROSEMIDE 20 MG: 20 TABLET ORAL at 12:14

## 2020-01-01 RX ADMIN — DILTIAZEM HYDROCHLORIDE 120 MG: 60 TABLET, FILM COATED ORAL at 12:15

## 2020-01-01 RX ADMIN — METOPROLOL TARTRATE 12.5 MG: 25 TABLET ORAL at 20:39

## 2020-01-01 RX ADMIN — ASPIRIN 81 MG: 81 TABLET ORAL at 08:30

## 2020-01-01 RX ADMIN — Medication 10 ML: at 20:57

## 2020-01-01 RX ADMIN — DIGOXIN 250 MCG: 250 INJECTION, SOLUTION INTRAMUSCULAR; INTRAVENOUS; PARENTERAL at 18:54

## 2020-01-01 RX ADMIN — BUDESONIDE AND FORMOTEROL FUMARATE DIHYDRATE 2 PUFF: 160; 4.5 AEROSOL RESPIRATORY (INHALATION) at 08:28

## 2020-01-01 RX ADMIN — ISOSORBIDE MONONITRATE 30 MG: 30 TABLET ORAL at 18:20

## 2020-01-01 RX ADMIN — ASPIRIN 81 MG 324 MG: 81 TABLET ORAL at 04:47

## 2020-01-01 RX ADMIN — METHYLPREDNISOLONE SODIUM SUCCINATE 80 MG: 125 INJECTION, POWDER, FOR SOLUTION INTRAMUSCULAR; INTRAVENOUS at 20:52

## 2020-01-01 RX ADMIN — BUDESONIDE AND FORMOTEROL FUMARATE DIHYDRATE 2 PUFF: 160; 4.5 AEROSOL RESPIRATORY (INHALATION) at 08:48

## 2020-01-01 RX ADMIN — METHYLPREDNISOLONE SODIUM SUCCINATE 80 MG: 125 INJECTION, POWDER, FOR SOLUTION INTRAMUSCULAR; INTRAVENOUS at 09:40

## 2020-01-01 RX ADMIN — IPRATROPIUM BROMIDE AND ALBUTEROL SULFATE 1 AMPULE: .5; 3 SOLUTION RESPIRATORY (INHALATION) at 10:17

## 2020-01-01 RX ADMIN — DILTIAZEM HYDROCHLORIDE 120 MG: 120 CAPSULE, EXTENDED RELEASE ORAL at 08:39

## 2020-01-01 RX ADMIN — DESMOPRESSIN ACETATE 40 MG: 0.2 TABLET ORAL at 20:06

## 2020-01-01 RX ADMIN — IPRATROPIUM BROMIDE AND ALBUTEROL SULFATE 1 AMPULE: .5; 3 SOLUTION RESPIRATORY (INHALATION) at 05:13

## 2020-01-01 RX ADMIN — FAMOTIDINE 20 MG: 20 TABLET, FILM COATED ORAL at 13:24

## 2020-01-01 RX ADMIN — DILTIAZEM HYDROCHLORIDE 5 MG: 5 INJECTION INTRAVENOUS at 16:57

## 2020-01-01 RX ADMIN — FERROUS SULFATE TAB EC 325 MG (65 MG FE EQUIVALENT) 325 MG: 325 (65 FE) TABLET DELAYED RESPONSE at 09:49

## 2020-01-01 RX ADMIN — Medication 10 ML: at 08:51

## 2020-01-01 RX ADMIN — APIXABAN 2.5 MG: 2.5 TABLET, FILM COATED ORAL at 20:40

## 2020-01-01 RX ADMIN — FERROUS SULFATE TAB 325 MG (65 MG ELEMENTAL FE) 325 MG: 325 (65 FE) TAB at 07:35

## 2020-01-01 RX ADMIN — APIXABAN 2.5 MG: 2.5 TABLET, FILM COATED ORAL at 08:13

## 2020-01-01 RX ADMIN — HYDRALAZINE HYDROCHLORIDE 50 MG: 50 TABLET, FILM COATED ORAL at 05:22

## 2020-01-01 RX ADMIN — APIXABAN 2.5 MG: 2.5 TABLET, FILM COATED ORAL at 10:51

## 2020-01-01 RX ADMIN — ALBUTEROL SULFATE 2.5 MG: 2.5 SOLUTION RESPIRATORY (INHALATION) at 13:19

## 2020-01-01 RX ADMIN — Medication 10 ML: at 09:41

## 2020-01-01 RX ADMIN — HEPARIN SODIUM AND DEXTROSE 12 UNITS/KG/HR: 10000; 5 INJECTION INTRAVENOUS at 12:59

## 2020-01-01 RX ADMIN — ATORVASTATIN CALCIUM 10 MG: 10 TABLET, FILM COATED ORAL at 01:05

## 2020-01-01 RX ADMIN — IPRATROPIUM BROMIDE AND ALBUTEROL SULFATE 1 AMPULE: .5; 3 SOLUTION RESPIRATORY (INHALATION) at 15:56

## 2020-01-01 RX ADMIN — IPRATROPIUM BROMIDE AND ALBUTEROL SULFATE 1 AMPULE: .5; 3 SOLUTION RESPIRATORY (INHALATION) at 21:00

## 2020-01-01 RX ADMIN — HYDRALAZINE HYDROCHLORIDE 25 MG: 25 TABLET, FILM COATED ORAL at 19:51

## 2020-01-01 RX ADMIN — IPRATROPIUM BROMIDE AND ALBUTEROL SULFATE 1 AMPULE: .5; 3 SOLUTION RESPIRATORY (INHALATION) at 00:01

## 2020-01-01 RX ADMIN — FUROSEMIDE 20 MG: 10 INJECTION, SOLUTION INTRAMUSCULAR; INTRAVENOUS at 17:29

## 2020-01-01 RX ADMIN — ALBUTEROL SULFATE 2.5 MG: 2.5 SOLUTION RESPIRATORY (INHALATION) at 11:31

## 2020-01-01 RX ADMIN — FUROSEMIDE 20 MG: 20 TABLET ORAL at 10:50

## 2020-01-01 RX ADMIN — METOPROLOL TARTRATE 25 MG: 25 TABLET ORAL at 10:50

## 2020-01-01 RX ADMIN — ISOSORBIDE MONONITRATE 30 MG: 30 TABLET ORAL at 08:41

## 2020-01-01 RX ADMIN — ENALAPRILAT 1.25 MG: 1.25 INJECTION INTRAVENOUS at 07:35

## 2020-01-01 RX ADMIN — IPRATROPIUM BROMIDE AND ALBUTEROL SULFATE 1 AMPULE: .5; 3 SOLUTION RESPIRATORY (INHALATION) at 00:37

## 2020-01-01 RX ADMIN — MORPHINE SULFATE 1 MG: 2 INJECTION, SOLUTION INTRAMUSCULAR; INTRAVENOUS at 03:20

## 2020-01-01 RX ADMIN — ALBUTEROL SULFATE 2.5 MG: 2.5 SOLUTION RESPIRATORY (INHALATION) at 21:12

## 2020-01-01 RX ADMIN — METOPROLOL TARTRATE 12.5 MG: 25 TABLET ORAL at 03:34

## 2020-01-01 RX ADMIN — WATER 1 G: 1 INJECTION INTRAMUSCULAR; INTRAVENOUS; SUBCUTANEOUS at 16:58

## 2020-01-01 RX ADMIN — IPRATROPIUM BROMIDE AND ALBUTEROL SULFATE 1 AMPULE: .5; 3 SOLUTION RESPIRATORY (INHALATION) at 08:05

## 2020-01-01 RX ADMIN — ATORVASTATIN CALCIUM 10 MG: 10 TABLET, FILM COATED ORAL at 08:35

## 2020-01-01 RX ADMIN — ENOXAPARIN SODIUM 50 MG: 60 INJECTION SUBCUTANEOUS at 10:33

## 2020-01-01 RX ADMIN — BUDESONIDE AND FORMOTEROL FUMARATE DIHYDRATE 2 PUFF: 160; 4.5 AEROSOL RESPIRATORY (INHALATION) at 08:18

## 2020-01-01 RX ADMIN — ALBUTEROL SULFATE 2.5 MG: 2.5 SOLUTION RESPIRATORY (INHALATION) at 16:33

## 2020-01-01 RX ADMIN — NITROGLYCERIN 0.5 INCH: 20 OINTMENT TOPICAL at 07:15

## 2020-01-01 RX ADMIN — BUDESONIDE AND FORMOTEROL FUMARATE DIHYDRATE 2 PUFF: 160; 4.5 AEROSOL RESPIRATORY (INHALATION) at 20:58

## 2020-01-01 RX ADMIN — Medication 10 ML: at 20:31

## 2020-01-01 RX ADMIN — IPRATROPIUM BROMIDE AND ALBUTEROL SULFATE 1 AMPULE: .5; 3 SOLUTION RESPIRATORY (INHALATION) at 16:48

## 2020-01-01 RX ADMIN — METHYLPREDNISOLONE SODIUM SUCCINATE 125 MG: 125 INJECTION, POWDER, FOR SOLUTION INTRAMUSCULAR; INTRAVENOUS at 04:46

## 2020-01-01 RX ADMIN — DILTIAZEM HYDROCHLORIDE 120 MG: 120 CAPSULE, EXTENDED RELEASE ORAL at 08:28

## 2020-01-01 RX ADMIN — ASPIRIN 81 MG 324 MG: 81 TABLET ORAL at 22:01

## 2020-01-01 RX ADMIN — FUROSEMIDE 40 MG: 10 INJECTION, SOLUTION INTRAMUSCULAR; INTRAVENOUS at 01:23

## 2020-01-01 RX ADMIN — ASPIRIN 81 MG: 81 TABLET ORAL at 08:14

## 2020-01-01 RX ADMIN — FUROSEMIDE 20 MG: 10 INJECTION, SOLUTION INTRAMUSCULAR; INTRAVENOUS at 12:16

## 2020-01-01 RX ADMIN — HEPARIN SODIUM 3320 UNITS: 1000 INJECTION, SOLUTION INTRAVENOUS; SUBCUTANEOUS at 07:15

## 2020-01-01 RX ADMIN — IPRATROPIUM BROMIDE AND ALBUTEROL SULFATE 1 AMPULE: .5; 3 SOLUTION RESPIRATORY (INHALATION) at 08:25

## 2020-01-01 RX ADMIN — IPRATROPIUM BROMIDE AND ALBUTEROL SULFATE 1 AMPULE: .5; 3 SOLUTION RESPIRATORY (INHALATION) at 12:42

## 2020-01-01 RX ADMIN — ATORVASTATIN CALCIUM 10 MG: 10 TABLET, FILM COATED ORAL at 20:53

## 2020-01-01 RX ADMIN — APIXABAN 2.5 MG: 2.5 TABLET, FILM COATED ORAL at 20:53

## 2020-01-01 RX ADMIN — ALBUTEROL SULFATE 2.5 MG: 2.5 SOLUTION RESPIRATORY (INHALATION) at 11:22

## 2020-01-01 RX ADMIN — HYDRALAZINE HYDROCHLORIDE 25 MG: 25 TABLET, FILM COATED ORAL at 14:40

## 2020-01-01 RX ADMIN — Medication 81 MG: at 09:49

## 2020-01-01 RX ADMIN — DILTIAZEM HYDROCHLORIDE 120 MG: 60 TABLET, FILM COATED ORAL at 08:41

## 2020-01-01 RX ADMIN — NAPROXEN SODIUM 550 MG: 550 TABLET, FILM COATED ORAL at 15:37

## 2020-01-01 RX ADMIN — IPRATROPIUM BROMIDE AND ALBUTEROL SULFATE 1 AMPULE: .5; 3 SOLUTION RESPIRATORY (INHALATION) at 00:33

## 2020-01-01 RX ADMIN — METHYLPREDNISOLONE SODIUM SUCCINATE 125 MG: 125 INJECTION, POWDER, FOR SOLUTION INTRAMUSCULAR; INTRAVENOUS at 11:27

## 2020-01-01 RX ADMIN — HYDRALAZINE HYDROCHLORIDE 50 MG: 50 TABLET, FILM COATED ORAL at 14:56

## 2020-01-01 RX ADMIN — PREDNISONE 20 MG: 20 TABLET ORAL at 08:14

## 2020-01-01 RX ADMIN — IPRATROPIUM BROMIDE AND ALBUTEROL SULFATE 1 AMPULE: .5; 3 SOLUTION RESPIRATORY (INHALATION) at 21:30

## 2020-01-01 RX ADMIN — Medication 10 ML: at 08:13

## 2020-01-01 RX ADMIN — APIXABAN 2.5 MG: 2.5 TABLET, FILM COATED ORAL at 01:05

## 2020-01-01 RX ADMIN — ASPIRIN 325 MG: 325 TABLET, COATED ORAL at 08:51

## 2020-01-01 RX ADMIN — IPRATROPIUM BROMIDE AND ALBUTEROL SULFATE 1 AMPULE: .5; 3 SOLUTION RESPIRATORY (INHALATION) at 01:07

## 2020-01-01 RX ADMIN — DILTIAZEM HYDROCHLORIDE 120 MG: 60 TABLET, FILM COATED ORAL at 08:50

## 2020-01-01 RX ADMIN — Medication 3 MG: at 01:53

## 2020-01-01 RX ADMIN — IPRATROPIUM BROMIDE AND ALBUTEROL SULFATE 1 AMPULE: .5; 3 SOLUTION RESPIRATORY (INHALATION) at 04:58

## 2020-01-01 RX ADMIN — IPRATROPIUM BROMIDE AND ALBUTEROL SULFATE 1 AMPULE: .5; 3 SOLUTION RESPIRATORY (INHALATION) at 02:09

## 2020-01-01 RX ADMIN — FERROUS SULFATE TAB 325 MG (65 MG ELEMENTAL FE) 325 MG: 325 (65 FE) TAB at 12:15

## 2020-01-01 RX ADMIN — Medication 10 ML: at 01:24

## 2020-01-01 RX ADMIN — METHYLPREDNISOLONE SODIUM SUCCINATE 80 MG: 125 INJECTION, POWDER, FOR SOLUTION INTRAMUSCULAR; INTRAVENOUS at 09:12

## 2020-01-01 RX ADMIN — METOPROLOL TARTRATE 25 MG: 25 TABLET ORAL at 13:24

## 2020-01-01 RX ADMIN — METOPROLOL TARTRATE 25 MG: 25 TABLET ORAL at 08:50

## 2020-01-01 RX ADMIN — HYDRALAZINE HYDROCHLORIDE 50 MG: 50 TABLET, FILM COATED ORAL at 23:31

## 2020-01-01 RX ADMIN — ALBUTEROL SULFATE 2.5 MG: 2.5 SOLUTION RESPIRATORY (INHALATION) at 01:28

## 2020-01-01 RX ADMIN — Medication 10 ML: at 20:02

## 2020-01-01 RX ADMIN — FERROUS SULFATE TAB EC 325 MG (65 MG FE EQUIVALENT) 325 MG: 325 (65 FE) TABLET DELAYED RESPONSE at 10:51

## 2020-01-01 RX ADMIN — METHYLPREDNISOLONE SODIUM SUCCINATE 125 MG: 125 INJECTION, POWDER, FOR SOLUTION INTRAMUSCULAR; INTRAVENOUS at 02:35

## 2020-01-01 RX ADMIN — HYDRALAZINE HYDROCHLORIDE 50 MG: 50 TABLET, FILM COATED ORAL at 06:24

## 2020-01-01 RX ADMIN — FAMOTIDINE 20 MG: 10 TABLET ORAL at 09:13

## 2020-01-01 RX ADMIN — IPRATROPIUM BROMIDE AND ALBUTEROL SULFATE 1 AMPULE: .5; 3 SOLUTION RESPIRATORY (INHALATION) at 11:59

## 2020-01-01 RX ADMIN — FUROSEMIDE 20 MG: 20 TABLET ORAL at 18:10

## 2020-01-01 RX ADMIN — METOPROLOL TARTRATE 25 MG: 25 TABLET ORAL at 08:41

## 2020-01-01 RX ADMIN — METHYLPREDNISOLONE SODIUM SUCCINATE 125 MG: 125 INJECTION, POWDER, FOR SOLUTION INTRAMUSCULAR; INTRAVENOUS at 20:45

## 2020-01-01 RX ADMIN — METOPROLOL TARTRATE 12.5 MG: 25 TABLET ORAL at 08:35

## 2020-01-01 RX ADMIN — FUROSEMIDE 20 MG: 20 TABLET ORAL at 09:49

## 2020-01-01 RX ADMIN — IPRATROPIUM BROMIDE AND ALBUTEROL SULFATE 1 AMPULE: .5; 3 SOLUTION RESPIRATORY (INHALATION) at 15:14

## 2020-01-01 RX ADMIN — DILTIAZEM HYDROCHLORIDE 5 MG/HR: 5 INJECTION INTRAVENOUS at 03:34

## 2020-01-01 RX ADMIN — DILTIAZEM HYDROCHLORIDE 120 MG: 60 TABLET, FILM COATED ORAL at 18:10

## 2020-01-01 RX ADMIN — CEFTRIAXONE 1 G: 1 INJECTION, POWDER, FOR SOLUTION INTRAMUSCULAR; INTRAVENOUS at 07:31

## 2020-01-01 RX ADMIN — ENOXAPARIN SODIUM 50 MG: 60 INJECTION SUBCUTANEOUS at 09:00

## 2020-01-01 RX ADMIN — Medication 81 MG: at 08:41

## 2020-01-01 RX ADMIN — METOPROLOL TARTRATE 25 MG: 25 TABLET ORAL at 20:00

## 2020-01-01 RX ADMIN — HYDRALAZINE HYDROCHLORIDE 10 MG: 20 INJECTION INTRAMUSCULAR; INTRAVENOUS at 00:17

## 2020-01-01 RX ADMIN — DILTIAZEM HYDROCHLORIDE 120 MG: 60 TABLET, FILM COATED ORAL at 09:13

## 2020-01-01 RX ADMIN — IPRATROPIUM BROMIDE AND ALBUTEROL SULFATE 1 AMPULE: .5; 3 SOLUTION RESPIRATORY (INHALATION) at 08:58

## 2020-01-01 RX ADMIN — Medication 81 MG: at 10:51

## 2020-01-01 RX ADMIN — LORAZEPAM 0.5 MG: 1 TABLET ORAL at 00:20

## 2020-01-01 RX ADMIN — HYDRALAZINE HYDROCHLORIDE 25 MG: 25 TABLET, FILM COATED ORAL at 08:41

## 2020-01-01 RX ADMIN — IPRATROPIUM BROMIDE AND ALBUTEROL SULFATE 1 AMPULE: .5; 3 SOLUTION RESPIRATORY (INHALATION) at 19:53

## 2020-01-01 RX ADMIN — SODIUM CHLORIDE 500 ML: 9 INJECTION, SOLUTION INTRAVENOUS at 16:57

## 2020-01-01 RX ADMIN — DILTIAZEM HYDROCHLORIDE 5 MG/HR: 5 INJECTION INTRAVENOUS at 17:04

## 2020-01-01 RX ADMIN — ISOSORBIDE MONONITRATE 30 MG: 30 TABLET ORAL at 09:49

## 2020-01-01 RX ADMIN — METOPROLOL TARTRATE 25 MG: 25 TABLET ORAL at 20:30

## 2020-01-01 RX ADMIN — ISOSORBIDE MONONITRATE 30 MG: 30 TABLET ORAL at 08:50

## 2020-01-01 RX ADMIN — SODIUM CHLORIDE: 9 INJECTION, SOLUTION INTRAVENOUS at 01:07

## 2020-01-01 RX ADMIN — SODIUM CHLORIDE: 9 INJECTION, SOLUTION INTRAVENOUS at 08:15

## 2020-01-01 RX ADMIN — ALBUTEROL SULFATE 2.5 MG: 2.5 SOLUTION RESPIRATORY (INHALATION) at 15:25

## 2020-01-01 RX ADMIN — DILTIAZEM HYDROCHLORIDE 120 MG: 60 TABLET, FILM COATED ORAL at 09:49

## 2020-01-01 ASSESSMENT — ENCOUNTER SYMPTOMS
CHOKING: 0
WHEEZING: 0
SHORTNESS OF BREATH: 1
BLOOD IN STOOL: 0
RHINORRHEA: 0
VOMITING: 0
CHEST TIGHTNESS: 0
COUGH: 1
ABDOMINAL PAIN: 0
EYE PAIN: 0
WHEEZING: 0
NAUSEA: 0
ABDOMINAL PAIN: 0
DIARRHEA: 0
STRIDOR: 0
ABDOMINAL PAIN: 0
CONSTIPATION: 0
NAUSEA: 0
NAUSEA: 0
COUGH: 0
CONSTIPATION: 0
WHEEZING: 0
EYE DISCHARGE: 0
RHINORRHEA: 0
SHORTNESS OF BREATH: 1
EYE PAIN: 0
COLOR CHANGE: 0
SHORTNESS OF BREATH: 1
ABDOMINAL DISTENTION: 0
COUGH: 0
DIARRHEA: 0
EYE REDNESS: 0
COUGH: 0
VOMITING: 0
VOMITING: 0
CHEST TIGHTNESS: 0
SHORTNESS OF BREATH: 1
VOMITING: 0
TROUBLE SWALLOWING: 0
RHINORRHEA: 0
ABDOMINAL PAIN: 0
COLOR CHANGE: 0
WHEEZING: 0
SHORTNESS OF BREATH: 0
RHINORRHEA: 0
ABDOMINAL PAIN: 0
CHEST TIGHTNESS: 1
WHEEZING: 1
BLOOD IN STOOL: 0
RHINORRHEA: 0
DIARRHEA: 0
SHORTNESS OF BREATH: 1
ABDOMINAL PAIN: 0
COUGH: 0
WHEEZING: 0
CONSTIPATION: 0
BACK PAIN: 0
VOMITING: 0
NAUSEA: 0
COUGH: 1
COLOR CHANGE: 0
WHEEZING: 1
EYES NEGATIVE: 1
EYE PAIN: 0
APNEA: 0
CHEST TIGHTNESS: 0
VOMITING: 0
NAUSEA: 0
DIARRHEA: 0
BLOOD IN STOOL: 0
SHORTNESS OF BREATH: 1
NAUSEA: 0
SORE THROAT: 0
SINUS PRESSURE: 0
ABDOMINAL PAIN: 0
SHORTNESS OF BREATH: 1
ABDOMINAL DISTENTION: 0
COLOR CHANGE: 1
ABDOMINAL PAIN: 0
DIARRHEA: 0
STRIDOR: 0
EYES NEGATIVE: 1
COUGH: 1
SHORTNESS OF BREATH: 1
BACK PAIN: 0
CONSTIPATION: 0
SHORTNESS OF BREATH: 1
WHEEZING: 0
SHORTNESS OF BREATH: 1
COUGH: 1
CHEST TIGHTNESS: 0
ABDOMINAL PAIN: 0
BACK PAIN: 0

## 2020-01-01 ASSESSMENT — LIFESTYLE VARIABLES: HOW OFTEN DO YOU HAVE A DRINK CONTAINING ALCOHOL: 0

## 2020-01-01 ASSESSMENT — PAIN DESCRIPTION - LOCATION
LOCATION: HAND
LOCATION: HAND
LOCATION: CHEST
LOCATION: CHEST

## 2020-01-01 ASSESSMENT — PAIN SCALES - GENERAL
PAINLEVEL_OUTOF10: 0
PAINLEVEL_OUTOF10: 9
PAINLEVEL_OUTOF10: 9
PAINLEVEL_OUTOF10: 0
PAINLEVEL_OUTOF10: 6
PAINLEVEL_OUTOF10: 0
PAINLEVEL_OUTOF10: 2
PAINLEVEL_OUTOF10: 0

## 2020-01-01 ASSESSMENT — PATIENT HEALTH QUESTIONNAIRE - PHQ9
SUM OF ALL RESPONSES TO PHQ QUESTIONS 1-9: 0
SUM OF ALL RESPONSES TO PHQ9 QUESTIONS 1 & 2: 0
SUM OF ALL RESPONSES TO PHQ QUESTIONS 1-9: 0
1. LITTLE INTEREST OR PLEASURE IN DOING THINGS: 0
2. FEELING DOWN, DEPRESSED OR HOPELESS: 0
SUM OF ALL RESPONSES TO PHQ QUESTIONS 1-9: 0
SUM OF ALL RESPONSES TO PHQ QUESTIONS 1-9: 0

## 2020-01-01 ASSESSMENT — PAIN DESCRIPTION - PROGRESSION: CLINICAL_PROGRESSION: NOT CHANGED

## 2020-01-01 ASSESSMENT — PAIN DESCRIPTION - PAIN TYPE
TYPE: ACUTE PAIN

## 2020-01-01 ASSESSMENT — PAIN DESCRIPTION - DESCRIPTORS
DESCRIPTORS: SHARP
DESCRIPTORS: PRESSURE

## 2020-01-01 ASSESSMENT — PAIN DESCRIPTION - ORIENTATION
ORIENTATION: LEFT
ORIENTATION: LEFT

## 2020-01-01 ASSESSMENT — PAIN DESCRIPTION - FREQUENCY: FREQUENCY: INTERMITTENT

## 2020-01-01 ASSESSMENT — PAIN DESCRIPTION - ONSET: ONSET: AWAKENED FROM SLEEP

## 2020-01-16 NOTE — TELEPHONE ENCOUNTER
Health Maintenance   Topic Date Due    Lipid screen  10/24/2017    Annual Wellness Visit (AWV)  05/29/2019    Flu vaccine (1) 09/01/2019    DTaP/Tdap/Td vaccine (1 - Tdap) 04/18/2020 (Originally 5/16/1939)    Shingles Vaccine (1 of 2) 04/18/2020 (Originally 5/16/1978)    Pneumococcal 65+ years Vaccine (1 of 1 - PPSV23) 04/18/2020 (Originally 5/16/1993)    Potassium monitoring  11/17/2020    Creatinine monitoring  11/17/2020    Hepatitis B vaccine  Aged Out    HPV vaccine  Aged Out             (applicable per patient's age: Cancer Screenings, Depression Screening, Fall Risk Screening, Immunizations)    Hemoglobin A1C (%)   Date Value   04/11/2019 6.3 (H)   03/29/2018 7.1   06/16/2017 6.0 (H)     Microalb/Crt.  Ratio (mcg/mg creat)   Date Value   01/23/2018 53 (H)     LDL Cholesterol (mg/dL)   Date Value   10/24/2016 73     AST (U/L)   Date Value   04/10/2019 18     ALT (U/L)   Date Value   04/10/2019 13     BUN (mg/dL)   Date Value   11/17/2019 31 (H)      (goal A1C is < 7)   (goal LDL is <100) need 30-50% reduction from baseline     BP Readings from Last 3 Encounters:   12/17/19 127/65   11/18/19 (!) 155/79   08/22/19 132/71    (goal /80)      All Future Testing planned in [facility]:  Lab Frequency Next Occurrence       Next Visit Date:  Future Appointments   Date Time Provider Olivia Deng   2/27/2020  3:00 PM Kassy Kirby MD [facility]            Patient Active Problem List:     Asthma     Status post coronary artery stent placement     CKD (chronic kidney disease) stage 4, GFR 15-29 ml/min (HCC)     Renovascular hypertension     COPD (chronic obstructive pulmonary disease) (HCC)     Atrial fibrillation (HCC)     Panlobular emphysema ([address]     Acute respiratory failure with hypoxemia ([facility]     Coronary artery disease involving native coronary artery of native heart without angina pectoris     Elevated brain natriuretic peptide (BNP) level     Acute on chronic diastolic congestive heart failure ([facility]

## 2020-01-26 NOTE — ED PROVIDER NOTES
[facility] ED  eMERGENCY dEPARTMENT eNCOUnter      Pt Name: Diane Michelle  MRN: [id number]  [place] 5/16/1928  Date of evaluation: 1/26/2020  Provider: Baron Javon MD    CHIEF COMPLAINT     Chief Complaint   Patient presents with    Shortness of Breath     pt states worse in the past 3 days    Hand Pain     pt states intermittent left hand pain, onset x 1 week. No known injury       HISTORY OF PRESENT ILLNESS    Diane Michelle is a 80 y.o. female who presents to the emergency department for evaluation of left hand pain. Pain has been present for the past week. Pain is located at the base of her pinky. She denies any injury. She states pain has been constant. Pain is worse with using it. She rates the pain as mild. She also states she has shortness of breath with this is unchanged however her baseline. She denies any fevers. She denies any change to her cough. PAST MEDICAL HISTORY     Past Medical History:   Diagnosis Date    Acute cystitis without hematuria 9/29/2016    Acute gout involving toe of right foot 4/19/2018    Acute renal failure superimposed on stage 4 chronic kidney disease ([address] 9/29/2016    Arthritis     Asthma     Atrial fibrillation with rapid ventricular response ([address] 2/17/2017    CAD (coronary artery disease)     CHF (congestive heart failure) (HCC)     Chronic kidney disease     COPD (chronic obstructive pulmonary disease) ([address]     COPD with acute exacerbation ([address] 3/24/2017    Examination of participant in clinical trial 5/20/13    6 year follow up, estimated completion JUN 2019    Gout 12/28/2017    Gout     H/O cardiovascular stress test 02/14/2017    H/O echocardiogram 02/14/2017    EF 55%. Mildly increased wall thickness of the LV. Evidence of diastolic dysfunction. Normal right ventricular size and function. Normal tricuspid valve structure and function.   Mild tricuspid regurg    Hemarthrosis involving shoulder region, right 7/30/2018  Hx of transesophageal echocardiography (KRISTOPHER) for monitoring 02/20/2017    No clots  were visulaized in the left atrial appendage EF 55%.  Hyperlipidemia     Hypertension     MI (myocardial infarction) ([facility]     2001    Pseudogout of right shoulder 7/30/2018    Renal artery stenosis ([facility] [address] 6/22/2016    Left proximal 75%    Shingles     15 years ago       SURGICAL HISTORY       Past Surgical History:   Procedure Laterality Date    CARDIAC CATHETERIZATION  02/2017    LMCA: Normal 0% stenosis. LAD: Mid area 50-60% stenosis. LCx: Patent proximal stent 30% stenosis distal to the stent in OM proximal area 50% stenosis. RCA: Minimal 30% mid area PDA stent  is patent <20% stenosis. PL branch diffuse disease.     CHOLECYSTECTOMY      CORONARY ANGIOPLASTY WITH STENT PLACEMENT      SPINE SURGERY         CURRENT MEDICATIONS       Discharge Medication List as of 1/26/2020  6:21 PM      CONTINUE these medications which have NOT CHANGED    Details   simvastatin (ZOCOR) 20 MG tablet take 1 tablet by mouth every evening, Disp-90 tablet, R-0Normal      furosemide (LASIX) 20 MG tablet take 1 tablet by mouth EVERY 48 HOURS, Disp-15 tablet, R-5Normal      FEROSUL 325 (65 Fe) MG tablet take 1 tablet by mouth once daily, Disp-90 tablet, R-3Normal      albuterol (PROVENTIL) (2.5 MG/3ML) 0.083% nebulizer solution Take 3 mLs by nebulization every 4 hours as needed for Wheezing, Disp-150 vial, R-11Normal      beclomethasone (QVAR) 80 MCG/ACT inhaler Inhale 1 puff into the lungs 2 times daily, Disp-1 Inhaler, R-11Normal      diltiazem (CARDIZEM) 120 MG tablet Take 120 mg by mouth daily Prescribed per Dr Moriah Pak - last filled 3/19 per Rhonda Hartmann 5  Verified as immediate release tablet dailyHistorical Med      ELIQUIS 2.5 MG TABS tablet 2 times daily , DAWHistorical Med      benzonatate (TESSALON) 100 MG capsule Take 100 mg by mouth 3 times daily as needed for CoughHistorical Med      albuterol sulfate HFA (PROAIR HFA) 108 (90 Base) MCG/ACT inhaler inhale 2 puffs by mouth every 6 hours if needed for wheezing, Disp-1 Inhaler, R-11Normal      nitroGLYCERIN (NITROSTAT) 0.4 MG SL tablet up to max of 3 total doses. If no relief after 1 dose, call 911., Disp-25 tablet, R-1Print             ALLERGIES     [place]    FAMILY HISTORY       Family History   Problem Relation Age of Onset    Heart Disease Mother     Cancer Father         SOCIAL HISTORY       Social History     Socioeconomic History    Marital status:      Spouse name: None    Number of children: None    Years of education: None    Highest education level: None   Occupational History    None   Social Needs    Financial resource strain: None    Food insecurity:     Worry: None     Inability: None    Transportation needs:     Medical: None     Non-medical: None   Tobacco Use    Smoking status: Former Smoker     Packs/day: 3.00     Years: 30.00     Pack years: 90.00     Types: Cigarettes    Smokeless tobacco: Never Used    Tobacco comment: Quit 40 years ago   Substance and Sexual Activity    Alcohol use: No    Drug use: No    Sexual activity: None   Lifestyle    Physical activity:     Days per week: None     Minutes per session: None    Stress: None   Relationships    Social connections:     Talks on phone: None     Gets together: None     Attends Zoroastrianism service: None     Active member of club or organization: None     Attends meetings of clubs or organizations: None     Relationship status: None    Intimate partner violence:     Fear of current or ex partner: None     Emotionally abused: None     Physically abused: None     Forced sexual activity: None   Other Topics Concern    None   Social History Narrative    None       REVIEW OF SYSTEMS       Review of Systems   Constitutional: Negative for fever. HENT: Negative for congestion. Eyes: Negative for pain. Respiratory: Positive for shortness of breath.     Cardiovascular: Negative for chest pain. Gastrointestinal: Negative for abdominal pain. Genitourinary: Negative for dysuria. Skin: Negative for rash. Allergic/Immunologic: Negative for immunocompromised state. Neurological: Negative for headaches. PHYSICAL EXAM    (up to 7 for level 4, 8 or more for level 5)     ED Triage Vitals [01/26/20 1440]   BP Temp Temp Source Pulse Resp SpO2 Height Weight   139/80 98.9 °F (37.2 °C) Tympanic 77 26 94 % -- 122 lb (55.3 kg)       Physical Exam  Vitals signs and nursing note reviewed. Constitutional:       General: She is not in acute distress. Appearance: She is well-developed. HENT:      Head: Normocephalic and atraumatic. Eyes:      General:         Right eye: No discharge. Left eye: No discharge. Conjunctiva/sclera: Conjunctivae normal.   Neck:      Musculoskeletal: Neck supple. Cardiovascular:      Rate and Rhythm: Normal rate and regular rhythm. Pulmonary:      Effort: Pulmonary effort is normal. No respiratory distress. Breath sounds: No stridor. No wheezing. Abdominal:      General: There is no distension. Palpations: Abdomen is soft. Tenderness: There is no abdominal tenderness. Musculoskeletal:         General: Tenderness present. Comments: Tenderness to palpation over ulnar aspect of left hand. No deformity. No erythema. 2+ radial pulse. Patient able to touch pinky and thumb and make okay sign with no issues. Skin:     General: Skin is warm and dry. Findings: No erythema. Neurological:      Mental Status: She is alert and oriented to person, place, and time. DIAGNOSTIC RESULTS     RADIOLOGY: (none if blank)   Interpretation per theRadiologist below, if available at the time of this note:    XR HAND LEFT (MIN 3 VIEWS)   Final Result   Diffuse osteopenia without acute osseous or soft tissue abnormality. Diffuse degenerative joint disease.          XR CHEST PORTABLE   Final Result   Mild chronic interstitial changes. No focal consolidation. LABS:  Labs Reviewed   BASIC METABOLIC PANEL - Abnormal; Notable for the following components:       Result Value    Glucose 115 (*)     BUN 29 (*)     CREATININE 2.02 (*)     GFR Non- 23 (*)     GFR  28 (*)     All other components within normal limits   BRAIN NATRIURETIC PEPTIDE - Abnormal; Notable for the following components:    Pro-BNP 2,296 (*)     All other components within normal limits   CBC WITH AUTO DIFFERENTIAL - Abnormal; Notable for the following components:    Seg Neutrophils 74 (*)     Lymphocytes 11 (*)     Eosinophils % 6 (*)     Absolute Eos # 0.62 (*)     All other components within normal limits   TROPONIN - Abnormal; Notable for the following components:    Troponin T 0.04 (*)     All other components within normal limits   TROPONIN - Abnormal; Notable for the following components:    Troponin T 0.04 (*)     All other components within normal limits       All other labs were within normal range or not returned as of this dictation. EMERGENCY DEPARTMENT COURSE and Medical Decision Making: On evaluation, patient is in no distress. She denies any changes to her breathing patient. She is mostly concerned about her hand. X-rays were obtained. Order were placed in triage by protocol. CBC is essentially unremarkable. CMP shows chronic kidney failure with no acute change. BNP is elevated. Troponin is elevated to 0.04. Patient troponin is normal at 0.03. Repeat troponin is unchanged. This is likely due to chronic renal failure. Patient denies any chest pain or change to her shortness of breath. X-rays show no acute abnormality. Patient was discharged with instructions to follow-up with PCP next 2 to 3 days. ED Medications administered this visit:    Medications   naproxen sodium (ANAPROX) tablet 550 mg (550 mg Oral Given 1/26/20 3071)       CLINICAL IMPRESSION      1.  Left hand pain 2. Chronic bronchitis, unspecified chronic bronchitis type [facility])          DISPOSITION/PLAN   DISPOSITION Decision To Discharge 01/26/2020 06:20:48 PM      PATIENT REFERRED TO:  JOANIE Hrone [facility]  [id number]  [facility]  [phone]    Schedule an appointment as soon as possible for a visit in 3 days  for follow up evaluation      DISCHARGE MEDICATIONS:  Discharge Medication List as of 1/26/2020  6:21 PM               Cristian Roger MD (electronically signed)  AttendingEmergency Department Provider            Cristian Roger MD  01/26/20 6916

## 2020-02-27 PROBLEM — R06.02 SHORTNESS OF BREATH: Status: ACTIVE | Noted: 2020-01-01

## 2020-02-27 NOTE — ED NOTES
Patient requests that mask be removed. Discussed with Dr. Jessa Henriquez. Patient placed on nasal cannula at 2 L/min.      Ivon Landis RN  02/26/20 3167

## 2020-02-27 NOTE — PROGRESS NOTES
[id number], PBEA    Blood Culture:   Sputum Culture:     VITALS  Pulse: 88   Resp: 20  BP: (!) 161/100  SpO2: 97 % O2 Device: Nasal cannula 2 lpm  Temp: 98.1 °F (36.7 °C)  Comment:   SKIN COLOR  [x] Normal  [] Pale  [] Dusky  [] Cyanotic      RESPIRATORY PATTERN  [x] Normal  [] Dyspnea  [] Cheyne-Joe  [] Kussmaul  [] Biots  AMBULATORY  [] Yes  [] No  [x] With Assistance    PEAK FLOW  Predicted:     Personal Best:        VITAL CAPACITY  Predicted value:  ml  Actual Value: ml  30% of Predicted:  ml  Patient Acuity 0 1 2 3 4 Score   Level of Concious (LOC) [x]  Alert & Oriented or Pt normal LOC []  Confused;follows directions []  Confused & uncooper-ative []  Obtunded []  Comatose 0   Respiratory Rate  (RR) [x]  Reg. rate & pattern. 12 - 20 bpm  []  Increased RR. Greater than 20 bpm   [x]  SOB w/ exertion or RR greater than 24 bpm []  Access- ory muscle use at rest. Abn.  resp. []  SOB at rest.   2   Bilateral Breath Sounds (BBS) []  Clear []  Diminish-ed bases  []  Diminish-ed t/o, or rales   []  Sporadic, scattered wheezes or rhonchi [x]  Persistentwheezes and, or absent BBS 4   Cough []  Strong, effective, & non-prod. [x]  Effective & prod. Less than 25 ml (2 TBSP) over past 24 hrs []  Ineffective & non-prod to less than 25 ML over past 24 hrs []  Ineffective and, or greater than 25 ml sputum prod. past 24 hrs. []  Nonspon- taneous; Requires suctioning 1   Pulmonary History  (PULM HX) []  No smoking and no chronic pulmonaryhistory []  Former smoker. Quit over 12 mos. ago []  Current smoker or quit w/ in 12 mos []  Pulm. History and, or 20 pk/yr smoking hx [x]  Admitted w/ acute pulm. dx and, or has been admitted w/ pulm. dx 2 or more times over past 12 mos 4   Surgical History this Admit  (SURG HX) [x]  No surgery []  General surgery []  Lower abdominal []  Thoracic or upper abdominal   []  Thoracic w/ pulm.  disease 0   Chest X-Ray (CXR)/CT Scan [x]  Clear or not applicable []  Not available []  Atelect- asis or clearance. Reverse atelectasis. [] Bronchopulmonary Hygiene Protocol    Total Acuity:   16-32  []  Secondary Assessment in 24 hrs Total Acuity:  9-15  []  Secondary Assessment in 24 hrs Total Acuity:  4-8  []  Secondary Assessment in 48 hrs Total Acuity:  0-3  []  Secondary Assessment in 72 hrs   METANEB QID with [physician-ordered bronchodilator(s)] if CXR Acuity = 4; otherwise:  PD&P, PEP, or Vest QID & PRN  NT Sxn PRN for ineffective cough  METANEB QID with [physician-ordered bronchodilator(s)] if CXR Acuity = 4; otherwise:  PD&P, PEP, or Vest TID & PRN  NT Sxn PRN for ineffective cough  Instruct patient to self-perform IS q1hr WA   Directed Cough self-performed q1hr WA     If Acuity Level is 2 or above in the following:    [x] PULMONARY HISTORY (PULM HX)    Goal: Assist patient in quitting smoking to slow or stop the progression of lung disease.     [x] Smoking Cessation Protocol    SMOKING CESSATION EDUCATION provided according to policy OE_246: (sandra with an X)  ____Yes    ____ No     __x__ NA    Smoking Cessation Booklet given:  ____Yes  ____No ____Patient Puja Nichols

## 2020-02-27 NOTE — PROGRESS NOTES
[facility] time from ER. A&Ox4. Pt is on [email or web address], breathing is unlabored, but pt gets short of breath with exertion, expiratory wheezing heard on assessment. BP is high, ER nurse said this is pts normal, but Dr Jesus José ordered PRN lopressor, which writer will give. Pt is unaware of all her medications, pt said to call her daughter tomorrow to clarify them, August Mullen will pass on to day shift.

## 2020-02-27 NOTE — PROGRESS NOTES
Pt sitting to edge of bed, Dr Servando Herring with Haroldo Sheppard CNP at bedside. Pt assisted as standby assist to bathroom. Pt assisted up to chair. Vitals obtained and morning assessment completed, see flow sheet for details. Pt blood pressure is elevated but pt had just ambulated back to chair. Will recheck blood pressure again and administer PRN Metoprolol if needed. Pt denies other needs at this time. Call light in reach. Chair pad alarm active. Will continue to monitor.

## 2020-02-27 NOTE — ED PROVIDER NOTES
[facility] AT THE [facility] MED SURG  Emergency Department Encounter  EmergencyMedicine Attending     Pt Name:Geovanna Gaston  MRN: [id number]  [place] 5/16/1928  Date of evaluation: 2/26/20  PCP:  JOANIE Young [id number]       Chief Complaint   Patient presents with    Shortness of Breath     history of COPD       HISTORY OF PRESENT ILLNESS  (Location/Symptom, Timing/Onset, Context/Setting, Quality, Duration, Modifying Factors, Severity.)      Spencer Sicard is a 80 y.o. female who presents with shortness of breath ongoing for the last couple of days. Patient daughter is bedside who states that this is similar to her usual COPD exacerbation. Significant wheezing. Got 2 breathing treatments at home. No phlegm production, no desaturations, 95% on room air. 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 No fevers, chills. No changes in the color of her phlegm, no significant change in the amount of phlegm either. History of COPD and family says that this is similar to her usual COPD. PAST MEDICAL / SURGICAL / SOCIAL / FAMILY HISTORY      has a past medical history of Acute cystitis without hematuria, Acute gout involving toe of right foot, Acute renal failure superimposed on stage 4 chronic kidney disease ([address] Arthritis, Asthma, Atrial fibrillation with rapid ventricular response ([address] CAD (coronary artery disease), CHF (congestive heart failure) ([address] Chronic kidney disease, COPD (chronic obstructive pulmonary disease) ([address] COPD with acute exacerbation ([address] Examination of participant in clinical trial, Gout, Gout, H/O cardiovascular stress test, H/O echocardiogram, Hemarthrosis involving shoulder region, right, Hx of transesophageal echocardiography (KRISTOPHER) for monitoring, Hyperlipidemia, Hypertension, MI (myocardial infarction) ([address] Pseudogout of right shoulder, Renal artery stenosis ([address] and Shingles.      has a past surgical history that includes Cholecystectomy; Coronary angioplasty with stent; Spine surgery; and Cardiac catheterization (02/2017). Social History     Socioeconomic History    Marital status:      Spouse name: Not on file    Number of children: Not on file    Years of education: Not on file    Highest education level: Not on file   Occupational History    Not on file   Social Needs    Financial resource strain: Not on file    Food insecurity:     Worry: Not on file     Inability: Not on file    Transportation needs:     Medical: Not on file     Non-medical: Not on file   Tobacco Use    Smoking status: Former Smoker     Packs/day: 3.00     Years: 30.00     Pack years: 90.00     Types: Cigarettes    Smokeless tobacco: Never Used    Tobacco comment: Quit 40 years ago   Substance and Sexual Activity    Alcohol use: No    Drug use: No    Sexual activity: Not on file   Lifestyle    Physical activity:     Days per week: Not on file     Minutes per session: Not on file    Stress: Not on file   Relationships    Social connections:     Talks on phone: Not on file     Gets together: Not on file     Attends Quaker service: Not on file     Active member of club or organization: Not on file     Attends meetings of clubs or organizations: Not on file     Relationship status: Not on file    Intimate partner violence:     Fear of current or ex partner: Not on file     Emotionally abused: Not on file     Physically abused: Not on file     Forced sexual activity: Not on file   Other Topics Concern    Not on file   Social History Narrative    Not on file       Family History   Problem Relation Age of Onset    Heart Disease Mother     Cancer Father        Allergies:  Belladonna    Home Medications:  Prior to Admission medications    Medication Sig Start Date End Date Taking?  Authorizing Provider   simvastatin (ZOCOR) 20 MG tablet take 1 tablet by mouth every evening  Patient taking differently: Take 20 mg by mouth nightly take 1 dysuria. Musculoskeletal: Negative for back pain. Skin: Negative for color change. Neurological: Negative for weakness and headaches. Psychiatric/Behavioral: Negative for agitation. PHYSICAL EXAM   (up to 7 for level 4, 8 or more for level 5)      INITIAL VITALS:   BP (!) 161/100   Pulse 88   Temp 98.1 °F (36.7 °C) (Temporal)   Resp 20   Ht 5' 2\" (1.575 m)   Wt 120 lb 14.4 oz (54.8 kg)   SpO2 97%   BMI 22.11 kg/m²     Physical Exam  Constitutional:       General: She is not in acute distress. Appearance: She is well-developed. HENT:      Head: Normocephalic and atraumatic. Eyes:      General:         Right eye: No discharge. Left eye: No discharge. Cardiovascular:      Rate and Rhythm: Normal rate and regular rhythm. Heart sounds: Normal heart sounds. No murmur. No friction rub. No gallop. Pulmonary:      Effort: Pulmonary effort is normal. No respiratory distress. Breath sounds: Normal breath sounds. No wheezing or rales. Abdominal:      General: There is no distension. Palpations: Abdomen is soft. Tenderness: There is no abdominal tenderness. There is no guarding or rebound. Musculoskeletal:         General: No swelling or tenderness. Comments: No asymmetrical leg swelling, no calf tenderness on examination. Skin:     General: Skin is warm. Findings: No erythema. Neurological:      Mental Status: She is alert.          DIFFERENTIAL  DIAGNOSIS     PLAN (LABS / IMAGING / EKG):  Orders Placed This Encounter   Procedures    Rapid influenza A/B antigens    XR CHEST PORTABLE    CBC auto differential    Comprehensive Metabolic Panel    Troponin    Blood gas, venous    Troponin    Brain Natriuretic Peptide    Urinalysis Reflex to Culture    CBC    Comprehensive Metabolic Panel w/ Reflex to MG    DIET GENERAL;    Vital signs per unit routine    Notify physician    Up with assistance    Daily weights    Tobacco cessation %    MCV 95.9 82.6 - 102.9 fL    MCH 29.7 25.2 - 33.5 pg    MCHC 31.0 28.4 - 34.8 g/dL    RDW 13.7 11.8 - 14.4 %    Platelets 102 716 - 512 k/uL    MPV 10.2 8.1 - 13.5 fL    NRBC Automated 0.0 0.0 per 100 WBC    Differential Type NOT REPORTED     WBC Morphology NOT REPORTED     RBC Morphology NOT REPORTED     Platelet Estimate NOT REPORTED     Seg Neutrophils 62 36 - 65 %    Lymphocytes 12 (L) 24 - 43 %    Monocytes 7 3 - 12 %    Eosinophils % 17 (H) 1 - 4 %    Immature Granulocytes 0 0 %    Basophils 2 0 - 2 %    Segs Absolute 8.12 (H) 1.50 - 8.10 k/uL    Absolute Lymph # 1.57 1.10 - 3.70 k/uL    Absolute Mono # 0.92 0.10 - 1.20 k/uL    Absolute Eos # 2.23 (H) 0.00 - 0.44 k/uL    Absolute Immature Granulocyte 0.00 0.00 - 0.30 k/uL    Basophils Absolute 0.26 (H) 0.0 - 0.2 k/uL    Morphology Normal    Comprehensive Metabolic Panel   Result Value Ref Range    Glucose 108 (H) 70 - 99 mg/dL    BUN 30 (H) 8 - 23 mg/dL    CREATININE 1.74 (H) 0.50 - 0.90 mg/dL    Bun/Cre Ratio 17 9 - 20    Calcium 9.5 8.6 - 10.4 mg/dL    Sodium 138 135 - 144 mmol/L    Potassium 4.6 3.7 - 5.3 mmol/L    Chloride 99 98 - 107 mmol/L    CO2 25 20 - 31 mmol/L    Anion Gap 14 9 - 17 mmol/L    Alkaline Phosphatase 100 35 - 104 U/L    ALT 11 5 - 33 U/L    AST 14 <32 U/L    Total Bilirubin <0.10 (L) 0.3 - 1.2 mg/dL    Total Protein 6.8 6.4 - 8.3 g/dL    Alb 4.0 3.5 - 5.2 g/dL    Albumin/Globulin Ratio 1.4 1.0 - 2.5    GFR Non-African American 27 (L) >60 mL/min    GFR  33 (L) >60 mL/min    GFR Comment          GFR Staging         Troponin   Result Value Ref Range    Troponin, High Sensitivity 49 (H) 0 - 14 ng/L    Troponin T NOT REPORTED <0.03 ng/mL    Troponin Interp NOT REPORTED    Blood gas, venous   Result Value Ref Range    pH, Vasile 7.410 7.32 - 7.42    pCO2, Vasile 45.4 39 - 55    pO2, Vasile 45.2 30.0 - 50.0    HCO3, Venous 28.1 24.0 - 30.0 mmol/L    Positive Base Excess, Vasile 2.9 (H) 0.0 - 2.0 mmol/L    Negative Base Excess, Vasile NOT REPORTED 0.0 - 2.0 mmol/L    O2 Sat, Vasile 81.4 60.0 - 85.0 %    Total Hb NOT REPORTED 12.0 - 16.0 g/dl    Oxyhemoglobin NOT REPORTED 95.0 - 98.0 %    Carboxyhemoglobin  0 - 5 %    Methemoglobin NOT REPORTED 0.0 - 1.9 %    Pt Temp NOT REPORTED     pH, Vasile, Temp Adj NOT REPORTED 7.320 - 7.420    pCO2, Vasile, Temp Adj NOT REPORTED 39.0 - 55.0 mmHg    pO2, Vasile, Temp Adj NOT REPORTED 30.0 - 50.0 mmHg    O2 Device/Flow/% ROOM AIR     Respiratory Rate 16     Faraz Test NOT REPORTED     Sample Site NOT REPORTED     Pt. Position FOWLERS     Mode NOT REPORTED     Set Rate NOT REPORTED     Total Rate NOT REPORTED     VT NOT REPORTED     FIO2 NOT REPORTED     Peep/Cpap NOT REPORTED     PSV NOT REPORTED     Text for Respiratory NOT REPORTED     NOTIFICATION NOT REPORTED     NOTIFICATION TIME NOT REPORTED    Troponin   Result Value Ref Range    Troponin, High Sensitivity 49 (H) 0 - 14 ng/L    Troponin T NOT REPORTED <0.03 ng/mL    Troponin Interp NOT REPORTED    Brain Natriuretic Peptide   Result Value Ref Range    Pro- (H) <300 pg/mL    BNP Interpretation Pro-BNP Reference Range:    EKG 12 Lead   Result Value Ref Range    Ventricular Rate 82 BPM    Atrial Rate 82 BPM    P-R Interval 178 ms    QRS Duration 68 ms    Q-T Interval 374 ms    QTc Calculation (Bazett) 436 ms    P Axis 78 degrees    R Axis -40 degrees    T Axis 76 degrees       IMPRESSION: 51-year-old female comes into the emergency department secondary to shortness of breath and significant wheezing throughout the lung fields. Reporting shortness of breath. Symptoms similar to previous COPD exacerbation. Wheezing throughout the lung fields no retractions, no significant tachypnea, breathing comfortably, but wheezing all over. No longer smoking, but 30-year history of smoking previously. No chest pain, no abdominal pain, usual cough without any changes in the color or the amount of phlegm production.   Will give a breathing treatment, placed on BiPAP for now to

## 2020-02-27 NOTE — PROGRESS NOTES
Within Functional Limits  Hearing: Exceptions to [facility]  Hearing Exceptions: Hard of hearing/hearing concerns     Subjective  General  Chart Reviewed: Yes  Patient assessed for rehabilitation services?: Yes  Response To Previous Treatment: Not applicable  Family / Caregiver Present: No  Referring Practitioner: SARAHI Be  Referral Date : 02/27/20  Diagnosis: Shortness of breath, R06.02  Follows Commands: Within Functional Limits  Subjective  Subjective: Patient denies pain and is agreeable to physical therapy eval at this time. Pain Screening  Patient Currently in Pain: Denies          Orientation  Orientation  Overall Orientation Status: Within Functional Limits  Social/Functional History  Social/Functional History  Lives With: Daughter  Type of Home: House  Home Layout: One level  Home Access: Level entry  Home Equipment: 4 wheeled walker  Receives Help From: Family  ADL Assistance: Independent  Homemaking Assistance: Needs assistance  Homemaking Responsibilities: No  Ambulation Assistance: Independent  Transfer Assistance: Independent  Active : No  Additional Comments: Patient reports she lives with her daughter who helps with groceries and housekeeping and she is independent with walking but keeps a 4WW in case she needs to sit down and rest in the store.    Cognition        Objective          AROM RLE (degrees)  RLE AROM: WFL  AROM LLE (degrees)  LLE AROM : WFL  Strength RLE  Comment: 4/5 grossly  Strength LLE  Comment: 4/5 grossly        Bed mobility  Supine to Sit: Modified independent  Sit to Supine: Modified independent  Transfers  Sit to Stand: Stand by assistance  Stand to sit: Stand by assistance  Ambulation  Ambulation?: Yes  Ambulation 1  Surface: level tile  Device: No Device  Other Apparatus: O2  Assistance: Contact guard assistance  Distance: Pt amb 15ftx2 in room with no AD, and CGA with supp O2 and moderate SOB following     Balance  Sitting - Static: Good  Sitting - Dynamic:

## 2020-02-27 NOTE — PROGRESS NOTES
Nutrition Assessment    Type and Reason for Visit: Initial(weight losses pta)    Nutrition Recommendations:  Encourage oral fluids    Nutrition Assessment: Unintentional weight declines r/t inadequate nutrient intakes AEB 1% acute weight loss and lower PO pta. States able to chew fine, despite poorer dentition and uses some chocolate Boost at home. Discussed use of Ensure in its place while here and encouraged PO. No significant jumps in glucose despite steroid. Malnutrition Assessment:  · Malnutrition Status: At risk for malnutrition  · Context: Acute illness or injury  · Findings of the 6 clinical characteristics of malnutrition (Minimum of 2 out of 6 clinical characteristics is required to make the diagnosis of moderate or severe Protein Calorie Malnutrition based on AND/ASPEN Guidelines):  1. Energy Intake-Less than or equal to 50% of estimated energy requirement, (last couple days)    2. Weight Loss-1-2% loss, in 1 week  3. Fat Loss-No significant subcutaneous fat loss,    4. Muscle Loss-No significant muscle mass loss,    5. Fluid Accumulation-No significant fluid accumulation,    6.  Strength-Not measured    Nutrition Risk Level:  Moderate    Nutrient Needs:  · Estimated Daily Total Kcal: 1358-3726(22-81)  · Estimated Daily Protein (g): 66-71(1.2-1.3)  · Estimated Daily Total Fluid (ml/day): 1500    Nutrition Diagnosis:   · Problem: Unintended weight loss  · Etiology: related to Insufficient energy/nutrient consumption     Signs and symptoms:  as evidenced by Weight loss 1-2% in 1 week    Objective Information:  · Nutrition-Focused Physical Findings: Thinner appearing, without malntrition indices  · Wound Type: None  · Current Nutrition Therapies:  · Oral Diet Orders: General   · Oral Diet intake: Unable to assess(No PO records, but working on breakfast this AM)  · Oral Nutrition Supplement (ONS) Orders: None  · Anthropometric Measures:  · Ht: 5' 2\" (157.5 cm)   · Current Body Wt: 120 lb 14.4 oz (54.8 kg)  · Admission Body Wt: 120 lb 14.4 oz (54.8 kg)  · Usual Body Wt: 122 lb (55.3 kg)(recently)  · % Weight Change:  ,  1% loss in last month, and 4.7% loss in last 6 months  · Ideal Body Wt: 110 lb (49.9 kg), % Ideal Body 110%  · BMI Classification: BMI 18.5 - 24.9 Normal Weight    Lab Results   Component Value Date     02/26/2020    K 4.6 02/26/2020    CL 99 02/26/2020    CO2 25 02/26/2020    BUN 30 (H) 02/26/2020    CREATININE 1.74 (H) 02/26/2020    GLUCOSE 108 (H) 02/26/2020    CALCIUM 9.5 02/26/2020    PROT 6.8 02/26/2020    LABALBU 4.0 02/26/2020    BILITOT <0.10 (L) 02/26/2020    ALKPHOS 100 02/26/2020    AST 14 02/26/2020    ALT 11 02/26/2020    LABGLOM 27 (L) 02/26/2020    GFRAA 33 (L) 02/26/2020    GLOB NOT REPORTED 09/28/2016     Lab Results   Component Value Date    LABA1C 6.3 (H) 04/11/2019     Lab Results   Component Value Date     04/11/2019     Nutrition Interventions:   Continue current diet, Start ONS  Continued Inpatient Monitoring, Coordination of Care, Education Initiated    Nutrition Evaluation:   · Evaluation: Goals set   · Goals: PO >75% meals and supplements    · Monitoring: Meal Intake, Pertinent Labs, Weight, I&O, Patient/Family Education    Electronically signed by Winsome Spaulding RD, LD on 2/27/20 at 8:09 AM    Contact Number: [phone]

## 2020-02-27 NOTE — PROGRESS NOTES
SW met with pt to complete assessment. Pt is alert and oriented and pleasant but very hard of hearing. Pt is a 80year old female admitted for COPD exacerbation. Pt lives with her dtr and her her dtr's family. Pt states that there are 7 of them that live together. Pt reports that she is staying there temporarily because they are remodeling her home currently. Pt reports that she was not utilizing any services prior to admission. Pt states that she has a rollator that she uses when they are walking long distance so she can sit down and a shower chair at home. Pt reports that she has a nebulizer at home but no oxygen. Pt reports that her family transports her to appointments. Pt is a full code and follows with Lalo Sun CNP as her PCP. Pt reports that's he does not have advance directives but it is charted that she has DPOA but no copy is on file. SW asked pt if she would like more information regarding these and pt denies need. Pt reports that her medications are affordable. Pt plans to return home at discharge with her family. Pt identifies no discharge needs or concerns. SW will continue to follow and remain available as needed.  Jacqueline Arellano [place] 2/27/2020

## 2020-02-27 NOTE — PROGRESS NOTES
Pharmacy Note     Renal Dose Adjustment    Evin Nath is a 80 y.o. female. Pharmacist assessment of renally cleared medications. Recent Labs     02/26/20 2030   BUN 30*       Recent Labs     02/26/20 2030   CREATININE 1.74*       Estimated Creatinine Clearance: 17 mL/min (A) (based on SCr of 1.74 mg/dL (H)). Estimated CrCl using Ideal Body Weight: 17 mL/min (based on IBW 55 kg)    Height:   Ht Readings from Last 1 Encounters:   02/26/20 5' 2\" (1.575 m)     Weight:  Wt Readings from Last 1 Encounters:   02/26/20 120 lb 14.4 oz (54.8 kg)       The following medication dose has been adjusted based upon renal function per P&T Guidelines:             Famotidine 20 mg oral twice daily changed to famotidine 20 mg oral once daily.

## 2020-02-27 NOTE — PROGRESS NOTES
Occupational Therapy   Occupational Therapy Initial Assessment  Date: 2020   Patient Name: Contreras Araujo  MRN: [id number]     : 1928    Date of Service: 2020    Discharge Recommendations:  Continue to assess pending progress       Assessment   Performance deficits / Impairments: Decreased functional mobility ; Decreased safe awareness;Decreased balance;Decreased coordination;Decreased ADL status; Decreased endurance;Decreased strength  Assessment: Pt is a 80 y.o female being seen d/t SOB. Pt presents with deficits in the areas of functional mobility, safety, balance, coordination, ADL status, endurance, and strength. Pt woud benefit from conitnued ocucpational therapy services to address these deficits and increase participation in ADL and functional activities. Prognosis: Good  Decision Making: Medium Complexity  OT Education: OT Role;Plan of Care  Barriers to Learning: none  REQUIRES OT FOLLOW UP: Yes  Safety Devices  Safety Devices in place: Yes  Type of devices: Call light within reach; Left in bed(left sitting at EOB.)           Patient Diagnosis(es): The encounter diagnosis was COPD exacerbation ([address] has a past medical history of Acute cystitis without hematuria, Acute gout involving toe of right foot, Acute renal failure superimposed on stage 4 chronic kidney disease ([address] Arthritis, Asthma, Atrial fibrillation with rapid ventricular response ([address] CAD (coronary artery disease), CHF (congestive heart failure) ([address] Chronic kidney disease, COPD (chronic obstructive pulmonary disease) ([address] COPD with acute exacerbation ([address] Examination of participant in clinical trial, Gout, Gout, H/O cardiovascular stress test, H/O echocardiogram, Hemarthrosis involving shoulder region, right, Hx of transesophageal echocardiography (KRISTOPHER) for monitoring, Hyperlipidemia, Hypertension, MI (myocardial infarction) ([address] Pseudogout of right shoulder, Renal artery stenosis ([address] and Shingles.    has a past

## 2020-02-27 NOTE — H&P
History and Physical    Patient: Cody Richards  MRN: [id number]    Chief Complaint:  SOB    History Obtained From:  patient, electronic medical record    PCP: Delvin Sever, APRN - CNP    History of Present Illness: The patient is a 80 y.o. female who presents with shortness of breath ongoing for the last couple of days. Patient daughter reported to the ER physician that this is similar to her usual COPD exacerbation. Significant wheezing. She had 2 breathing treatments at home. No phlegm production, no desaturations, 95% on room air. 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 No fevers, chills. No changes in the color of her phlegm, no significant change in the amount of phlegm either. History of COPD and family says that this is similar to her usual COPD. Today the patient is sitting up in a chair with O2 on at 2L per NC. She states this is new to her and has never needed oxygen in the past.  She states that she is coughing up clear phlegm. She states that she has been having trouble sleeping at night due to being more SOB. Past Medical History:        Diagnosis Date    Acute cystitis without hematuria 9/29/2016    Acute gout involving toe of right foot 4/19/2018    Acute renal failure superimposed on stage 4 chronic kidney disease ([address] 9/29/2016    Arthritis     Asthma     Atrial fibrillation with rapid ventricular response ([address] 2/17/2017    CAD (coronary artery disease)     CHF (congestive heart failure) (HCC)     Chronic kidney disease     COPD (chronic obstructive pulmonary disease) ([address]     COPD with acute exacerbation ([address] 3/24/2017    Examination of participant in clinical trial 5/20/13    6 year follow up, estimated completion JUN 2019    Gout 12/28/2017    Gout     H/O cardiovascular stress test 02/14/2017    H/O echocardiogram 02/14/2017    EF 55%. Mildly increased wall thickness of the LV.   Evidence of diastolic HOURS  Patient taking differently: Take 20 mg by mouth every other day  10/25/19 2/27/20 Yes Shanna Cope MD   FEROSUL 325 (65 Fe) MG tablet take 1 tablet by mouth once daily  Patient taking differently: Take 325 mg by mouth daily (with breakfast)  10/10/19  Yes JOANIE Becker CNP   albuterol (PROVENTIL) (2.5 MG/3ML) 0.083% nebulizer solution Take 3 mLs by nebulization every 4 hours as needed for Wheezing 8/22/19  Yes JOANIE Vaughn CNP   beclomethasone (QVAR) 80 MCG/ACT inhaler Inhale 1 puff into the lungs 2 times daily 8/22/19  Yes JOANIE Vaughn CNP   diltiazem (CARDIZEM) 120 MG tablet Take 120 mg by mouth daily    Yes Historical Provider, MD   ELIQUIS 2.5 MG TABS tablet Take 2.5 mg by mouth 2 times daily  7/13/18  Yes Historical Provider, MD   albuterol sulfate HFA (PROAIR HFA) 108 (90 Base) MCG/ACT inhaler inhale 2 puffs by mouth every 6 hours if needed for wheezing 8/22/19   JOANIE Vaughn CNP   nitroGLYCERIN (NITROSTAT) 0.4 MG SL tablet up to max of 3 total doses. If no relief after 1 dose, call 911. Patient not taking: Reported on 8/22/2019 2/6/18   Cricket Hawley MD       Review of Systems:  Constitutional:negative  for fevers, and negative for chills.   Eyes: negative for visual disturbance   ENT: negative for sore throat, negative nasal congestion, and negative for earache  Respiratory: positive for shortness of breath, positive for cough, and positive for wheezing  Cardiovascular: negative for chest pain, negative for palpitations, and negative for syncope  Gastrointestinal: negative for abdominal pain, negative for nausea,negative for vomiting, negative for diarrhea, negative for constipation, and negative for hematochezia or melena  Genitourinary: negative for dysuria, negative for urinary urgency, negative for urinary frequency, and negative for hematuria  Skin: negative for skin rash, and negative for skin lesions  Neurological: negative for unilateral weakness, numbness or tingling. Physical Exam:    Vitals:   Temp: 97.6 °F (36.4 °C)  BP: (!) 158/81  Resp: 18  Pulse: 80  SpO2: 96 %  24HR INTAKE/OUTPUT:      Intake/Output Summary (Last 24 hours) at 2/27/2020 1108  Last data filed at 2/27/2020 0459  Gross per 24 hour   Intake 389 ml   Output --   Net 389 ml       Exam:  GEN:  alert and oriented to person, place and time, well-developed and well-nourished, in no acute distress  EYES: No gross abnormalities. , PERRL and EOMI  NECK: normal, supple, no lymphadenopathy,  no carotid bruits  PULM: wheezing present- throughout posterior fields  COR: regular rate & rhythm, no murmurs, no gallops, S1 normal and S2 normal  ABD:  soft, non-tender, non-distended, normal bowel sounds, no masses or organomegaly  EXT:   no cyanosis, clubbing or edema present    NEURO: follows commands, PATEL, no deficits  SKIN:  no rashes or significant lesions  -----------------------------------------------------------------  Diagnostic Data:   Lab Results   Component Value Date    WBC 13.1 (H) 02/26/2020    HGB 13.1 02/26/2020     02/26/2020       Lab Results   Component Value Date    BUN 30 (H) 02/26/2020    CREATININE 1.74 (H) 02/26/2020     02/26/2020    K 4.6 02/26/2020    CALCIUM 9.5 02/26/2020    CL 99 02/26/2020    CO2 25 02/26/2020    LABGLOM 27 (L) 02/26/2020       Lab Results   Component Value Date    WBCUA GREATER THAN 100 04/09/2019    RBCUA 2 TO 5 04/09/2019    EPITHUA 0 TO 2 04/09/2019    LEUKOCYTESUR MODERATE (A) 04/09/2019    SPECGRAV >1.030 (H) 04/09/2019    GLUCOSEU NEGATIVE 04/09/2019    KETUA NEGATIVE 04/09/2019    PROTEINU 2+ (A) 04/09/2019    HGBUR 2+ (A) 04/09/2019    CASTUA NOT REPORTED 04/09/2019    CRYSTUA NOT REPORTED 04/09/2019    BACTERIA 3+ (A) 04/09/2019    YEAST NOT REPORTED 04/09/2019       Lab Results   Component Value Date    MYOGLOBIN 93 (H) 11/28/2015    TROPONINT NOT REPORTED 02/26/2020    CKTOTAL 110 12/16/2019    CKMB 3.9 12/16/2019    PROBNP 870 (H) 02/26/2020       Xr Chest Portable    Result Date: 2/26/2020  EXAMINATION: ONE XRAY VIEW OF THE CHEST 2/26/2020 9:13 pm COMPARISON: 01/26/2020 HISTORY: ORDERING SYSTEM PROVIDED HISTORY: shortness of breath. TECHNOLOGIST PROVIDED HISTORY: shortness of breath. FINDINGS: Single portable frontal view of the chest is submitted for review. The cardiac silhouette is normal in size. Lung parenchyma is clear without focal airspace consolidation, sizeable pleural effusion, or pneumothorax. Trachea is midline. Generalized osteopenia. Elevation of the right humeral head most compatible with a complete rotator cuff tear. No acute cardiopulmonary pathology. Assessment:    Principal Problem:    COPD with acute exacerbation (HCC)  Active Problems:    Acute respiratory failure with hypoxia (HCC)    CKD (chronic kidney disease) stage 4, GFR 15-29 ml/min (HCC)    Atrial fibrillation (HCC)    Panlobular emphysema (HCC)    Coronary artery disease involving native coronary artery of native heart without angina pectoris    Shortness of breath  Resolved Problems:    * No resolved hospital problems.  *      Patient Active Problem List    Diagnosis Date Noted    Acute respiratory failure with [facility] 03/24/2017     Priority: High     Class: Acute    COPD with acute exacerbation ([address] 03/24/2017     Priority: High     Class: Acute    Asthma 01/25/2013     Priority: High    Shortness of breath 02/27/2020    Acute on chronic diastolic congestive heart failure (HCC)     Elevated brain natriuretic peptide (BNP) level 02/03/2018    Coronary artery disease involving native coronary artery of native heart without angina pectoris     Panlobular emphysema (HCC)     Atrial fibrillation ([address] 02/17/2017    COPD (chronic obstructive pulmonary disease) (HCC)     CKD (chronic kidney disease) stage 4, GFR 15-29 ml/min ([address] 06/22/2016    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    Status post coronary artery stent placement 05/20/2013       Plan:     · This patient requires inpatient admission because of Exacerbation of COPD  · Factors affecting the medical complexity of this patient include Hypoxia, Atrial Fibrillation, CAD, CKD [address]  · Estimated length of stay is 3 days  · IV Zithromax  · IV Solumedrol  · Duonebs  · Daily Labs  · PT/OT  · Wean O2 as able  · High risk medication monitoring: Eliquis    CORE MEASURES  DVT prophylaxis: already on chronic anticoagulation  Decubitus ulcer present on admission: No  CODE STATUS: FULL CODE  Nutrition Status: good   Physical therapy: Yes   Old Charts reviewed: Yes  EKG Reviewed:  Yes  Advance Directive Addressed: No    JOANIE Hogue, NP-C  2/27/2020, 11:08 AM

## 2020-02-28 NOTE — PROGRESS NOTES
Progress Note    SUBJECTIVE:  F/u on SOB and dyspnea    OBJECTIVE:  Patient sitting up in chair alert and oriented with oxygen off. She states that she walked in the [place] today. She states that she felt that she was \"breathing fine\". She states she is not breathing like \"normal\" yet. Afebrile. No other complaints.    Vitals:   Vitals:    02/28/20 1022   BP: 115/63   Pulse: 71   Resp:    Temp:    SpO2:      Weight: 123 lb 3.2 oz (55.9 kg)   Height: 5' 2\" (157.5 cm)   -----------------------------------------------------------------  Exam:    CONSTITUTIONAL:  awake, alert, cooperative, no apparent distress, and appears stated age  EYES:  Lids and lashes normal, pupils equal, round and reactive to light, extra ocular muscles intact, sclera clear, conjunctiva normal  ENT:  normocepalic, without obvious abnormality  NECK:  supple, symmetrical, trachea midline, skin normal and no stridor  HEMATOLOGIC/LYMPHATICS:  no cervical lymphadenopathy and no supraclavicular lymphadenopathy  LUNGS:  no increased work of breathing, good air exchange and wheeze diffuse  CARDIOVASCULAR:  Normal apical impulse, regular rate and rhythm, normal S1 and S2, no S3 or S4, and no murmur noted  ABDOMEN:  No scars, normal bowel sounds, soft, non-distended, non-tender, no masses palpated, no hepatosplenomegally  MUSCULOSKELETAL:  there is no redness, warmth, or swelling of the joints  full range of motion noted  tone is normal  NEUROLOGIC:  Mental Status Exam:  Level of Alertness:   awake  Orientation:   person, place, time  Memory:   normal  SKIN:  no bruising or bleeding, normal skin color, texture, turgor, no redness, warmth, or swelling, no rashes and no lesions  EXT:     no cyanosis, clubbing or edema present    -----------------------------------------------------------------  Diagnostic Data: Reviewed    ASSESSMENT:   Principal Problem:    COPD with acute exacerbation (HCC)  Active Problems:    Acute respiratory failure with hypoxia

## 2020-02-28 NOTE — DISCHARGE SUMMARY
Discharge Summary    Berta Karimi  :  1928  MRN:  [id number]    Admit date:  2020      Discharge date: 2020     Admitting Physician:  Audelia Matamoros MD    Consultants:  none    Procedures: none    Complications: none    Discharge Condition: stable    Hospital Course: Berta Karimi is a 80 y.o. female admitted with shortness of breath ongoing for the last couple of days.  Patient daughter reported to the ER physician that this is similar to her usual COPD exacerbation.  Significant wheezing.  She had 2 breathing treatments at home.  No phlegm production, no desaturations, 95% on room air.  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  No fevers, chills.  No changes in the color of her phlegm, no significant change in the amount of phlegm either.  History of COPD and family says that this is similar to her usual COPD. Today the patient sitting up in chair alert and oriented with oxygen off. She states that she walked in the [place] today. She states that she felt that she was \"breathing fine\". She states she is not breathing like \"normal\" yet. Afebrile. No other complaints. She states she is ready to go home. She states she lives with 7 people. I will send her out with a ZPak and 5 days of Prednisone. Exam:  GEN:  alert and oriented to person, place and time, well-developed and well-nourished, in no acute distress  EYES: No gross abnormalities. , PERRL and EOMI  NECK: normal, supple, no lymphadenopathy,  no carotid bruits  PULM: diffuse wheezes without rales or rhonchi, normal air movement, no respiratory distress  COR: regular rate & rhythm, no murmurs, no gallops, S1 normal and S2 normal  ABD:  soft, non-tender, non-distended, normal bowel sounds, no masses or organomegaly  EXT:   no cyanosis, clubbing or edema present    NEURO: follows commands, PATEL, no deficits  SKIN:  no rashes or significant lesions    Significant Diagnostic Studies:   Lab problems. *      Active Hospital Problems    Diagnosis Date Noted    COPD with acute exacerbation ([facility] [J44.1] 03/24/2017     Priority: High     Class: Acute    Acute respiratory failure with hypoxia (HCC) [J96.01] 03/24/2017     Priority: High     Class: Acute    Shortness of breath [R06.02] 02/27/2020    Coronary artery disease involving native coronary artery of native heart without angina pectoris [I25.10]     Panlobular emphysema (HCC) [J43.1]     Atrial fibrillation (HCC) [I48.91] 02/17/2017    CKD (chronic kidney disease) stage 4, GFR 15-29 ml/min ([facility] [N18.4] 06/22/2016       Discharge Medications:       [employer]   Home Medication Instructions [id number]    Printed on:02/28/20 1316   Medication Information                      albuterol (PROVENTIL) (2.5 MG/3ML) 0.083% nebulizer solution  Take 3 mLs by nebulization every 4 hours as needed for Wheezing             albuterol sulfate HFA (PROAIR HFA) 108 (90 Base) MCG/ACT inhaler  inhale 2 puffs by mouth every 6 hours if needed for wheezing             azithromycin (ZITHROMAX) 250 MG tablet  Take 1 tablet by mouth See Admin Instructions for 5 days 500mg on day 1 followed by 250mg on days 2 - 5             beclomethasone (QVAR) 80 MCG/ACT inhaler  Inhale 1 puff into the lungs 2 times daily             benzonatate (TESSALON) 100 MG capsule  Take 100 mg by mouth 3 times daily as needed for Cough             diltiazem (CARDIZEM) 120 MG tablet  Take 120 mg by mouth daily              ELIQUIS 2.5 MG TABS tablet  Take 2.5 mg by mouth 2 times daily              FEROSUL 325 (65 Fe) MG tablet  take 1 tablet by mouth once daily             furosemide (LASIX) 20 MG tablet  take 1 tablet by mouth EVERY 48 HOURS             nitroGLYCERIN (NITROSTAT) 0.4 MG SL tablet  up to max of 3 total doses. If no relief after 1 dose, call 911.              predniSONE (DELTASONE) 20 MG tablet  Take 1 tablet by mouth 2 times daily for 5 days             simvastatin

## 2020-02-28 NOTE — PLAN OF CARE
Problem: Falls - Risk of:  Goal: Will remain free from falls  Description  Will remain free from falls  Outcome: Ongoing  Note:   Call light in reach at all times, frequent checks, bed in lowest position, wheels of bed and chair locked, non skid footwear on, appropriate transfer techniques, personal items within reach, walkways free of obstructions, fall risk armband and sign displayed, Chahal fall risk score per protocol. No falls this shift, will continue to monitor. Problem: Discharge Planning:  Goal: Discharged to appropriate level of care  Description  Discharged to appropriate level of care  Outcome: Ongoing  Note:   Pt from home plans to return home upon discharge. Problem: Activity Intolerance:  Goal: Ability to tolerate increased activity will improve  Description  Ability to tolerate increased activity will improve  Outcome: Ongoing  Note:   Some MEDEL. Problem: Airway Clearance - Ineffective:  Goal: Ability to maintain a clear airway will improve  Description  Ability to maintain a clear airway will improve  Outcome: Ongoing  Note:   Pt A&O, able to cough to clear secretions if present. Problem: Gas Exchange - Impaired:  Goal: Levels of oxygenation will improve  Description  Levels of oxygenation will improve  Outcome: Ongoing  Note:   Weaned oxygen, pt currently on room air. SpO2 WNL. Problem: Musculor/Skeletal Functional Status  Goal: Highest potential functional level  Outcome: Ongoing  Note:   Therapy working with pt.

## 2020-02-28 NOTE — PROGRESS NOTES
Education & Training  Safety Devices  Type of devices:  All fall risk precautions in place, Bed alarm in place, Call light within reach, Nurse notified, Gait belt, Patient at risk for falls, Left in bed     Therapy Time   Individual Concurrent Group Co-treatment   Time In 1326         Time Out [place], [place]

## 2020-02-28 NOTE — PROGRESS NOTES
Objective    ADL  UE Bathing: Setup;Contact guard assistance(CGA when standing at sinkside)  LE Bathing: Minimal assistance(Assistance required to wash bilateral feet.)  UE Dressing: (Therapist donned pt gown d/t snaps and pt with IV in right arm.)  LE Dressing: Minimal assistance(assist to don bilateral socks. CGA when threading legs through pants and brief and when standing to pull brief and pants up)  Additional Comments: Pt completed sinkside bathing ADL, standing to complete tasks and sitting when needed or requiring rest breaks. Increase SOB noted with tasks, however, SPO2 at 97 on room air prior to and following ADL tasks. Fair safety overall with tasks. Balance  Standing Balance: Contact guard assistance  Standing Balance  Time: 3-4 minutes  Activity: sinkside bathing ADL  Comment: SBA-CGA for safety. Fair (+) dynamic and static standing balance. Functional Mobility  Functional - Mobility Device: No device  Activity: To/from bathroom  Assist Level: Contact guard assistance  Functional Mobility Comments: Pt with slight LOB when initiating functional mobility and ambulating from bed to bathroom.       Transfers  Sit to stand: Stand by assistance  Stand to sit: Stand by assistance           Plan   Plan  Times per week: 7x/week  Times per day: Daily  Current Treatment Recommendations: Strengthening, Endurance Training, Self-Care / ADL, Patient/Caregiver Education & Training, Balance Training, Functional Mobility Training, Safety Education & Training    AM-PAC Score     -[facility] Inpatient Daily Activity Raw Score: 19 (02/27/20 1537)  AM-PAC Inpatient ADL T-Scale Score : 40.22 (02/27/20 1537)  ADL Inpatient CMS 0-100% Score: 42.8 (02/27/20 1537)  ADL Inpatient CMS G-Code Modifier : CK (02/27/20 1537)    Goals  Short term goals  Time Frame for Short term goals: 10 visits  Short term goal 1: Pt will tolerate greater than 5 minutes of standing while completing sinkside ADL tasks to increase safety and endurance with ADL. Short term goal 2: Pt will engage in 15 minutes BUE therex/theract to increase UB strength for increased ease and independence with ADL and functional transfers. Short term goal 3: Pt will complete TB ADL Atul with use of AE PRN to increase safety and independence with ADL tasks. Short term goal 4: Pt will verbalize good understanding of discharge folder.         Therapy Time   Individual Concurrent Group Co-treatment   Time In [facility]         Time Out 0854         Minutes [facility] Tram, [place]

## 2020-02-28 NOTE — PROGRESS NOTES
Physical Therapy  Facility/Department: [facility] MED SURG  Daily Treatment Note  NAME: Evin Nath  : 1928  MRN: [id number]    Date of Service: 2020    Discharge Recommendations:  Continue to assess pending progress, Home with assist PRN        Assessment      PT Education: Goals;Plan of Care;Transfer Training;Gait Training  Patient Education: Educated pt on above with good understanding noted. REQUIRES PT FOLLOW UP: Yes  Activity Tolerance  Activity Tolerance: Patient Tolerated treatment well;Patient limited by endurance     Patient Diagnosis(es): The encounter diagnosis was COPD exacerbation ([address] has a past medical history of Acute cystitis without hematuria, Acute gout involving toe of right foot, Acute renal failure superimposed on stage 4 chronic kidney disease ([address] Arthritis, Asthma, Atrial fibrillation with rapid ventricular response ([address] CAD (coronary artery disease), CHF (congestive heart failure) ([address] Chronic kidney disease, COPD (chronic obstructive pulmonary disease) ([address] COPD with acute exacerbation ([address] Examination of participant in clinical trial, Gout, Gout, H/O cardiovascular stress test, H/O echocardiogram, Hemarthrosis involving shoulder region, right, Hx of transesophageal echocardiography (KRISTOPHER) for monitoring, Hyperlipidemia, Hypertension, MI (myocardial infarction) ([address] Pseudogout of right shoulder, Renal artery stenosis ([address] and Shingles. has a past surgical history that includes Cholecystectomy; Coronary angioplasty with stent; Spine surgery; and Cardiac catheterization (2017). Restrictions  Restrictions/Precautions  Restrictions/Precautions: General Precautions, Fall Risk  Subjective   General  Chart Reviewed: Yes  Response To Previous Treatment: Patient with no complaints from previous session. Family / Caregiver Present: No  Referring Practitioner: SARAHI Avilez  Subjective  Subjective: Pt with out any complaints of pain.

## 2020-02-28 NOTE — PROGRESS NOTES
Discharge instruction reviewed with pt and daughter. Verbalize understanding of picking up medications. Pt denies other needs to go home. Pt escorted to private car via wheelchair. Belongings in hand.

## 2020-02-29 NOTE — CARE COORDINATION
[facility] Transitions Initial Follow Up Call    Call within 2 business days of discharge: Yes    Patient: Kristal Kulkarni Patient : 1928   MRN: [id number]  Reason for Admission: COPD exacerbation  Discharge Date: 20 RARS: Readmission Risk Score: 21      Last Discharge [facility]       Complaint Diagnosis Description Type Department Provider    20 Shortness of Breath COPD exacerbation [facility]) ED to Hosp-Admission (Discharged) (ADMIT) Anibal Herzog MD; Qi Hartman. .. Facility: [facility]    Attempted to contact patient for initial transitions call. Contact information left to  requesting call back at the earliest convenience.     Sunil Bazan RN BSN   Care Transitions Nurse  [phone]       Follow Up  Future Appointments   Date Time Provider Olivia Deng   3/10/2020  1:40 PM JOANIE Fleming - CNP [facility]   2020  2:45 PM [facility] Landis [facility]       Sunil Bazan RN

## 2020-03-02 NOTE — CARE COORDINATION
[facility] Transitions Initial Follow Up Call    Call within 2 business days of discharge: Yes    Patient: Elmer Elizalde Patient : 1928   MRN: <[id number]>  Reason for Admission:  COPD exacerbation  Discharge Date: 3/2/20 RARS: Readmission Risk Score: 21      Last Discharge [facility]       Complaint Diagnosis Description Type Department Provider    3/1/20 Shortness of Breath COPD exacerbation ([address] . .. ED (DISCHARGE) [facility] ED Olive Scott MD            Facility:   [place]        Follow Up: Attempted to reach patient for Care Transition follow up/ Second attempt. No answer to phone. Message left with CTN contact information and request for call back.    Future Appointments   Date Time Provider Olivia Deng   3/16/2020  4:40 PM JOANIE oRb - CNP [facility]   2020  2:45 PM Lillie Yancey [facility]       Asha Yoo RN

## 2020-03-02 NOTE — ED PROVIDER NOTES
[facility] ED  EMERGENCY DEPARTMENT ENCOUNTER      Pt Name: Berta Karimi  MRN: [id number]  [place] 5/16/1928  Date of evaluation: 3/1/2020  Provider: Tequila Alexander MD    [address] [place] [address]     Chief Complaint   Patient presents with    Shortness of Breath     chronic COPD         HISTORY OF PRESENT ILLNESS   (Location/Symptom, Timing/Onset, Context/Setting,Quality, Duration, Modifying Factors, Severity)  Note limiting factors. Berta Karimi is a80 y.o. female who presents to the emergency department with shortness of breath. Status post admission for COPD exacerbation on 2/26/2020. Patient reports she has been unable to sleep since she was discharged to home from the hospital. She reports feeling anxious. Patient reports her shortness of breath is worse at night when she is laying in bed unable to sleep. Denies that it is exacerbated by laying flat. No chest pain. No fever or chills. No headache, upper or lower extremity weakness, abdominal pain, nausea, vomiting, diarrhea, pain with urination, increased urgency or frequency of urination. Nursing Notes were reviewed. REVIEW OF SYSTEMS    (2-9 systems for level 4, 10 or more for level 5)     Review of Systems   Constitutional: Negative. Negative for fever. HENT: Negative for congestion, rhinorrhea, sinus pressure, sneezing and sore throat. Eyes: Negative. Negative for discharge and redness. Respiratory: Positive for shortness of breath. Negative for apnea, cough, choking, chest tightness, wheezing and stridor. Cardiovascular: Negative. Gastrointestinal: Negative for abdominal distention, abdominal pain, blood in stool, constipation, diarrhea, nausea and vomiting. Endocrine: Negative. Negative for polyuria. Genitourinary: Negative. Negative for difficulty urinating and dysuria. Musculoskeletal: Negative. Negative for arthralgias, back pain, gait problem, joint swelling, myalgias, neck pain and neck stiffness. Neurological: Negative. Negative for tremors, seizures, syncope and weakness. Hematological: Negative. Psychiatric/Behavioral: Positive for sleep disturbance. Except as noted above the remainder of the review of systems was reviewed and negative. PAST MEDICAL HISTORY     Past Medical History:   Diagnosis Date    Acute cystitis without hematuria 9/29/2016    Acute gout involving toe of right foot 4/19/2018    Acute renal failure superimposed on stage 4 chronic kidney disease ([address] 9/29/2016    Arthritis     Asthma     Atrial fibrillation with rapid ventricular response ([address] 2/17/2017    CAD (coronary artery disease)     CHF (congestive heart failure) (HCC)     Chronic kidney disease     COPD (chronic obstructive pulmonary disease) ([address]     COPD with acute exacerbation ([address] 3/24/2017    Examination of participant in clinical trial 5/20/13    6 year follow up, estimated completion JUN 2019    Gout 12/28/2017    Gout     H/O cardiovascular stress test 02/14/2017    H/O echocardiogram 02/14/2017    EF 55%. Mildly increased wall thickness of the LV. Evidence of diastolic dysfunction. Normal right ventricular size and function. Normal tricuspid valve structure and function. Mild tricuspid regurg    Hemarthrosis involving shoulder region, right 7/30/2018    Hx of transesophageal echocardiography (KRISTOPHER) for monitoring 02/20/2017    No clots  were visulaized in the left atrial appendage EF 55%.  Hyperlipidemia     Hypertension     MI (myocardial infarction) ([address]     2001    Pseudogout of right shoulder 7/30/2018    Renal artery stenosis ([address] 6/22/2016    Left proximal 75%    Shingles     15 years ago         SURGICALHISTORY       Past Surgical History:   Procedure Laterality Date    CARDIAC CATHETERIZATION  02/2017    LMCA: Normal 0% stenosis. LAD: Mid area 50-60% stenosis. LCx: Patent proximal stent 30% stenosis distal to the stent in OM proximal area 50% stenosis.  RCA: History     Socioeconomic History    Marital status:      Spouse name: None    Number of children: None    Years of education: None    Highest education level: None   Occupational History    None   Social Needs    Financial resource strain: None    Food insecurity:     Worry: None     Inability: None    Transportation needs:     Medical: None     Non-medical: None   Tobacco Use    Smoking status: Former Smoker     Packs/day: 3.00     Years: 30.00     Pack years: 90.00     Types: Cigarettes    Smokeless tobacco: Never Used    Tobacco comment: Quit 40 years ago   Substance and Sexual Activity    Alcohol use: No    Drug use: No    Sexual activity: None   Lifestyle    Physical activity:     Days per week: None     Minutes per session: None    Stress: None   Relationships    Social connections:     Talks on phone: None     Gets together: None     Attends Muslim service: None     Active member of club or organization: None     Attends meetings of clubs or organizations: None     Relationship status: None    Intimate partner violence:     Fear of current or ex partner: None     Emotionally abused: None     Physically abused: None     Forced sexual activity: None   Other Topics Concern    None   Social History Narrative    None       SCREENINGS             PHYSICAL EXAM    (up to 7 for level 4, 8 or more for level 5)     ED Triage Vitals [03/01/20 2309]   BP Temp Temp Source Pulse Resp SpO2 Height Weight   139/88 97.6 °F (36.4 °C) Oral 91 24 94 % -- 122 lb (55.3 kg)       Physical Exam  Constitutional:       Appearance: Normal appearance. HENT:      Head: Normocephalic and atraumatic. Nose: Nose normal.      Mouth/Throat:      Mouth: Mucous membranes are moist.   Eyes:      Extraocular Movements: Extraocular movements intact. Conjunctiva/sclera: Conjunctivae normal.      Pupils: Pupils are equal, round, and reactive to light.    Cardiovascular:      Rate and Rhythm: Normal rate and regular rhythm. Pulses: Normal pulses. Heart sounds: Normal heart sounds. Pulmonary:      Effort: Pulmonary effort is normal.      Breath sounds: Normal breath sounds. No stridor. No wheezing, rhonchi or rales. Chest:      Chest wall: No tenderness. Abdominal:      General: Abdomen is flat. Bowel sounds are normal.      Palpations: Abdomen is soft. Skin:     General: Skin is warm and dry. Capillary Refill: Capillary refill takes less than 2 seconds. Neurological:      General: No focal deficit present. Mental Status: She is alert and oriented to person, place, and time. Psychiatric:         Mood and Affect: Mood normal.         Behavior: Behavior normal.         Thought Content: Thought content normal.         Judgment: Judgment normal.         DIAGNOSTIC RESULTS     EKG: All EKG's are interpreted by the Emergency Department Physician who either signs or Co-signs this chart in the absence of a cardiologist.      RADIOLOGY:   Plain film images such as CT, Ultrasound and MRI are read by the radiologist. Plain radiographic images are visualized and preliminarily interpreted by the emergency physician with the below findings:      Interpretation per the Radiologist below, ifavailable at the time of this note:    XR CHEST STANDARD (2 VW)   Final Result   No acute findings. ED BEDSIDE ULTRASOUND:   Performed by ED Physician - none    LABS:  Labs Reviewed - No data to display    All other labs were within normal range ornot returned as of this dictation. EMERGENCY DEPARTMENT COURSE and DIFFERENTIAL DIAGNOSIS/MDM:   Vitals:    Vitals:    03/01/20 2309 03/02/20 0115   BP: 139/88    Pulse: 91    Resp: 24    Temp: 97.6 °F (36.4 °C)    TempSrc: Oral    SpO2: 94% 97%   Weight: 122 lb (55.3 kg)        59-year-old female with past medical history of COPD recently admitted with COPD exacerbation and discharged 2 days ago.   Since her discharge she has been unable to sleep and feels

## 2020-03-07 PROBLEM — T50.905A DRUG-INDUCED HYPERGLYCEMIA: Status: ACTIVE | Noted: 2020-01-01

## 2020-03-07 PROBLEM — R73.9 DRUG-INDUCED HYPERGLYCEMIA: Status: ACTIVE | Noted: 2020-01-01

## 2020-03-07 PROBLEM — I21.4 NSTEMI (NON-ST ELEVATED MYOCARDIAL INFARCTION) (HCC): Status: ACTIVE | Noted: 2020-01-01

## 2020-03-07 NOTE — ED NOTES
Jacoby Short in department for transport.   Pt report given to crew     Jenaro Salas, VALERIE  03/07/20 1204

## 2020-03-07 NOTE — ED NOTES
Ryann PADILLA informed Dr. Monique Easton of elevated troponin level. Ryann then called and spoke with Milagro in lab to inform her ED was never notified of result. Dr. Monique Gamboas to bedside to speak with patient and family.       Nain Guy RN  03/07/20 8339

## 2020-03-07 NOTE — PROGRESS NOTES
Pharmacy Note     Renal Dose Adjustment    Kim Vila is a 80 y.o. female. Pharmacist assessment of renally cleared medications. Recent Labs     03/07/20  0434   BUN 34*       Recent Labs     03/07/20  0434   CREATININE 1.69*       Estimated Creatinine Clearance: 17 mL/min (A) (based on SCr of 1.69 mg/dL (H)). Height:   Ht Readings from Last 1 Encounters:   03/07/20 5' 2\" (1.575 m)     Weight:  Wt Readings from Last 1 Encounters:   03/07/20 121 lb 14.6 oz (55.3 kg)       The following medication dose has been adjusted based upon renal function per P&T Guidelines:             Pepcid 20 mg po twice daily changed to 20 mg po daily. Lala Carroll, Pharm. D.  3/7/2020  4:30 PM

## 2020-03-07 NOTE — ED PROVIDER NOTES
[facility] ED  Emergency Department Encounter  EmergencyMedicine Attending     Pt Name:Geovanna Cavanaugh  MRN: [id number]  [place] 5/16/1928  Date of evaluation: 3/7/20  PCP:  Kasi Wiggins       Chief Complaint   Patient presents with    Chest Pain     Pt arrives with chest pressure and shortness of breath . Pt states she took an albuterol tx at home prior to calling EMS. Pt also took 1 nitro with no relief. Pt states pressure 2/10. HISTORY OF PRESENT ILLNESS  (Location/Symptom, Timing/Onset, Context/Setting, Quality, Duration, Modifying Factors, Severity.)      Jose Preston is a 80 y.o. female who presents with chest pain that started earlier in the morning. History of known coronary artery disease, congestive heart failure, COPD, history of smoking. 2 previous heart attacks that the patient has had.  patient does report some mild shortness of breath. Pain is mild, substernal, 2 out of 10, no radiations, no associated nausea vomiting but does report a cough, no purulent phlegm production. No fevers or chills at home. No hemoptysis, no asymmetrical leg swelling. Patient was given a nitro by daughter, reports that the pain got significantly better after the nitro. Pain is only rated at a 2 out of 10 at this time. Pain is sharp. No radiations.     PAST MEDICAL / SURGICAL / SOCIAL / FAMILY HISTORY      has a past medical history of Acute cystitis without hematuria, Acute gout involving toe of right foot, Acute renal failure superimposed on stage 4 chronic kidney disease ([address] Arthritis, Asthma, Atrial fibrillation with rapid ventricular response ([address] CAD (coronary artery disease), CHF (congestive heart failure) ([address] Chronic kidney disease, COPD (chronic obstructive pulmonary disease) ([address] COPD with acute exacerbation ([address] Examination of participant in clinical trial, Gout, Gout, H/O cardiovascular stress test, H/O echocardiogram, Hemarthrosis Negative for leg swelling. Gastrointestinal: Negative for abdominal pain, diarrhea, nausea and vomiting. Genitourinary: Negative for dysuria and hematuria. Musculoskeletal: Negative for back pain. Skin: Negative for color change. Neurological: Negative for weakness and headaches. Psychiatric/Behavioral: Negative for agitation. PHYSICAL EXAM   (up to 7 for level 4, 8 or more for level 5)      INITIAL VITALS:   BP (!) 156/88   Pulse 81   Temp 98.1 °F (36.7 °C) (Tympanic)   Resp 18   Ht 5' 2\" (1.575 m)   Wt 122 lb (55.3 kg)   SpO2 91%   BMI 22.31 kg/m²     Physical Exam  Vitals signs reviewed. Constitutional:       General: She is not in acute distress. Appearance: She is well-developed. HENT:      Head: Normocephalic and atraumatic. Mouth/Throat:      Pharynx: No oropharyngeal exudate or posterior oropharyngeal erythema. Eyes:      General:         Right eye: No discharge. Left eye: No discharge. Cardiovascular:      Rate and Rhythm: Normal rate and regular rhythm. Heart sounds: Normal heart sounds. No murmur. No friction rub. No gallop. Pulmonary:      Effort: Pulmonary effort is normal. No respiratory distress. Breath sounds: Wheezing and rales present. Comments: Expiratory wheezing throughout the lung fields with some scattered rales. Abdominal:      General: There is no distension. Palpations: Abdomen is soft. Tenderness: There is no abdominal tenderness. There is no guarding or rebound. Musculoskeletal:         General: No swelling or tenderness. Comments: No asymmetrical leg swelling, no calf tenderness on examination bilaterally. Skin:     General: Skin is warm. Findings: No erythema. Neurological:      Mental Status: She is alert.          DIFFERENTIAL  DIAGNOSIS     PLAN (LABS / IMAGING / EKG):  Orders Placed This Encounter   Procedures    Rapid influenza A/B antigens    Culture, Blood 1    Culture, Blood 1    100 WBC    Differential Type NOT REPORTED     WBC Morphology NOT REPORTED     RBC Morphology NOT REPORTED     Platelet Estimate NOT REPORTED     Seg Neutrophils 89 (H) 36 - 65 %    Lymphocytes 3 (L) 24 - 43 %    Monocytes 7 3 - 12 %    Eosinophils % 0 (L) 1 - 4 %    Immature Granulocytes 1 (H) 0 %    Basophils 0 0 - 2 %    Segs Absolute 25.19 (H) 1.50 - 8.10 k/uL    Absolute Lymph # 0.85 (L) 1.10 - 3.70 k/uL    Absolute Mono # 1.98 (H) 0.10 - 1.20 k/uL    Absolute Eos # 0.00 0.00 - 0.44 k/uL    Absolute Immature Granulocyte 0.28 0.00 - 0.30 k/uL    Basophils Absolute 0.00 0.0 - 0.2 k/uL    Morphology Normal    Basic Metabolic Panel w/ Reflex to MG   Result Value Ref Range    Glucose 207 (H) 70 - 99 mg/dL    BUN 34 (H) 8 - 23 mg/dL    CREATININE 1.69 (H) 0.50 - 0.90 mg/dL    Bun/Cre Ratio 20 9 - 20    Calcium 9.0 8.6 - 10.4 mg/dL    Sodium 137 135 - 144 mmol/L    Potassium 4.3 3.7 - 5.3 mmol/L    Chloride 99 98 - 107 mmol/L    CO2 25 20 - 31 mmol/L    Anion Gap 13 9 - 17 mmol/L    GFR Non-African American 28 (L) >60 mL/min    GFR  34 (L) >60 mL/min    GFR Comment          GFR Staging         Troponin   Result Value Ref Range    Troponin, High Sensitivity 277 (HH) 0 - 14 ng/L    Troponin T NOT REPORTED <0.03 ng/mL    Troponin Interp NOT REPORTED    Brain Natriuretic Peptide   Result Value Ref Range    Pro-BNP 3,609 (H) <300 pg/mL    BNP Interpretation Pro-BNP Reference Range:    Protime-INR   Result Value Ref Range    Protime 9.4 (L) 9.7 - 12.2 sec    INR 0.9 0.9 - 1.2   APTT   Result Value Ref Range    PTT 23.6 23.2 - 34.4 sec   EKG 12 Lead   Result Value Ref Range    Ventricular Rate 84 BPM    Atrial Rate 84 BPM    P-R Interval 174 ms    QRS Duration 74 ms    Q-T Interval 362 ms    QTc Calculation (Bazett) 427 ms    P Axis 87 degrees    R Axis -81 degrees    T Axis 85 degrees       IMPRESSION: 60-year-old female comes into the emergency department secondary to chest pain, 2 out of 10, no radiations, STEMI, patient was started on heparin drip. Last Eliquis dose was last night. We will continue to trend troponins. Given the end STEMI, patient will be needing a higher level of care. Plan to transfer. Discussed with Cj Escobedo, patient will be admitted to the stepdown unit at [facility] for cardiology. I did start the patient on azithromycin and Rocephin given the leukocytosis. No pneumonia, no obvious sources of infection at this time, patient is afebrile, no tachycardia, the leukocytosis may just be secondary to the recent steroids that the patient was on however given the significant leukocytosis will cover for infection for now as well. But at this time there is no source of infection so far. 6:43 am: Discussed with cardiologist, Dr. Jo Chapa, he recommends doing half inch nitro paste Q 6 hrs. Otherwise continue heparin. And cardiology will see patient when she gets here. Pending transportation for transfer to higher level of care. PROCEDURES:  None    CONSULTS:  None    CRITICAL CARE:  None    FINAL IMPRESSION      1. NSTEMI (non-ST elevated myocardial infarction) ([facility]          DISPOSITION / PLAN     DISPOSITION Decision To Transfer 03/07/2020 06:08:16 AM      PATIENT REFERRED TO:  No follow-up provider specified.     DISCHARGE MEDICATIONS:  New Prescriptions    No medications on file       Tete Lew MD  Emergency Medicine Attending    (Please note that portions of thisnote were completed with a voice recognition program.  Efforts were made to edit the dictations but occasionally words are mis-transcribed.)       Tete Lew MD  03/07/20 1749

## 2020-03-07 NOTE — ED NOTES
Daughter called to request status of pt being transferred. Was just given room assignment. Daughter stated she would be out.   LS arrived and she had not arrived so provided switchboard information with room number, etc to give to her when she arrived     [employer] Reser  03/07/20 5941

## 2020-03-07 NOTE — PROGRESS NOTES
Elisha Oscar, PPatient Assessment complete. NSTEMI (non-ST elevated myocardial infarction) ([facility] [I21.4] . Vitals:    03/07/20 1145   BP: (!) 181/100   Pulse: 68   Resp: 21   Temp: 97.3 °F (36.3 °C)   SpO2: 95%   . Patients home meds are   Prior to Admission medications    Medication Sig Start Date End Date Taking? Authorizing Provider   simvastatin (ZOCOR) 20 MG tablet take 1 tablet by mouth every evening  Patient taking differently: Take 20 mg by mouth nightly  1/16/20   JOANIE Becker CNP   benzonatate (TESSALON) 100 MG capsule Take 100 mg by mouth 3 times daily as needed for Cough    Historical Provider, MD   furosemide (LASIX) 20 MG tablet take 1 tablet by mouth EVERY 48 HOURS  Patient taking differently: Take 20 mg by mouth every other day  10/25/19 3/7/20  Roxie Lutz MD   FEROSUL 325 (65 Fe) MG tablet take 1 tablet by mouth once daily  Patient taking differently: Take 325 mg by mouth daily (with breakfast)  10/10/19   JOANIE Becker CNP   albuterol (PROVENTIL) (2.5 MG/3ML) 0.083% nebulizer solution Take 3 mLs by nebulization every 4 hours as needed for Wheezing 8/22/19   JOANIE Miller CNP   beclomethasone (QVAR) 80 MCG/ACT inhaler Inhale 1 puff into the lungs 2 times daily 8/22/19   JOANIE Miller CNP   albuterol sulfate HFA (PROAIR HFA) 108 (90 Base) MCG/ACT inhaler inhale 2 puffs by mouth every 6 hours if needed for wheezing 8/22/19   JOANIE Miller CNP   diltiazem (CARDIZEM) 120 MG tablet Take 120 mg by mouth daily     Historical Provider, MD   ELIQUKARTIK 2.5 MG TABS tablet Take 2.5 mg by mouth 2 times daily  7/13/18   Historical Provider, MD   nitroGLYCERIN (NITROSTAT) 0.4 MG SL tablet up to max of 3 total doses.  If no relief after 1 dose, call 911. 2/6/18   Mitchel Scanlon MD   .  Recent Surgical History: None = 0     Assessment     Peak Flow (asthma only)    Predicted: na  Personal Best: na  PEF na  % Predicted na  Peak Flow : not applicable =

## 2020-03-07 NOTE — H&P
disease ([facility] 9/29/2016    Arthritis     Asthma     Atrial fibrillation with rapid ventricular response ([facility] 2/17/2017    CAD (coronary artery disease)     [facility]congestive [facility] failure) ([facility]     Chronic kidney disease     COPD (chronic obstructive pulmonary disease) ([facility]     COPD with acute exacerbation ([facility] 3/24/2017    Examination of participant in clinical trial 5/20/13    6 year follow up, estimated completion JUN 2019    Gout 12/28/2017    Gout     H/O cardiovascular stress test 02/14/2017    H/O echocardiogram 02/14/2017    EF 55%. Mildly increased wall thickness of the LV. Evidence of diastolic dysfunction. Normal right ventricular size and function. Normal tricuspid valve structure and function. Mild tricuspid regurg    Hemarthrosis involving shoulder region, right 7/30/2018    Hx of transesophageal echocardiography ([facility]) for monitoring 02/20/2017    No clots  were visulaized in the left atrial appendage EF 55%.  Hyperlipidemia     Hypertension     [facility]     2001    Pseudogout of right shoulder 7/30/2018    Renal artery stenosis (HCC) 6/22/2016    Left proximal 75%    Shingles     15 years ago        Past Surgical History:     Past Surgical History:   Procedure Laterality Date    CARDIAC CATHETERIZATION  02/2017    LMCA: Normal 0% stenosis. LAD: Mid area 50-60% stenosis. LCx: Patent proximal stent 30% stenosis distal to the stent in OM proximal area 50% stenosis. RCA: Minimal 30% mid area PDA stent  is patent <20% stenosis. PL branch diffuse disease.  CHOLECYSTECTOMY      CORONARY ANGIOPLASTY WITH STENT PLACEMENT      SPINE SURGERY          Medications Prior to Admission:     Prior to Admission medications    Medication Sig Start Date End Date Taking?  Authorizing Provider   simvastatin (ZOCOR) 20 MG tablet take 1 tablet by mouth every evening  Patient taking differently: Take 20 mg by mouth nightly  1/16/20   Lavern Sorensonts, APRN - CNP benzonatate (TESSALON) 100 MG capsule Take 100 mg by mouth 3 times daily as needed for Cough    Historical Provider, MD   furosemide (LASIX) 20 MG tablet take 1 tablet by mouth EVERY 48 HOURS  Patient taking differently: Take 20 mg by mouth every other day  10/25/19 3/7/20  Ivet Mobley MD   FEROSUL 325 (65 Fe) MG tablet take 1 tablet by mouth once daily  Patient taking differently: Take 325 mg by mouth daily (with breakfast)  10/10/19   JOANIE Becker CNP   albuterol (PROVENTIL) (2.5 MG/3ML) 0.083% nebulizer solution Take 3 mLs by nebulization every 4 hours as needed for Wheezing 8/22/19   JOANIE Boateng CNP   beclomethasone (QVAR) 80 MCG/ACT inhaler Inhale 1 puff into the lungs 2 times daily 8/22/19   JOANIE Boateng CNP   albuterol sulfate HFA (PROAIR HFA) 108 (90 Base) MCG/ACT inhaler inhale 2 puffs by mouth every 6 hours if needed for wheezing 8/22/19   JOANIE Boateng CNP   diltiazem (CARDIZEM) 120 MG tablet Take 120 mg by mouth daily     Historical Provider, MD   ELIQUIS 2.5 MG TABS tablet Take 2.5 mg by mouth 2 times daily  7/13/18   Historical Provider, MD   nitroGLYCERIN (NITROSTAT) 0.4 MG SL tablet up to max of 3 total doses. If no relief after 1 dose, call 911. 2/6/18   Rose Grant MD        Allergies:     Belladonna    Social History:     Tobacco:    reports that she has quit smoking. Her smoking use included cigarettes. She has a 90.00 pack-year smoking history. She has never used smokeless tobacco.  Alcohol:      reports no history of alcohol use. Drug Use:  reports no history of drug use. Family History:     Family History   Problem Relation Age of Onset    Heart Disease Mother     Cancer Father        Review of Systems:     Positive and Negative as described in HPI.     CONSTITUTIONAL:  negative for fevers, chills, sweats, weight loss  HEENT:  negative for vision, hearing changes, runny nose, throat pain  RESPIRATORY: Positive for chronic shortness of breath, negative for cough, congestion, wheezing  CARDIOVASCULAR: Positive for chest pain, negative for orthopnea, exertional dyspnea, palpitations  GASTROINTESTINAL:  negative for nausea, vomiting, diarrhea, constipation, change in bowel habits, abdominal pain   GENITOURINARY:  negative for difficulty of urination, burning with urination, frequency   INTEGUMENT:  negative for rash, skin lesions, easy bruising   HEMATOLOGIC/LYMPHATIC:  negative for swelling/edema   ALLERGIC/IMMUNOLOGIC:  negative for urticaria , itching  ENDOCRINE:  negative increase in drinking, increase in urination, hot or cold intolerance  MUSCULOSKELETAL:  negative joint pains, muscle aches, swelling of joints  NEUROLOGICAL:  negative for headaches, dizziness, lightheadedness, numbness, pain, tingling extremities  BEHAVIOR/PSYCH:  negative for depression, anxiety    Physical Exam:   BP (!) 181/100   Pulse 68   Temp 97.3 °F (36.3 °C) (Oral)   Resp 21   Ht 5' 2\" (1.575 m)   Wt 121 lb 14.6 oz (55.3 kg)   SpO2 95%   BMI 22.30 kg/m²   Temp (24hrs), Av.7 °F (36.5 °C), Min:97.3 °F (36.3 °C), Max:98.1 °F (36.7 °C)    No results for input(s): POCGLU in the last 72 hours.   No intake or output data in the 24 hours ending 20 1540    General Appearance: alert, chronically ill/elderly and frail appearing, and in no acute distress  Mental status: oriented to person, place, and time  Head: normocephalic, atraumatic  Eye: no icterus, redness, pupils equal and reactive, extraocular eye movements intact, conjunctiva clear  Ear: normal external ear, no discharge, hearing intact  Nose: no drainage noted  Mouth: mucous membranes moist  Neck: supple, no carotid bruits, thyroid not palpable  Lungs: Bilateral equal air entry, diminished throughout with faint rhonchi, normal effort  Cardiovascular: normal rate, regular rhythm, no gallop, rub, faint systolic murmur  Abdomen: Soft, nontender, nondistended, normal bowel sounds, no hepatomegaly or splenomegaly  Neurologic: There are no new focal motor or sensory deficits, normal muscle tone and bulk, no abnormal sensation, normal speech, cranial nerves II through XII grossly intact  Skin: No gross lesions, rashes, bruising or bleeding on exposed skin area  Extremities: peripheral pulses palpable, no pedal edema or calf pain with palpation  Psych: normal affect    Investigations:      Laboratory Testing:  Recent Results (from the past 24 hour(s))   CBC Auto Differential    Collection Time: 03/07/20  4:34 AM   Result Value Ref Range    WBC 28.3 (H) 3.5 - 11.3 k/uL    RBC 4.64 3.95 - 5.11 m/uL    Hemoglobin 13.6 11.9 - 15.1 g/dL    Hematocrit 44.1 36.3 - 47.1 %    MCV 95.0 82.6 - 102.9 fL    MCH 29.3 25.2 - 33.5 pg    MCHC 30.8 28.4 - 34.8 g/dL    RDW 14.1 11.8 - 14.4 %    Platelets 052 462 - 088 k/uL    MPV 11.2 8.1 - 13.5 fL    NRBC Automated 0.0 0.0 per 100 WBC    Differential Type NOT REPORTED     WBC Morphology NOT REPORTED     RBC Morphology NOT REPORTED     Platelet Estimate NOT REPORTED     Seg Neutrophils 89 (H) 36 - 65 %    Lymphocytes 3 (L) 24 - 43 %    Monocytes 7 3 - 12 %    Eosinophils % 0 (L) 1 - 4 %    Immature Granulocytes 1 (H) 0 %    Basophils 0 0 - 2 %    Segs Absolute 25.19 (H) 1.50 - 8.10 k/uL    Absolute Lymph # 0.85 (L) 1.10 - 3.70 k/uL    Absolute Mono # 1.98 (H) 0.10 - 1.20 k/uL    Absolute Eos # 0.00 0.00 - 0.44 k/uL    Absolute Immature Granulocyte 0.28 0.00 - 0.30 k/uL    Basophils Absolute 0.00 0.0 - 0.2 k/uL    Morphology Normal    Basic Metabolic Panel w/ Reflex to MG    Collection Time: 03/07/20  4:34 AM   Result Value Ref Range    Glucose 207 (H) 70 - 99 mg/dL    BUN 34 (H) 8 - 23 mg/dL    CREATININE 1.69 (H) 0.50 - 0.90 mg/dL    Bun/Cre Ratio 20 9 - 20    Calcium 9.0 8.6 - 10.4 mg/dL    Sodium 137 135 - 144 mmol/L    Potassium 4.3 3.7 - 5.3 mmol/L    Chloride 99 98 - 107 mmol/L    CO2 25 20 - 31 mmol/L    Anion Gap 13 9 - 17 mmol/L    GFR Non-African American 28 (L) >60 mL/min GFR  34 (L) >60 mL/min    GFR Comment          GFR Staging         Troponin    Collection Time: 03/07/20  4:34 AM   Result Value Ref Range    Troponin, High Sensitivity 277 (HH) 0 - 14 ng/L    Troponin T NOT REPORTED <0.03 ng/mL    Troponin Interp NOT REPORTED    Brain Natriuretic Peptide    Collection Time: 03/07/20  4:34 AM   Result Value Ref Range    Pro-BNP 3,609 (H) <300 pg/mL    BNP Interpretation Pro-BNP Reference Range:    Protime-INR    Collection Time: 03/07/20  4:34 AM   Result Value Ref Range    Protime 9.4 (L) 9.7 - 12.2 sec    INR 0.9 0.9 - 1.2   APTT    Collection Time: 03/07/20  4:34 AM   Result Value Ref Range    PTT 23.6 23.2 - 34.4 sec   EKG 12 Lead    Collection Time: 03/07/20  4:36 AM   Result Value Ref Range    Ventricular Rate 84 BPM    Atrial Rate 84 BPM    P-R Interval 174 ms    QRS Duration 74 ms    Q-T Interval 362 ms    QTc Calculation (Bazett) 427 ms    P Axis 87 degrees    R Axis -81 degrees    T Axis 85 degrees   Rapid influenza A/B antigens    Collection Time: 03/07/20  5:23 AM   Result Value Ref Range    Specimen Description . NASOPHARYNGEAL SWAB     Special Requests NOT REPORTED     Direct Exam       NEGATIVE for Influenza A + B antigens. PCR testing to confirm this result is available upon request.  Specimen will be saved in the laboratory for 7 days. Please call [phone] if PCR testing is indicated. Culture, Blood 1    Collection Time: 03/07/20  6:50 AM   Result Value Ref Range    Specimen Description . BLOOD     Special Requests RAC 20ML     Culture NO GROWTH 1 HOUR    Lactic acid, plasma    Collection Time: 03/07/20  6:50 AM   Result Value Ref Range    Lactic Acid 1.2 0.5 - 2.2 mmol/L    Lactic Acid, Whole Blood NOT REPORTED 0.7 - 2.1 mmol/L   Troponin    Collection Time: 03/07/20  7:10 AM   Result Value Ref Range    Troponin, High Sensitivity 302 (HH) 0 - 14 ng/L    Troponin T NOT REPORTED <0.03 ng/mL    Troponin Interp NOT REPORTED    APTT    Collection Time: 03/07/20  7:10 AM   Result Value Ref Range    PTT 25.0 23.2 - 34.4 sec   APTT    Collection Time: 03/07/20 12:22 PM   Result Value Ref Range    PTT 38.4 (H) 20.5 - 30.5 sec   CBC    Collection Time: 03/07/20 12:22 PM   Result Value Ref Range    WBC 23.8 (H) 3.5 - 11.3 k/uL    RBC 4.48 3.95 - 5.11 m/uL    Hemoglobin 13.2 11.9 - 15.1 g/dL    Hematocrit 42.8 36.3 - 47.1 %    MCV 95.5 82.6 - 102.9 fL    MCH 29.5 25.2 - 33.5 pg    MCHC 30.8 28.4 - 34.8 g/dL    RDW 14.0 11.8 - 14.4 %    Platelets 354 256 - 555 k/uL    MPV 10.8 8.1 - 13.5 fL    NRBC Automated 0.0 0.0 per 100 WBC   Troponin    Collection Time: 03/07/20 12:22 PM   Result Value Ref Range    Troponin, High Sensitivity 303 (HH) 0 - 14 ng/L    Troponin T NOT REPORTED <0.03 ng/mL    Troponin Interp NOT REPORTED        Imaging/Diagnostics:  Xr Chest Standard (2 Vw)    Result Date: 3/7/2020  No evidence for acute cardiopulmonary pathology. COPD. Xr Chest Standard (2 Vw)    Result Date: 3/2/2020  No acute findings. Assessment :      Hospital Problems           Last Modified POA    * (Principal) NSTEMI (non-ST elevated myocardial infarction) ([facility] 3/7/2020 Yes    CKD (chronic kidney disease) stage 4, GFR 15-29 ml/min (HCC) (Chronic) 3/7/2020 Yes    Overview Addendum 1/24/2018 12:26 PM by Kerrie Hooper MD     Baseline creatinine 1.8 - 2         COPD without exacerbation ([facility] 3/7/2020 Yes    Chronic diastolic heart failure ([facility] 3/7/2020 Yes    Essential hypertension 3/7/2020 Yes    Hyperlipidemia 3/7/2020 Yes    Drug-induced hyperglycemia 3/7/2020 Yes    Overview Signed 3/7/2020  3:36 PM by Juanpablo Ruiz DO     Recently completed course of prednisone for COPD exacerbation               Plan:     Patient status inpatient in the Progressive Unit/Step down    1. Admit inpatient  2. Cardiology consultation  3. Serial troponin checks  4. Check 2D echo to reevaluate LV function  5.  Changed to a DNR CCA today in the emergency room, continue per patient and family request  6. Monitor renal function, avoid nephrotoxic agents  7. Heparin drip  8. Aspirin and statin  9. Nitro as needed for chest pain  10. PT and OT as needed  11. Hold home Eliquis while on IV heparin  12. Discussed with family at bedside  15. Monitor glucose, no history of diabetes but recently treated with steroids and has been hyperglycemic  14. Strict I's and O's  15. Kidney function stable, heart catheterization planned will obtain nephrology consultation prior  16. See orders for details    Consultations:   IP CONSULT TO CARDIOLOGY     Patient is admitted as inpatient status because of co-morbidities listed above, severity of signs and symptoms as outlined, requirement for current medical therapies and most importantly because of direct risk to patient if care not provided in a hospital setting.     Albert Fernandez DO  3/7/2020  3:40 PM    Copy sent to Dr. Mynor Linton, APRN - CNP

## 2020-03-08 NOTE — CONSULTS
[facility]  In Patient Cardiology Consult             Date:   3/8/2020  Patient name: Cody Richards  Date of admission:  3/7/2020 11:42 AM  MRN:   [id number]  YOB: 1928    Reason for Admission:  Cp, NSTEMI    CHIEF COMPLAINT:  CP, NSTEMI     History Obtained From:  Pt and chart    HISTORY OF PRESENT ILLNESS:      This is a 80year old female who presents with chest pain. Awakened from sleep. It was left sided. Different than her COPD. Came to ER, found to have NSTEMI- Transferred to [facility] for further work up. Currently CP free. Known to us for :  1. Dyspnea & acute COPD exacerbation  2. Elevated troponins, likely secondary to COPD exacerbation. doubt ACS  3. UTI  4. CAD status post history of stenting in the past  5. History of paroxysmal A. fib, Eliquis  6. Essential hypertension  7. Hyperlipidemia  8. CKD stage IV Baseline creatinine 1.8-2.1    Past Medical History:   has a past medical history of Acute cystitis without hematuria, Acute gout involving toe of right foot, Acute renal failure superimposed on stage 4 chronic kidney disease (HCC), Arthritis, Asthma, Atrial fibrillation with rapid ventricular response ([address] CAD (coronary artery disease), CHF (congestive heart failure) ([address] Chronic kidney disease, COPD (chronic obstructive pulmonary disease) ([address] COPD with acute exacerbation ([address] Examination of participant in clinical trial, Gout, Gout, H/O cardiovascular stress test, H/O echocardiogram, Hemarthrosis involving shoulder region, right, Hx of transesophageal echocardiography ([facility]) for monitoring, Hyperlipidemia, Hypertension, MI (myocardial infarction) ([address] Pseudogout of right shoulder, Renal artery stenosis ([address] and Shingles. Past Surgical History:   has a past surgical history that includes Cholecystectomy; Coronary angioplasty with stent; Spine surgery; and Cardiac catheterization (02/2017).      Home Medications:    Prior to Admission level.     · Eyes: No visual changes or diplopia. No scleral icterus. · ENT: No Headaches, hearing loss or vertigo. No mouth sores or sore throat. · Cardiovascular: As HPI  · Respiratory: As HPI  · Gastrointestinal: No abdominal pain, appetite loss, blood in stools. No change in bowel or bladder habits. · Genitourinary: No dysuria, trouble voiding, or hematuria. · Musculoskeletal:  No gait disturbance, No weakness or joint complaints. · Integumentary: No rash or pruritis. · Neurological: No headache, diplopia, change in muscle strength, numbness or tingling. No change in gait, balance, coordination, mood, affect, memory, mentation, behavior. · Psychiatric: No anxiety, or depression. · Endocrine: No temperature intolerance. No excessive thirst, fluid intake, or urination. No tremor. · Hematologic/Lymphatic: No abnormal bruising or bleeding, blood clots or swollen lymph nodes. · Allergic/Immunologic: No nasal congestion or hives. PHYSICAL EXAM:    Physical Examination:    BP (!) 145/69   Pulse 65   Temp 99.2 °F (37.3 °C) (Temporal)   Resp 26   Ht 5' 2\" (1.575 m)   Wt 117 lb 11.6 oz (53.4 kg)   SpO2 91%   BMI 21.53 kg/m²    Constitutional and General Appearance: alert, cooperative, no distress and appears stated age  HEENT: PERRL, no cervical lymphadenopathy. No masses palpable. Normal oral mucosa  Respiratory:  · Normal excursion and expansion without use of accessory muscles  · Resp Auscultation: Good respiratory effort. No for increased work of breathing.  On auscultation: Clear  Cardiovascular:  · The apical impulse is not displaced  · Heart tones are crisp and normal. regular S1 and S2. Murmurs: none  · Jugular venous pulsation Normal  · The carotid upstroke is normal in amplitude and contour without delay or bruit  · Peripheral pulses are symmetrical and full   Abdomen:  · No masses or tenderness  · Bowel sounds present  Extremities:  ·  No Cyanosis or Clubbing  ·  Lower extremity edema: Recent Labs     03/07/20  0434   PROTIME 9.4*   INR 0.9     APTT:  Recent Labs     03/07/20  2158 03/08/20  0418   APTT 46.8* 79.3*     CARDIAC ENZYMES:  Recent Labs     03/07/20  0710 03/07/20  1222 03/07/20  1543   TROPHS 302* 303* 321*     FASTING LIPID PANEL:  Lab Results   Component Value Date    HDL 63 03/08/2020    TRIG 87 03/08/2020     LIVER PROFILE:  Recent Labs     03/08/20  0418   AST 29   ALT 18   LABALBU 2.4*         IMPRESSION:    Patient Active Problem List   Diagnosis    Asthma    Status post coronary artery stent placement    CKD (chronic kidney disease) stage 4, GFR 15-29 ml/min (HCC)    Renovascular hypertension    COPD without exacerbation ([address]    Hypertension    Atrial fibrillation ([address]    Panlobular emphysema ([address]    Acute respiratory failure with hypoxia ([address]    COPD with acute exacerbation ([address]    Coronary artery disease involving native coronary artery of native heart without angina pectoris    Elevated brain natriuretic peptide (BNP) level    Chronic diastolic heart failure (HCC)    Shortness of breath    NSTEMI (non-ST elevated myocardial infarction) ([address]    Essential hypertension    Hyperlipidemia    Drug-induced hyperglycemia     - NSTEMI type 1  - Chest pain- resolved  - Leukocytosis  - COPD  - HTN  - Parox AFib  - CKD  - CAD s/p PCI in past    RECOMMENDATIONS:  - change to lovenox  - on ASA, statin, BB  - check echo for LVEF  - long discussion regarding options/therapies- She would like conservative therapy for now  - increase imdur    Dispo- if LVEF is preserved, will consider d/c tomorrow on med tx. Discussed with patient and nursing. Thank you for allowing me to participate in the care of this patient, please do not hesitate to call if you have any questions. Stephanie Sims DO, [facility] [place], [address] [facility]  [email or web address]  [phone]

## 2020-03-08 NOTE — PLAN OF CARE
4/day when she is at home      Bronchodilator assessment at level  3  Hyperinflation assessment at level   Secretion Management assessment at level      []    Bronchodilator Assessment  BRONCHODILATOR ASSESSMENT SCORE  Score 0 1 2 3 4 5   Breath Sounds   []  Patient Baseline []  No Wheeze good aeration []  Faint, scattered wheezing, good aeration [x]  Expiratory Wheezing and or moderately diminished []  Insp/Exp wheeze and/or very diminished []  Insp/Exp and/ or marked distress   Respiratory Rate   []  Patient Baseline [x]  Less than 20 [x]  Less than 20 []  20-25 []  Greater than 25 []  Greater than 25   Peak flow % of Pred or PB [x]  NA   []  Greater than 90%  []  81-90% []  71-80% []  Less than or equal to 70%  or unable to perform []  Unable due to Respiratory Distress   Dyspnea re [x]  Patient Baseline []  No SOB []  No SOB []  SOB on exertion []  SOB min activity []  At rest/acute   e FEV% Predicted       [x]  NA []  Above 69%  []  Unable []  Above 60-69%  []  Unable []  Above 50-59%  []  Unable []  Above 35-49%  []  Unable []  Less than 35%  []  Unable                 []  Hyperinflation Assessment  Score 1 2 3   CXR and Breath Sounds   []  Clear []  No atelectasis  Basilar aeration []  Atelectasis or absent basilar breath sounds   Incentive Spirometry Volume  (Per IBW)   []  Greater than or equal to 15ml/Kg []  less than 15ml/Kg []  less than 15ml/Kg   Surgery within last 2 weeks []  None or general   []  Abdominal or thoracic surgery  []  Abdominal or thoracic   Chronic Pulmonary Historyre []  No []  Yes []  Yes     []  Secretion Management Assessment  Score 1 2 3   Bilateral Breath Sounds   []  Occasional Rhonchi []  Scattered Rhonchi []  Course Rhonchi and/or poor aeration   Sputum    []  Small amount of thin secretions []  Moderate amount of viscous secretions []  Copius, Viscious Yellow/ Secretions   CXR as reported by physician []  clear  []  Unavailable []  Infiltrates and/or consolidation  []  Unavailable []  Mucus Plugging and or lobar consolidation  []  Unavailable   Cough []  Strong, productive cough []  Weak productive cough []  No cough or weak non-productive cough   Torsten EPPS Black  1:24 PM                            FEMALE                                  MALE                            FEV1 Predicted Normal Values                        FEV1 Predicted Normal Values          Age                                     Height in Feet and Inches       Age                                     Height in Feet and Inches       4' 11\" 5' 1\" 5' 3\" 5' 5\" 5' 7\" 5' 9\" 5' 11\" 6' 1\"  4' 11\" 5' 1\" 5' 3\" 5' 5\" 5' 7\" 5' 9\" 5' 11\" 6' 1\"   42 - 45 2.49 2.66 2.84 3.03 3.22 3.42 3.62 3.83 42 - 45 2.82 3.03 3.26 3.49 3.72 3.96 4.22 4.47   46 - 49 2.40 2.57 2.76 2.94 3.14 3.33 3.54 3.75 46 - 49 2.70 2.92 3.14 3.37 3.61 3.85 4.10 4.36   50 - 53 2.31 2.48 2.66 2.85 3.04 3.24 3.45 3.66 50 - 53 2.58 2.80 3.02 3.25 3.49 3.73 3.98 4.24   54 - 57 2.21 2.38 2.57 2.75 2.95 3.14 3.35 3.56 54 - 57 2.46 2.67 2.89 3.12 3.36 3.60 3.85 4.11   58 - 61 2.10 2.28 2.46 2.65 2.84 3.04 3.24 3.45 58 - 61 2.32 2.54 2.76 2.99 3.23 3.47 3.72 3.98   62 - 65 1.99 2.17 2.35 2.54 2.73 2.93 3.13 3.34 62 - 65 2.19 2.40 2.62 2.85 3.09 3.33 3.58 3.84   66 - 69 1.88 2.05 2.23 2.42 2.61 2.81 3.02 3.23 66 - 69 2.04 2.26 2.48 2.71 2.95 3.19 3.44 3.70   70+ 1.82 1.99 2.17 2.36 2.55 2.75 2.95 3.16 70+ 1.97 2.19 2.41 2.64 2.87 3.12 3.37 3.62             Predicted Peak Expiratory Flow Rate                                       Height (in)  Female       Height (in) Male           Age 58 63 61 63 56 77 78 74 Age            20 970 832 300 292 995 263 281 254  10 29 26 42 57 70 72 74 76   25 337 352 366 381 396 411 426 441 25 447 476 [phone] [phone]   30 329 344 359 374 389 404 419 434 30 437 466 494 523 552 580 [phone]   35 322 337 351 366 381 396 411 426 35 426 455 484 512 541 [phone]   40 314 329 344 359 374 [phone]

## 2020-03-08 NOTE — PLAN OF CARE
assessment at level      [x]    Bronchodilator Assessment  BRONCHODILATOR ASSESSMENT SCORE  Score 0 1 2 3 4 5   Breath Sounds   []  Patient Baseline [x]  No Wheeze good aeration []  Faint, scattered wheezing, good aeration []  Expiratory Wheezing and or moderately diminished []  Insp/Exp wheeze and/or very diminished []  Insp/Exp and/ or marked distress   Respiratory Rate   []  Patient Baseline [x]  Less than 20 [x]  Less than 20 []  20-25 []  Greater than 25 []  Greater than 25   Peak flow % of Pred or PB [x]  NA   []  Greater than 90%  []  81-90% []  71-80% []  Less than or equal to 70%  or unable to perform []  Unable due to Respiratory Distress   Dyspnea re [x]  Patient Baseline []  No SOB []  No SOB []  SOB on exertion []  SOB min activity []  At rest/acute   e FEV% Predicted       [x]  NA []  Above 69%  []  Unable []  Above 60-69%  []  Unable []  Above 50-59%  []  Unable []  Above 35-49%  []  Unable []  Less than 35%  []  Unable                 []  Hyperinflation Assessment  Score 1 2 3   CXR and Breath Sounds   []  Clear []  No atelectasis  Basilar aeration []  Atelectasis or absent basilar breath sounds   Incentive Spirometry Volume  (Per IBW)   []  Greater than or equal to 15ml/Kg []  less than 15ml/Kg []  less than 15ml/Kg   Surgery within last 2 weeks []  None or general   []  Abdominal or thoracic surgery  []  Abdominal or thoracic   Chronic Pulmonary Historyre []  No []  Yes []  Yes     []  Secretion Management Assessment  Score 1 2 3   Bilateral Breath Sounds   []  Occasional Rhonchi []  Scattered Rhonchi []  Course Rhonchi and/or poor aeration   Sputum    []  Small amount of thin secretions []  Moderate amount of viscous secretions []  Copius, Viscious Yellow/ Secretions   CXR as reported by physician []  clear  []  Unavailable []  Infiltrates and/or consolidation  []  Unavailable []  Mucus Plugging and or lobar consolidation  []  Unavailable   Cough []  Strong, productive cough []  Weak productive cough []  No cough or weak non-productive cough   Torsten Milian  11:12 AM                            FEMALE                                  MALE                            FEV1 Predicted Normal Values                        FEV1 Predicted Normal Values          Age                                     Height in Feet and Inches       Age                                     Height in Feet and Inches       4' 11\" 5' 1\" 5' 3\" 5' 5\" 5' 7\" 5' 9\" 5' 11\" 6' 1\"  4' 11\" 5' 1\" 5' 3\" 5' 5\" 5' 7\" 5' 9\" 5' 11\" 6' 1\"   42 - 45 2.49 2.66 2.84 3.03 3.22 3.42 3.62 3.83 42 - 45 2.82 3.03 3.26 3.49 3.72 3.96 4.22 4.47   46 - 49 2.40 2.57 2.76 2.94 3.14 3.33 3.54 3.75 46 - 49 2.70 2.92 3.14 3.37 3.61 3.85 4.10 4.36   50 - 53 2.31 2.48 2.66 2.85 3.04 3.24 3.45 3.66 50 - 53 2.58 2.80 3.02 3.25 3.49 3.73 3.98 4.24   54 - 57 2.21 2.38 2.57 2.75 2.95 3.14 3.35 3.56 54 - 57 2.46 2.67 2.89 3.12 3.36 3.60 3.85 4.11   58 - 61 2.10 2.28 2.46 2.65 2.84 3.04 3.24 3.45 58 - 61 2.32 2.54 2.76 2.99 3.23 3.47 3.72 3.98   62 - 65 1.99 2.17 2.35 2.54 2.73 2.93 3.13 3.34 62 - 65 2.19 2.40 2.62 2.85 3.09 3.33 3.58 3.84   66 - 69 1.88 2.05 2.23 2.42 2.61 2.81 3.02 3.23 66 - 69 2.04 2.26 2.48 2.71 2.95 3.19 3.44 3.70   70+ 1.82 1.99 2.17 2.36 2.55 2.75 2.95 3.16 70+ 1.97 2.19 2.41 2.64 2.87 3.12 3.37 3.62             Predicted Peak Expiratory Flow Rate                                       Height (in)  Female       Height (in) Male           Age 30 48 70 19 48 77 78 74 Age            20 721 431 644 792 749 168 306 687  02 15 89 34 68 70 72 74 76   25 337 352 366 381 396 411 426 441 25 447 476 [phone] [phone]   30 329 344 359 374 389 404 419 434 30 437 466 494 523 [phone] 667   35 322 337 351 366 381 396 411 426 35 426 455 484 512 541 [phone]   40 314 329 344 359 374 389 404 419 40 416 445 473 502 531 559 [phone]   45 307 322 336 351 366 381 396 411 45 405 434 463 491 520 549 [phone]   50 299 314 329 344 359 [phone] 50

## 2020-03-08 NOTE — PROGRESS NOTES
Brett Verma [facility]    Progress Note    3/8/2020    8:23 AM    Name:   Evin Nath  MRN:     [id number]     Acct:      [de-identified]   Room:   [address] [place] [address] Day:  1  Admit Date:  3/7/2020 11:42 AM    PCP:   JOANIE Burton CNP  Code Status:  DNR-CCA    Subjective:     C/C: Chest pain    Interval History Status: improved. Patient has remained pain-free since her admission to the hospital.  Continues on heparin drip. Denies any shortness of breath, nausea or vomiting, fevers or chills. Brief History: This is a 44-year-old white female with history of coronary disease and COPD that presents with chest pain that awoke her from sleep around 1 AM on the day of presentation. She presented to [facility], ER was evaluated with elevation of troponin and findings concerning for non-STEMI. She was placed on a heparin drip and transferred here. She had taken sublingual nitroglycerin at home prior to arrival without improvement in her pain. She reports the pain resolved in the emergency room without further intervention required. Review of Systems:     Constitutional:  negative for chills, fevers, sweats  Respiratory:  negative for cough, dyspnea on exertion, positive for chronic shortness of breath, wheezing  Cardiovascular:  negative for chest pain, chest pressure/discomfort, lower extremity edema, palpitations  Gastrointestinal:  negative for abdominal pain, constipation, diarrhea, nausea, vomiting  Neurological:  negative for dizziness, headache    Medications: Allergies:     Allergies   Allergen Reactions    Belladonna        Current Meds:   Scheduled Meds:    enoxaparin  1 mg/kg Subcutaneous BID    aspirin  324 mg Oral Once    aspirin  325 mg Oral Daily    atorvastatin  40 mg Oral Nightly    heparin (porcine)  60 Units/kg Intravenous Once    metoprolol tartrate  25 mg Oral BID    sodium chloride flush  10 mL Intravenous hours.    I/O (24Hr): Intake/Output Summary (Last 24 hours) at 3/8/2020 0823  Last data filed at 3/8/2020 3567  Gross per 24 hour   Intake 945 ml   Output 650 ml   Net 295 ml       Labs:  Hematology:  Recent Labs     03/07/20  0434 03/07/20  1222 03/08/20  0418   WBC 28.3* 23.8* 25.6*   RBC 4.64 4.48 3.79*   HGB 13.6 13.2 11.6*   HCT 44.1 42.8 36.8   MCV 95.0 95.5 97.1   MCH 29.3 29.5 30.6   MCHC 30.8 30.8 31.5   RDW 14.1 14.0 14.1    240 191   MPV 11.2 10.8 10.9   INR 0.9  --   --      Chemistry:  Recent Labs     03/07/20 0434 03/07/20  0650 03/07/20  0710 03/07/20  1222 03/07/20  1543 03/08/20  0418     --   --   --   --  134*   K 4.3  --   --   --   --  4.6   CL 99  --   --   --   --  100   CO2 25  --   --   --   --  21   GLUCOSE 207*  --   --   --   --  177*   BUN 34*  --   --   --   --  37*   CREATININE 1.69*  --   --   --   --  1.70*   MG  --   --   --   --   --  2.0   ANIONGAP 13  --   --   --   --  13   LABGLOM 28*  --   --   --   --  28*   GFRAA 34*  --   --   --   --  34*   CALCIUM 9.0  --   --   --   --  8.4*   PROBNP 3,609*  --   --   --   --  9,068*   TROPHS 277*  --  302* 303* 321*  --    LACTACIDWB  --  NOT REPORTED  --   --   --   --      Recent Labs     03/08/20 0418   PROT 5.1*   LABALBU 2.4*   AST 29   ALT 18   ALKPHOS 66   BILITOT 0.28*   CHOL 122   HDL 63   LDLCHOLESTEROL 42   CHOLHDLRATIO 1.9   TRIG 87   VLDL NOT REPORTED     ABG:  Lab Results   Component Value Date    FIO2 NOT REPORTED 02/26/2020     Lab Results   Component Value Date/Time    SPECIAL L WRITST  20ML 03/07/2020 07:10 AM     Lab Results   Component Value Date/Time    CULTURE NO GROWTH 1 DAY 03/07/2020 07:10 AM       Radiology:  Marizol Sauers Chest Standard (2 Vw)    Result Date: 3/7/2020  No evidence for acute cardiopulmonary pathology. COPD. Xr Chest Standard (2 Vw)    Result Date: 3/2/2020  No acute findings.        Physical Examination:        General appearance:  alert, cooperative and no distress  Mental Status: oriented to person, place and time and normal affect  Lungs: Rhonchi with expiratory wheezing bilaterally, normal effort, no crackles  Heart:  regular rate and rhythm, no murmur  Abdomen:  soft, nontender, nondistended, normal bowel sounds, no masses, hepatomegaly, splenomegaly  Extremities:  no edema, redness, tenderness in the calves  Skin:  no gross lesions, rashes, induration    Assessment:        Hospital Problems           Last Modified POA    * (Principal) NSTEMI (non-ST elevated myocardial infarction) ([facility] 3/7/2020 Yes    CKD (chronic kidney disease) stage 4, GFR 15-29 ml/min (HCC) (Chronic) 3/7/2020 Yes    Overview Addendum 1/24/2018 12:26 PM by Kim Ferguson MD     Baseline creatinine 1.8 - 2         COPD without exacerbation ([facility] 3/7/2020 Yes    Chronic diastolic heart failure ([facility] 3/7/2020 Yes    Essential hypertension 3/7/2020 Yes    Hyperlipidemia 3/7/2020 Yes    Drug-induced hyperglycemia 3/7/2020 Yes    Overview Signed 3/7/2020  3:36 PM by Fabi Khan DO     Recently completed course of prednisone for COPD exacerbation               Plan:        1. Continue Imdur as ordered, await cardiology input  2. Heparin drip  3. Await echocardiogram  4. Continue aerosols  5. BNP is elevated further this morning, patient asymptomatic. Continue adjustment of her home Lasix dose pending her clinical course  6. Activity as tolerated  7. Cardiac diet  8. Monitor and control blood pressure  9. Aerosols as ordered  10. Trend labs  11. Monitor renal function, avoid nephrotoxic agents  12. Statin therapy as ordered  13.  Aspirin as ordered    Fabi Khan DO  3/8/2020  8:23 AM

## 2020-03-08 NOTE — FLOWSHEET NOTE
Assessment:  Patient is a 80 y.o. female who is in the hospital due to NSTEMI. Patient was welcoming, and friendly. Patient was struggling to understand . Patient was sitting up in bed reading, and was awake and alert. .    Intervention:   was ministry of presence.  provided spiritual and emotional support. Outcome:  Patient expressed gratitude to . Plan:  Chaplains will remain available for spiritual and emotional support as needed. 03/07/20 2018   Encounter Summary   Services provided to: Patient   Referral/Consult From: [address] [place] [address] Road Visiting   (3/7/2020)   Complexity of Encounter Low   Length of Encounter 15 minutes   Spiritual Assessment Completed Yes   Routine   Type Initial   Assessment Calm; Approachable   Intervention Active listening;Sustaining presence/ Ministry of presence   Outcome Expressed gratitude

## 2020-03-08 NOTE — PROGRESS NOTES
Pseudogout of right shoulder, Renal artery stenosis ([address] and Shingles. has a past surgical history that includes Cholecystectomy; Coronary angioplasty with stent; Spine surgery; and Cardiac catheterization (02/2017). Treatment Diagnosis: NSTEMI       Restrictions  Restrictions/Precautions  Restrictions/Precautions: Fall Risk, Cardiac  Required Braces or Orthoses?: No  Position Activity Restriction  Other position/activity restrictions: up with assist     Subjective   General  Patient assessed for rehabilitation services?: Yes  Family / Caregiver Present: No  Diagnosis: NSTEMI  General Comment  Comments: RN ok'd for therapy this morning.  Pt agreeable to participate in session and cooperative/pleasant throughout   Patient Currently in Pain: Denies    Oxygen Therapy  O2 Device: None (Room air)  O2 Flow Rate (L/min): 2 L/min    Social/Functional History  Social/Functional History  Lives With: Family(7 people )  Type of Home: House  Home Layout: One level  Home Access: Stairs to enter without rails  Entrance Stairs - Number of Steps: 1  Bathroom Shower/Tub: Tub/Shower unit  Bathroom Toilet: Standard  Home Equipment: Rolling walker, Cane(pt reported no use of DME At baseline )  ADL Assistance: [facility]: Needs assistance  Homemaking Responsibilities: No(pt reported daughter and granddaughter complete IADLs)  Ambulation Assistance: Independent  Transfer Assistance: Independent  Active : No  Patient's  Info: family drives   Leisure & Hobbies: anna, baking   Additional Comments: pt reported family able to assist PRN      Objective   Vision: Impaired  Vision Exceptions: Wears glasses for reading  Hearing: Exceptions to [facility]  Hearing Exceptions: Hard of hearing/hearing concerns    Orientation  Overall Orientation Status: Within Functional Limits     Balance  Sitting Balance: Modified independent (~8 minutes on EOB )  Standing Balance: Stand by assistance  Standing Balance  Time: ~5 complete functional tasks      Therapy Time   Individual Concurrent Group Co-treatment   Time In 0914         Time Out 0934         Minutes 20         Timed Code Treatment Minutes: [facility], OTR/L

## 2020-03-08 NOTE — PROGRESS NOTES
Pharmacy Note     Renal Dose Adjustment    Linda Liriano is a 80 y.o. female. Pharmacist assessment of renally cleared medications. Recent Labs     03/07/20  0434 03/08/20  0418   BUN 34* 37*       Recent Labs     03/07/20  0434 03/08/20  0418   CREATININE 1.69* 1.70*       Estimated Creatinine Clearance: 17 mL/min (A) (based on SCr of 1.7 mg/dL (H)). Height:   Ht Readings from Last 1 Encounters:   03/07/20 5' 2\" (1.575 m)     Weight:  Wt Readings from Last 1 Encounters:   03/08/20 117 lb 11.6 oz (53.4 kg)       The following medication dose has been adjusted based upon renal function per P&T Guidelines:             Lovenox 1mg/kg daily.     Thanks,   Nicolas Yanez, PharmD  3/8/2020  8:23 AM

## 2020-03-08 NOTE — PROGRESS NOTES
Physical Therapy    Facility/Department: [facility] 4B STEPDOWN  Initial Assessment    NAME: Dari Harris  : 1928  MRN: [id number]    Date of Service: 3/8/2020  Dari Harris is a 80 y.o. Non-/non  female who presents with No chief complaint on file. and is admitted to the hospital for the management of NSTEMI (non-ST elevated myocardial infarction) ([facility]    This is a 51-year-old white female with history of coronary disease with prior stents that was recently treated for COPD exacerbation. She presents at this time with onset of chest pain around 1 AM that woke her from sleep. The pain was located in the lower sternal area without radiation, diaphoresis, nausea or worsening of her shortness of breath. She has chronic shortness of breath at baseline due to her COPD and chronic heart failure which is unchanged. She initially thought the pain was dyspepsia but did not improve and subsequently took a sublingual nitroglycerin at home. Again the pain did not improve and she went to the emergency room and shortly thereafter her pain resolved while in the emergency department without further treatment. She is found to have elevated troponins concerning for non-STEMI and was placed on a heparin drip and transferred to [place] [address]. She is currently seen in room and she remains pain-free without other associated symptoms. Discharge Recommendations:  Continue to assess pending progress   PT Equipment Recommendations  Equipment Needed: No    Assessment   Body structures, Functions, Activity limitations: Decreased functional mobility ; Decreased endurance;Decreased balance  Specific instructions for Next Treatment: Try cane  Prognosis: Good  Decision Making: Medium Complexity  PT Education: PT Role;Plan of Care  Barriers to Learning: hard of hearing  REQUIRES PT FOLLOW UP: Yes  Activity Tolerance  Activity Tolerance: Patient limited by endurance; Patient limited by fatigue One level  Home Access: Stairs to enter without rails  Entrance Stairs - Number of Steps: 1  Bathroom Shower/Tub: Tub/Shower unit  Bathroom Toilet: Standard  Home Equipment: Rolling walker, Cane(pt reported no use of DME At baseline )  ADL Assistance: [address]: Needs assistance  Homemaking Responsibilities: No(pt reported daughter and granddaughter complete IADLs)  Ambulation Assistance: Independent  Transfer Assistance: Independent  Active : No  Patient's  Info: family drives   Leisure & Hobbies: anna, baking   Additional Comments: pt reported family able to assist PRN   Cognition   Cognition  Overall Cognitive Status: WFL    Objective          AROM RLE (degrees)  RLE AROM: WFL  AROM LLE (degrees)  LLE AROM : WFL  AROM RUE (degrees)  RUE AROM : WFL  AROM LUE (degrees)  LUE AROM : WFL  Strength RLE  Strength RLE: WFL  Strength LLE  Strength LLE: WFL  Strength RUE  Strength RUE: WFL  Strength LUE  Strength LUE: WFL  Motor Control  Gross Motor?: WFL  Sensation  Overall Sensation Status: WFL  Bed mobility  Supine to Sit: Stand by assistance  Sit to Supine: Stand by assistance  Scooting: Stand by assistance  Transfers  Sit to Stand: Contact guard assistance  Stand to sit: Contact guard assistance  Ambulation  Ambulation?: Yes  More Ambulation?: Yes  Ambulation 1  Surface: level tile  Device: Rolling Walker  Assistance: Stand by assistance  Gait Deviations: None  Distance: 150 ft  Ambulation 2  Surface - 2: level tile  Device 2: No device  Assistance 2: Contact guard assistance  Quality of Gait 2: Slower mark with decreased step length. No arm swing.   Distance: 150 ft  Stairs/Curb  Stairs?: No     Balance  Posture: Good  Sitting - Static: Good  Sitting - Dynamic: Good  Standing - Static: Good;-  Standing - Dynamic: Fair;+        Plan   Plan  Times per week: 5x/week  Specific instructions for Next Treatment: Try cane  Current Treatment Recommendations: Functional Mobility Training, Transfer Training, Gait Training, Safety Education & Training, Endurance Training, Balance Training  Safety Devices  Type of devices: Gait belt, Left in bed, Bed alarm in place, Call light within reach    AM-PAC Score     AM-PAC Inpatient Mobility without Stair Climbing Raw Score : 17 (03/08/20 1127)  AM-PAC Inpatient without Stair Climbing T-Scale Score : 48.47 (03/08/20 1127)  Mobility Inpatient CMS 0-100% Score: 32.72 (03/08/20 1127)  Mobility Inpatient without Stair CMS G-Code Modifier : Preston Megan (03/08/20 1127)       Goals  Short term goals  Time Frame for Short term goals: 10 visits  Short term goal 1: Independent bed mobility  Short term goal 2: Independent transfers  Short term goal 3:  Independent ambulation with least restrictive device 300 ft  Short term goal 4: pt to tolerate 30 minutes of activity to improve endurance  Short term goal 5: Improve standing dynamic balance to Good  Patient Goals   Patient goals : Go home soon       Therapy Time   Individual Concurrent Group Co-treatment   Time In 0910         Time Out 0930         Minutes 20         Timed Code Treatment Minutes: [address], PT

## 2020-03-09 NOTE — PROGRESS NOTES
Occupational Therapy  Facility/Department: [facility] 4B STEPDOWN  Daily Treatment Note  NAME: Chelsie Bundy  : 1928  MRN: [id number]    Date of Service: 3/9/2020    Discharge Recommendations: Further therapy recommended at discharge. Pt would benefit from a toilet seat with handle bars. Assessment   Performance deficits / Impairments: Decreased functional mobility ; Decreased safe awareness;Decreased balance;Decreased ADL status; Decreased high-level IADLs;Decreased endurance  Prognosis: Good  OT Education: OT Role;Plan of Care;Transfer Training  Patient Education: purpose of OT; importance of utilizing RW; proper hand placement  Barriers to Learning: pt demo F carry over and understanding  REQUIRES OT FOLLOW UP: Yes  Activity Tolerance  Activity Tolerance: Patient Tolerated treatment well  Safety Devices  Safety Devices in place: Yes  Type of devices: Gait belt;Patient at risk for falls;Call light within reach; Chair alarm in place         Patient Diagnosis(es): There were no encounter diagnoses. has a past medical history of Acute cystitis without hematuria, Acute gout involving toe of right foot, Acute renal failure superimposed on stage 4 chronic kidney disease ([address] Arthritis, Asthma, Atrial fibrillation with rapid ventricular response ([address] CAD (coronary artery disease), CHF (congestive heart failure) ([address] Chronic kidney disease, COPD (chronic obstructive pulmonary disease) ([address] COPD with acute exacerbation ([address] Examination of participant in clinical trial, Gout, Gout, H/O cardiovascular stress test, H/O echocardiogram, Hemarthrosis involving shoulder region, right, Hx of transesophageal echocardiography (KRISTOPHER) for monitoring, Hyperlipidemia, Hypertension, MI (myocardial infarction) ([address] Pseudogout of right shoulder, Renal artery stenosis ([address] and Shingles.    has a past surgical history that includes Cholecystectomy; Coronary angioplasty with stent; Spine surgery; and Cardiac catheterization (02/2017).     Restrictions  Restrictions/Precautions  Restrictions/Precautions: Fall Risk, Cardiac  Required Braces or Orthoses?: No  Position Activity Restriction  Other position/activity restrictions: up with assist  Subjective   General  Patient assessed for rehabilitation services?: Yes  Family / Caregiver Present: Yes pt dtr  Diagnosis: NSTEMI  General Comment  Comments: RN and pt agreeable to therapy  Pain Assessment  Pain Assessment: 0-10  Pain Level: 0  Vital Signs  Patient Currently in Pain: No  Oxygen Therapy  SpO2: 94 %  O2 Device: Nasal cannula   Orientation  Orientation  Overall Orientation Status: Impaired  Orientation Level: Disoriented to situation  Objective    ADL  Grooming: Stand by assistance;Setup(oral care)  LE Dressing: Setup;Contact guard assistance(to doff/don socks, pt demo F hip flexion)  Additional Comments: ADLs completed seated at EOB  Balance  Sitting Balance: Supervision(1 LOB noted during LBD, pt able to self correct)  Standing Balance: Contact guard assistance  Standing Balance  Time: pt tolerated approx 3-4 min  Activity: during mobility  Comment: without AD  Functional Mobility  Functional - Mobility Device: No device  Activity: To/from bathroom  Assist Level: Contact guard assistance  Functional Mobility Comments: Pt noted not utilizing walker at residence, functional mobility attempted without RW increased unsteadiness noted  Toilet Transfers  Toilet - Technique: Ambulating  Equipment Used: Standard toilet(with rails)  Toilet Transfer: Contact guard assistance  Toilet Transfers Comments: pt would benefit from a toilet seat with rails at residence  Bed mobility  Supine to Sit: Stand by assistance  Scooting: Stand by assistance  Transfers  Stand Step Transfers: Contact guard assistance  Sit to stand: Contact guard assistance  Stand to sit: Contact guard assistance  Transfer Comments: without AD  Cognition  Overall Cognitive Status: Exceptions  Safety Judgement: Decreased awareness of need for safety;Decreased awareness of need for assistance  Insights: Decreased awareness of deficits    Pt pleasant and cooperative throughout session. ADL tasks of grooming and dressing completed see above for LOF. Decreased balance noted throughout session. At session end pt seated at EOB with chair alarm on and call light in reach.    Plan   Plan  Times per week: 3-4x/wk   Cont POC    Goals  Short term goals  Time Frame for Short term goals: pt will, by discharge  Short term goal 1: complete LB ADLs and toileting tasks with SBA and set up  Short term goal 2: complete UB ADLs and grooming tasks with mod I and set up  Short term goal 3: increase activity  tolerance to 25+ minutes in order to participate in daily tasks  Short term goal 4: dem supervision during functional transfers/functional mobility with LRD, as needed  Short term goal 5: dem ~8 minutes dynamic standing tolerance with supervision and LRD in order to complete functional tasks        Therapy Time   Individual Concurrent Group Co-treatment   Time In 1514         Time Out 1533         Minutes 19         Timed Code Treatment Minutes: [address], LEYVA/L

## 2020-03-09 NOTE — PROGRESS NOTES
INR 0.9     Echo:  Results for orders placed or performed during the hospital encounter of 02/02/18   ECHO Complete 2D W Doppler W Color  Summary  Technically difficult study. Tachycardia noted during study. Normal LV size and wall thickness. No obvious wall motion abnormality noted. Normal LV systolic function. EF > 55%. Normal RV size and function. Normal size LA and RA. No obvious significant structural valvular abnormality noted. Anterior Echo free space due to adipose tissue,      LAST CATH:   Results for orders placed or performed during the hospital encounter of 02/14/17   Cardiac Catheterization  Patent LCX and PDA stents   Diffuse disease in OM, LAD and PL branches   LV function: Lower normal by KRISTOPHER   Successful cardioversion      Recommendations      Medical treatments       Objective:   Vitals: BP (!) 150/67   Pulse 55   Temp 98.1 °F (36.7 °C) (Oral)   Resp 16   Ht 5' 2\" (1.575 m)   Wt 118 lb 6.2 oz (53.7 kg)   SpO2 91%   BMI 21.65 kg/m²   General appearance: alert and cooperative with exam, thnin   HEENT: Head: Normocephalic, no lesions, without obvious abnormality.   Neck: no JVD, trachea midline, no adenopathy  Lungs: Clear to auscultation  Heart: Regular rate and rhythm, s1/s2 auscultated, no murmurs  Abdomen: soft, non-tender, bowel sounds active  Extremities: no edema  Neurologic: not done        Assessment / Acute Cardiac Problems:   - NSTEMI type 1  - Chest pain- resolved  - Leukocytosis  - COPD  - HTN  - Parox AFib  - CKD  - CAD s/p PCI in past    Patient Active Problem List:     Asthma     Status post coronary artery stent placement     CKD (chronic kidney disease) stage 4, GFR 15-29 ml/min (HCC)     Renovascular hypertension     COPD without exacerbation (HCC)     Hypertension     Atrial fibrillation (HCC)     Panlobular emphysema (HCC)     Acute respiratory failure with hypoxia (HCC)     COPD with acute exacerbation (HCC)     Coronary artery disease involving native coronary

## 2020-03-09 NOTE — PROGRESS NOTES
daily    isosorbide mononitrate  30 mg Oral Daily    aspirin  324 mg Oral Once    atorvastatin  40 mg Oral Nightly    metoprolol tartrate  25 mg Oral BID    sodium chloride flush  10 mL Intravenous 2 times per day    fluticasone  2 puff Inhalation BID    dilTIAZem  120 mg Oral Daily    furosemide  20 mg Oral Every Other Day    ferrous sulfate  325 mg Oral Daily with breakfast     Continuous Infusions:   PRN Meds: calcium carbonate, nitroGLYCERIN, acetaminophen **OR** acetaminophen, magnesium hydroxide, promethazine **OR** ondansetron, sodium chloride flush, albuterol, benzonatate, hydrALAZINE, metoprolol    Data:     Past Medical History:   has a past medical history of Acute cystitis without hematuria, Acute gout involving toe of right foot, Acute renal failure superimposed on stage 4 chronic kidney disease (HCC), Arthritis, Asthma, Atrial fibrillation with rapid ventricular response ([address] CAD (coronary artery disease), CHF (congestive heart failure) ([address] Chronic kidney disease, COPD (chronic obstructive pulmonary disease) ([address] COPD with acute exacerbation ([address] Examination of participant in clinical trial, Gout, Gout, H/O cardiovascular stress test, H/O echocardiogram, Hemarthrosis involving shoulder region, right, Hx of transesophageal echocardiography (KRISTOPHER) for monitoring, Hyperlipidemia, Hypertension, MI (myocardial infarction) ([address] Pseudogout of right shoulder, Renal artery stenosis ([address] and Shingles. Social History:   reports that she has quit smoking. Her smoking use included cigarettes. She has a 90.00 pack-year smoking history. She has never used smokeless tobacco. She reports that she does not drink alcohol or use drugs.      Family History:   Family History   Problem Relation Age of Onset    Heart Disease Mother     Cancer Father        Vitals:  BP (!) 150/67   Pulse 55   Temp 98.1 °F (36.7 °C) (Oral)   Resp 16   Ht 5' 2\" (1.575 m)   Wt 118 lb 6.2 oz (53.7 kg)   SpO2 91% BMI 21.65 kg/m²   Temp (24hrs), Av.7 °F (36.5 °C), Min:97.2 °F (36.2 °C), Max:98.1 °F (36.7 °C)    Recent Labs     20  1133   POCGLU 280*       I/O (24Hr):     Intake/Output Summary (Last 24 hours) at 3/9/2020 1445  Last data filed at 3/9/2020 0800  Gross per 24 hour   Intake 1241 ml   Output --   Net 1241 ml       Labs:  Hematology:  Recent Labs     20  0434 20  1222 20  0418 20  0606   WBC 28.3* 23.8* 25.6* 20.6*   RBC 4.64 4.48 3.79* 3.73*   HGB 13.6 13.2 11.6* 11.2*   HCT 44.1 42.8 36.8 36.3   MCV 95.0 95.5 97.1 97.3   MCH 29.3 29.5 30.6 30.0   MCHC 30.8 30.8 31.5 30.9   RDW 14.1 14.0 14.1 14.3    240 191 228   MPV 11.2 10.8 10.9 11.4   INR 0.9  --   --   --      Chemistry:  Recent Labs     20  0434 20  0650 20  0710 20  1222 20  1543 20  0418 20  0606     --   --   --   --  134* 138   K 4.3  --   --   --   --  4.6 4.5   CL 99  --   --   --   --  100 103   CO2 25  --   --   --   --  21 23   GLUCOSE 207*  --   --   --   --  177* 140*   BUN 34*  --   --   --   --  37* 48*   CREATININE 1.69*  --   --   --   --  1.70* 2.06*   MG  --   --   --   --   --  2.0  --    ANIONGAP 13  --   --   --   --  13 12   LABGLOM 28*  --   --   --   --  28* 23*   GFRAA 34*  --   --   --   --  34* 27*   CALCIUM 9.0  --   --   --   --  8.4* 8.3*   PROBNP 3,609*  --   --   --   --  9,068*  --    TROPHS 277*  --  302* 303* 321*  --   --    LACTACIDWB  --  NOT REPORTED  --   --   --   --   --      Recent Labs     20  0418 20  1133   PROT 5.1*  --    LABALBU 2.4*  --    AST 29  --    ALT 18  --    ALKPHOS 66  --    BILITOT 0.28*  --    CHOL 122  --    HDL 63  --    LDLCHOLESTEROL 42  --    CHOLHDLRATIO 1.9  --    TRIG 87  --    VLDL NOT REPORTED  --    POCGLU  --  280*     ABG:  Lab Results   Component Value Date    FIO2 NOT REPORTED 2020     Lab Results   Component Value Date/Time    SPECIAL NOT REPORTED 2020 05:21 AM     Lab Results   Component Value Date/Time    CULTURE NO GROWTH 03/08/2020 05:21 AM       Radiology:  Nascimento Re Chest Standard (2 Vw)    Result Date: 3/7/2020  No evidence for acute cardiopulmonary pathology. COPD. Xr Chest Standard (2 Vw)    Result Date: 3/2/2020  No acute findings. Physical Examination:        General appearance:  alert, cooperative and no distress  Mental Status:  oriented to person, place and time and normal affect  Lungs: Rhonchi with expiratory wheezing bilaterally, normal effort, no crackles  Heart:  regular rate and rhythm, no murmur  Abdomen:  soft, nontender, nondistended, normal bowel sounds, no masses, hepatomegaly, splenomegaly  Extremities:  no edema, redness, tenderness in the calves  Skin:  no gross lesions, rashes, induration    Assessment:        Hospital Problems           Last Modified POA    * (Principal) NSTEMI (non-ST elevated myocardial infarction) ([facility] 3/7/2020 Yes    CKD (chronic kidney disease) stage 4, GFR 15-29 ml/min (HCC) (Chronic) 3/7/2020 Yes    Overview Addendum 1/24/2018 12:26 PM by Barbara Moore MD     Baseline creatinine 1.8 - 2         COPD without exacerbation ([facility] 3/7/2020 Yes    Chronic diastolic heart failure ([facility] 3/7/2020 Yes    Essential hypertension 3/7/2020 Yes    Hyperlipidemia 3/7/2020 Yes    Drug-induced hyperglycemia 3/7/2020 Yes    Overview Signed 3/7/2020  3:36 PM by Deepali Alberto DO     Recently completed course of prednisone for COPD exacerbation               Plan:      1. Appreciate cardiology input, they have ordered echocardiogram and if preserved LV function patient can be discharged home  2. Continue Lovenox and resume home medications on discharge  3. Renal function is close to her baseline even though her creatinine is higher than yesterday  4. Continue aerosols as needed  5. Monitor white blood cell count, cultures so far negative, clinically no evidence of infection  6. Add Symbicort  7.  Discharge planning depends on echocardiogram and cardiology recommendations    Fabrizio Mahajan MD  3/9/2020  2:45 PM

## 2020-03-09 NOTE — PROGRESS NOTES
Shingles. has a past surgical history that includes Cholecystectomy; Coronary angioplasty with stent; Spine surgery; and Cardiac catheterization (02/2017). Restrictions  Restrictions/Precautions  Restrictions/Precautions: Fall Risk, Cardiac  Required Braces or Orthoses?: No  Position Activity Restriction  Other position/activity restrictions: up with assist   Subjective   General  Chart Reviewed: Yes  Family / Caregiver Present: Yes(daughter)  Subjective  Subjective: RN and pt in agreement to PT this date. Pt supine in bed upon arrival. Pt needing encouragement for participation. General Comment  Comments: Pt hard of hearing. Pain Screening  Patient Currently in Pain: Denies  Vital Signs  Patient Currently in Pain: Denies       Orientation  Orientation  Overall Orientation Status: Within Functional Limits  Cognition      Objective   Bed mobility  Supine to Sit: Stand by assistance  Sit to Supine: Stand by assistance  Scooting: Stand by assistance  Transfers  Sit to Stand: Contact guard assistance  Stand to sit: Contact guard assistance  Comment: Gait belt donned for safety. Ambulation  Ambulation?: Yes  More Ambulation?: Yes  Ambulation 1  Surface: level tile  Device: Rolling Walker  Assistance: Contact guard assistance  Gait Deviations: Slow Macy  Distance: 200 ft   Comments: Pt reports feeling to unsteady to walk without AD. Pt displays intermittent unsteadiness. Pt displays increased fatigue with ambulation. Requiring one standing rest break. Stairs/Curb  Stairs?: No     Balance  Posture: Good  Sitting - Static: Good  Sitting - Dynamic: Good  Standing - Static: Good  Standing - Dynamic: Fair;+  Comments: Neutral TERESA with eyes closed 1' no LOB; perturbations no LOB; Unable to complete narrow TERESA.  anterior/lateral stepping BLE x10 with CGA  Exercises  Comments: Seated LE exercise program: Long Arc Quads, hip abduction/adduction, heel/toe raises, and marches x15        Goals  Short term goals  Time

## 2020-03-09 NOTE — PROGRESS NOTES
Smoking Cessation - topics covered   []  Health Risks  []  Benefits of Quitting   []  Smoking Cessation  []  Patient has no history of tobacco use per note in significant history. [x]  Patient is former smoker per note in significant history. [x]  No need for tobacco cessation education. []  Booklet given  []  Patient verbalizes understanding. []  Patient denies need for tobacco cessation education. []  Unable to meet with patient today. Will follow up as able.   Manda Josét  8:31 AM

## 2020-03-10 NOTE — PROGRESS NOTES
failure (HCC)     Shortness of breath     NSTEMI (non-ST elevated myocardial infarction) ([facility]     Essential hypertension     Hyperlipidemia     Drug-induced hyperglycemia      Plan of Treatment:   1. NSTEMI. CP free. Awaiting ECHO results. Continue ASA, statin, imdur and BB. Pt requesting conservative therapy now. 2. HTN. Elevated. CCB, BB, imdur. Will increase hydralazine    3. Bradycardia. Will decrease CCb. Hydralazine will also help. 4. If pt refusing ECHO, ok to d/c   5. If ECHO preserved, will d/c home and follow up in 2 weeks.       Electronically signed by JOANIE Odom CNP on 3/10/2020 at 10:55 [address] [facility].  [phone]

## 2020-03-10 NOTE — PROGRESS NOTES
Patients heart rate has been in the 40's. Monitor showed irregular rhythm, EKG done, showed A-FIB, notified cardiology. Patient remains asymptomatic.  Electronically signed by Elías Sawyer RN on 3/10/2020 at 2:53 PM

## 2020-03-10 NOTE — PLAN OF CARE
Problem: Falls - Risk of:  Goal: Will remain free from falls  Description: Will remain free from falls  Outcome: Ongoing  Goal: Absence of physical injury  Description: Absence of physical injury  Outcome: Ongoing     Problem: Safety:  Goal: Free from accidental physical injury  Description: Free from accidental physical injury  Outcome: Ongoing  Goal: Free from intentional harm  Description: Free from intentional harm  Outcome: Ongoing     Problem: Daily Care:  Goal: Daily care needs are met  Description: Daily care needs are met  Outcome: Ongoing     Problem: Pain:  Goal: Patient's pain/discomfort is manageable  Description: Patient's pain/discomfort is manageable  Outcome: Ongoing     Problem: Skin Integrity:  Goal: Skin integrity will stabilize  Description: Skin integrity will stabilize  Outcome: Ongoing

## 2020-03-10 NOTE — PLAN OF CARE
Problem: Falls - Risk of:  Goal: Will remain free from falls  Description: Will remain free from falls  3/10/2020 1837 by Symone Gonzalez RN  Outcome: Met This Shift  3/10/2020 0554 by Garrick Mcneil RN  Outcome: Ongoing  Goal: Absence of physical injury  Description: Absence of physical injury  3/10/2020 1837 by Symone Gonzalez RN  Outcome: Met This Shift  3/10/2020 0554 by Garrick Mcneil RN  Outcome: Ongoing    Patient remained free of falls during shift. Call light within reach, bed side table within reach, bed in lowest poistion, wheels locked. Continue to monitor.

## 2020-03-10 NOTE — PROGRESS NOTES
Patient /79, recheck 163/72. Nurse saw in [facility], lopressor and hydralazine ordered as PRN medications for high BP. Hydralazine order states to give only if lopressor does not work. Patient heart rate 58. Nurse paged NP to verify. NP recommended to give IV hydralazine. Nurse will administer hydralazine.      Electronically signed by Krista Hebert RN on 3/10/2020 at 12:15 AM

## 2020-03-10 NOTE — PROGRESS NOTES
kidney disease ([facility] 9/29/2016    Arthritis     Asthma     Atrial fibrillation with rapid ventricular response ([facility] 2/17/2017    CAD (coronary artery disease)     [facility]congest[facility]) [facility]     Chronic kidney disease     COPD (chronic obstructive pulmonary disease) ([facility]     COPD with acute exacerbation ([facility] 3/24/2017    Examination of participant in clinical trial 5/20/13    6 year follow up, estimated completion JUN 2019    Gout 12/28/2017    Gout     H/O cardiovascular stress test 02/14/2017    H/O echocardiogram 02/14/2017    EF 55%. Mildly increased wall thickness of the LV. Evidence of diastolic dysfunction. Normal right ventricular size and function. Normal tricuspid valve structure and function. Mild tricuspid regurg    Hemarthrosis involving shoulder region, right 7/30/2018    Hx of transesophageal echocardiography (KRISTOPHER) for monitoring 02/20/2017    No clots  were visulaized in the left atrial appendage EF 55%.  Hyperlipidemia     Hypertension     MI (myocardial infarction) ([facility]     2001    Pseudogout of right shoulder 7/30/2018    Renal artery stenosis (HCC) 6/22/2016    Left proximal 75%    Shingles     15 years ago      Allergies: Allergies   Allergen Reactions    Belladonna       Medications :  hydrALAZINE, 25 mg, Oral, 2 times per day  budesonide-formoterol, 2 puff, Inhalation, BID  enoxaparin, 1 mg/kg, Subcutaneous, Daily  aspirin, 81 mg, Oral, Daily  albuterol, 2.5 mg, Nebulization, 4x daily  isosorbide mononitrate, 30 mg, Oral, Daily  aspirin, 324 mg, Oral, Once  atorvastatin, 40 mg, Oral, Nightly  metoprolol tartrate, 25 mg, Oral, BID  sodium chloride flush, 10 mL, Intravenous, 2 times per day  dilTIAZem, 120 mg, Oral, Daily  furosemide, 20 mg, Oral, Every Other Day  ferrous sulfate, 325 mg, Oral, Daily with breakfast        Review of Systems   Review of Systems   Constitutional: Positive for activity change and fatigue.  Negative for appetite change, fever Right Sinus: No maxillary sinus tenderness or frontal sinus tenderness. Left Sinus: No maxillary sinus tenderness or frontal sinus tenderness. Mouth/Throat:      Pharynx: No oropharyngeal exudate. Eyes:      General: No scleral icterus. Conjunctiva/sclera: Conjunctivae normal.      Pupils: Pupils are equal, round, and reactive to light. Neck:      Musculoskeletal: Full passive range of motion without pain and neck supple. Thyroid: No thyromegaly. Vascular: No JVD. Cardiovascular:      Rate and Rhythm: Bradycardia present. Rhythm irregular. Pulses:           Dorsalis pedis pulses are 2+ on the right side and 2+ on the left side. Heart sounds: Normal heart sounds. No murmur. Pulmonary:      Effort: Tachypnea and accessory muscle usage present. Breath sounds: Normal breath sounds. No wheezing or rales. Abdominal:      Palpations: Abdomen is soft. There is no mass. Tenderness: There is no abdominal tenderness. Lymphadenopathy:      Head:      Right side of head: No submandibular adenopathy. Left side of head: No submandibular adenopathy. Cervical: No cervical adenopathy. Skin:     General: Skin is warm. Neurological:      Mental Status: She is alert and oriented to person, place, and time. Motor: No tremor. Psychiatric:         Behavior: Behavior is cooperative.            Laboratory findings:    Recent Labs     03/08/20  0418 03/09/20  0606 03/10/20  0612   WBC 25.6* 20.6* 16.1*   HGB 11.6* 11.2* 11.3*   HCT 36.8 36.3 35.7*    228 262     Recent Labs     03/08/20  0418 03/09/20  0606 03/10/20  0612   * 138 139   K 4.6 4.5 4.1    103 104   CO2 21 23 22   GLUCOSE 177* 140* 119*   BUN 37* 48* 50*   CREATININE 1.70* 2.06* 2.03*   MG 2.0  --   --    CALCIUM 8.4* 8.3* 8.8     Recent Labs     03/08/20 0418   PROT 5.1*   LABALBU 2.4*   AST 29   ALT 18   ALKPHOS 66   BILITOT 0.28*   CHOL 122   HDL 63   LDLCHOLESTEROL 42

## 2020-03-11 NOTE — PROGRESS NOTES
CLINICAL PHARMACY NOTE: MEDS TO [address] Select Patient?: Yes  Total # of Prescriptions Filled: 3   The following medications were delivered to the patient:  · Hydralazine 50 mg tb  · Aspirin adult low dose 81 mg tab (otc)  · Isosorbide mono. Er 30 mg tab  Total # of Interventions Completed: 1  Time Spent (min): 60    Additional Documentation:Medications delivered to the room. Family said they did not want Symbicort inh., Metoprolol Tart 25 mg, Iron 325 mg and tums they said she has those medications at home already.

## 2020-03-11 NOTE — DISCHARGE SUMMARY
oxygen. 3. COPD without exacerbation -Symbicort added . Continue bronchodilators  4. Chronic diastolic CHF-compensated  5. Acute respiratory failure with hypoxia-continue oxygen support. 6. Paroxysmal atrial fibrillation with slow ventricular response. Hold Cardizem. Significant Diagnostic Studies:   Labs / Micro:/Radiology  Recent Labs     03/11/20  0601 03/10/20  0612 03/09/20  0606 03/08/20  0418   WBC  --  16.1* 20.6* 25.6*   HGB  --  11.3* 11.2* 11.6*   HCT  --  35.7* 36.3 36.8   MCV  --  97.0 97.3 97.1    262 228 191     Labs Renal Latest Ref Rng & Units 3/11/2020 3/10/2020 3/9/2020 3/8/2020 3/7/2020   BUN 8 - 23 mg/dL 43(H) 50(H) 48(H) 37(H) 34(H)   Cr 0.50 - 0.90 mg/dL 1.80(H) 2.03(H) 2.06(H) 1.70(H) 1.69(H)   K 3.7 - 5.3 mmol/L 4.1 4.1 4.5 4.6 4.3   Na 135 - 144 mmol/L 142 139 138 134(L) 137     Lab Results   Component Value Date    ALT 18 03/08/2020    AST 29 03/08/2020    ALKPHOS 66 03/08/2020    BILITOT 0.28 (L) 03/08/2020     Lab Results   Component Value Date    TSH 1.31 10/24/2016     No results found for: HAV, HEPAIGM, HEPBIGM, HEPBCAB, HBEAG, HEPCAB  Lab Results   Component Value Date    COLORU YELLOW 03/08/2020    NITRU NEGATIVE 03/08/2020    GLUCOSEU NEGATIVE 03/08/2020    KETUA NEGATIVE 03/08/2020    UROBILINOGEN Normal 03/08/2020    BILIRUBINUR NEGATIVE 03/08/2020     Lab Results   Component Value Date    LABA1C 6.3 (H) 04/11/2019     Lab Results   Component Value Date     04/11/2019     Lab Results   Component Value Date    INR 0.9 03/07/2020    INR 1.0 11/17/2019    INR 1.0 07/28/2018    PROTIME 9.4 (L) 03/07/2020    PROTIME 10.2 11/17/2019    PROTIME 10.7 07/28/2018       Xr Chest Standard (2 Vw)    Result Date: 3/7/2020  No evidence for acute cardiopulmonary pathology. COPD. Xr Chest Portable    Result Date: 3/8/2020  Small bibasilar effusions and chronic pulmonary change.             Consultations:    Consults:     Final Specialist Recommendations/Findings:   SEVEN furosemide 20 MG tablet  Commonly known as:  LASIX  take 1 tablet by mouth EVERY 48 HOURS  What changed:  when to take this     simvastatin 20 MG tablet  Commonly known as:  ZOCOR  take 1 tablet by mouth every evening  What changed:  See the new instructions. * This list has 2 medication(s) that are the same as other medications prescribed for you. Read the directions carefully, and ask your doctor or other care provider to review them with you. CONTINUE taking these medications    * albuterol sulfate  (90 Base) MCG/ACT inhaler  Commonly known as:  ProAir HFA  inhale 2 puffs by mouth every 6 hours if needed for wheezing  Notes to patient:  Last dose given today 3/11/20 at 3:25 pm     * albuterol (2.5 MG/3ML) 0.083% nebulizer solution  Commonly known as:  PROVENTIL  Take 3 mLs by nebulization every 4 hours as needed for Wheezing  Notes to patient:  Last dose given today 3/11/20 at 3:25 pm     Eliquis 2.5 MG Tabs tablet  Generic drug:  apixaban     nitroGLYCERIN 0.4 MG SL tablet  Commonly known as:  NITROSTAT  up to max of 3 total doses. If no relief after 1 dose, call 911. * This list has 2 medication(s) that are the same as other medications prescribed for you. Read the directions carefully, and ask your doctor or other care provider to review them with you.             STOP taking these medications    beclomethasone 80 MCG/ACT inhaler  Commonly known as:  Qvar     benzonatate 100 MG capsule  Commonly known as:  TESSALON     dilTIAZem 120 MG tablet  Commonly known as:  CARDIZEM           Where to Get Your Medications      These medications were sent to [address] [place] [address]  [place] [facility] [address]    Phone:  [phone]   · albuterol (2.5 MG/3ML) 0.083% nebulizer solution  · nitroGLYCERIN 0.4 MG SL tablet     These medications were sent to [address] [place] [address]    Phone:  [phone]   · aspirin 81 MG EC tablet  · budesonide-formoterol 160-4.5 MCG/ACT Aero  · ferrous sulfate 325 (65 Fe) MG EC tablet  · hydrALAZINE 50 MG tablet  · isosorbide mononitrate 30 MG extended release tablet  · metoprolol tartrate 25 MG tablet     You can get these medications from any pharmacy    You don't need a prescription for these medications  · calcium carbonate 500 MG chewable tablet         Time Spent on discharge is  35 mins in patient examination, evaluation, counseling as well as medication reconciliation, prescriptions for required medications, discharge plan and follow up. Electronically signed by   Arianne Witt MD  3/11/2020        Thank you JOANIE Jo - TRA for the opportunity to be involved in this patient's care.

## 2020-03-11 NOTE — PROGRESS NOTES
Discharge paperwork discussed with patient and daughter (POA) at bedside, voiced understanding and signed. Patient wheeled down to front to awaiting car.  Electronically signed by Mane Whiting RN on 3/11/2020 at 4:42 PM

## 2020-03-11 NOTE — PLAN OF CARE
Problem: Falls - Risk of:  Goal: Will remain free from falls  Description: Will remain free from falls  Outcome: Met This Shift  Goal: Absence of physical injury  Description: Absence of physical injury  Outcome: Met This Shift     Problem: Safety:  Goal: Free from accidental physical injury  Description: Free from accidental physical injury  Outcome: Met This Shift  Goal: Free from intentional harm  Description: Free from intentional harm  Outcome: Met This Shift     Problem: Daily Care:  Goal: Daily care needs are met  Description: Daily care needs are met  Outcome: Met This Shift     Problem: Pain:  Goal: Patient's pain/discomfort is manageable  Description: Patient's pain/discomfort is manageable  Outcome: Met This Shift     Problem: Skin Integrity:  Goal: Skin integrity will stabilize  Description: Skin integrity will stabilize  Outcome: Met This Shift

## 2020-03-11 NOTE — PROGRESS NOTES
infarction) ([facility] [address] Pseudogout of right shoulder, Renal artery stenosis ([facility] [address] and Shingles. has a past surgical history that includes Cholecystectomy; Coronary angioplasty with stent; Spine surgery; and Cardiac catheterization (02/2017). Restrictions  Restrictions/Precautions  Restrictions/Precautions: Fall Risk, Cardiac  Required Braces or Orthoses?: No  Position Activity Restriction  Other position/activity restrictions: up with assist  Subjective   General  Chart Reviewed: Yes  Response To Previous Treatment: Patient with no complaints from previous session. Family / Caregiver Present: Yes(daughter)  Subjective  Subjective: RN and pt in agreement to PT. Pt supine in bed upon arrival. Pt reports no pain at this time. General Comment  Comments: Pt retired to recliner post tx. Call light within reach, chair alarm in place;RN notified. Pain Screening  Patient Currently in Pain: Denies  Vital Signs  Patient Currently in Pain: Denies       Orientation  Orientation  Overall Orientation Status: Within Functional Limits  Cognition      Objective   Bed mobility  Supine to Sit: Stand by assistance  Sit to Supine: Stand by assistance  Scooting: Stand by assistance  Transfers  Sit to Stand: Contact guard assistance  Stand to sit: Contact guard assistance  Comment: Gait belt donned for safety. RW used. Ambulation  Ambulation?: Yes  More Ambulation?: No  Ambulation 1  Surface: level tile  Device: Rolling Walker  Assistance: Stand by assistance  Gait Deviations: Slow Macy  Distance: 300 ft   Comments: Pt requests use of RW, reporting she feels too unsteady to walk without AD. Balance  Posture: Good  Sitting - Static: Good  Sitting - Dynamic: Good  Standing - Static: Good  Standing - Dynamic: Fair;+  Comments: Anterior/lateral stepping x10 BLE requiring HHA of writer. Exercises  Comments: Seated LE exercise program: Long Arc Quads, hip abduction/adduction, heel/toe raises, and marches.  x15   anterior/lateral stepping x10 BLE HHA from writer. Goals  Short term goals  Time Frame for Short term goals: 10 visits  Short term goal 1: Independent bed mobility  Short term goal 2: Independent transfers  Short term goal 3: Independent ambulation with least restrictive device 300 ft  Short term goal 4: pt to tolerate 30 minutes of activity to improve endurance  Short term goal 5: Improve standing dynamic balance to Good  Patient Goals   Patient goals : Go home soon    Plan    Plan  Times per week: 5x/week  Specific instructions for Next Treatment:   Current Treatment Recommendations: Functional Mobility Training, Transfer Training, Gait Training, Safety Education & Training, Endurance Training, Balance Training  Safety Devices  Type of devices: Gait belt, Call light within reach, All fall risk precautions in place, Patient at risk for falls, Nurse notified, Left in chair, Chair alarm in place  Restraints  Initially in place: No     Therapy Time   Individual Concurrent Group Co-treatment   Time In 1136         Time Out 1200         Minutes 24         Timed Code Treatment Minutes: 24 Minutes       Laymond Coupe SPTA  Treatment performed by Student PTA under the supervision of co-signing PTA who agrees with all treatment and documentation.    Dorian June PTA

## 2020-03-11 NOTE — PLAN OF CARE
PROVIDE ADEQUATE OXYGENATION WITH ACCEPTABLE SP02/ABG'S    [x]  IDENTIFY APPROPRIATE OXYGEN THERAPY  [x]   MONITOR SP02/ABG'S AS NEEDED   [x]   PATIENT EDUCATION AS NEEDED   BRONCHOSPASM/BRONCHOCONSTRICTION     [x]         IMPROVE AERATION/BREATH SOUNDS  [x]   ADMINISTER BRONCHODILATOR THERAPY AS APPROPRIATE  [x]   ASSESS BREATH SOUNDS  []   IMPLEMENT AEROSOL/MDI PROTOCOL  [x]   PATIENT EDUCATION AS NEEDED   [place] Xiong Assessment complete. NSTEMI (non-ST elevated myocardial infarction) ([facility] [address] [I21.4] . Vitals:    03/11/20 0624   BP: (!) 139/55   Pulse:    Resp:    Temp:    SpO2:    . Patients home meds are   Prior to Admission medications    Medication Sig Start Date End Date Taking?  Authorizing Provider   albuterol (PROVENTIL) (2.5 MG/3ML) 0.083% nebulizer solution Take 3 mLs by nebulization every 4 hours as needed for Wheezing 3/9/20   Marti Pappas MD   simvastatin (ZOCOR) 20 MG tablet take 1 tablet by mouth every evening  Patient taking differently: Take 20 mg by mouth nightly  1/16/20   JOANIE Becker CNP   benzonatate (TESSALON) 100 MG capsule Take 100 mg by mouth 3 times daily as needed for Cough    Historical Provider, MD   furosemide (LASIX) 20 MG tablet take 1 tablet by mouth EVERY 48 HOURS  Patient taking differently: Take 20 mg by mouth every other day  10/25/19 3/7/20  Dmitri Candelario MD   FEROSUL 325 (65 Fe) MG tablet take 1 tablet by mouth once daily  Patient taking differently: Take 325 mg by mouth daily (with breakfast)  10/10/19   JOANIE Becker CNP   beclomethasone (QVAR) 80 MCG/ACT inhaler Inhale 1 puff into the lungs 2 times daily 8/22/19   JOANIE Downs CNP   albuterol sulfate HFA (PROAIR HFA) 108 (90 Base) MCG/ACT inhaler inhale 2 puffs by mouth every 6 hours if needed for wheezing 8/22/19   JOANIE Downs CNP   diltiazem (CARDIZEM) 120 MG tablet Take 120 mg by mouth daily     Historical Provider, MD   ELIQUKARTIK 2.5 MG TABS tablet Take 2.5 mg by mouth 2 times daily  7/13/18   Historical Provider, MD   nitroGLYCERIN (NITROSTAT) 0.4 MG SL tablet up to max of 3 total doses.  If no relief after 1 dose, call 911. 2/6/18   Linda Sorensen MD   .      Assessment   Pt does not wear O2 at home  Pt does not wear a cpap at home  Pt takes at home meds  Pt gets SOB on exertion       RR:16    Breath Sounds: expiratory wheezing, diminished      Bronchodilator assessment at level  3  Hyperinflation assessment at level   Secretion Management assessment at level      [x]    Bronchodilator Assessment  BRONCHODILATOR ASSESSMENT SCORE  Score 0 1 2 3 4 5   Breath Sounds   []  Patient Baseline []  No Wheeze good aeration []  Faint, scattered wheezing, good aeration [x]  Expiratory Wheezing and or moderately diminished []  Insp/Exp wheeze and/or very diminished []  Insp/Exp and/ or marked distress   Respiratory Rate   []  Patient Baseline [x]  Less than 20 [x]  Less than 20 []  20-25 []  Greater than 25 []  Greater than 25   Peak flow % of Pred or PB [x]  NA   []  Greater than 90%  []  81-90% []  71-80% []  Less than or equal to 70%  or unable to perform []  Unable due to Respiratory Distress   Dyspnea re []  Patient Baseline []  No SOB []  No SOB [x]  SOB on exertion []  SOB min activity []  At rest/acute   e FEV% Predicted       [x]  NA []  Above 69%  []  Unable []  Above 60-69%  []  Unable []  Above 50-59%  []  Unable []  Above 35-49%  []  Unable []  Less than 35%  []  Unable                 []  Hyperinflation Assessment  Score 1 2 3   CXR and Breath Sounds   []  Clear []  No atelectasis  Basilar aeration []  Atelectasis or absent basilar breath sounds   Incentive Spirometry Volume  (Per IBW)   []  Greater than or equal to 15ml/Kg []  less than 15ml/Kg []  less than 15ml/Kg   Surgery within last 2 weeks []  None or general   []  Abdominal or thoracic surgery  []  Abdominal or thoracic   Chronic Pulmonary Historyre []  No []  Yes []  Yes     []  Secretion Management

## 2020-03-11 NOTE — PROGRESS NOTES
[facility] [place]      Daily Progress Note     Admit Date: 3/7/2020  Bed/Room No.  3020/3020-01  Admitting Physician : Dorothy Agosto MD  [facility]  LOS: 4 days   Chief Complaint:     Chest Pain     Principal Problem:    NSTEMI (non-ST elevated myocardial infarction) ([address]  Active Problems:    CKD (chronic kidney disease) stage 4, GFR 15-29 ml/min (HCC)    COPD without exacerbation (HCC)    Chronic diastolic heart failure ([address]    Essential hypertension    Hyperlipidemia    Drug-induced hyperglycemia  Resolved Problems:    * No resolved hospital problems. *    Subjective : Interval History/Significant events :  03/11/20    Patient reports improvement in breathing. She is on room air. Patient has some wheezing. Denies nausea, vomiting. She is eating reasonable. Patient denies any chest pain  Vitals - Unstable afebrile  Labs -elevated creatinine    Nursing notes , Consults notes reviewed. Overnight events and updates discussed with Nursing staff . Background History: Elmer Elizalde is 80 y.o. female  Who was admitted to the hospital on 3/7/2020 for treatment of NSTEMI (non-ST elevated myocardial infarction) ([facility] [address] Patient presented to emergency with acute onset chest pain and was brought to emergency room at [facility] ER by EMS. Initial evaluation showed elevated troponin. Patient was diagnosed with an STEMI and started on heparin infusion and transferred to this facility for further evaluation. Patient has underlying history of COPD, chronic diastolic CHF, hypertension, hyperlipidemia, paroxysmal atrial fibrillation. Patient was evaluated by cardiology and recommended medical management.     PMH:  Past Medical History:   Diagnosis Date    Acute cystitis without hematuria 9/29/2016    Acute gout involving toe of right foot 4/19/2018    Acute renal failure superimposed on stage 4 chronic kidney disease ([facility] [address] 9/29/2016    Arthritis     Asthma  [facility] with [facility]    CAD (coronary artery disease)     CHF (congestive heart failure) (HCC)     Chronic kidney disease     COPD (chronic obstructive pulmonary disease) ([facility] [address]     COPD with acute exacerbation ([facility] [address] 3/24/2017    Examination of participant in clinical trial 5/20/13    6 year follow up, estimated completion JUN 2019    Gout 12/28/2017    Gout     H/O cardiovascular stress test 02/14/2017    H/O echocardiogram 02/14/2017    EF 55%. Mildly increased wall thickness of the LV. Evidence of diastolic dysfunction. Normal right ventricular size and function. Normal tricuspid valve structure and function. Mild tricuspid regurg    Hemarthrosis involving shoulder region, right 7/30/2018    Hx of transesophageal echocardiography (KRISTOPHER) for monitoring 02/20/2017    No clots  were visulaized in the left atrial appendage EF 55%.  Hyperlipidemia     Hypertension     [facility]     [facility]    Pseudogout of right shoulder 7/30/2018    Renal artery stenosis (HCC) 6/22/2016    Left proximal 75%    Shingles     15 years ago      Allergies: Allergies   Allergen Reactions    Belladonna       Medications :  hydrALAZINE, 50 mg, Oral, 3 times per day  [Held by provider] dilTIAZem, 60 mg, Oral, Daily  budesonide-formoterol, 2 puff, Inhalation, BID  enoxaparin, 1 mg/kg, Subcutaneous, Daily  aspirin, 81 mg, Oral, Daily  albuterol, 2.5 mg, Nebulization, 4x daily  isosorbide mononitrate, 30 mg, Oral, Daily  aspirin, 324 mg, Oral, Once  atorvastatin, 40 mg, Oral, Nightly  metoprolol tartrate, 25 mg, Oral, BID  sodium chloride flush, 10 mL, Intravenous, 2 times per day  furosemide, 20 mg, Oral, Every Other Day  ferrous sulfate, 325 mg, Oral, Daily with breakfast        Review of Systems   Review of Systems   Constitutional: Positive for activity change and fatigue. Negative for appetite change, fever and unexpected weight change.    HENT: is underweight. She is ill-appearing. She is not diaphoretic. Interventions: Face mask in place. Comments: On nonrebreather. HENT:      Head: Normocephalic and atraumatic. Nose:      Right Sinus: No maxillary sinus tenderness or frontal sinus tenderness. Left Sinus: No maxillary sinus tenderness or frontal sinus tenderness. Mouth/Throat:      Pharynx: No oropharyngeal exudate. Eyes:      General: No scleral icterus. Conjunctiva/sclera: Conjunctivae normal.      Pupils: Pupils are equal, round, and reactive to light. Neck:      Musculoskeletal: Full passive range of motion without pain and neck supple. Thyroid: No thyromegaly. Vascular: No JVD. Cardiovascular:      Rate and Rhythm: Rhythm irregular. Pulses:           Dorsalis pedis pulses are 2+ on the right side and 2+ on the left side. Heart sounds: Normal heart sounds. No murmur. Pulmonary:      Effort: No tachypnea or accessory muscle usage. Breath sounds: Examination of the right-upper field reveals wheezing. Examination of the left-lower field reveals decreased breath sounds. Decreased breath sounds and wheezing present. No rales. Abdominal:      Palpations: Abdomen is soft. There is no mass. Tenderness: There is no abdominal tenderness. Lymphadenopathy:      Head:      Right side of head: No submandibular adenopathy. Left side of head: No submandibular adenopathy. Cervical: No cervical adenopathy. Skin:     General: Skin is warm. Neurological:      Mental Status: She is alert and oriented to person, place, and time. Motor: No tremor. Psychiatric:         Behavior: Behavior is cooperative.            Laboratory findings:    Recent Labs     03/09/20  0606 03/10/20  0612 03/11/20  0601   WBC 20.6* 16.1*  --    HGB 11.2* 11.3*  --    HCT 36.3 35.7*  --     262 193     Recent Labs     03/09/20  0606 03/10/20  0612 03/11/20  0601    139 142   K 4.5 4.1 4.1   CL

## 2020-03-11 NOTE — PROGRESS NOTES
hospital encounter of 02/02/18   ECHO Complete 2D W Doppler W Color  Summary  Technically difficult study. Tachycardia noted during study. Normal LV size and wall thickness. No obvious wall motion abnormality noted. Normal LV systolic function. EF > 55%. Normal RV size and function. Normal size LA and RA. No obvious significant structural valvular abnormality noted. Anterior Echo free space due to adipose tissue,      LAST CATH:   Results for orders placed or performed during the hospital encounter of 02/14/17   Cardiac Catheterization  Patent LCX and PDA stents   Diffuse disease in OM, LAD and PL branches   LV function: Lower normal by KRISTOPHER   Successful cardioversion      Recommendations      Medical treatments     ECHO 3/10/20  Summary  Left ventricle is normal in size with normal systolic function globally. Calculated ejection fraction is 53%. Grade II (moderate) left ventricular diastolic dysfunction. Enlarged left atrium. Increased left atrial volume. Aortic sclerosis without stenosis. Trivial aortic insufficiency. Mitral annular calcification. Thickening of mitral valve leaflets without  stenosis. Mild mitral regurgitation  Objective:   Vitals: BP (!) 139/55   Pulse 61   Temp 98.3 °F (36.8 °C) (Oral)   Resp 19   Ht 5' 2\" (1.575 m)   Wt 120 lb 3.2 oz (54.5 kg)   SpO2 99%   BMI 21.98 kg/m²   General appearance: alert and cooperative with exam, thin  HEENT: Head: Normocephalic, no lesions, without obvious abnormality.   Neck: no JVD, trachea midline, no adenopathy  Lungs: Clear to auscultation  Heart: Regular rate and rhythm, s1/s2 auscultated, no murmurs  Abdomen: soft, non-tender, bowel sounds active  Extremities: no edema  Neurologic: not done        Assessment / Acute Cardiac Problems:   - NSTEMI type 1  - Chest pain- resolved  - Leukocytosis  - COPD  - HTN  - Parox AFib  - CKD  - CAD s/p PCI in past    Patient Active Problem List:     Asthma     Status post coronary artery stent

## 2020-03-12 NOTE — CARE COORDINATION
[address] Transitions Initial Follow Up Call    Call within 2 business days of discharge: Yes    Patient: Juani Pearson Patient : 1928   MRN: [id number]  Reason for Admission: BPCI-A Medical Bundle Patient:  Working DR - [facility]  Admitting Location: [facility]  Adm Date: 3/7/20         Date of Discharge: 3/11/20  Bundle End Date: 20  Discharge Date: 3/11/20 RARS: Readmission Risk Score: 29      Last Discharge [facility]       Complaint Diagnosis Description Type Department Provider    3/7/20   Admission (Discharged) [address]  [address]. [address] Jillian Valdez MD    3/7/20 Chest Pain NSTEMI (non-ST elevated myocardial infarction) [facility] ED ([facility]) [facility] ED Shira Guerin MD; Peri Rodriguez MD           Spoke with: Yaima Perera, patient's daughter and POA    Facility: [facility]    Non-face-to-face services provided:  Scheduled appointment with [facility] 3/16  Obtained and reviewed discharge summary and/or continuity of care documents     Spoke with patient's daughter who states patient is doing well today. She states she \"acts like nothing happened and wants to go shopping\". She denies any further chest pains, denies shortness of breath, cough, swelling or other cardiac related issues. Patient is getting around well at home. Medications reviewed, daughter assists with medication set up and has all medications in home. She did question the discontinuation of the Q-Juvencio and says she is going to be calling her pulmonologist to inquire. She has her follow up appointment with PCP set for this coming Monday. Expressed that patient is part of a Medicare initiative and she would receive further outreach over the course of 90 days. Daughter is agreeable to further calls.      Care Transitions 24 Hour Call    Schedule Follow Up Appointment with PCP:  Completed  Do you have any ongoing symptoms?:  No  Do you have a copy of your discharge instructions?:  Yes  Do you have all of your prescriptions and are they filled?:

## 2020-03-13 NOTE — ED NOTES
Patient given warm blankets. Patient and daughter deny needs at this time. Patient states she feels like her breathing is better and seems more comfortable. Lights lowered.       Ilsa Hatchet, RN  03/13/20 1963

## 2020-03-13 NOTE — ED PROVIDER NOTES
function. Normal tricuspid valve structure and function. Mild tricuspid regurg    Hemarthrosis involving shoulder region, right 7/30/2018    Hx of transesophageal echocardiography (KRISTOPHER) for monitoring 02/20/2017    No clots  were visulaized in the left atrial appendage EF 55%.  Hyperlipidemia     Hypertension     MI (myocardial infarction) ([facility] [address]     2001    Pseudogout of right shoulder 7/30/2018    Renal artery stenosis ([facility] [address] 6/22/2016    Left proximal 75%    Shingles     15 years ago       SURGICAL HISTORY       Past Surgical History:   Procedure Laterality Date    CARDIAC CATHETERIZATION  02/2017    LMCA: Normal 0% stenosis. LAD: Mid area 50-60% stenosis. LCx: Patent proximal stent 30% stenosis distal to the stent in OM proximal area 50% stenosis. RCA: Minimal 30% mid area PDA stent  is patent <20% stenosis. PL branch diffuse disease.     CHOLECYSTECTOMY      CORONARY ANGIOPLASTY WITH STENT PLACEMENT      SPINE SURGERY         CURRENT MEDICATIONS       Previous Medications    ALBUTEROL (PROVENTIL) (2.5 MG/3ML) 0.083% NEBULIZER SOLUTION    Take 3 mLs by nebulization every 4 hours as needed for Wheezing    ALBUTEROL SULFATE HFA (PROAIR HFA) 108 (90 BASE) MCG/ACT INHALER    inhale 2 puffs by mouth every 6 hours if needed for wheezing    ASPIRIN 81 MG EC TABLET    Take 1 tablet by mouth daily    BUDESONIDE-FORMOTEROL (SYMBICORT) 160-4.5 MCG/ACT AERO    Inhale 2 puffs into the lungs 2 times daily    CALCIUM CARBONATE (TUMS) 500 MG CHEWABLE TABLET    Take 1 tablet by mouth 3 times daily as needed for Heartburn    DILTIAZEM (CARDIZEM CD) 120 MG EXTENDED RELEASE CAPSULE    Take 120 mg by mouth daily    ELIQUIS 2.5 MG TABS TABLET    Take 2.5 mg by mouth 2 times daily     FEROSUL 325 (65 FE) MG TABLET    take 1 tablet by mouth once daily    FERROUS SULFATE (FE TABS 325) 325 (65 FE) MG EC TABLET    Take 1 tablet by mouth daily (with breakfast)    FUROSEMIDE (LASIX) 20 MG TABLET    take 1 tablet by mouth EVERY 48 HOURS    HYDRALAZINE (APRESOLINE) 50 MG TABLET    Take 1 tablet by mouth every 8 hours    ISOSORBIDE MONONITRATE (IMDUR) 30 MG EXTENDED RELEASE TABLET    Take 1 tablet by mouth daily    METOPROLOL TARTRATE (LOPRESSOR) 25 MG TABLET    Take 1 tablet by mouth 2 times daily    NITROGLYCERIN (NITROSTAT) 0.4 MG SL TABLET    up to max of 3 total doses. If no relief after 1 dose, call 911. SIMVASTATIN (ZOCOR) 20 MG TABLET    take 1 tablet by mouth every evening       ALLERGIES     Belladonna    FAMILY HISTORY       Family History   Problem Relation Age of Onset    Heart Disease Mother     Cancer Father         SOCIAL HISTORY       Social History     Socioeconomic History    Marital status:       Spouse name: Not on file    Number of children: Not on file    Years of education: Not on file    Highest education level: Not on file   Occupational History    Not on file   Social Needs    Financial resource strain: Not on file    Food insecurity     Worry: Not on file     Inability: Not on file    Transportation needs     Medical: Not on file     Non-medical: Not on file   Tobacco Use    Smoking status: Former Smoker     Packs/day: 3.00     Years: 30.00     Pack years: 90.00     Types: Cigarettes    Smokeless tobacco: Never Used    Tobacco comment: Quit 40 years ago   Substance and Sexual Activity    Alcohol use: No    Drug use: No    Sexual activity: Not on file   Lifestyle    Physical activity     Days per week: Not on file     Minutes per session: Not on file    Stress: Not on file   Relationships    Social connections     Talks on phone: Not on file     Gets together: Not on file     Attends Nondenominational service: Not on file     Active member of club or organization: Not on file     Attends meetings of clubs or organizations: Not on file     Relationship status: Not on file    Intimate partner violence     Fear of current or ex partner: Not on file     Emotionally abused: Not on file Physically abused: Not on file     Forced sexual activity: Not on file   Other Topics Concern    Not on file   Social History Narrative    Not on file       REVIEW OF SYSTEMS       Review of Systems   Constitutional: Negative for fever. HENT: Negative for congestion. Eyes: Negative for pain. Respiratory: Positive for shortness of breath. Cardiovascular: Negative for chest pain. Gastrointestinal: Negative for abdominal pain. Genitourinary: Negative for dysuria. Skin: Negative for rash. Allergic/Immunologic: Negative for immunocompromised state. Neurological: Negative for headaches. PHYSICAL EXAM    (up to 7 for level 4, 8 or more for level 5)     ED Triage Vitals [03/13/20 0157]   BP Temp Temp Source Pulse Resp SpO2 Height Weight   128/72 98 °F (36.7 °C) Oral 111 20 96 % 5' 2\" (1.575 m) 120 lb (54.4 kg)       Physical Exam  Vitals signs and nursing note reviewed. Constitutional:       General: She is not in acute distress. Appearance: She is well-developed. HENT:      Head: Normocephalic and atraumatic. Mouth/Throat:      Mouth: Mucous membranes are moist.   Eyes:      General:         Right eye: No discharge. Left eye: No discharge. Conjunctiva/sclera: Conjunctivae normal.   Neck:      Musculoskeletal: Neck supple. Cardiovascular:      Rate and Rhythm: Regular rhythm. Tachycardia present. Pulmonary:      Effort: Pulmonary effort is normal. No respiratory distress. Breath sounds: No stridor. Wheezing present. No rhonchi. Abdominal:      General: There is no distension. Palpations: Abdomen is soft. Tenderness: There is no abdominal tenderness. Musculoskeletal:         General: No tenderness. Skin:     General: Skin is warm and dry. Findings: No erythema. Neurological:      Mental Status: She is alert and oriented to person, place, and time.          DIAGNOSTIC RESULTS     RADIOLOGY: (none if blank)   Interpretation per theRadiologist

## 2020-03-13 NOTE — ED NOTES
Eric Cortez from [facility] states that they have all the information they need. State that an RN or  will call or be back out after 0800 to make final decision and make arrangements for home. Patient and daughter deny needs at this time.       Juan Carlos Nguyen RN  03/13/20 [id number]

## 2020-03-25 NOTE — CARE COORDINATION
[facility] Transitions Follow Up Call    3/25/2020    Patient: Evin Nath  Patient : 1928   MRN: [id number]  Reason for Admission: NSTEMI  Discharge Date: 3/13/20 RARS: Readmission Risk Score: 34         Spoke with: Pt's daughter ACCESS [facility] Transitions Subsequent and Final Call    Subsequent and Final Calls  Do you have any ongoing symptoms?:  No  Have your medications changed?:  Yes  Patient Reports:  morphine sulfate 0.25 mls q 4 hrs prn  Do you have any questions related to your medications?:  No  Do you currently have any active services?:  No  Do you have any needs or concerns that I can assist you with?:  No  Identified Barriers:  None  Care Transitions Interventions  Other Interventions:          CTN spoke to pt's daughter Sheng Thomas who stated pt is doing well, denies CP/pressure, palpitations, does have SOB which is chronic and is on 2 LPM supplemental 02. Denies need to increase oxygen litre flow. Stated her appetite is good, elimination patterns normal. Stated morphine is helping her with pain. Staed pt is staying home and practicing COVID-19 precautions. Reviewed CDC recommended COVID-19 precautions with patient. We recommend that you remain diligent about social distancing:    -Stay home except to get medical care  -Separate yourself from other people and animals in your home  -Call ahead before visiting your doctor  -Wear a facemask or stay within 6 feet of others in [facility] your coughs and sneezes  -Avoid sharing personal household items  -Clean hands often  -Sanitize all high touch surfaces daily  -If you feel unwell, call your doctor for advice on what to do  -Monitor your symptoms, call 911 for medical emergency    Patient's daughter verbalized understanding.     Follow Up  Future Appointments   Date Time Provider Olivia Deng   2020  2:45 PM Marti Pappas MD [facility] T[facility]       Archie Ballesteros, RN

## 2020-04-01 NOTE — CARE COORDINATION
[facility] Transitions Follow Up Call    2020    Patient: Nadia Fails  Patient : 1928   MRN: [id number]    Discharge Date: 3/13/20 RARS: Readmission Risk Score: 29         Attempted to contact patient for BPCI-A f/u call. Contact information left to  requesting call back at the earliest convenience. Follow Up  No future appointments.     Payal Nguyen RN

## 2020-06-01 NOTE — TELEPHONE ENCOUNTER
Health Maintenance   Topic Date Due    DTaP/Tdap/Td vaccine (1 - Tdap) 05/16/1947    Shingles Vaccine (1 of 2) 05/16/1978    Pneumococcal 65+ yrs at Risk Vaccine (1 of 2 - PCV13) 05/16/1993    Annual Wellness Visit (AWV)  05/29/2019    Flu vaccine (Season Ended) 09/01/2020    Lipid screen  03/08/2021    Potassium monitoring  03/13/2021    Creatinine monitoring  03/13/2021    Hepatitis A vaccine  Aged Out    Hepatitis B vaccine  Aged Out    Hib vaccine  Aged Out    Meningococcal (ACWY) vaccine  Aged Out             (applicable per patient's age: Cancer Screenings, Depression Screening, Fall Risk Screening, Immunizations)    Hemoglobin A1C (%)   Date Value   04/11/2019 6.3 (H)   03/29/2018 7.1   06/16/2017 6.0 (H)     Microalb/Crt. Ratio (mcg/mg creat)   Date Value   01/23/2018 53 (H)     LDL Cholesterol (mg/dL)   Date Value   03/08/2020 42     AST (U/L)   Date Value   03/13/2020 14     ALT (U/L)   Date Value   03/13/2020 18     BUN (mg/dL)   Date Value   03/13/2020 39 (H)      (goal A1C is < 7)   (goal LDL is <100) need 30-50% reduction from baseline     BP Readings from Last 3 Encounters:   03/13/20 (!) 119/56   03/11/20 (!) 132/53   03/07/20 (!) 163/78    (goal /80)      All Future Testing planned in [facility]:  Lab Frequency Next Occurrence       Next Visit Date:  No future appointments.          Patient Active Problem List:     Asthma     Status post coronary artery stent placement     CKD (chronic kidney disease) stage 4, GFR 15-29 ml/min (HCC)     Renovascular hypertension     COPD without exacerbation (HCC)     Hypertension     Atrial fibrillation (HCC)     Panlobular emphysema (HCC)     Acute respiratory failure with hypoxia ([facility] [address]     COPD with acute exacerbation (HCC)     Coronary artery disease involving native coronary artery of native heart without angina pectoris     Elevated brain natriuretic peptide (BNP) level     Chronic diastolic heart failure (HCC)     Shortness of breath     NSTEMI (non-ST elevated myocardial infarction) ([facility] [address]     Essential hypertension     Hyperlipidemia     Drug-induced hyperglycemia

## 2020-07-19 NOTE — ED PROVIDER NOTES
(KRISTOPHER) for monitoring 02/20/2017    No clots  were visulaized in the left atrial appendage EF 55%.  Hyperlipidemia     Hypertension     MI (myocardial infarction) ([address]     2001    Pseudogout of right shoulder 7/30/2018    Renal artery stenosis (HCC) 6/22/2016    Left proximal 75%    Shingles     15 years ago     Past Surgical History:   Procedure Laterality Date    CARDIAC CATHETERIZATION  02/2017    LMCA: Normal 0% stenosis. LAD: Mid area 50-60% stenosis. LCx: Patent proximal stent 30% stenosis distal to the stent in OM proximal area 50% stenosis. RCA: Minimal 30% mid area PDA stent  is patent <20% stenosis. PL branch diffuse disease.     CHOLECYSTECTOMY      CORONARY ANGIOPLASTY WITH STENT PLACEMENT      SPINE SURGERY        CURRENT MEDICATIONS   Current Outpatient Rx   Medication Sig Dispense Refill    benzonatate (TESSALON) 100 MG capsule Take 100 mg by mouth 3 times daily as needed for Cough      aspirin 81 MG EC tablet Take 1 tablet by mouth daily 30 tablet 3    budesonide-formoterol (SYMBICORT) 160-4.5 MCG/ACT AERO Inhale 2 puffs into the lungs 2 times daily 1 Inhaler 3    ferrous sulfate (FE TABS 325) 325 (65 Fe) MG EC tablet Take 1 tablet by mouth daily (with breakfast) 90 tablet 3    hydrALAZINE (APRESOLINE) 50 MG tablet Take 1 tablet by mouth every 8 hours 90 tablet 3    metoprolol tartrate (LOPRESSOR) 25 MG tablet Take 1 tablet by mouth 2 times daily 60 tablet 3    albuterol (PROVENTIL) (2.5 MG/3ML) 0.083% nebulizer solution Take 3 mLs by nebulization every 4 hours as needed for Wheezing 150 vial 11    simvastatin (ZOCOR) 20 MG tablet take 1 tablet by mouth every evening (Patient taking differently: Take 20 mg by mouth nightly ) 90 tablet 0    albuterol sulfate HFA (PROAIR HFA) 108 (90 Base) MCG/ACT inhaler inhale 2 puffs by mouth every 6 hours if needed for wheezing 1 Inhaler 11    ELIQUIS 2.5 MG TABS tablet Take 2.5 mg by mouth 2 times daily       furosemide (LASIX) 20 MG tablet Take 1 tablet by mouth every other day 15 tablet 3    cephALEXin (KEFLEX) 250 MG capsule Take 1 capsule by mouth 3 times daily 21 capsule 0    dilTIAZem (CARDIZEM CD) 120 MG extended release capsule Take 120 mg by mouth daily      isosorbide mononitrate (IMDUR) 30 MG extended release tablet Take 1 tablet by mouth daily 30 tablet 3    nitroGLYCERIN (NITROSTAT) 0.4 MG SL tablet up to max of 3 total doses. If no relief after 1 dose, call 911. 07 tablet 1      ALLERGIES   Allergies   Allergen Reactions    Belladonna       SOCIAL & FAMILY HISTORY   Social History     Socioeconomic History    Marital status:       Spouse name: None    Number of children: None    Years of education: None    Highest education level: None   Occupational History    None   Social Needs    Financial resource strain: None    Food insecurity     Worry: None     Inability: None    Transportation needs     Medical: None     Non-medical: None   Tobacco Use    Smoking status: Former Smoker     Packs/day: 3.00     Years: 30.00     Pack years: 90.00     Types: Cigarettes    Smokeless tobacco: Never Used    Tobacco comment: Quit 40 years ago   Substance and Sexual Activity    Alcohol use: No    Drug use: No    Sexual activity: None   Lifestyle    Physical activity     Days per week: None     Minutes per session: None    Stress: None   Relationships    Social connections     Talks on phone: None     Gets together: None     Attends Bahai service: None     Active member of club or organization: None     Attends meetings of clubs or organizations: None     Relationship status: None    Intimate partner violence     Fear of current or ex partner: None     Emotionally abused: None     Physically abused: None     Forced sexual activity: None   Other Topics Concern    None   Social History Narrative    None     Family History   Problem Relation Age of Onset    Heart Disease Mother     Cancer Father         PHYSICAL EXAM VITAL SIGNS: BP (!) 162/93   Pulse 83   Temp 98.9 °F (37.2 °C) (Tympanic)   Resp 26   Ht 5' 2\" (1.575 m)   Wt 122 lb (55.3 kg)   SpO2 100%   BMI 22.31 kg/m²    Constitutional: Well developed, well nourished, no acute distress   HENT: Atraumatic, dry mucus membranes  Neck: supple, no JVD   Respiratory: Lungs reveal diminished lung sounds bilaterally with a prolonged expiratory phase, and wheezes, though the wheeze sounds like it's upper airway    Cardiovascular: regular rate, no murmurs  GI: Soft, nontender, normal bowel sounds  Musculoskeletal: 2+  bilateral LE edema, no acute deformities  Integument: Skin is warm and dry, no petechiae   Neurologic: Alert & oriented, normal speech  Psych: Pleasant affect, the patient does not  appear anxious     EKG (Interpreted by me)  sinus tach rhythm at 102 BPM anterior leads with q waves      RADIOLOGY/PROCEDURES   XR CHEST PORTABLE   Final Result   Chronic pulmonary changes. Trace left effusion versus pleuroparenchymal scarring. No consolidation. ED COURSE & MEDICAL DECISION MAKING   Pertinent Labs & Imaging studies reviewed and interpreted. (See chart for details)       Vitals:    07/19/20 1058   BP: (!) 162/93   Pulse: 83   Resp: 26   Temp: 98.9 °F (37.2 °C)   SpO2: 100%     Differential Diagnosis: COPD exacerbation, foreign body aspiration, Congestive Heart Failure, Myocardial Infarction, Pulmonary Embolus, Pneumonia, Pneumothorax, other     Mdm: Patient well-appearing. I had a discussion with her and her daughter about whether she should stay in the hospital or go home. There is some shared decision making because she is 80 and does not want be in the hospital.  I had her off oxygen for several minutes and stand up and she went about as low as 93 and as high as 97%. She does not seem more short of breath. She says she is feeling better. She does have an elevated troponin is not changing I think that is a baseline for her.  she will go home and return if needed. I discussed that I will be here during the daytime and that they are also welcome to come back at night. She will go up on her lasix for a few days also. FINAL IMPRESSION   1. COPD exacerbation ([address]    2. Generalized edema      PLAN  Home with increased daily lasix and return if excessively short of breath.   Electronically signed by: Igor Anderson MD, 7/19/2020 1:20 PM  (This note was completed wth a voice recognition program)           Igor Anderson MD  07/19/20 [id number] Krystian Canchola MD  07/19/20 7265

## 2020-07-19 NOTE — ED NOTES
Bed: 02  Expected date:   Expected time:   Means of arrival:   Comments:  EMS       Mis Baez RN  07/19/20 1051

## 2020-07-22 NOTE — TELEPHONE ENCOUNTER
exacerbation ([facility] [address]     Coronary artery disease involving native coronary artery of native heart without angina pectoris     Elevated brain natriuretic peptide (BNP) level     Chronic diastolic heart failure (HCC)     Shortness of breath     NSTEMI (non-ST elevated myocardial infarction) ([facility] [address]     Essential hypertension     Hyperlipidemia     Drug-induced hyperglycemia

## 2020-07-25 PROBLEM — I50.33 ACUTE ON CHRONIC DIASTOLIC (CONGESTIVE) HEART FAILURE (HCC): Status: ACTIVE | Noted: 2020-01-01

## 2020-07-25 PROBLEM — I48.91 ATRIAL FIBRILLATION WITH RVR (HCC): Status: ACTIVE | Noted: 2020-01-01

## 2020-07-25 PROBLEM — J18.9 PNEUMONIA OF LEFT LOWER LOBE DUE TO INFECTIOUS ORGANISM: Status: ACTIVE | Noted: 2020-01-01

## 2020-07-25 NOTE — ED NOTES
Per Lexicomp compatability chart Azithromycin and Diltiazem are compatible to run together     Flory Wheeler RN  07/25/20 5201

## 2020-07-25 NOTE — ED PROVIDER NOTES
[facility] ED  EMERGENCY DEPARTMENT ENCOUNTER      Pt Name:Geovanna Abel  MRN: [id number]  [place] 5/16/1928  Date of evaluation: 7/25/2020  Provider: Omkar Plaumbo MD    [address] [place] [address]     Chief Complaint   Patient presents with    Shortness of Breath     onset yesterday-seen here in ER last night and told to double lasix-not feeling any better--denies chest pain    Cough     non-productive         HISTORY OF PRESENT ILLNESS   (Location/Symptom, Timing/Onset, Context/Setting, Quality, Duration, Modifying Factors, Severity)  Note limiting factors. HPI the patient is a 80-year-old female who presents with shortness of breath. She was in the emergency department last night. She was treated and released. Patient states that she was breathing much better at that time she was released. She denies having any pain. No nausea or vomiting. Last night's labs WBC 29.9 hemoglobin 11.9 neutrophils 79 and lymphs 9. Venous blood gas pH is 7.281, PCO2 is 55.7, PO2 37.3, bicarb 25.6. Electrolytes sodium 133, potassium 4.5, chloride 101 and bicarb 22. Creatinine is 1.8 and BUN is 39. Nursing Notes were reviewed. REVIEW OF SYSTEMS    (2-9 systems for level 4, 10 or more for level 5)     Review of Systems   Constitutional: Positive for activity change. Negative for fever. HENT: Positive for congestion. Negative for trouble swallowing. Respiratory: Positive for cough, shortness of breath and wheezing. Cardiovascular: Negative for chest pain, palpitations and leg swelling. Gastrointestinal: Negative for abdominal distention, abdominal pain, constipation, diarrhea, nausea and vomiting. Genitourinary: Negative for dysuria. Musculoskeletal: Negative for neck pain and neck stiffness. Skin: Negative for color change. Neurological: Negative for dizziness, light-headedness and headaches. Psychiatric/Behavioral: Negative for confusion, decreased concentration and dysphoric mood. MG/3ML) 0.083% NEBULIZER SOLUTION    Take 3 mLs by nebulization every 4 hours as needed for Wheezing    ALBUTEROL SULFATE HFA (PROAIR HFA) 108 (90 BASE) MCG/ACT INHALER    inhale 2 puffs by mouth every 6 hours if needed for wheezing    ASPIRIN 81 MG EC TABLET    Take 1 tablet by mouth daily    BENZONATATE (TESSALON) 100 MG CAPSULE    Take 100 mg by mouth 3 times daily as needed for Cough    BUDESONIDE-FORMOTEROL (SYMBICORT) 160-4.5 MCG/ACT AERO    Inhale 2 puffs into the lungs 2 times daily    ELIQUIS 2.5 MG TABS TABLET    Take 2.5 mg by mouth 2 times daily     FERROUS SULFATE (FE TABS 325) 325 (65 FE) MG EC TABLET    Take 1 tablet by mouth daily (with breakfast)    FUROSEMIDE (LASIX) 20 MG TABLET    Take 1 tablet by mouth every other day    HYDRALAZINE (APRESOLINE) 50 MG TABLET    Take 1 tablet by mouth every 8 hours    IBUPROFEN (ADVIL;MOTRIN) 200 MG TABLET    Take 200 mg by mouth every 6 hours as needed for Pain    METOPROLOL TARTRATE (LOPRESSOR) 25 MG TABLET    take 1/2 tablet by mouth twice a day    NITROGLYCERIN (NITROSTAT) 0.4 MG SL TABLET    up to max of 3 total doses. If no relief after 1 dose, call 911. SIMVASTATIN (ZOCOR) 20 MG TABLET    take 1 tablet by mouth every evening       ALLERGIES     [facility]    FAMILY HISTORY       Family History   Problem Relation Age of Onset    Heart Disease Mother     Cancer Father           SOCIAL HISTORY       Social History     Socioeconomic History    Marital status:       Spouse name: None    Number of children: None    Years of education: None    Highest education level: None   Occupational History    None   Social Needs    Financial resource strain: None    Food insecurity     Worry: None     Inability: None    Transportation needs     Medical: None     Non-medical: None   Tobacco Use    Smoking status: Former Smoker     Packs/day: 3.00     Years: 30.00     Pack years: 90.00     Types: Cigarettes    Smokeless tobacco: Never Used    Tobacco Musculoskeletal: Normal range of motion. General: No swelling or tenderness. Skin:     General: Skin is warm and dry. Neurological:      General: No focal deficit present. Mental Status: She is alert and oriented to person, place, and time. Mental status is at baseline. Psychiatric:         Mood and Affect: Mood normal.         Behavior: Behavior normal.         Thought Content: Thought content normal.         Judgment: Judgment normal.         DIAGNOSTIC RESULTS     EKG: All EKG's are interpreted by the Emergency Department Physician whoeither signs or Co-signs this chart in the absence of a cardiologist.      RADIOLOGY:   Non-plain film images such as CT, Ultrasound and MRI are read by the radiologist. Plain radiographic images are visualized and preliminarily interpreted by the emergency physician     Interpretation per the Radiologist below, if available at the time of this note:    XR CHEST (2 VW)   Final Result   1. Suboptimal inspiration for the exam.   2. Mild opacity medial lower left lung typical of subsegmental atelectasis,   scarring or pneumonitis. 3. Chronic interstitial changes.                ED BEDSIDE ULTRASOUND:   Performed by ED Physician - none    LABS:  Labs Reviewed   BLOOD GAS, ARTERIAL - Abnormal; Notable for the following components:       Result Value    pH, Arterial 7.460 (*)     pCO2, Arterial 26.6 (*)     pO2, Arterial 111.3 (*)     HCO3, Arterial 18.5 (*)     Negative Base Excess, Art 3.6 (*)     O2 Sat, Arterial 98.4 (*)     All other components within normal limits   TROPONIN - Abnormal; Notable for the following components:    Troponin, High Sensitivity 87 (*)     All other components within normal limits   BRAIN NATRIURETIC PEPTIDE - Abnormal; Notable for the following components:    Pro-BNP 8,556 (*)     All other components within normal limits   TROPONIN - Abnormal; Notable for the following components:    Troponin, High Sensitivity 88 (*)     All other components within normal limits   CULTURE, BLOOD 1       EMERGENCY DEPARTMENT COURSE and DIFFERENTIAL DIAGNOSIS/MDM:   Vitals:    Vitals:    07/25/20 1715 07/25/20 1755 07/25/20 1800 07/25/20 1810   BP: (!) 113/48 (!) 81/44 (!) 94/52 (!) 104/53   Pulse: 142 134 125 132   Resp:       Temp:       TempSrc:       SpO2: 98% 98% 98% 97%   Weight:       Height:               MDM I had a discussion with the patient and her daughter. They do not want the patient intubated but they do want testing done. She probably should be changed to DNR CC arrest.    CONSULTS:  None    PROCEDURES:  Unless otherwise noted below, none     Procedures    FINAL IMPRESSION      1. COPD exacerbation ([facility] [address]    2. Atrial fibrillation with RVR ([facility] [address]    3. Pneumonia of left lower lobe due to infectious organism [facility])          DISPOSITION/PLAN   DISPOSITION Decision To Transfer 07/25/2020 06:39:53 PM      PATIENT REFERRED TO:  No follow-up provider specified.     DISCHARGE MEDICATIONS:  New Prescriptions    No medications on file              (Please note that portions ofthis note were completed with a voice recognition program.  Efforts were made to edit the dictations but occasionally words are mis-transcribed.)      Mercy Retana MD (electronically signed)  Attending Emergency Physician          Cyndy River MD  07/25/20 5350 [facility] MD Nelda  07/25/20 9270

## 2020-07-25 NOTE — ED NOTES
Bed: 05  Expected date:   Expected time:   Means of arrival:   Comments:  [place]-difficulty breathing     Yin Rudolph  07/25/20 0151

## 2020-07-25 NOTE — ED NOTES
Contacted [facility] requesting hospitalist at [facility]. V's per Dr. Leda Fields request.     ST. AWGNER HOOKER Reser  07/25/20 1607

## 2020-07-25 NOTE — ED PROVIDER NOTES
[facility] ED  eMERGENCY dEPARTMENT eNCOUnter      Pt Name: Jenise Rose  MRN: [id number]  [place] 5/16/1928  Date of evaluation: 7/24/2020  Provider: Ashley Grijalva MD    CHIEF COMPLAINT     Chief Complaint   Patient presents with    Shortness of Breath       HISTORY OF PRESENT ILLNESS    Jenise Rose is a 80 y.o. female who presents to the emergency department for evaluation of shortness of breath. Symptoms started today. She denies any associated pain with this. She denies any changes to her cough. Patient has history of COPD and congestive heart failure. Patient is on Lasix every Monday, Wednesday, and Friday. PAST MEDICAL HISTORY     Past Medical History:   Diagnosis Date    Acute cystitis without hematuria 9/29/2016    Acute gout involving toe of right foot 4/19/2018    Acute renal failure superimposed on stage 4 chronic kidney disease ([address] 9/29/2016    Arthritis     Asthma     Atrial fibrillation with rapid ventricular response ([address] 2/17/2017    CAD (coronary artery disease)     CHF (congestive heart failure) (HCC)     Chronic kidney disease     COPD (chronic obstructive pulmonary disease) ([address]     COPD with acute exacerbation ([address] 3/24/2017    Examination of participant in clinical trial 5/20/13    6 year follow up, estimated completion JUN 2019    Gout 12/28/2017    Gout     H/O cardiovascular stress test 02/14/2017    H/O echocardiogram 02/14/2017    EF 55%. Mildly increased wall thickness of the LV. Evidence of diastolic dysfunction. Normal right ventricular size and function. Normal tricuspid valve structure and function. Mild tricuspid regurg    Hemarthrosis involving shoulder region, right 7/30/2018    Hx of transesophageal echocardiography (KRISTOPHER) for monitoring 02/20/2017    No clots  were visulaized in the left atrial appendage EF 55%.     Hyperlipidemia     Hypertension     MI (myocardial infarction) ([address]     2001    Pseudogout of right shoulder 7/30/2018    Renal artery stenosis (HCC) 6/22/2016    Left proximal 75%    Shingles     15 years ago       SURGICAL HISTORY       Past Surgical History:   Procedure Laterality Date    CARDIAC CATHETERIZATION  02/2017    LMCA: Normal 0% stenosis. LAD: Mid area 50-60% stenosis. LCx: Patent proximal stent 30% stenosis distal to the stent in OM proximal area 50% stenosis. RCA: Minimal 30% mid area PDA stent  is patent <20% stenosis. PL branch diffuse disease.     CHOLECYSTECTOMY      CORONARY ANGIOPLASTY WITH STENT PLACEMENT      SPINE SURGERY         CURRENT MEDICATIONS       Discharge Medication List as of 7/25/2020  2:54 AM      CONTINUE these medications which have NOT CHANGED    Details   ibuprofen (ADVIL;MOTRIN) 200 MG tablet Take 200 mg by mouth every 6 hours as needed for PainHistorical Med      metoprolol tartrate (LOPRESSOR) 25 MG tablet take 1/2 tablet by mouth twice a day, Disp-90 tablet,R-3Normal      benzonatate (TESSALON) 100 MG capsule Take 100 mg by mouth 3 times daily as needed for CoughHistorical Med      furosemide (LASIX) 20 MG tablet Take 1 tablet by mouth every other day, Disp-15 tablet,R-3Normal      aspirin 81 MG EC tablet Take 1 tablet by mouth daily, Disp-30 tablet, R-3Normal      budesonide-formoterol (SYMBICORT) 160-4.5 MCG/ACT AERO Inhale 2 puffs into the lungs 2 times daily, Disp-1 Inhaler, R-3Normal      ferrous sulfate (FE TABS 325) 325 (65 Fe) MG EC tablet Take 1 tablet by mouth daily (with breakfast), Disp-90 tablet, R-3Normal      hydrALAZINE (APRESOLINE) 50 MG tablet Take 1 tablet by mouth every 8 hours, Disp-90 tablet, R-3Normal      isosorbide mononitrate (IMDUR) 30 MG extended release tablet Take 1 tablet by mouth daily, Disp-30 tablet, R-3Normal      albuterol (PROVENTIL) (2.5 MG/3ML) 0.083% nebulizer solution Take 3 mLs by nebulization every 4 hours as needed for Wheezing, Disp-150 vial, R-11Normal      simvastatin (ZOCOR) 20 MG tablet take 1 tablet by mouth every evening, Disp-90 tablet, R-0Normal      albuterol sulfate HFA (PROAIR HFA) 108 (90 Base) MCG/ACT inhaler inhale 2 puffs by mouth every 6 hours if needed for wheezing, Disp-1 Inhaler, R-11Normal      ELIQUIS 2.5 MG TABS tablet Take 2.5 mg by mouth 2 times daily , DAWHistorical Med      nitroGLYCERIN (NITROSTAT) 0.4 MG SL tablet up to max of 3 total doses. If no relief after 1 dose, call 911., Disp-25 tablet, R-1Normal             ALLERGIES     [place]    FAMILY HISTORY       Family History   Problem Relation Age of Onset    Heart Disease Mother     Cancer Father         SOCIAL HISTORY       Social History     Socioeconomic History    Marital status:       Spouse name: None    Number of children: None    Years of education: None    Highest education level: None   Occupational History    None   Social Needs    Financial resource strain: None    Food insecurity     Worry: None     Inability: None    Transportation needs     Medical: None     Non-medical: None   Tobacco Use    Smoking status: Former Smoker     Packs/day: 3.00     Years: 30.00     Pack years: 90.00     Types: Cigarettes    Smokeless tobacco: Never Used    Tobacco comment: Quit 40 years ago   Substance and Sexual Activity    Alcohol use: No    Drug use: No    Sexual activity: None   Lifestyle    Physical activity     Days per week: None     Minutes per session: None    Stress: None   Relationships    Social connections     Talks on phone: None     Gets together: None     Attends Orthodox service: None     Active member of club or organization: None     Attends meetings of clubs or organizations: None     Relationship status: None    Intimate partner violence     Fear of current or ex partner: None     Emotionally abused: None     Physically abused: None     Forced sexual activity: None   Other Topics Concern    None   Social History Narrative    None       REVIEW OF SYSTEMS       Review of Systems   Constitutional: Negative for fever. HENT: Negative for congestion. Eyes: Negative for pain. Respiratory: Positive for shortness of breath. Cardiovascular: Negative for chest pain. Gastrointestinal: Negative for abdominal pain. Genitourinary: Negative for dysuria. Skin: Negative for rash. Allergic/Immunologic: Negative for immunocompromised state. Neurological: Negative for headaches. PHYSICAL EXAM    (up to 7 for level 4, 8 or more for level 5)     ED Triage Vitals [07/24/20 2341]   BP Temp Temp Source Pulse Resp SpO2 Height Weight   (!) 173/82 100.1 °F (37.8 °C) Tympanic 117 24 97 % -- --       Physical Exam  Vitals signs and nursing note reviewed. Constitutional:       General: She is not in acute distress. Appearance: She is well-developed. HENT:      Head: Normocephalic and atraumatic. Eyes:      General:         Right eye: No discharge. Left eye: No discharge. Conjunctiva/sclera: Conjunctivae normal.   Neck:      Musculoskeletal: Neck supple. Cardiovascular:      Rate and Rhythm: Normal rate and regular rhythm. Pulmonary:      Effort: Pulmonary effort is normal. No respiratory distress. Breath sounds: No stridor. Wheezing and rales present. No rhonchi. Abdominal:      General: There is no distension. Palpations: Abdomen is soft. Tenderness: There is no abdominal tenderness. Musculoskeletal:         General: No tenderness. Skin:     General: Skin is warm and dry. Findings: No erythema. Neurological:      Mental Status: She is alert and oriented to person, place, and time. DIAGNOSTIC RESULTS     RADIOLOGY: (none if blank)   Interpretation per theRadiologist below, if available at the time of this note:    XR CHEST PORTABLE   Final Result   Interstitial lung disease. No acute disease.              LABS:  Labs Reviewed   CBC WITH AUTO DIFFERENTIAL - Abnormal; Notable for the following components:       Result Value    WBC 29.9 (*)     Seg Neutrophils 79 (*)     Lymphocytes 9 (*)     Eosinophils % 0 (*)     Segs Absolute 23.62 (*)     Absolute Mono # 3.59 (*)     All other components within normal limits   COMPREHENSIVE METABOLIC PANEL W/ REFLEX TO MG FOR LOW K - Abnormal; Notable for the following components:    Glucose 131 (*)     BUN 39 (*)     CREATININE 1.80 (*)     Bun/Cre Ratio 22 (*)     Sodium 133 (*)     Total Protein 6.1 (*)     GFR Non- 26 (*)     GFR  32 (*)     All other components within normal limits   BRAIN NATRIURETIC PEPTIDE - Abnormal; Notable for the following components:    Pro-BNP 10,552 (*)     All other components within normal limits   CULTURE, URINE   CULTURE, BLOOD 1   URINALYSIS   LACTATE, SEPSIS   PROTIME-INR   LIPASE       All other labs were within normal range or not returned as of this dictation. EMERGENCY DEPARTMENT COURSE and Medical Decision Making: On evaluation, patient is in no distress. Patient is 97% oxygen saturation on room air. Patient is slightly tachycardic. Sepsis order set was initiated. CBC does show leukocytosis of 29.9. Patient just finished course of prednisone today. Lactic acid is normal.  proBNP is up to 10,500. This is increased from 2000 1-week ago. CMP is unremarkable. Chest x-ray shows interstitial lung disease with no acute disease. Urinalysis shows no evidence of infection. Patient was given 40 mg Lasix IV for diuresis due to worsening BNP. Patient has no evidence of infection on imaging or laboratory studies. Patient has leukocytosis after being on prolonged course of prednisone. On reevaluation, patient states her breathing is improved. We did discuss taking Lasix Saturday, Sunday, and Monday and then resume Lasix every other day as prescribed. Strict return precautions were discussed. Patient is DNR CC and does not want to stay in the hospital.  Patient was discharged with instructions to follow-up with PCP in the next 2 to 3 days.     ED

## 2020-07-25 NOTE — ED NOTES
Dr. Tomas Stout made aware that pt's HR is 140-155 he gives verbal to increase diltiazem to 10mg/hr     Elyssa Daniels RN  07/25/20 7443

## 2020-07-25 NOTE — ED NOTES
Per Dr. Johanna Bustillo, the patient no longer want to remain a DNR-CC and now wants to be a DNR-CCA     Elizabeth Hernandez RN  07/25/20 5188

## 2020-07-25 NOTE — ED NOTES
Dr. Micki Chen made aware pt's BP was 94/52--he states to leave diltiazem drip at current rate     Sanchez Molina RN  07/25/20 2904

## 2020-07-25 NOTE — ED NOTES
Dr. Daniel Show notified of current BP. New orders obtained.       Estuardo Anthony RN  07/25/20 1942

## 2020-07-25 NOTE — ED NOTES
Rachael caal called with Cynthia José CNP from 's on the line, connected call to Dr Sahra Rivera  07/25/20 [phone]

## 2020-07-25 NOTE — ED NOTES
Writer at bedside. Family has questions regarding patient transfer and code status. Dr. Johanna Bustillo informed and will be speaking with family.       Italia Varma RN  07/25/20 3982

## 2020-07-26 NOTE — ED PROVIDER NOTES
[facility] ED  Emergency Department  Emergency Medicine Sign-out     Care of Sudheer Villasenor was assumed from Dr. Chelsi Lewis and is being seen for Shortness of Breath (onset yesterday-seen here in ER last night and told to double lasix-not feeling any better--denies chest pain) and Cough (non-productive)  . The patient's initial evaluation and plan have been discussed with the prior provider who initially evaluated the patient.      EMERGENCY DEPARTMENT COURSE / MEDICAL DECISION MAKING:       MEDICATIONS GIVEN:  Orders Placed This Encounter   Medications    ipratropium-albuterol (DUONEB) nebulizer solution 1 ampule    methylPREDNISolone sodium (SOLU-MEDROL) injection 125 mg    ipratropium-albuterol (DUONEB) nebulizer solution 1 ampule    0.9 % NaCl bolus    0.9 % sodium chloride infusion    dilTIAZem injection 5 mg    dilTIAZem 125 mg in dextrose 5 % 125 mL infusion    cefTRIAXone (ROCEPHIN) 1 g in sterile water 10 mL IV syringe    azithromycin (ZITHROMAX) 500 mg in D5W 250ml addavial    digoxin (LANOXIN) injection 250 mcg    DISCONTD: cefTRIAXone (ROCEPHIN) 1 g in sterile water 10 mL IV syringe    DISCONTD: azithromycin (ZITHROMAX) 500 mg in D5W 250ml addavial       LABS / RADIOLOGY:     Labs Reviewed   BLOOD GAS, ARTERIAL - Abnormal; Notable for the following components:       Result Value    pH, Arterial 7.460 (*)     pCO2, Arterial 26.6 (*)     pO2, Arterial 111.3 (*)     HCO3, Arterial 18.5 (*)     Negative Base Excess, Art 3.6 (*)     O2 Sat, Arterial 98.4 (*)     All other components within normal limits   TROPONIN - Abnormal; Notable for the following components:    Troponin, High Sensitivity 87 (*)     All other components within normal limits   BRAIN NATRIURETIC PEPTIDE - Abnormal; Notable for the following components:    Pro-BNP 8,556 (*)     All other components within normal limits   TROPONIN - Abnormal; Notable for the following components:    Troponin, High Sensitivity 88 a 80 y.o. Female with A. fib with RVR and concern for a pulmonary infection as well as COPD exacerbation. Patient is accepted for transfer to [facility], given digoxin and  on a Cardizem infusion. Pending transportation to [facility] once there is a bed available. I was informed however that [facility] is discharge dependent. I informed the family regarding this, they would not want to wait all night in the emergency room and would like me to see if [facility] has a bed available. I discussed with the transfer line with [facility], patient excepted for transfer there for COPD, A. Fib. Patient was already given antibiotics per by previous physician for her pneumonia as well. OUTSTANDING TASKS / RECOMMENDATIONS:    1. Pending transportation     FINAL IMPRESSION:     1. COPD exacerbation ([facility]    2. Atrial fibrillation with RVR ([facility]    3. Pneumonia of left lower lobe due to infectious organism [facility])        DISPOSITION:         DISPOSITION:  []  Discharge   [x]  Transfer - [facility]   []  Admission -     []  Against Medical Advice   []  Eloped   FOLLOW-UP: No follow-up provider specified.    DISCHARGE MEDICATIONS: New Prescriptions    No medications on file           Jessica López MD  Emergency Medicine Attending        Jessica López MD  07/25/20 3645

## 2020-07-26 NOTE — ED NOTES
Writer at bedside with Dr. Dwain Balbuena. Family questioning if they should send patient to another hospital due to being a DNR. Dr. Dwain Balbuena explained to family that patient was changed from a DNR-CC to a DNR-CCA so that testing will be preformed and treatment will be provided. Family and patient in agreement for transfer.       Jose M Charlton RN  07/25/20 7067

## 2020-07-26 NOTE — FLOWSHEET NOTE
07/26/20 0411   Provider Notification   Reason for Communication Evaluate   Provider Name Martina Armstrong   Provider Notification Resident   Method of Communication Call   Response See orders   Notification Time [phone]- Dr. Martina Armstrong called RN to ask how patients heart rate has been (70-80s), stated he was going to restart PO cardizem to give first dose and one hour after PO is given to stop drip.

## 2020-07-26 NOTE — FLOWSHEET NOTE
07/26/20 0527   Provider Notification   Reason for Communication Evaluate   Provider Name [facility]   Provider Notification Resident   Method of Communication Secure Message   Response See orders   Notification Time [id number]- patient passed swallow eval.  also has only urinated 15ml since giving the dose of IV Lasix, bladder scanned and had 236ml. See orders for clark and advanced diet.

## 2020-07-26 NOTE — ED NOTES
Dr. Shreyas Lopez at bedside to speak with family and patient to see if they would like to continue waiting on a bed at [facility] [address] [facility] or look elsewhere.       Toy White RN  07/25/20 6356

## 2020-07-26 NOTE — ED NOTES
Called [facility] to contact [facility]as [facility]), connected to DORIAN/VALERIE Villalobos who then spoke to Dr Irena Fermin  07/25/20 7455

## 2020-07-26 NOTE — PROGRESS NOTES
Dr Ny Bray. 80 yr old, Afib RVR on Cardizem drip, CHF/pneumonia. WBC 29.9,CXR mild LLL opacity, covid test pending. Trop x2 [id number]. Creat 1.8 close to her baseline. Has received Rocephin,zithromax,digoxin 250 mcg,solumedrol,duonebs and some fluids. Vitals afebrile,117/53,110, O2 3L 95%. Has been waiting all day for a bed at [facility] but they are full.

## 2020-07-26 NOTE — H&P
History and Physical    Assessment and Plan:        1. SOB 2/2 #2: Was seen at [facility] ED on 7/19/2020 for SOB and was told to double her home lasix dose upon discharge. Seen again at [facility] ED on 7/25/2020 and still has SOB since then with wheezing and clear sputum production. Does not use oxygen at home. Transferred to [facility] for further management. - Plan as below  - Arrived with 2L oxygen via NC, started for symptom relief, no hypoxia; deescalate to RA as tolerated by patient with SpO2 >92%  - Albuterol PRN  2. Acute decompensated diastolic congestive heart failure with EF 53%: Last echocardiogram on 3/10/2020. BNP was elevated at [place] ED, but improving. She was given Solu-Medrol 125mg, Duoneb x2, 0.9% NaCl 1L bolus, rocephin x1, and azithromycin x1 at [place] ED (For presumed COPD and CAP). On arrival to [facility] she states she still feels SOB and has wheezing. On physical exam there are diffuse crackles with diminished air entry R>L. No edema noted to BLE. CXR from [place] ED shows chronic interstitial changes, mild opacity of left lower lung typical of atelectasis, scarring, or pneumonitis. - Given Lasix 40mg once  - Patient is on Lasix 20mg every other day. Will start Lasix 20mg BID for now. Reassess need for further diuresis and decrease lasix as needed  - Monitor intake and output  3. COPD with exacerbation, non-oxygen dependent: History of 90 pack years. Continue outpatient symbicort BID and Albuterol PRN  - Start duonebs q4hr and prednisone 20mg QD  4. Chronic vs. Paroxysmal Atrial fibrillation, rate controlled: Was in atrial fibrillation with RVR with HR of 140s at [place] ED. Was started on cardizem drip. On arrival to [facility] HR was 100s-110s. Takes metoprolol 12.5mg BID outpatient for rate control.  Was on Cardizem CD 120mg QD, but discontinued on 7/24/2020 for therapy completion.  - Continue cardizem drip for now and wean until   - Consider restarting PO cardizem once adequate rate control  5. Elevated troponin likely secondary to demand ischemia due to #2, 3, 4: Chronically elevated troponins in the past since 2019. EKG shows atrial fibrillation. No evidence of STEMI or NSTEMI. Denies chest pain, palpitations. 6. Moderate leukocytosis likely reactive to #2: Patient remains afebrile. WBC is downtrending. No evidence of pneumonia or infectious process on CXR. - Monitor CBC  7. CAD s/p stent in 2017: On appropriate anticoagulation; Continue ASA 81mg QD and Eliquis 2.5mg BID  8. CKD Stage IV, stable: Baseline creatinine 1.8-2.1 and GFR <30. Still produces urine.   - Monitor BMP  9. HTN, controlled: Continue outpatient Lopressor 12.5mg BID and hydralazine 50mg q8hr. 10. HLD: Continue outpatient lipitor 10mg    CC: SOB    HPI: Patient is a 81 y/o  female with PMH Diastolic CHF with EF 84%, CAD s/p stent, atrial fibrillation, CKD Stage IV, COPD, HTN, and HLD. She was transferred to [facility] from [facility]. She complains of SOB and coughing up clear sputum for the past week since the last time she was in the ED on 7/19/2020. At that time patient was treated for COPD exacerbation and was sent home with an increased dose of her lasix. Since her discharge on 7/19 she has still been having shortness of breath, wheezing, cough. She reports a subjective fever yesterday. Denies dizziness, lightheadedness, chest pain, palpitations, N/V/D.    ROS: Review of Systems   Constitutional: Negative for chills and fever. HENT: Negative. Eyes: Negative. Respiratory: Positive for cough, shortness of breath and wheezing. Negative for chest tightness and stridor. Cardiovascular: Negative. Genitourinary: Negative. Musculoskeletal: Negative. Skin: Negative. Neurological: Negative.        PMH:    Past Medical History:   Diagnosis Date    Acute cystitis without hematuria 9/29/2016    Acute gout involving toe of right foot 4/19/2018    Acute renal failure superimposed on stage 4 chronic kidney disease ([facility] 9/29/2016    Arthritis     Asthma     Atrial fibrillation with rapid ventricular response ([address] 2/17/2017    CAD (coronary artery disease)     [facility]cong[facility]) [facility]     Chronic kidney disease     COPD (chronic obstructive pulmonary disease) ([address]     COPD with acute exacerbation ([address] 3/24/2017    Examination of participant in clinical trial 5/20/13    6 year follow up, estimated completion JUN 2019    Gout 12/28/2017    Gout     H/O cardiovascular stress test 02/14/2017    H/O echocardiogram 02/14/2017    EF 55%. Mildly increased wall thickness of the LV. Evidence of diastolic dysfunction. Normal right ventricular size and function. Normal tricuspid valve structure and function. Mild tricuspid regurg    Hemarthrosis involving shoulder region, right 7/30/2018    Hx of transesophageal echocardiography (KRISTOPHER) for monitoring 02/20/2017    No clots  were visulaized in the left atrial appendage EF 55%.  Hyperlipidemia     Hypertension     MI (myocardial infarction) ([address]     2001    Pseudogout of right shoulder 7/30/2018    Renal artery stenosis ([address] 6/22/2016    Left proximal 75%    Shingles     15 years ago      SurgHx:   Past Surgical History:   Procedure Laterality Date    CARDIAC CATHETERIZATION  02/2017    LMCA: Normal 0% stenosis. LAD: Mid area 50-60% stenosis. LCx: Patent proximal stent 30% stenosis distal to the stent in OM proximal area 50% stenosis. RCA: Minimal 30% mid area PDA stent  is patent <20% stenosis. PL branch diffuse disease.  CHOLECYSTECTOMY      CORONARY ANGIOPLASTY WITH STENT PLACEMENT      SPINE SURGERY       SHX:    Social History     Socioeconomic History    Marital status:       Spouse name: Not on file    Number of children: Not on file    Years of education: Not on file    Highest education level: Not on file   Occupational History    Not on file   Social Needs    Financial resource strain: Not on file   [employer] insecurity     Worry: Not on file     Inability: Not on file    Transportation needs     Medical: Not on file     Non-medical: Not on file   Tobacco Use    Smoking status: Former Smoker     Packs/day: 3.00     Years: 30.00     Pack years: 90.00     Types: Cigarettes    Smokeless tobacco: Never Used    Tobacco comment: Quit 40 years ago   Substance and Sexual Activity    Alcohol use: No    Drug use: No    Sexual activity: Not on file   Lifestyle    Physical activity     Days per week: Not on file     Minutes per session: Not on file    Stress: Not on file   Relationships    Social connections     Talks on phone: Not on file     Gets together: Not on file     Attends [employer] service: Not on file     Active member of club or organization: Not on file     Attends meetings of clubs or organizations: Not on file     Relationship status: Not on file    Intimate partner violence     Fear of current or ex partner: Not on file     Emotionally abused: Not on file     Physically abused: Not on file     Forced sexual activity: Not on file   Other Topics Concern    Not on file   Social History Narrative    Not on file      FHX:   Family History   Problem Relation Age of Onset    Heart Disease Mother     Cancer Father       Allergies:    Allergies   Allergen Reactions    Belladonna       Medications:            Labs:   Recent Results (from the past 24 hour(s))   Blood Gas, Arterial    Collection Time: 07/25/20  3:15 PM   Result Value Ref Range    pH, Arterial 7.460 (H) 7.35 - 7.45    pCO2, Arterial 26.6 (L) 35 - 45 mmHg    pO2, Arterial 111.3 (H) 80.0 - 100.0 mmHg    HCO3, Arterial 18.5 (L) 22 - 26 mmol/L    Positive Base Excess, Art NOT REPORTED 0.0 - 2.0 mmol/L    Negative Base Excess, Art 3.6 (H) 0.0 - 2.0 mmol/L    O2 Sat, Arterial 98.4 (H) 95 - 98 %    Total Hb NOT REPORTED 12.0 - 16.0 g/dl    Oxyhemoglobin NOT REPORTED 95.0 - 98.0 %    Carboxyhemoglobin NOT REPORTED 0.0 - 5.0 %    Methemoglobin NOT REPORTED 8.5 - 10.5 mg/dL   Magnesium    Collection Time: 07/26/20  2:22 AM   Result Value Ref Range    Magnesium 1.9 1.6 - 2.4 mg/dL   CBC    Collection Time: 07/26/20  2:22 AM   Result Value Ref Range    WBC 21.1 (H) 4.8 - 10.8 thou/mm3    RBC 4.04 (L) 4.20 - 5.40 mill/mm3    Hemoglobin 12.3 12.0 - 16.0 gm/dl    Hematocrit 37.6 37.0 - 47.0 %    MCV 93.1 81.0 - 99.0 fL    MCH 30.4 26.0 - 33.0 pg    MCHC 32.7 32.2 - 35.5 gm/dl    RDW-CV 14.0 11.5 - 14.5 %    RDW-SD 48.0 (H) 35.0 - 45.0 fL    Platelets 560 370 - 010 thou/mm3    MPV 10.4 9.4 - 12.4 fL   Troponin    Collection Time: 07/26/20  2:22 AM   Result Value Ref Range    Troponin T 0.059 (A) ng/ml   Anion Gap    Collection Time: 07/26/20  2:22 AM   Result Value Ref Range    Anion Gap 13.0 8.0 - 16.0 meq/L   Glomerular Filtration Rate, Estimated    Collection Time: 07/26/20  2:22 AM   Result Value Ref Range    Est, Glom Filt Rate 22 (A) ml/min/1.73m2   Procalcitonin    Collection Time: 07/26/20  2:22 AM   Result Value Ref Range    Procalcitonin 0.46 (H) 0.01 - 0.09 ng/mL   Microscopic Urinalysis    Collection Time: 07/26/20  4:17 AM   Result Value Ref Range    Glucose, Urine NEGATIVE NEGATIVE mg/dl    Bilirubin Urine NEGATIVE NEGATIVE    Ketones, Urine NEGATIVE NEGATIVE    Specific Gravity, UA 1.011 1.002 - 1.03    Blood, Urine NEGATIVE NEGATIVE    pH, UA 5.0 5.0 - 9.0    Protein, UA NEGATIVE NEGATIVE mg/dl    Urobilinogen, Urine 0.2 0.0 - 1.0 eu/dl    Nitrite, Urine NEGATIVE NEGATIVE    Leukocytes, UA NEGATIVE NEGATIVE    Color, UA YELLOW YELLOW-STR    Character, Urine SL CLOUDY CLR-SL.[id number]    RBC, UA 5-10 0-2/hpf /hpf    WBC, UA 0-2 0-4/hpf /hpf    Epithelial Cells, UA 0-2 3-5/hpf /hpf    Bacteria, UA MANY FEW/NONE S    Casts NONE SEEN NONE SEEN /lpf    Crystals NONE SEEN NONE SEEN    Renal Epithelial, UA NONE SEEN NONE SEEN    Yeast, UA NONE SEEN NONE SEEN    Casts NONE SEEN /lpf    Miscellaneous Lab Test Result NONE SEEN    Troponin    Collection Time: 07/26/20  4:17 AM   Result Value Ref Range    Troponin T 0.056 (A) ng/ml     Vital Signs:   Vitals:    07/26/20 0200 07/26/20 0334 07/26/20 0351 07/26/20 0522   BP: 120/60 115/63 (!) 125/93 120/61   Pulse: 89 90 90    Resp:   16    Temp:   98.3 °F (36.8 °C)    TempSrc:   Oral    SpO2:   97%    Weight:       Height:         General: Thin  female in no acute distress. HEENT: Normocephalic and atraumatic. No scleral icterus. PERRLA. Mucous membranes moist.  Neck: Supple. Trachea midline. No JVD. Full ROM. Lungs: Diffuse crackles with diminished air entry R>L. No retractions. Cardiac: Irregularly irregular rate and rhythm. No murmurs, rubs, or gallops. Abdomen: Soft, non-tender to palpation. Normoactive bowel sounds. Extremities:  No clubbing, cyanosis x 4, edema    Vasculature: Capillary refill < 3 seconds. Skin: Warm, dry, and intact. Psych:  Alert and oriented x3. Affect appropriate  Neurologic: No focal deficit. Data: (All radiographs, tracings, PFTs, and imaging are personally viewed and interpreted unless otherwise noted).     Outside data reviewed    Electronically signed by  Radha Elena DO

## 2020-07-27 NOTE — PROGRESS NOTES
[address] ICU STEPDOWN TELEMETRY 4K  Bedside Swallowing Evaluation      SLP Individual Minutes  Time In: 2346  Time Out: 2513  Minutes: 9  Timed Code Treatment Minutes: 0 Minutes       Date: 2020  Patient Name: Russel Bernal      CSN: [id number]   : 1928  (80 y.o.)  Gender: female   Referring Physician:  Abimael Hodges DO   Diagnosis: Atrial fibrillation with RVR  Secondary Diagnosis: Dysphagia     History of Present Illness/Injury: Pt admitted to [facility] with above med dx; please refer to physician H&P for full details. ST consulted to assess swallow function via BSE to ensure safest PO Diet level is established. Past Medical History:   Diagnosis Date    Acute cystitis without hematuria 2016    Acute gout involving toe of right foot 2018    Acute renal failure superimposed on stage 4 chronic kidney disease ([address] 2016    Arthritis     Asthma     Atrial fibrillation with rapid ventricular response ([address] 2017    CAD (coronary artery disease)     CHF (congestive heart failure) (HCC)     Chronic kidney disease     COPD (chronic obstructive pulmonary disease) ([address]     COPD with acute exacerbation ([address] 3/24/2017    Examination of participant in clinical trial 13    6 year follow up, estimated completion 2019    Gout 2017    Gout     H/O cardiovascular stress test 2017    H/O echocardiogram 2017    EF 55%. Mildly increased wall thickness of the LV. Evidence of diastolic dysfunction. Normal right ventricular size and function. Normal tricuspid valve structure and function. Mild tricuspid regurg    Hemarthrosis involving shoulder region, right 2018    Hx of transesophageal echocardiography (KRISTOPHER) for monitoring 2017    No clots  were visulaized in the left atrial appendage EF 55%.     Hyperlipidemia     Hypertension     MI (myocardial infarction) ([address]         Pseudogout of right shoulder 7/30/2018    Renal artery stenosis (HCC) 6/22/2016    Left proximal 75%    Shingles     15 years ago       SUBJECTIVE:  Pt resting in bed upon arrival, awake and alert. Reduced hearing acuity levels noted. Pt denies hx of dysphagia, however, confirms needing to eat \"softer foods\" prior to admission. OBJECTIVE:    Pain:  No pain reported. Current Diet: Minced and moist with thin liquids    Respiratory Status:  Independent    Behavioral Observation:  Alert and Oriented    Oral Mechanism Evaluation:      Facial / Labial Impaired Decreased tone   Lingual WFL    Dentition Impaired Sparse natural dentition; poor oral hygiene   Velum WFL    Vocal Quality WFL    Sensation WFL    Cough Not Tested      Patient Evaluated Using: Thin soda via straw; mixed/soft solids    Oral Phase:  Impaired:  Impaired Mastication and Reduced Bolus Formation    Pharyngeal Phase: Impaired:  Decreased Hyolaryngeal Elevation    Signs and Symptoms of Laryngeal Penetration/Aspiration: No signs/symptoms of aspiration evident in this evaluation, but cannot rule out silent aspiration. Impresssions: Pt presents with at least mill oropharyngeal dysphagia based on findings outlined above. Oral phase compounded d/t sparse natural dentition resulting in which increased time required for textural breakdown of mixed/soft solids; min deficits detected for cohesive bolus formation attempts resulting in presence of min oral residuals, dominating on medial dorsal body. Attempted to complete advanced texture trial (sarai cracker dipped in pudding), however, pt deferred, stating, \"that will be too hard for me to chew\"; adequate insight noted. Swallow initiation relatively timely with decreased hyolaryngeal elevation upon tactile palpitation. Thin soda via straw consumed without overt difficulty.  NO overt s/s aspiration exhibited across all consistencies/trials consumed, certainly not able to r/o premature spillage/pharyngeal entry or silent aspiration at bedside alone. Recommend continuation of minced and moist diet with thin liquids at this time, pulmonary monitoring to ensue given pt is at risk for potential airway invasion events given pertinent medical hx of COPD. RECOMMENDATIONS/ASSESSMENT:   Modified Barium Swallow:  MBS is not indicated at this time. Will recommend as appropriate  Diet Recommendations:  Minced and moist with thin liquids  Strategies:  Full Upright Position, Small Bite/Sip, Pulmonary Monitoring, Supervision, Alternate Solids and Liquids, Limit Distractions and Monitor for Fatigue   Rehabilitation Potential: good    EDUCATION:  Learner: Patient  Education:  Reviewed results and recommendations of this evaluation, Reviewed diet and strategies, Reviewed signs, symptoms and risks of aspiration, Reviewed ST goals and Plan of Care and Reviewed recommendations for follow-up  Evaluation of Education: Verbalizes understanding, Needs further instruction and Family not present    PLAN:  Skilled SLP intervention on acute care 3-5 x per week or until goals met and/or pt plateaus in function. Specific interventions for next session may include: dietary analysis, pulmonary monitoring. PATIENT GOAL:    Did not state. Will further assess during treatment. SHORT TERM GOALS:  Short-term Goals  Timeframe for Short-term Goals: 2 weeks  Goal 1: Pt will safely consume minced and moist diet with thin liquids without overt s/s aspiration to ensure adequate nutrition/hydration measures. Goal 2: Pt will safely consume advanced texture trials demonstrating adequate mastication pattern, textural breakdown, cohesive bolus formation, and endurance to determine readiness for potential dietary upgrade. Goal 3: Monitor pulmonary status; consider completion of formal instrumentation should adverse changes be documented.     LONG TERM GOALS:  No LTG established given short OSIEL Shepard M.A., [facility]

## 2020-07-27 NOTE — CARE COORDINATION
7/27/20, 9:49 AM EDT  DISCHARGE PLANNING EVALUATION:    Gricelda Eastman       Admitted from: [facility] 7/26/2020/ [facility] day: 1   Location: [facility]06/006-A Reason for admit: Atrial fibrillation with RVR ([address] [I48.91] Status: IP  Admit order signed?: yes  PMH:  has a past medical history of Acute cystitis without hematuria, Acute gout involving toe of right foot, Acute renal failure superimposed on stage 4 chronic kidney disease ([address] Arthritis, Asthma, Atrial fibrillation with rapid ventricular response ([address] CAD (coronary artery disease), CHF (congestive heart failure) ([address] Chronic kidney disease, COPD (chronic obstructive pulmonary disease) ([address] COPD with acute exacerbation ([address] Examination of participant in clinical trial, Gout, Gout, H/O cardiovascular stress test, H/O echocardiogram, Hemarthrosis involving shoulder region, right, Hx of transesophageal echocardiography (KRISTOPHER) for monitoring, Hyperlipidemia, Hypertension, MI (myocardial infarction) ([address] Pseudogout of right shoulder, Renal artery stenosis ([address] and Shingles. Procedure:   Medications:  Scheduled Meds:   aspirin  81 mg Oral Daily    budesonide-formoterol  2 puff Inhalation BID    apixaban  2.5 mg Oral BID    hydrALAZINE  50 mg Oral 3 times per day    metoprolol tartrate  12.5 mg Oral BID    atorvastatin  10 mg Oral Daily    sodium chloride flush  10 mL Intravenous 2 times per day    dilTIAZem  120 mg Oral Daily    predniSONE  20 mg Oral Daily    ipratropium-albuterol  1 ampule Inhalation Q4H     Continuous Infusions:   sodium chloride 75 mL/hr at 07/27/20 0815      Pertinent Info/Orders/Treatment Plan:  Client admitted wit A-fib treated with Cardizem gtt (now PO). Creatinine 2.9 (2.1) worse, elevated WBC; monitor  Diet: DIET RENAL; Low Sodium (2 GM); Dysphagia Minced and Moist   Smoking status:  reports that she has quit smoking. Her smoking use included cigarettes. She has a 90.00 pack-year smoking history.  She has never used smokeless tobacco.   PCP: JOANIE Stallworth CNP  Readmission 30 days or less: no  Readmission Risk Score: 25%    Discharge Planning Evaluation  Current Residence:  Private Residence  Living Arrangements:  Children, Family Members   Support Systems:  Children  Current Services PTA:     Potential Assistance Needed:  Home Care  Potential Assistance Purchasing Medications:  No  Does patient want to participate in local refill/ meds to beds program?  Not Assessed  Type of Home Care Services:  None  Patient expects to be discharged to:     Expected Discharge date:  07/30/20  Follow Up Appointment: Best Day/ Time: Monday AM    Patient Goals/Plan/Treatment Preferences: met with client; denied needs as plans home with daughter Will Mcgovern (who works but attentive to needs), [facility], has nebulizer, await therapy input  Transportation/Food Security/Housekeeping Addressed:  No issues identified.     Evaluation: no

## 2020-07-27 NOTE — PROGRESS NOTES
urine over past 24 hours. Monitor with repeat BMP in AM.  5. Elevated Troponin, likely demand ischemia: 2/2 #1/3. Chronically elevated troponin since 2019, likely due to CKD. EKG showed A-fib without ischemic changes. Continues to deny chest pain or palpitations. 6. Leukocytosis: Likely reactive 2/2 Afib with RVR in addition to 2/2 steroids that daughter states patient has recently taken. Afebrile, no increased cough or sputum production. Not likely pneumonia, no antibiotics continued. Monitor with daily CBC. 7. Hx of CAD s/p PCI in 2017: Continue ASA/statin. 8.  benign Essential HTN: Fairly well controlled, continue Lopressor and Hydralazine. Holding parameters in place. 9. Hyperlipidemia: continue chronic statin. Disposition: Respiratory status has improved on RA at home. Family denied needs, previously on home hospice for oxygen supplementation but patient states this has been removed. Developed STORM 2/2 diuretics, optimize and monitor renal function in AM. If renal function back at baseline in AM and respiratory status is stable with continued CVR on telemetry, patient may be candidate for discharge at that time. Chief Complaint: SOB    Initial H and P:-    \"Patient is a 79 y/o  female with PMH Diastolic CHF with EF 61%, CAD s/p stent, atrial fibrillation, CKD Stage IV, COPD, HTN, and HLD. She was transferred to [facility] from [facility] ED. She complains of SOB and coughing up clear sputum for the past week since the last time she was in the ED on 7/19/2020. At that time patient was treated for COPD exacerbation and was sent home with an increased dose of her lasix. Since her discharge on 7/19 she has still been having shortness of breath, wheezing, cough. She reports a subjective fever yesterday. Denies dizziness, lightheadedness, chest pain, palpitations, N/V/D.\"    Subjective (past 24 hours):   Patient evaluated resting comfortably in bed.   She does finish her breakfast, is hard of hearing, but oriented x3. She states that her shortness of breath appears to be at baseline, chronically on breathing treatments at home. She denies any increased cough, or increased sputum production. Patient denies any fever chills overnight. Denies any chest pain. Denies any abdominal pain, nausea, vomiting. Continues to make urine in [place]. Denies any needs, discussed current plan of care. No further questions at this time. Past medical history, family history, social history and allergies reviewed again and is unchanged since admission. ROS (12 point review of systems completed. Pertinent positives noted. Otherwise ROS is negative)     Medications:  Reviewed    Infusion Medications    sodium chloride       Scheduled Medications    aspirin  81 mg Oral Daily    budesonide-formoterol  2 puff Inhalation BID    apixaban  2.5 mg Oral BID    hydrALAZINE  50 mg Oral 3 times per day    metoprolol tartrate  12.5 mg Oral BID    atorvastatin  10 mg Oral Daily    sodium chloride flush  10 mL Intravenous 2 times per day    dilTIAZem  120 mg Oral Daily    predniSONE  20 mg Oral Daily    ipratropium-albuterol  1 ampule Inhalation Q4H     PRN Meds: albuterol, benzonatate, nitroGLYCERIN, albuterol, sodium chloride flush, acetaminophen **OR** acetaminophen, polyethylene glycol, promethazine **OR** ondansetron      Intake/Output Summary (Last 24 hours) at 7/27/2020 0814  Last data filed at 7/27/2020 0415  Gross per 24 hour   Intake 810 ml   Output 1400 ml   Net -590 ml       Diet:  DIET RENAL; Low Sodium (2 GM); Dysphagia Minced and Moist    Exam:  BP (!) 145/64   Pulse 101   Temp 98.2 °F (36.8 °C) (Oral)   Resp 20   Ht 5' 2\" (1.575 m)   Wt 117 lb 6.4 oz (53.3 kg)   SpO2 96%   BMI 21.47 kg/m²   General appearance: Chronically ill appearing, frail, 79 y/o  female. Hard of hearing. HEENT: Pupils equal, round, and reactive to light. Conjunctivae/corneas clear. Neck: Supple, with full range of motion. No jugular venous distention. Trachea midline. Respiratory:  Normal respiratory effort. Diminished breath sounds bilaterally, coarse breath sounds bilateral upper lobes without rales. Scattered expiratory wheezing. Cardiovascular: Irregularly irregular rhythm with normal rate. No murmurs, rubs, or gallops. Abdomen: Soft, non-tender, non-distended with normal bowel sounds. Musculoskeletal: passive and active ROM x 4 extremities. No LE edema. Skin: Skin color, texture, turgor normal.  No rashes or lesions. Neurologic:  Neurovascularly intact without any focal sensory/motor deficits. Cranial nerves: II-XII intact, grossly non-focal.  Psychiatric: Alert and oriented, thought content appropriate, normal insight  Capillary Refill: Brisk,< 3 seconds   Peripheral Pulses: +2 palpable, equal bilaterally     Labs:   Recent Labs     07/25/20  0005 07/26/20 0222 07/27/20  0538   WBC 29.9* 21.1* 19.8*   HGB 11.9 12.3 11.4*   HCT 38.0 37.6 36.5*    229 266     Recent Labs     07/25/20  0005 07/26/20 0222 07/27/20  0538   * 138 141   K 4.5 4.3 3.9    104 104   CO2 22 21* 23   BUN 39* 48* 65*   CREATININE 1.80* 2.1* 2.9*   CALCIUM 9.2 8.6 8.7     Recent Labs     07/25/20  0005   AST 13   ALT 12   BILITOT 0.49   ALKPHOS 73     Recent Labs     07/25/20  0005   INR 1.0     No results for input(s): CKTOTAL, TROPONINI in the last 72 hours.     Microbiology:    Blood culture #1: No results found for: BC    Blood culture #2:No results found for: Fatoumata Kunz    Organism:  Lab Results   Component Value Date    ORG EC 09/28/2016       No results found for: LABGRAM    MRSA culture only:No results found for: [facility]    Urine culture:   Lab Results   Component Value Date    LABURIN No growth-preliminary  07/26/2020       Respiratory culture: No results found for: CULTRESP    Aerobic and Anaerobic :  No results found for: LABAERO  No results found for: LABANAE    Urinalysis:      Lab Results   Component Value Date NITRU NEGATIVE 07/26/2020    WBCUA 0-2 07/26/2020    BACTERIA MANY 07/26/2020    RBCUA 5-10 07/26/2020    BLOODU NEGATIVE 07/26/2020    SPECGRAV 1.011 07/26/2020    GLUCOSEU NEGATIVE 07/24/2020       Radiology:  No orders to display     No results found.     Electronically signed by Yehuda Pickett PA-C on 7/27/2020 at 8:14 AM

## 2020-07-28 NOTE — PROGRESS NOTES
[facility] ICU STEPDOWN TELEMETRY 4K  DAILY NOTE    TIME   SLP Individual Minutes  Time In: 0342  Time Out: 1013  Minutes: 8  Timed Code Treatment Minutes: 0 Minutes       Date: 2020  Patient Name: Luis Iverson      CSN: [id number]   : 1928  (80 y.o.)  Gender: female   Referring Physician:  Simba Justin DO   Diagnosis: Atrial fibrillation with RVR  Secondary Diagnosis: Dysphagia   Precautions: Fall risk, aspiration precautions  Current Diet: Minced and moist with thin liquids   Swallowing Strategies: Full Upright Position, Small Bite/Sip, Pulmonary Monitoring, Supervision, Alternate Solids and Liquids, Limit Distractions and Monitor for Fatigue     Date of Last MBS: Not Applicable    Pain:  No pain reported. Subjective:  Pt resting in bed upon arrival, alert and pleasant. Hearing acuity levels note to be a barrier at this time. Despite positive participation, limited intake completed given pt stating, \"I'm full\". Short-Term Goals:  SHORT TERM GOAL #1:  Goal 1: Pt will safely consume minced and moist diet with thin liquids without overt s/s aspiration to ensure adequate nutrition/hydration measures. INTERVENTIONS: PO trials facilitated as form of dietary analysis to ensure safe continuation of minced and moist diet level with thin liquids. Prior to intake, head of bed elevated to approximate 90 degree postural placement.  The following was consumed:  Marianela [place] cracker crushed in pudding: x2 trials completed with adequate textural breakdown with min deficits for cohesive bolus formation resulting in min oral residuals spread diffusely throughout oral cavity; positive sensation for presence of stasis with independent clearance via lingual sweep; no overt s/s aspiration  Thin soda via straw: x2 trials completed with what appears to be adequate control of fluid bolus; no overt s/s aspiration     Education provided to pt re: compensatory swallowing strategies to implement during PO intake, including full upright positioning, small bite/sip, slow rate for intake, and monitoring for fatigue. Verbal receptiveness noted. See STG2 below for further details. SHORT TERM GOAL #2:  Goal 2: Pt will safely consume advanced texture trials demonstrating adequate mastication pattern, textural breakdown, cohesive bolus formation, and endurance to determine readiness for potential dietary upgrade. INTERVENTIONS: Advanced texture trial completed to determine readiness for potential dietary upgrade. Bob silva dipped in pudding x1 trial completed with increased mastication required for textural breakdown; adequate breakdown achieved, however, presence of mod oral stasis with pt stating, \"this was harder to chew\"; no overt s/s aspiration. Recommend continuation of minced and moist diet with with thin liquids. Suspect swallow function and diet level to be approaching baseline with f/u dietary analysis x1 to be completed to ensure safe consistency for continued intake. SHORT TERM GOAL #3:  Goal 3: Monitor pulmonary status; consider completion of formal instrumentation should adverse changes be documented. INTERVENTIONS: RN Luis Santiago reporting lung sounds diminished, no acute changes r/t pulmonary status. Will continue to monitor. Long-Term Goals:  No LTG established given short ELOS       EDUCATION:  Learner: Patient  Education:  Reviewed diet and strategies, Reviewed signs, symptoms and risks of aspiration and Reviewed ST goals and Plan of Care  Evaluation of Education: Verbalizes understanding, Demonstrates with assistance, Needs further instruction and Family not present    ASSESSMENT/PLAN:  Activity Tolerance:  Patient tolerance of  treatment: good. Cooperative with ST and POC. Assessment/Plan: Patient progressing toward established goals.   Continues to require skilled care of licensed speech pathologist to progress toward achievement of established goals and plan of care. .     Plan for Next Session: dietary analysis, pulmonary monitoring       Jacoby Mcgovern M.A., [address] [place] [address]

## 2020-07-28 NOTE — DISCHARGE SUMMARY
Hospitalist Discharge Summary        Patient: Aric Gonzalez  YOB: 1928  MRN: [id number]   Acct: [de-identified]    Primary Care Physician: JOANIE Lucas CNP    Admit date  7/26/2020    Discharge date:  7/28/2020 3:44 PM    Chief Complaint on presentation :-  SOB    Discharge Assessment and Plan:-   1. Acute on Chronic HFpEF: Last echo 3/2020 with EF 53%. Chronically on Lasix 20mg every other day. BNP 10,552 at outlying facility, improved to 8,556 on admission to [facility]. CXR 7/26/20 with chronic interstitial lung disease, mild medial opacity of lower left lung, likely atelectasis. Afebrile, denies increased cough. SOB likely 2/2 CHF/COPD exacerbation, less likely PNA. Leukocytosis possibly steroid related as daughter reports she has been on them recently. Received Solumedrol, Duoneb, Rocephin, and Azithromycin in ED prior to transport. Abx not continued on arrival. Transported with 2L O2 via NC, not chronically on O2, weaned to RA. 1. Administered Lasix 40mg IV x1, 20mg x2. Subsequent STORM, Cr trending from 1.8 to 2.9, now back down to 2.6. Lasix stopped. Takes every other day at home per daughter. 2.  No signs of fluid overload, IVF continued x8 hours on day of discharge. Patient to resume Lasix 20mg qod on 7/30/20. Repeat BMP on 7/30/20 and f/u with PCP     2. COPD with acute exacerbation: 90 year pack history. Continue Symbicort BID, Duonebs q4h, prednisone 40mg qd x5 days started 7/26/20. Resting comfortably on RA. No increased sputum production or purulent sputum. No indication to continue Abx. 1. Completed 3 days of prednisone with improvement, complete final 2 days at discharge. Continue home inhalers, f/u with PCP. 3. Chronic Atrial Fibrillation: Initially A-fib with RVR at [place], started on Cardizem drip. PO Cardizem had been stopped on 7/24/20 by another provider. Upon arrival, was rate controlled and Cardizem drip d/c. Resumed PO Cardizem and continued metoprolol 12.5mg BID. To continue these doses at discharge. Now rate controlled. Continue Eliquis. 4. STORM on CKD IV: Baseline Cr appears to be around 1.8. up to 2.9 likely 2/2 IV diuresis. Lasix discontinued and started on gentle IVF hydration now back down to 2.6. Continue IVF x8 hours, then will d/c home with repeat BMP on 7/30/20, to f/u with PCP. Encouraged PO intake. Lasix to be resumed every other day on 7/30/20.   5. Elevated Troponin, likely demand ischemia: 2/2 #1/3. Chronically elevated troponin since 2019, likely due to CKD. EKG showed A-fib without ischemic changes. Continues to deny chest pain or palpitations. 6. Leukocytosis: Likely reactive 2/2 Afib with RVR in addition to 2/2 steroids that daughter states patient has recently taken. Afebrile, no increased cough or sputum production. Not likely pneumonia, no antibiotics continued. 7. Hx of CAD s/p PCI in 2017: Continue ASA/statin. 8.  benign Essential HTN: Fairly well controlled, continue Lopressor and Hydralazine. Holding parameters in place. 9. Hyperlipidemia: continue chronic statin. Initial H and P and Hospital course:-  \"Patient is a 81 y/o  female with PMH Diastolic CHF with EF 09%, CAD s/p stent, atrial fibrillation, CKD Stage IV, COPD, HTN, and HLD. She was transferred to [address] [place] [address] from [facility]. She complains of SOB and coughing up clear sputum for the past week since the last time she was in the ED on 7/19/2020. At that time patient was treated for COPD exacerbation and was sent home with an increased dose of her lasix. Since her discharge on 7/19 she has still been having shortness of breath, wheezing, cough. She reports a subjective fever yesterday. Denies dizziness, lightheadedness, chest pain, palpitations, N/V/D.\"    Patient was treated with IV Lasix 40mg x1 and 20mg x2 for concerns for CHF exacerbation despite no increased pulmonary edema or LE edema. Patient was also started on Prednisone and Duonebs for probable COPD exacerbation.  On day two of admission she was noted to have STORM. Due to not being fluid overloaded she was started on gentle IVF hydration and COPD continued to be treated. On 7/28/20, Cr had trended down to 2.6, baseline approximately 2.1. She was resting comfortably on room air. Patient and daughter refused home services. Patient stable for discharge at this time, however, important to monitor renal function so BMP ordered for 7/30/20. Lasix every other day to be resumed at that time. Patient also to finish five day course of Prednisone outpatient and continue home inhalers. (2 days left of steroids). Patient made aware, stable for discharge. No further questions at this time. Return to ED for new or worsening concerns or complaints. Physical Exam:-  Vitals:   Patient Vitals for the past 24 hrs:   BP Temp Temp src Pulse Resp SpO2 Weight   07/28/20 1514 (!) 145/63 97.7 °F (36.5 °C) Oral 77 16 95 % --   07/28/20 1112 138/76 98.4 °F (36.9 °C) Oral 82 16 95 % --   07/28/20 0858 -- -- -- -- -- 97 % --   07/28/20 0830 135/68 98.6 °F (37 °C) Oral 88 16 95 % --   07/28/20 0446 (!) 138/90 98.7 °F (37.1 °C) Oral 87 14 96 % 118 lb 6.4 oz (53.7 kg)   07/28/20 0034 -- -- -- -- 22 95 % --   07/27/20 2358 137/74 98.8 °F (37.1 °C) Oral 90 20 95 % --   07/27/20 2036 (!) 135/55 98.3 °F (36.8 °C) Oral 90 20 96 % --     Weight:   Weight: 118 lb 6.4 oz (53.7 kg)   24 hour intake/output:     Intake/Output Summary (Last 24 hours) at 7/28/2020 1544  Last data filed at 7/28/2020 1341  Gross per 24 hour   Intake 2812.06 ml   Output 1050 ml   Net 1762.06 ml       General appearance: Chronically ill appearing, frail, 79 y/o  female. Hard of hearing. HEENT: Pupils equal, round, and reactive to light. Conjunctivae/corneas clear. Neck: Supple, with full range of motion. No jugular venous distention. Trachea midline. Respiratory:  Normal respiratory effort.  Diminished breath sounds bilaterally, coarse breath sounds bilateral upper lobes without 911.     simvastatin 20 MG tablet  Commonly known as:  ZOCOR  take 1 tablet by mouth every evening         * This list has 2 medication(s) that are the same as other medications prescribed for you. Read the directions carefully, and ask your doctor or other care provider to review them with you. Labs :-  Recent Results (from the past 72 hour(s))   EKG 12 Lead    Collection Time: 07/25/20  4:12 PM   Result Value Ref Range    Ventricular Rate 155 BPM    Atrial Rate 94 BPM    QRS Duration 86 ms    Q-T Interval 292 ms    QTc Calculation (Bazett) 469 ms    R Axis -53 degrees    T Axis 96 degrees   Troponin    Collection Time: 07/25/20  4:16 PM   Result Value Ref Range    Troponin, High Sensitivity 87 (HH) 0 - 14 ng/L    Troponin T NOT REPORTED <0.03 ng/mL    Troponin Interp NOT REPORTED    Brain Natriuretic Peptide    Collection Time: 07/25/20  4:22 PM   Result Value Ref Range    Pro-BNP 8,556 (H) <300 pg/mL    BNP Interpretation Pro-BNP Reference Range:    Troponin    Collection Time: 07/25/20  5:12 PM   Result Value Ref Range    Troponin, High Sensitivity 88 (HH) 0 - 14 ng/L    Troponin T NOT REPORTED <0.03 ng/mL    Troponin Interp NOT REPORTED    Culture, Blood 1    Collection Time: 07/25/20  7:20 PM    Specimen: Blood   Result Value Ref Range    Specimen Description . BLOOD     Special Requests L WRIST, 12ML     Culture NO GROWTH 3 DAYS    COVID-19    Collection Time: 07/25/20  9:40 PM    Specimen: Other   Result Value Ref Range    SARS-CoV-2          SARS-CoV-2, Rapid Not Detected Not Detected    Source . NASOPHARYNGEAL SWAB     SARS-CoV-2, PCR         VRE Screen by PCR    Collection Time: 07/26/20  2:00 AM    Specimen: Rectal Swab   Result Value Ref Range    Vancomycin Resistant Enterococcus NEGATIVE    MRSA by PCR    Collection Time: 07/26/20  2:00 AM   Result Value Ref Range    MRSA SCREEN RT-PCR NEGATIVE    Culture, MRSA, Screening    Collection Time: 07/26/20  2:00 AM    Specimen: Rectum   Result Value Ref Range    MRSA SCREEN No MRSA isolated     Basic Metabolic Panel    Collection Time: 07/26/20  2:22 AM   Result Value Ref Range    Sodium 138 135 - 145 meq/L    Potassium 4.3 3.5 - 5.2 meq/L    Chloride 104 98 - 111 meq/L    CO2 21 (L) 23 - 33 meq/L    Glucose 223 (H) 70 - 108 mg/dL    BUN 48 (H) 7 - 22 mg/dL    CREATININE 2.1 (H) 0.4 - 1.2 mg/dL    Calcium 8.6 8.5 - 10.5 mg/dL   Magnesium    Collection Time: 07/26/20  2:22 AM   Result Value Ref Range    Magnesium 1.9 1.6 - 2.4 mg/dL   CBC    Collection Time: 07/26/20  2:22 AM   Result Value Ref Range    WBC 21.1 (H) 4.8 - 10.8 thou/mm3    RBC 4.04 (L) 4.20 - 5.40 mill/mm3    Hemoglobin 12.3 12.0 - 16.0 gm/dl    Hematocrit 37.6 37.0 - 47.0 %    MCV 93.1 81.0 - 99.0 fL    MCH 30.4 26.0 - 33.0 pg    MCHC 32.7 32.2 - 35.5 gm/dl    RDW-CV 14.0 11.5 - 14.5 %    RDW-SD 48.0 (H) 35.0 - 45.0 fL    Platelets 636 574 - 385 thou/mm3    MPV 10.4 9.4 - 12.4 fL   Troponin    Collection Time: 07/26/20  2:22 AM   Result Value Ref Range    Troponin T 0.059 (A) ng/ml   Anion Gap    Collection Time: 07/26/20  2:22 AM   Result Value Ref Range    Anion Gap 13.0 8.0 - 16.0 meq/L   Glomerular Filtration Rate, Estimated    Collection Time: 07/26/20  2:22 AM   Result Value Ref Range    Est, Glom Filt Rate 22 (A) ml/min/1.73m2   Procalcitonin    Collection Time: 07/26/20  2:22 AM   Result Value Ref Range    Procalcitonin 0.46 (H) 0.01 - 0.09 ng/mL   Microscopic Urinalysis    Collection Time: 07/26/20  4:17 AM   Result Value Ref Range    Glucose, Urine NEGATIVE NEGATIVE mg/dl    Bilirubin Urine NEGATIVE NEGATIVE    Ketones, Urine NEGATIVE NEGATIVE    Specific Gravity, UA 1.011 1.002 - 1.03    Blood, Urine NEGATIVE NEGATIVE    pH, UA 5.0 5.0 - 9.0    Protein, UA NEGATIVE NEGATIVE mg/dl    Urobilinogen, Urine 0.2 0.0 - 1.0 eu/dl    Nitrite, Urine NEGATIVE NEGATIVE    Leukocytes, UA NEGATIVE NEGATIVE    Color, UA YELLOW YELLOW-STR    Character, Urine SL CLOUDY [id number]    RBC, UA 5-10 Magnesium    Collection Time: 07/28/20  6:00 AM   Result Value Ref Range    Magnesium 2.1 1.6 - 2.4 mg/dL   CBC    Collection Time: 07/28/20  6:00 AM   Result Value Ref Range    WBC 15.5 (H) 4.8 - 10.8 thou/mm3    RBC 3.72 (L) 4.20 - 5.40 mill/mm3    Hemoglobin 11.0 (L) 12.0 - 16.0 gm/dl    Hematocrit 36.1 (L) 37.0 - 47.0 %    MCV 97.0 81.0 - 99.0 fL    MCH 29.6 26.0 - 33.0 pg    MCHC 30.5 (L) 32.2 - 35.5 gm/dl    RDW-CV 14.1 11.5 - 14.5 %    RDW-SD 50.3 (H) 35.0 - 45.0 fL    Platelets 737 487 - 566 thou/mm3    MPV 10.9 9.4 - 12.4 fL   SPECIMEN REJECTION    Collection Time: 07/28/20  6:37 AM   Result Value Ref Range    Rejected Test bmp     Reason for Rejection see below    Basic Metabolic Panel    Collection Time: 07/28/20  7:45 AM   Result Value Ref Range    Sodium 142 135 - 145 meq/L    Potassium 3.7 3.5 - 5.2 meq/L    Chloride 106 98 - 111 meq/L    CO2 21 (L) 23 - 33 meq/L    Glucose 142 (H) 70 - 108 mg/dL    BUN 64 (H) 7 - 22 mg/dL    CREATININE 2.6 (H) 0.4 - 1.2 mg/dL    Calcium 8.3 (L) 8.5 - 10.5 mg/dL   Anion Gap    Collection Time: 07/28/20  7:45 AM   Result Value Ref Range    Anion Gap 15.0 8.0 - 16.0 meq/L   Glomerular Filtration Rate, Estimated    Collection Time: 07/28/20  7:45 AM   Result Value Ref Range    Est, Glom Filt Rate 17 (A) ml/min/1.73m2        Microbiology:    Blood culture #1: No results found for: BC    Blood culture #2:No results found for: BLOODCULT2    Organism:  No results found for: LABGRAM    MRSA culture only:No results found for: [address] [place] [address]    Urine culture:   Lab Results   Component Value Date    LABURIN No growth-preliminary No growth  07/26/2020     Lab Results   Component Value Date    ORG EC 09/28/2016        Respiratory culture: No results found for: CULTRESP    Aerobic and Anaerobic :  No results found for: LABAERO  No results found for: LABANAE    Urinalysis:      Lab Results   Component Value Date    NITRU NEGATIVE 07/26/2020    WBCUA 0-2 07/26/2020    BACTERIA MANY 07/26/2020    RBCUA 5-10 07/26/2020    BLOODU NEGATIVE 07/26/2020    SPECGRAV 1.011 07/26/2020    GLUCOSEU NEGATIVE 07/24/2020       Radiology:-  No results found.      Follow-up scheduled after discharge :-    in 1 weeks with JOANIE Hayes CNP      Consultations during this hospital stay:-  [] NONE [] Cardiology  [] Nephrology  [] Hemo onco   [] GI   [] ID  [] Endocrine  [] Pulm    [] Neuro    [] Psych   [] Urology  [] ENT   [] G SURGERY   []Ortho    []CV surg    [x] Palliative  [] Hospice [] Pain management   []    []TCU   [] PT/OT  OTHERS:-    Disposition: home  Condition at Discharge: Stable    Time Spent:- 30 minutes    Electronically signed by Sergey Rasmussen PA-C on 7/28/2020 at 3:44 PM  Discharging Hospitalist

## 2020-07-28 NOTE — PLAN OF CARE
Hospitalist Progress Note      Patient: Tom Sales    Unit/Bed:4K-06/006-A  YOB: 1928  MRN: [id number]   Acct: [de-identified]     PCP: JOANIE Ricardo CNP  Date of Admission: 7/26/2020      I personally reviewed patient's chart incluidng HPI, VS, medications, labs, and radiology reports. She was admitted this morning due to SOB thought to be due to acute on chronic HFpEF. Last echo 3/2020 with EF 30%, grade 2 diastolic dysfunction. BNP elevated, CXR with chronic interstitial changes, mild opacity of left lower lung typical of atelectasis, scarring, or pneumonitis. Was administered IV Lasix 40mg x1, started on Lasix 20mg BID (on every other day at home). Concerns for posssible COPD exacerbation as well, started on duonebs q4 and prednisone 20mg daily x5 days. Afib with RVR noted at outlying hospital, known chronic vs paroxysmal a-fib, chronically on Eliquis. Initially on cardizem drip until rate controlled, then transitioned to home BB and resumed cardizem PO that was discontinued on 7/24/20. Patient remains on 2L via NC, no hypoxia documented, wean to O2 sat >92%. HR 82, A-fib with CVR on telemetry,HR 80's. No Urine output documented at this time. I evaluated the patient and she is resting comfortably on 2L. Coarse breath sounds throughout, no rales. No LE edema. Discussed current POC with daughter. Discussed weaning O2, no home O2 and not hypoxic. No further questions at this time.      Sergey Rasmussen PA-C
Problem: Impaired respiratory status  Goal: Lung sounds clear or within normal limits for patient  7/27/2020 1955 by Miguel Turner RCP  Outcome: Ongoing   Continue with Duoneb and Symbicort to achieve clear lung sounds.
Problem: Impaired respiratory status  Goal: Lung sounds clear or within normal limits for patient  7/27/2020 2624 by Juvenal Bauer RCP  Outcome: Ongoing    Note: Patient is receiving nebulizer treatments Q4 to improve aeration.
Problem: Impaired respiratory status  Goal: Lung sounds clear or within normal limits for patient  7/28/2020 0902 by Lizette Thorne RCP  Outcome: Ongoing   Continue therapy to improve lung sounds.
Problem: Impaired respiratory status  Goal: Lung sounds clear or within normal limits for patient  Outcome: Not Met This Shift   Breath sounds crackles, started duoneb every 4 hours and symbicort bid. Medication education provided.
Problem: Pain Control  Goal: Maintain pain level at or below patient's acceptable level (or 5 if patient is unable to determine acceptable level)  7/27/2020 1151 by Kristen Hassan RN  Outcome: Ongoing  Flowsheets (Taken 7/27/2020 0800 by Marvin Gentile RN)  Patient's Stated Pain Goal: 2  Note: Pain Assessment: 0-10  Pain Level: 0   Patient's Stated Pain Goal: 2   Is pain goal met at this time? Yes            Problem: Musculor/Skeletal Functional Status  Goal: Absence of falls  Outcome: Ongoing  Note: Fall sign posted. Arm band in place. Non-skid slippers on. Bed alarm on. Patient agreeable to use of call light. Call light within reach. Bed in lowest position. Problem: Discharge Planning:  Goal: Discharged to appropriate level of care  Description: Discharged to appropriate level of care  Outcome: Ongoing  Note: Patient plans to return home with family. Problem: Impaired respiratory status  Goal: Lung sounds clear or within normal limits for patient  7/27/2020 1151 by Kristen Hassan RN  Outcome: Not Met This Shift  Note: Will continue to educate on the importance of tobacco cessation. Lung sounds not clear at this time. Oxygen saturation 94% on room air. Problem: Impaired respiratory status  Goal: Lung sounds clear or within normal limits for patient  7/27/2020 1151 by Kristen Hassan RN  Outcome: Not Met This Shift  Note: Will continue to educate on the importance of tobacco cessation. Lung sounds not clear at this time. Oxygen saturation 94% on room air. Care plan reviewed with patient. Patient verbalizes understanding of the plan of care and contributes to goal setting.
Problem: Skin Integrity:  Goal: Absence of new skin breakdown  Description: Absence of new skin breakdown  Outcome: Ongoing  Note: Patient shows no signs of new skin breakdown noted during shift. Patient encouraged to turn/reposition every two hours throughout shift, patient repositions self frequently throughout shift. Patient tolerated well. Problem: Pain Control  Goal: Maintain pain level at or below patient's acceptable level (or 5 if patient is unable to determine acceptable level)  Outcome: Ongoing  Flowsheets (Taken 7/27/2020 0452)  Patient's Stated Pain Goal: 2  Note: Patient denied pain throughout shift. Non pharmacological interventions of rest/reposition implemented. Patient satisfied. Problem: Cardiovascular  Goal: Hemodynamic stability  Outcome: Ongoing  Note: Patient's VSS. Patient is reading afib on telemetry throughout shift. PO Eliquis and Lopressor given. Will continue to monitor. Problem: Respiratory  Goal: No pulmonary complications  Outcome: Ongoing  Note: Patient has hx of COPD and smoking. Patient weaned off of nasal cannula and on room air, with oxygen saturations greater than 90% throughout shift. Patient has respiratory treatments every four hours. Patient tolerated well. Care plan reviewed with patient. Patient verbalizes understanding of the plan of care and contribute to goal setting.
Problem: Skin Integrity:  Goal: Will show no infection signs and symptoms  Description: Will show no infection signs and symptoms  7/28/2020 0908 by Toy Andrews RN  Outcome: Ongoing  Note: No new skin breakdown this shift. Goal: Absence of new skin breakdown  Description: Absence of new skin breakdown  7/28/2020 0908 by Toy Andrews RN  Outcome: Ongoing     Problem: Pain Control  Goal: Maintain pain level at or below patient's acceptable level (or 5 if patient is unable to determine acceptable level)  7/28/2020 0908 by Toy Andrews RN  Outcome: Ongoing  Note: The patient has had no c/o pain this shift. Problem: Cardiovascular  Goal: Hemodynamic stability  7/28/2020 0908 by Toy Andrews RN  Outcome: Ongoing  Note: The patient has been in controlled chronic Afib this shift. Problem: Respiratory  Goal: No pulmonary complications  5/62/1962 0908 by Toy Andrews RN  Outcome: Ongoing  Note: The patient did have some wheeziness. Breathing treatments scheduled. Problem: Musculor/Skeletal Functional Status  Goal: Absence of falls  7/28/2020 0908 by Toy Andrews RN  Outcome: Ongoing  Note: The patient has been free of falls this shift. Bed is in low position, 2/4 rails are up, and alarm is active. Call light is in reach, 2/4 rails are up, and hourly rounding performed. Problem: Discharge Planning:  Goal: Discharged to appropriate level of care  Description: Discharged to appropriate level of care  7/28/2020 0908 by Toy Andrews RN  Outcome: Ongoing  Note: Discharge is pending at this time.
by Isabella Giles RN  Outcome: Ongoing  7/27/2020 1955 by Anam Benoit RCP  Outcome: Ongoing  7/27/2020 1151 by Geeta Arevalo RN  Outcome: Not Met This Shift  Note: Will continue to educate on the importance of tobacco cessation. Lung sounds not clear at this time. Oxygen saturation 94% on room air. Care plan reviewed with patient. The patient verbalizes understanding and contributed to plan of care.

## 2020-07-28 NOTE — CARE COORDINATION
7/28/20, 11:10 AM EDT    Patient goals/plan/ treatment preferences discussed by  and . Patient goals/plan/ treatment preferences reviewed with patient/ family. Patient/ family verbalize understanding of discharge plan and are in agreement with goal/plan/treatment preferences. Understanding was demonstrated using the teach back method. AVS provided by RN at time of discharge, which includes all necessary medical information pertaining to the patients current course of illness, treatment, post-discharge goals of care, and treatment preferences.         IMM Letter  IMM Letter date given[de-identified] 07/26/20

## 2020-07-28 NOTE — PROGRESS NOTES
Discussed home going needs with patient. She denies the need for Home health services at home. She states, \" there are 10 of us at home, I don't need anyone else to help me. \" patient has called her daughter to pick her up, since she has been discharged officially. Patient says that her daughter will be here around 8pm to pick her up. 1815 patient would like the AVS discharge to be discussed with her when her daughter is present.

## 2020-07-29 NOTE — PROGRESS NOTES
Discharge teaching and instructions for diagnosis/procedure of Atrial Fib completed with patient and daughter/LYLY Ya using teachback method. AVS reviewed. Printed prescriptions given to patient. Patient voiced understanding regarding prescriptions, follow up appointments, and care of self at home. Discharged in a wheelchair to  home with support per family. 2000- Patient taken down to the main lobby doors via wheelchair by Chyna Quinn with daughter in stable condition. Patient being discharged & retuning home with her daughter/LYLY Ya via private vehicle. Discharge teaching explained with the patient and her daughter/LYLY Ya. [employer] signed the discharge papers. All belongings and discharge papers & education papers taken with the patient. Patient left in stable condition.

## 2020-07-29 NOTE — CARE COORDINATION
[facility] Transitions Initial Follow Up Call    Call within 2 business days of discharge: Yes    Patient: Renee Salazar Patient : 1928   MRN: [id number]  Reason for Admission: CKD  Discharge Date: 20 RARS: Readmission Risk Score: 35      Last Discharge [facility]       Complaint Diagnosis Description Type Department Provider    20  CKD (chronic kidney disease) stage 4, GFR 15-29 ml/min [facility]) Admission (Discharged) MARCELINO 4K Jules Chaidez PA-C           Spoke with: Gayla Roman  Daughters patient is doing well. She got up this am and did a breathing tx and went back to bed. Daughter was able to get all her medications this am medications and completed 1111F. Reviewed appts. Daughter stated she did not get the DNR. Reviewed paper back from nurse. Informed her to contact medical records to be able to get DNR paper back because it is more in likely in her chart. Educated daughter on diuretic medication. Avoid taking you medication at night. Take it at lease six hours before bedtime it you need to take it twice a day. Your kidneys will be making more urine. Watch for water retention which includes swollen ankles and unexplained weight gain over a short period of time. Notify your PCP if you gain 3lbs in a day or 5lb in a week. Manage you salt intake. Follow a fluid restriction if you were given one by your PCP. Reminded daughter to check expiration date on Nitroglycerin bottle. Denies any concerns or issues at this time.       Facility: [facility]    Non-face-to-face services provided:  Obtained and reviewed discharge summary and/or continuity of care documents    Care Transitions 24 Hour Call    Do you have all of your prescriptions and are they filled?:  Yes  Patient DME:  [employer]  Patient Home Equipment:  Nebulizer  Do you have support at home?:  Child, Grandchild  Are you an active caregiver in your home?:  No  Care Transitions Interventions         Follow Up  Future Appointments   Date Time Provider Olivia Doylei   7/30/2020 11:30 [facility]   8/24/2020 11:00 AM John Moralez MD [facility]iff None       Lillian Prescott RN

## 2020-08-05 NOTE — CARE COORDINATION
[facility] Transitions Follow Up Call    2020    Patient: Radha Askew  Patient : 1928   MRN: [id number]  Reason for Admission: CKD  Discharge Date: 20 RARS: Readmission Risk Score: 28         Spoke with: Daughter Martina Torres  Daughter still not received DNR paper back from [address] [place] [address]. She contacted Medical records and left a message. No one contacted her back. She had paper done at [facility] [place] and will contact them for a copy. Explained PCP can also do another DNR for her as well. Daughter has no concerns or issues. States patient is still the same and hospice is coming to the home. Care Transitions Subsequent and Final Call    Subsequent and Final Calls  Do you have any ongoing symptoms?:  No  Have your medications changed?:  No  Do you have any questions related to your medications?:  No  Do you currently have any active services?:  No  Do you have any needs or concerns that I can assist you with?:  No  Identified Barriers:  None  Care Transitions Interventions  No Identified Needs  Other Interventions:             Follow Up  Future Appointments   Date Time Provider Olivia Deng   2020 11:00 AM [facility]       Eric Stone, RN

## 2020-08-12 NOTE — CARE COORDINATION
[facility] Transitions Follow Up Call    2020    Patient: Carla Loomis  Patient : 1928   MRN: [id number]  Reason for Admission:   Discharge Date: 20 RARS: Readmission Risk Score: 35         Attempted transition call. Left message to return call.     Follow Up  Future Appointments   Date Time Provider Olivia Deng   2020 11:00 AM Hamida Garcia MD [facility] None   9/3/2020  9:00 AM JOANIE Dickerson - CNP TIFF Jv Hadley RN

## 2020-08-13 NOTE — CARE COORDINATION
[facility] Transitions Follow Up Call    2020    Patient: Gregor Almanza  Patient : 1928   MRN: [id number]  Reason for Admission:   Discharge Date: 20 RARS: Readmission Risk Score: 35         Second attempt for transition call. Left message to return call.     Follow Up  Future Appointments   Date Time Provider Olivia Deng   2020 11:00 AM Delmy Beasley MD [facility] None   9/3/2020  9:00 AM Jay Pedroza APRN - CNP TIFF Miguel A Caal RN

## 2020-09-03 NOTE — PATIENT INSTRUCTIONS
SURVEY:    You may be receiving a survey from [employer] regarding your visit today. Please complete the survey to enable us to provide the highest quality of care to you and your family. If you cannot score us a very good on any question, please call the office to discuss how we could have made your experience a very good one. Thank you. Personalized Preventive Plan for Cammie Vitale - 9/3/2020  Medicare offers a range of preventive health benefits. Some of the tests and screenings are paid in full while other may be subject to a deductible, co-insurance, and/or copay. Some of these benefits include a comprehensive review of your medical history including lifestyle, illnesses that may run in your family, and various assessments and screenings as appropriate. After reviewing your medical record and screening and assessments performed today your provider may have ordered immunizations, labs, imaging, and/or referrals for you. A list of these orders (if applicable) as well as your Preventive Care list are included within your After Visit Summary for your review. Other Preventive Recommendations:    · A preventive eye exam performed by an eye specialist is recommended every 1-2 years to screen for glaucoma; cataracts, macular degeneration, and other eye disorders. · A preventive dental visit is recommended every 6 months. · Try to get at least 150 minutes of exercise per week or 10,000 steps per day on a pedometer . · Order or download the FREE \"Exercise & Physical Activity: Your Everyday Guide\" from [employer]. Call 2-350.870.2973 or search [employer] online. · You need 5903-8324 mg of calcium and 8986-8485 IU of vitamin D per day.  It is possible to meet your calcium requirement with diet alone, but a vitamin D supplement is usually necessary to meet this goal.  · When exposed to the sun, use a sunscreen that protects against both UVA and UVB radiation with an SPF of 30 or greater. Reapply every 2 to 3 hours or after sweating, drying off with a towel, or swimming. · Always wear a seat belt when traveling in a car. Always wear a helmet when riding a bicycle or motorcycle.

## 2020-09-03 NOTE — PROGRESS NOTES
Medicare Annual Wellness Visit  Name: Benigno Sales Date: 9/3/2020   MRN: [id number] Sex: Female   Age: 80 y.o. Ethnicity: Non-/Non    : 1928 Race: Jose Felix is here for Medicare AWV    Screenings for behavioral, psychosocial and functional/safety risks, and cognitive dysfunction are all negative except as indicated below. These results, as well as other patient data from the [address] form, are documented in Flowsheets linked to this Encounter. Allergies   Allergen Reactions    Belladonna          Prior to Visit Medications    Medication Sig Taking?  Authorizing Provider   ferrous sulfate (FE TABS 325) 325 (65 Fe) MG EC tablet Take 1 tablet by mouth daily (with breakfast) Yes JOANIE Becker CNP   albuterol sulfate  (90 Base) MCG/ACT inhaler inhale 2 puffs by mouth INTO THE LUNGS every 6 hours if needed for wheezing Yes JOANIE Vizcaino CNP   dilTIAZem (CARDIZEM CD) 120 MG extended release capsule Take 1 capsule by mouth daily Yes Anny Arnold PA-C   metoprolol tartrate (LOPRESSOR) 25 MG tablet take 1/2 tablet by mouth twice a day Yes JOANIE Mcelroy CNP   aspirin 81 MG EC tablet Take 1 tablet by mouth daily Yes Quoc Hinkle MD   budesonide-formoterol (SYMBICORT) 160-4.5 MCG/ACT AERO Inhale 2 puffs into the lungs 2 times daily Yes Quoc Hinkle MD   hydrALAZINE (APRESOLINE) 50 MG tablet Take 1 tablet by mouth every 8 hours Yes Quoc Hinkle MD   albuterol (PROVENTIL) (2.5 MG/3ML) 0.083% nebulizer solution Take 3 mLs by nebulization every 4 hours as needed for Wheezing Yes Ricki Balderrama MD   simvastatin (ZOCOR) 20 MG tablet take 1 tablet by mouth every evening  Patient taking differently: Take 20 mg by mouth nightly  Yes JOANIE Becker CNP   ELIQUIS 2.5 MG TABS tablet Take 2.5 mg by mouth 2 times daily  Yes Historical Provider, MD   furosemide (LASIX) 20 MG tablet Take 1 tablet by mouth every other day Start on 7/30/20  Leanna Gaspar PA-C   benzonatate (TESSALON) 100 MG capsule Take 100 mg by mouth 3 times daily as needed for Cough  Historical Provider, MD   nitroGLYCERIN (NITROSTAT) 0.4 MG SL tablet up to max of 3 total doses. If no relief after 1 dose, call 911. Patient not taking: Reported on 9/3/2020  Marely Clark MD         Past Medical History:   Diagnosis Date    Acute cystitis without hematuria 9/29/2016    Acute gout involving toe of right foot 4/19/2018    Acute renal failure superimposed on stage 4 chronic kidney disease ([address] 9/29/2016    Arthritis     Asthma     Atrial fibrillation with rapid ventricular response ([address] 2/17/2017    CAD (coronary artery disease)     [facility]est[facility]     Chronic kidney disease     COPD (chronic obstructive pulmonary disease) ([address]     COPD with acute exacerbation ([address] 3/24/2017    Examination of participant in clinical trial 5/20/13    6 year follow up, estimated completion JUN 2019    Gout 12/28/2017    Gout     H/O cardiovascular stress test 02/14/2017    H/O echocardiogram 02/14/2017    EF 55%. Mildly increased wall thickness of the LV. Evidence of diastolic dysfunction. Normal right ventricular size and function. Normal tricuspid valve structure and function. Mild tricuspid regurg    Hemarthrosis involving shoulder region, right 7/30/2018    Hx of transesophageal echocardiography (KRISTOPHER) for monitoring 02/20/2017    No clots  were visulaized in the left atrial appendage EF 55%.  Hyperlipidemia     Hypertension     MI (myocardial infarction) ([address]     2001    Pseudogout of right shoulder 7/30/2018    Renal artery stenosis (HCC) 6/22/2016    Left proximal 75%    Shingles     15 years ago       Past Surgical History:   Procedure Laterality Date    CARDIAC CATHETERIZATION  02/2017    LMCA: Normal 0% stenosis. LAD: Mid area 50-60% stenosis.  LCx: Patent proximal stent 30% stenosis distal to the stent in OM proximal area 50% stenosis. RCA: Minimal 30% mid area PDA stent  is patent <20% stenosis. PL branch diffuse disease.  CHOLECYSTECTOMY      CORONARY ANGIOPLASTY WITH STENT PLACEMENT      SPINE SURGERY           Family History   Problem Relation Age of Onset    Heart Disease Mother     Cancer Father        CareTeam (Including outside providers/suppliers regularly involved in providing care):   Patient Care Team:  JOANIE Sood CNP as PCP - General (Family Nurse Practitioner)  JOANIE Pina CNP as PCP - [facility] Empaneled Provider  Dimitris Rodriguez MD as Consulting Physician (Nephrology)    Wt Readings from Last 3 Encounters:   09/03/20 120 lb (54.4 kg)   07/28/20 118 lb 6.4 oz (53.7 kg)   07/25/20 122 lb (55.3 kg)     Vitals:    09/03/20 0911   BP: 119/73   Site: Left Upper Arm   Position: Sitting   Cuff Size: Large Adult   Pulse: 76   Resp: 22   Temp: 97.1 °F (36.2 °C)   TempSrc: Temporal   Weight: 120 lb (54.4 kg)   Height: 5' 2\" (1.575 m)     Body mass index is 21.95 kg/m². Based upon direct observation of the patient, evaluation of cognition reveals recent and remote memory intact. General Appearance: alert and oriented to person, place and time and in no acute distress  Pulmonary/Chest: decreased breath sounds noted  Cardiovascular: normal rate and normal S1 and S2    Patient's complete Health Risk Assessment and screening values have been reviewed and are found in Flowsheets. The following problems were reviewed today and where indicated follow up appointments were made and/or referrals ordered. Positive Risk Factor Screenings with Interventions:     General Health:  General  In general, how would you say your health is?: Good  In the past 7 days, have you experienced any of the following?  New or Increased Pain, New or Increased Fatigue, Loneliness, Social Isolation, Stress or Anger?: None of These  Do you get the social and emotional support that you need?: Yes  Do you have a Living Will?: (!) No  General Health Risk Interventions:  · No Living Will. Daughter is power of     Health Habits/Nutrition:  Health Habits/Nutrition  Do you exercise for at least 20 minutes 2-3 times per week?: Yes  Have you lost any weight without trying in the past 3 months?: No  Do you eat fewer than 2 meals per day?: (!) Yes  Have you seen a dentist within the past year?: (!) No  Body mass index is 21.95 kg/m². Health Habits/Nutrition Interventions:  · Nutritional issues:  patient is not ready to address his/her nutritional/weight issues at this time  · Dental exam overdue:  patient declines dental evaluation    Hearing/Vision:  No exam data present  Hearing/Vision  Do you or your family notice any trouble with your hearing?: (!) Yes  Do you have difficulty driving, watching TV, or doing any of your daily activities because of your eyesight?: (!) Yes  Have you had an eye exam within the past year?: Yes  Hearing/Vision Interventions:  · Hearing concerns:  patient declines any further evaluation/treatment for hearing issues  · Vision concerns:  patient declines any further evaluation/treatment for this issue    Safety:  Safety  Do you have working smoke detectors?: Yes  Have all throw rugs been removed or fastened?: Yes  Do you have non-slip mats or surfaces in all bathtubs/showers?: (!) No  Do all of your stairways have a railing or banister?: (!) No  Are your doorways, halls and stairs free of clutter?: Yes  Do you always fasten your seatbelt when you are in a car?: Yes  Safety Interventions:  · Home safety tips provided    Personalized Preventive Plan   Current Health Maintenance Status    There is no immunization history on file for this patient.      Health Maintenance   Topic Date Due    DTaP/Tdap/Td vaccine (1 - Tdap) 06/16/2021 (Originally 5/16/1947)    Shingles Vaccine (1 of 2) 06/16/2021 (Originally 5/16/1978)    Pneumococcal 65+ yrs at Risk Vaccine (1 of 2 - PCV13) 06/22/2021 (Originally 5/16/1993)    Flu
